# Patient Record
Sex: FEMALE | Race: WHITE | NOT HISPANIC OR LATINO | Employment: PART TIME | URBAN - METROPOLITAN AREA
[De-identification: names, ages, dates, MRNs, and addresses within clinical notes are randomized per-mention and may not be internally consistent; named-entity substitution may affect disease eponyms.]

---

## 2018-09-27 ENCOUNTER — HOSPITAL ENCOUNTER (EMERGENCY)
Facility: HOSPITAL | Age: 28
Discharge: HOME | End: 2018-09-27
Attending: EMERGENCY MEDICINE
Payer: MEDICAID

## 2018-09-27 VITALS
RESPIRATION RATE: 16 BRPM | SYSTOLIC BLOOD PRESSURE: 120 MMHG | OXYGEN SATURATION: 99 % | BODY MASS INDEX: 28.32 KG/M2 | HEART RATE: 88 BPM | WEIGHT: 150 LBS | TEMPERATURE: 98 F | HEIGHT: 61 IN | DIASTOLIC BLOOD PRESSURE: 60 MMHG

## 2018-09-27 DIAGNOSIS — J06.9 UPPER RESPIRATORY TRACT INFECTION, UNSPECIFIED TYPE: Primary | ICD-10-CM

## 2018-09-27 DIAGNOSIS — R05.9 COUGH: ICD-10-CM

## 2018-09-27 LAB
B-HCG UR QL: NEGATIVE
CTP QC/QA: YES

## 2018-09-27 PROCEDURE — 81025 URINE PREGNANCY TEST: CPT | Performed by: PHYSICIAN ASSISTANT

## 2018-09-27 PROCEDURE — 94640 AIRWAY INHALATION TREATMENT: CPT

## 2018-09-27 PROCEDURE — 99284 EMERGENCY DEPT VISIT MOD MDM: CPT | Mod: 25

## 2018-09-27 PROCEDURE — 99284 EMERGENCY DEPT VISIT MOD MDM: CPT | Mod: ,,, | Performed by: EMERGENCY MEDICINE

## 2018-09-27 PROCEDURE — 25000242 PHARM REV CODE 250 ALT 637 W/ HCPCS: Performed by: PHYSICIAN ASSISTANT

## 2018-09-27 PROCEDURE — 25000003 PHARM REV CODE 250: Performed by: PHYSICIAN ASSISTANT

## 2018-09-27 PROCEDURE — 27100098 HC SPACER

## 2018-09-27 RX ORDER — ALBUTEROL SULFATE 90 UG/1
2 AEROSOL, METERED RESPIRATORY (INHALATION)
Status: COMPLETED | OUTPATIENT
Start: 2018-09-27 | End: 2018-09-27

## 2018-09-27 RX ORDER — NAPROXEN 500 MG/1
500 TABLET ORAL 2 TIMES DAILY
COMMUNITY
End: 2019-01-23

## 2018-09-27 RX ORDER — BENZONATATE 100 MG/1
200 CAPSULE ORAL 3 TIMES DAILY PRN
Qty: 20 CAPSULE | Refills: 0 | Status: SHIPPED | OUTPATIENT
Start: 2018-09-27 | End: 2019-01-23

## 2018-09-27 RX ORDER — BENZONATATE 100 MG/1
200 CAPSULE ORAL
Status: COMPLETED | OUTPATIENT
Start: 2018-09-27 | End: 2018-09-27

## 2018-09-27 RX ORDER — ESCITALOPRAM OXALATE 20 MG/1
20 TABLET ORAL DAILY
COMMUNITY

## 2018-09-27 RX ORDER — IPRATROPIUM BROMIDE AND ALBUTEROL SULFATE 2.5; .5 MG/3ML; MG/3ML
3 SOLUTION RESPIRATORY (INHALATION)
Status: COMPLETED | OUTPATIENT
Start: 2018-09-27 | End: 2018-09-27

## 2018-09-27 RX ORDER — ALBUTEROL SULFATE 90 UG/1
2 AEROSOL, METERED RESPIRATORY (INHALATION) EVERY 6 HOURS PRN
Status: DISCONTINUED | OUTPATIENT
Start: 2018-09-27 | End: 2018-09-27

## 2018-09-27 RX ADMIN — IPRATROPIUM BROMIDE AND ALBUTEROL SULFATE 3 ML: .5; 3 SOLUTION RESPIRATORY (INHALATION) at 09:09

## 2018-09-27 RX ADMIN — BENZONATATE 200 MG: 100 CAPSULE ORAL at 10:09

## 2018-09-27 RX ADMIN — ALBUTEROL SULFATE 2 PUFF: 90 AEROSOL, METERED RESPIRATORY (INHALATION) at 10:09

## 2018-09-27 NOTE — ED PROVIDER NOTES
Encounter Date: 9/27/2018       History     Chief Complaint   Patient presents with    Cough     fever, congestion sunday monday. cough and wheezing started yesterday    Wheezing     The patient is a 28-year-old female presenting to the ER for evaluation of a cough.  Patient states the symptoms initially started with a subjective fever on Sunday.  She states that the cough started about 2 days ago.  Slightly productive.  Patient states that he is also wheezing and feels as though she can't catch her breath. Denies any sore throat, runny nose, ear pain or pressure.  No history of asthma or COPD.  Patient states that she has been taking Mucinex was only slight alleviation of her symptoms.  Denies any abdominal pain, vomiting. Patient's friend had a URI last week.  No recent antibiotic use.      The history is provided by the patient.     Review of patient's allergies indicates:  No Known Allergies  Past Medical History:   Diagnosis Date    Anxiety     Anxiety      Past Surgical History:   Procedure Laterality Date    LEG SURGERY       History reviewed. No pertinent family history.  Social History     Tobacco Use    Smoking status: Current Every Day Smoker     Types: Vaping with nicotine    Smokeless tobacco: Never Used   Substance Use Topics    Alcohol use: No     Frequency: Never    Drug use: No     Review of Systems   Constitutional: Positive for chills and fever.   HENT: Positive for congestion and rhinorrhea. Negative for ear pain and sore throat.    Respiratory: Positive for cough and shortness of breath.    Cardiovascular: Negative for chest pain and palpitations.   Gastrointestinal: Negative for abdominal pain, nausea and vomiting.   Musculoskeletal: Negative for myalgias.   Skin: Negative for rash.   Allergic/Immunologic: Negative for immunocompromised state.   Neurological: Negative for dizziness and weakness.   Hematological: Does not bruise/bleed easily.   Psychiatric/Behavioral: Negative for  confusion.       Physical Exam     Initial Vitals [09/27/18 0855]   BP Pulse Resp Temp SpO2   134/77 103 (!) 24 98.5 °F (36.9 °C) 97 %      MAP       --         Physical Exam    Constitutional: She appears well-developed and well-nourished. She is not diaphoretic. No distress.   HENT:   Head: Normocephalic and atraumatic.   Right Ear: Tympanic membrane normal.   Left Ear: Tympanic membrane normal.   Mouth/Throat: Uvula is midline, oropharynx is clear and moist and mucous membranes are normal.   Eyes: Conjunctivae and EOM are normal. Pupils are equal, round, and reactive to light.   Cardiovascular: Normal rate, regular rhythm, normal heart sounds and intact distal pulses.   Pulmonary/Chest: No respiratory distress. She has wheezes. She has rhonchi.   Neurological: She is alert and oriented to person, place, and time.   Skin: Skin is warm and dry.         ED Course   Procedures  Labs Reviewed   POCT URINE PREGNANCY          Imaging Results          X-Ray Chest PA And Lateral (Final result)  Result time 09/27/18 09:44:07    Final result by Evgeny Colorado Jr., MD (09/27/18 09:44:07)                 Impression:      No significant abnormality seen.      Electronically signed by: Evgeny Colorado MD  Date:    09/27/2018  Time:    09:44             Narrative:    EXAMINATION:  XR CHEST PA AND LATERAL    CLINICAL HISTORY:  Cough    TECHNIQUE:  PA and lateral views of the chest were performed.    COMPARISON:  None    FINDINGS:  Heart size and pulmonary vessels are normal.  The lungs are well aerated and clear.  No pleural fluid.  Bones are intact.                                       APC / Resident Notes:   Patient was seen in the ER promptly upon arrival.  She is afebrile, no acute distress. Physical examination reveals diffuse rhonchi and wheezing bilaterally.  Chest x-ray unremarkable for pneumonia.  Patient was given DuoNeb treatment in ED.    Upon reassessment lung sounds are heard better.  Wheezing has diminished.  She  was given albuterol inhaler and spacer in ED and was given instructions by respiratory.  Patient also prescribed home on Tessalon Perles for cough.  Symptoms likely secondary to viral etiology.  Advised increase handwashing.  She is stable for discharge at this time.    The care of this patient was overseen by attending physician who agrees with treatment, plan, and disposition.           Attending Attestation:     Physician Attestation Statement for NP/PA:   I have conducted a face to face encounter with this patient in addition to the NP/PA, due to Medical Complexity    Other NP/PA Attestation Additions:    History of Present Illness: 29 y/o female w/ URI symptoms and SOB x few days. Has sick contact. Afebrile. Nonproductive cough. No known intrinsic lung disease.   Physical Exam: Well appearing. Tachycardic. Bilateral wheezes that do not clear with cough.   Medical Decision Making: Mildly tachycardic. Afebrile. Normotensive. Duoneb x 1 here w/ improvement of symptoms. CXR viewed. No acute infiltrate, effusion or PTX on my read. Encourage fluids while sick. Supportive care for now. PA to provide rx for albuterol MDI PRN. Good for discharge. Return precautions discussed.                    Clinical Impression:   The primary encounter diagnosis was Upper respiratory tract infection, unspecified type. A diagnosis of Cough was also pertinent to this visit.      Disposition:   Disposition: Discharged  Condition: Stable (Good)                        Tammy Zazueta PA-C  09/27/18 [id number]       Jaswinder Boyle MD  09/28/18 [id number]

## 2018-09-27 NOTE — ED TRIAGE NOTES
Onset fever 4 day ago and began coughing and wheezing 2 days ago.  States she has bronchitis. Using mucinex. Denies sore throat or runny nose or ear pain . Had fever earlier.

## 2018-09-27 NOTE — DISCHARGE INSTRUCTIONS
Take medication as prescribed.  Continue taking Mucinex as previously.  Tylenol or Motrin for pain if needed.  Follow up with family doctor this week.  Return to the ER with concerning symptoms.    Use albuterol inhaler 3 times a day

## 2018-09-27 NOTE — ED NOTES
LOC: The patient is awake and alert; oriented x 3 and speaking appropriately.  APPEARANCE: Patient resting comfortably, patient is clean and well groomed  SKIN: warm and dry, normal skin turgor & moist mucus membranes, skin intact, no breakdown noted.  MUSCULOSKELETAL: Patient moving all extremities well, no obvious swelling or deformities noted  RESPIRATORY: Airway is open and patent, audible wheezing and moist cough, respirations are spontaneous, increased  effort and rate  CARDIAC: Patient has a normal rate, no peripheral edema noted, capillary refill < 3 seconds; No complaints of chest pain   ABDOMEN: Soft and denies dysuria or abd pain .

## 2019-01-22 ENCOUNTER — HOSPITAL ENCOUNTER (EMERGENCY)
Facility: OTHER | Age: 29
Discharge: HOME | End: 2019-01-22
Attending: EMERGENCY MEDICINE
Payer: MEDICAID

## 2019-01-22 VITALS
HEART RATE: 98 BPM | OXYGEN SATURATION: 96 % | TEMPERATURE: 98 F | HEIGHT: 61 IN | BODY MASS INDEX: 28.32 KG/M2 | SYSTOLIC BLOOD PRESSURE: 114 MMHG | DIASTOLIC BLOOD PRESSURE: 60 MMHG | RESPIRATION RATE: 18 BRPM | WEIGHT: 150 LBS

## 2019-01-22 DIAGNOSIS — M54.9 BACK PAIN: ICD-10-CM
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
## 2019-01-22 LAB
B-HCG UR QL: NEGATIVE
CTP QC/QA: YES

## 2019-01-22 PROCEDURE — 99283 EMERGENCY DEPT VISIT LOW MDM: CPT

## 2019-01-22 PROCEDURE — 81025 URINE PREGNANCY TEST: CPT | Performed by: EMERGENCY MEDICINE

## 2019-01-22 PROCEDURE — 25000003 PHARM REV CODE 250: Performed by: PHYSICIAN ASSISTANT

## 2019-01-22 RX ORDER — METHOCARBAMOL 500 MG/1
500 TABLET, FILM COATED ORAL 3 TIMES DAILY
Qty: 12 TABLET | Refills: 0 | Status: SHIPPED | OUTPATIENT
Start: 2019-01-22 | End: 2020-01-31 | Stop reason: CLARIF

## 2019-01-22 RX ORDER — METHOCARBAMOL 500 MG/1
1000 TABLET, FILM COATED ORAL
Status: COMPLETED | OUTPATIENT
Start: 2019-01-22 | End: 2019-01-22

## 2019-01-22 RX ORDER — ALPRAZOLAM 0.5 MG/1
0.5 TABLET ORAL 3 TIMES DAILY
COMMUNITY
End: 2023-04-12

## 2019-01-22 RX ADMIN — METHOCARBAMOL TABLETS 1000 MG: 500 TABLET, COATED ORAL at 06:01

## 2019-01-23 ENCOUNTER — HOSPITAL ENCOUNTER (EMERGENCY)
Facility: OTHER | Age: 29
Discharge: HOME | End: 2019-01-23
Attending: EMERGENCY MEDICINE
Payer: MEDICAID

## 2019-01-23 VITALS
SYSTOLIC BLOOD PRESSURE: 114 MMHG | RESPIRATION RATE: 18 BRPM | HEIGHT: 61 IN | WEIGHT: 150 LBS | BODY MASS INDEX: 28.32 KG/M2 | TEMPERATURE: 98 F | HEART RATE: 82 BPM | OXYGEN SATURATION: 97 % | DIASTOLIC BLOOD PRESSURE: 59 MMHG

## 2019-01-23 DIAGNOSIS — M54.2 NECK PAIN: ICD-10-CM

## 2019-01-23 DIAGNOSIS — S16.1XXA STRAIN OF NECK MUSCLE, INITIAL ENCOUNTER: Primary | ICD-10-CM

## 2019-01-23 LAB
B-HCG UR QL: NEGATIVE
CTP QC/QA: YES

## 2019-01-23 PROCEDURE — 81025 URINE PREGNANCY TEST: CPT | Performed by: PHYSICIAN ASSISTANT

## 2019-01-23 PROCEDURE — 96372 THER/PROPH/DIAG INJ SC/IM: CPT

## 2019-01-23 PROCEDURE — 99283 EMERGENCY DEPT VISIT LOW MDM: CPT | Mod: 25

## 2019-01-23 PROCEDURE — 63600175 PHARM REV CODE 636 W HCPCS: Performed by: PHYSICIAN ASSISTANT

## 2019-01-23 RX ORDER — KETOROLAC TROMETHAMINE 30 MG/ML
15 INJECTION, SOLUTION INTRAMUSCULAR; INTRAVENOUS
Status: COMPLETED | OUTPATIENT
Start: 2019-01-23 | End: 2019-01-23

## 2019-01-23 RX ADMIN — KETOROLAC TROMETHAMINE 15 MG: 30 INJECTION, SOLUTION INTRAMUSCULAR at 06:01
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
## 2019-01-24 ENCOUNTER — PES CALL (OUTPATIENT)
Dept: ADMINISTRATIVE | Facility: CLINIC | Age: 29
End: 2019-01-24
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
## 2019-01-30 ENCOUNTER — HOSPITAL ENCOUNTER (EMERGENCY)
Facility: OTHER | Age: 29
Discharge: HOME | End: 2019-01-30
Attending: EMERGENCY MEDICINE
Payer: MEDICAID

## 2019-01-30 VITALS
TEMPERATURE: 98 F | BODY MASS INDEX: 29.27 KG/M2 | OXYGEN SATURATION: 100 % | DIASTOLIC BLOOD PRESSURE: 62 MMHG | WEIGHT: 155 LBS | HEIGHT: 61 IN | SYSTOLIC BLOOD PRESSURE: 122 MMHG | HEART RATE: 84 BPM | RESPIRATION RATE: 16 BRPM

## 2019-01-30 DIAGNOSIS — S09.90XD INJURY OF HEAD, SUBSEQUENT ENCOUNTER: ICD-10-CM

## 2019-01-30 DIAGNOSIS — J32.9 SINUSITIS, UNSPECIFIED CHRONICITY, UNSPECIFIED LOCATION: Primary | ICD-10-CM

## 2019-01-30 DIAGNOSIS — S06.0X0D CONCUSSION WITHOUT LOSS OF CONSCIOUSNESS, SUBSEQUENT ENCOUNTER: ICD-10-CM

## 2019-01-30 LAB
B-HCG UR QL: NEGATIVE
CTP QC/QA: YES

## 2019-01-30 PROCEDURE — 99284 EMERGENCY DEPT VISIT MOD MDM: CPT | Mod: 25

## 2019-01-30 PROCEDURE — 81025 URINE PREGNANCY TEST: CPT | Performed by: EMERGENCY MEDICINE
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
## 2019-03-21 ENCOUNTER — HOSPITAL ENCOUNTER (EMERGENCY)
Facility: HOSPITAL | Age: 29
Discharge: HOME | End: 2019-03-22
Attending: EMERGENCY MEDICINE
Payer: MEDICAID

## 2019-03-21 VITALS
HEIGHT: 61 IN | TEMPERATURE: 98 F | RESPIRATION RATE: 18 BRPM | SYSTOLIC BLOOD PRESSURE: 119 MMHG | OXYGEN SATURATION: 98 % | BODY MASS INDEX: 30.21 KG/M2 | DIASTOLIC BLOOD PRESSURE: 73 MMHG | WEIGHT: 160 LBS | HEART RATE: 108 BPM
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
## 2019-03-21 DIAGNOSIS — M54.9 UPPER BACK PAIN ON LEFT SIDE: Primary | ICD-10-CM

## 2019-03-21 LAB
B-HCG UR QL: NEGATIVE
CTP QC/QA: YES

## 2019-03-21 PROCEDURE — 99282 PR EMERGENCY DEPT VISIT,LEVEL II: ICD-10-PCS | Mod: ,,, | Performed by: PHYSICIAN ASSISTANT

## 2019-03-21 PROCEDURE — 99283 EMERGENCY DEPT VISIT LOW MDM: CPT | Mod: 25

## 2019-03-21 PROCEDURE — 99282 EMERGENCY DEPT VISIT SF MDM: CPT | Mod: ,,, | Performed by: PHYSICIAN ASSISTANT

## 2019-03-21 PROCEDURE — 81025 URINE PREGNANCY TEST: CPT | Performed by: PHYSICIAN ASSISTANT

## 2019-03-21 NOTE — ED NOTES
LOC: The patient is awake, alert, and aware of environment. The patient is oriented x 3 and speaking appropriately.   APPEARANCE: No acute distress noted.   PSYCHOSOCIAL: Patient is calm and cooperative.   SKIN: The skin is warm, dry.   RESPIRATORY: Airway is open and patent. Bilateral chest rise and fall. Respirations are spontaneous, even and unlabored. Normal effort and rate noted. No accessory muscle use noted.   CARDIAC:  Denies chest pain or SOB.   ABDOMEN: Soft and non tender to palpation. No distention noted.   URINARY:  Voids independently.   NEUROLOGIC: Eyes open spontaneously. Speech clear. Tolerating saliva secretions well. Able to follow commands, demonstrating ability to actively and appropriately communicate within context of current conversation. Symmetrical facial muscles. Moving all extremities well. Movement is purposeful.   MUSCULOSKELETAL: No obvious deformities noted.
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
## 2019-08-15 ENCOUNTER — TELEPHONE (OUTPATIENT)
Dept: SPORTS MEDICINE | Facility: CLINIC | Age: 29
End: 2019-08-15

## 2019-08-29 ENCOUNTER — TELEPHONE (OUTPATIENT)
Dept: SPORTS MEDICINE | Facility: CLINIC | Age: 29
End: 2019-08-29

## 2019-08-29 NOTE — TELEPHONE ENCOUNTER
Attempted to telephone patient to reschedule 9/04/19, 8:00 am appointment to later in the day due to Dr. Henderson being called away from clinic. Unable to leave message (mailbox full).

## 2019-08-29 NOTE — TELEPHONE ENCOUNTER
Attempted to telephone in an effort to notify patient of their 9/04/19 appointment cancellation. No answer when telephoning.

## 2019-09-11 ENCOUNTER — HOSPITAL ENCOUNTER (OUTPATIENT)
Dept: RADIOLOGY | Facility: HOSPITAL | Age: 29
Discharge: HOME | End: 2019-09-11
Attending: ORTHOPAEDIC SURGERY
Payer: MEDICAID

## 2019-09-11 ENCOUNTER — OFFICE VISIT (OUTPATIENT)
Dept: SPORTS MEDICINE | Facility: CLINIC | Age: 29
End: 2019-09-11
Payer: MEDICAID

## 2019-09-11 VITALS
HEIGHT: 61 IN | BODY MASS INDEX: 30.21 KG/M2 | SYSTOLIC BLOOD PRESSURE: 114 MMHG | HEART RATE: 98 BPM | DIASTOLIC BLOOD PRESSURE: 70 MMHG | WEIGHT: 160 LBS

## 2019-09-11 DIAGNOSIS — M25.561 CHRONIC PAIN OF RIGHT KNEE: ICD-10-CM

## 2019-09-11 DIAGNOSIS — G89.29 CHRONIC MIDLINE LOW BACK PAIN WITHOUT SCIATICA: ICD-10-CM

## 2019-09-11 DIAGNOSIS — G89.29 CHRONIC PAIN OF RIGHT KNEE: ICD-10-CM

## 2019-09-11 DIAGNOSIS — M25.551 BILATERAL HIP PAIN: Primary | ICD-10-CM

## 2019-09-11 DIAGNOSIS — M25.551 BILATERAL HIP PAIN: ICD-10-CM

## 2019-09-11 DIAGNOSIS — M54.50 CHRONIC MIDLINE LOW BACK PAIN WITHOUT SCIATICA: ICD-10-CM

## 2019-09-11 DIAGNOSIS — M25.552 BILATERAL HIP PAIN: ICD-10-CM

## 2019-09-11 DIAGNOSIS — M46.1 SACROILIITIS: ICD-10-CM

## 2019-09-11 DIAGNOSIS — M21.70 LEG LENGTH DISCREPANCY: ICD-10-CM

## 2019-09-11 DIAGNOSIS — Q65.89 DDH (DEVELOPMENTAL DYSPLASIA OF THE HIP): ICD-10-CM

## 2019-09-11 DIAGNOSIS — M25.552 BILATERAL HIP PAIN: Primary | ICD-10-CM

## 2019-09-11 PROCEDURE — 99212 OFFICE O/P EST SF 10 MIN: CPT | Mod: PBBFAC,25,27,PO | Performed by: FAMILY MEDICINE

## 2019-09-11 PROCEDURE — 99999 PR PBB SHADOW E&M-EST. PATIENT-LVL V: ICD-10-PCS | Mod: PBBFAC,,, | Performed by: ORTHOPAEDIC SURGERY

## 2019-09-11 PROCEDURE — 73521 X-RAY EXAM HIPS BI 2 VIEWS: CPT | Mod: TC,FY,PO

## 2019-09-11 PROCEDURE — 77073 BONE LENGTH STUDIES: CPT | Mod: TC,FY,PO

## 2019-09-11 PROCEDURE — 72110 X-RAY EXAM L-2 SPINE 4/>VWS: CPT | Mod: TC,FY,PO

## 2019-09-11 PROCEDURE — 20611 DRAIN/INJ JOINT/BURSA W/US: CPT | Mod: PBBFAC,PO | Performed by: FAMILY MEDICINE

## 2019-09-11 PROCEDURE — 99499 UNLISTED E&M SERVICE: CPT | Mod: S$PBB,,, | Performed by: FAMILY MEDICINE

## 2019-09-11 PROCEDURE — 77073 BONE LENGTH STUDIES: CPT | Mod: 26,,, | Performed by: RADIOLOGY

## 2019-09-11 PROCEDURE — 72110 XR LUMBAR SPINE 5 VIEW WITH FLEX AND EXT: ICD-10-PCS | Mod: 26,,, | Performed by: RADIOLOGY

## 2019-09-11 PROCEDURE — 73560 X-RAY EXAM OF KNEE 1 OR 2: CPT | Mod: TC,FY,PO,RT

## 2019-09-11 PROCEDURE — 73521 XR HIPS BILATERAL 2 VIEW INCL AP PELVIS: ICD-10-PCS | Mod: 26,,, | Performed by: RADIOLOGY

## 2019-09-11 PROCEDURE — 99215 OFFICE O/P EST HI 40 MIN: CPT | Mod: PBBFAC,25,PO | Performed by: ORTHOPAEDIC SURGERY

## 2019-09-11 PROCEDURE — 99999 PR PBB SHADOW E&M-EST. PATIENT-LVL II: ICD-10-PCS | Mod: PBBFAC,,, | Performed by: FAMILY MEDICINE

## 2019-09-11 PROCEDURE — 72110 X-RAY EXAM L-2 SPINE 4/>VWS: CPT | Mod: 26,,, | Performed by: RADIOLOGY

## 2019-09-11 PROCEDURE — 73560 X-RAY EXAM OF KNEE 1 OR 2: CPT | Mod: 26,RT,, | Performed by: RADIOLOGY

## 2019-09-11 PROCEDURE — 73560 XR KNEE 1 OR 2 VIEW RIGHT: ICD-10-PCS | Mod: 26,RT,, | Performed by: RADIOLOGY

## 2019-09-11 PROCEDURE — 99204 OFFICE O/P NEW MOD 45 MIN: CPT | Mod: S$PBB,,, | Performed by: ORTHOPAEDIC SURGERY

## 2019-09-11 PROCEDURE — 99999 PR PBB SHADOW E&M-EST. PATIENT-LVL V: CPT | Mod: PBBFAC,,, | Performed by: ORTHOPAEDIC SURGERY

## 2019-09-11 PROCEDURE — 99499 NO LOS: ICD-10-PCS | Mod: S$PBB,,, | Performed by: FAMILY MEDICINE

## 2019-09-11 PROCEDURE — 20611 LARGE JOINT ASPIRATION/INJECTION: L HIP JOINT: ICD-10-PCS | Mod: S$PBB,LT,, | Performed by: FAMILY MEDICINE

## 2019-09-11 PROCEDURE — 99204 PR OFFICE/OUTPT VISIT, NEW, LEVL IV, 45-59 MIN: ICD-10-PCS | Mod: S$PBB,,, | Performed by: ORTHOPAEDIC SURGERY

## 2019-09-11 PROCEDURE — 77073 XR HIP TO ANKLE: ICD-10-PCS | Mod: 26,,, | Performed by: RADIOLOGY

## 2019-09-11 PROCEDURE — 73521 X-RAY EXAM HIPS BI 2 VIEWS: CPT | Mod: 26,,, | Performed by: RADIOLOGY

## 2019-09-11 PROCEDURE — 99999 PR PBB SHADOW E&M-EST. PATIENT-LVL II: CPT | Mod: PBBFAC,,, | Performed by: FAMILY MEDICINE

## 2019-09-11 RX ORDER — TRAMADOL HYDROCHLORIDE 50 MG/1
50 TABLET ORAL EVERY 12 HOURS PRN
Status: ON HOLD | COMMUNITY
End: 2020-02-27 | Stop reason: HOSPADM

## 2019-09-11 RX ORDER — TRIAMCINOLONE ACETONIDE 40 MG/ML
40 INJECTION, SUSPENSION INTRA-ARTICULAR; INTRAMUSCULAR
Status: DISCONTINUED | OUTPATIENT
Start: 2019-09-11 | End: 2019-09-11 | Stop reason: HOSPADM

## 2019-09-11 RX ORDER — DICLOFENAC SODIUM 50 MG/1
50 TABLET, DELAYED RELEASE ORAL 2 TIMES DAILY
Status: ON HOLD | COMMUNITY
End: 2020-02-27 | Stop reason: HOSPADM

## 2019-09-11 RX ORDER — GABAPENTIN 300 MG/1
300 CAPSULE ORAL 3 TIMES DAILY
Status: ON HOLD | COMMUNITY
End: 2020-02-27 | Stop reason: HOSPADM

## 2019-09-11 RX ADMIN — TRIAMCINOLONE ACETONIDE 40 MG: 40 INJECTION, SUSPENSION INTRA-ARTICULAR; INTRAMUSCULAR at 01:09

## 2019-09-11 NOTE — PROGRESS NOTES
Subjective:          Chief Complaint: Scarlett Knox is a 29 y.o. female who had concerns including Pain of the Left Hip and Pain of the Right Hip.    Patient is kind of females refer to Dr. Rios by Dr. Parrish.  Patient presents with primary complaint of low back pain and radiculopathy into the right lower extremity with associated limp.  Patient reports a history of bilateral developmental dysplasia of the hip, with multiple surgeries of the hip and the knees during childhood.  She reports that her low back pain and limp has significantly progressed over the past few months, and is limiting her ability to walk.  She can only walk a couple feet before she has to stop due to low back pain and deconditioning.  She is on gabapentin.  She denies any lumbar injections. Denies any groin pain. Reports that she had a right shoe lift due to leg length discrepancy, which she has not worn over the past few years because she lost it. She reports that her limp is significantly impairing her ability to go up and down stairs.  She reports pain and numbness going into the posterior thigh and calf and the plantar foot on the right.          Review of Systems   Constitution: Negative.   HENT: Negative.    Eyes: Negative.    Cardiovascular: Negative.    Respiratory: Negative.    Endocrine: Negative.    Hematologic/Lymphatic: Negative.    Skin: Negative.    Musculoskeletal: Positive for joint pain.   Gastrointestinal: Negative.    Genitourinary: Negative.    Neurological: Negative.    Psychiatric/Behavioral: Negative.    Allergic/Immunologic: Negative.                    Objective:        General: Scarlett is well-developed, well-nourished, appears stated age, in no acute distress, alert and oriented to time, place and person.     General    Vitals reviewed.  Constitutional: She is oriented to person, place, and time. She appears well-developed and well-nourished. No distress.   HENT:   Mouth/Throat: No oropharyngeal exudate.    Eyes: Right eye exhibits no discharge. Left eye exhibits no discharge.   Neck: Normal range of motion.   Cardiovascular: Normal rate.    Pulmonary/Chest: Effort normal and breath sounds normal. No respiratory distress.   Neurological: She is alert and oriented to person, place, and time. She has normal reflexes. No cranial nerve deficit. Coordination normal.   Psychiatric: She has a normal mood and affect. Her behavior is normal. Judgment and thought content normal.     General Musculoskeletal Exam   Gait: abnormal and antalgic   Pelvic Obliquity: none      Right Knee Exam     Inspection   Alignment:  normal  Effusion: absent    Left Knee Exam     Inspection   Alignment:  normal  Effusion: absent    Right Hip Exam     Inspection   Scars: absent  Swelling: absent  Bruising: absent  No deformity of hip.  Quadriceps Atrophy:  Negative  Erythema: absent    Range of Motion   Abduction:  40 normal   Adduction:  20 normal   Extension:  0 normal   Flexion:  90 normal   External rotation:  10 normal   Internal rotation:  15 normal     Tests   Pain w/ forced internal rotation (SAFIA): absent  Pain w/ forced external rotation (FADIR): absent  Frank: negative  Trendelenburg Test: negative  Circumduction test: negative  Stinchfield test: negative  Log Roll: negative  Snapping Hip (internal): negative  Step-down test: negative  Resisted sit-up pain: negative  Unresisted sit-up pain: negative  Resisted sit-up pain with adductor contraction pain: negative    Other   Sensation: normal    Comments:  Right leg is about 2 cm shorter than the left.  Left Hip Exam     Inspection   Scars: absent  Swelling: absent  No deformity of hip.  Quadriceps Atrophy:  negative  Erythema: absent  Bruising: absent    Tenderness   The patient tender to palpation of the SI joint.    Range of Motion   Abduction:  40 normal   Adduction:  20 normal   Extension:  0 normal   Flexion:  100 normal   External rotation:  20 normal   Internal rotation: 15 normal      Tests   Pain w/ forced internal rotation (SAFIA): absent  Pain w/ forced external rotation (FADIR): absent  Frank: negative  Trendelenburg Test: negative  Circumduction test: negative  Stinchfield test: negative  Log Roll: negative  Snapping Hip (internal): negative  Step-down test: negative  Resisted sit-up pain: negative  Resisted sit-up pain with adductor contraction pain: negative  Unresisted sit-up pain: negative    Other   Sensation: normal      Back (L-Spine & T-Spine) / Neck (C-Spine) Exam     Back (L-Spine & T-Spine) Range of Motion   The patient has abnormal back ROM.    Comments:  5/5 strength in the L2 to S1 nerve distributions.  Sensation 2/2 L2-S1 distributions. 3+ deep tendon reflexes for the Achilles and patellar tendons.  Positive Kamari's test on the Left, negative on the Right      Muscle Strength   Right Lower Extremity   Hip Abduction: 5/5   Hip Adduction: 5/5   Hip Flexion: 5/5   Left Lower Extremity   Hip Abduction: 5/5   Hip Adduction: 5/5   Hip Flexion: 5/5     Reflexes     Left Side  Quadriceps:  2+  Achilles:  2+    Right Side   Quadriceps:  2+  Achilles:  2+    Vascular Exam     Right Pulses  Dorsalis Pedis:      2+  Posterior Tibial:      2+        Left Pulses  Dorsalis Pedis:      2+  Posterior Tibial:      2+        Capillary Refill  Right Hand: normal capillary refill  Left Hand: normal capillary refill    Edema  Right Upper Leg: absent  Left Upper Leg: absent    IMAGING  AP of the pelvis with bilateral frog-leg laterals from today was personally reviewed and reveals developmental dysplasia of the hip with minimal femoral neck offset. There is bilateral subchondral sclerosis, however joint space is well preserved.  No evidence of fractures or dislocations.    AP and lateral flexion-extension x-rays of the lumbar spine from today was personally reviewed, and reveals no evidence of spondylolisthesis or spondylolysis.  All 3 columns are well aligned.  No evidence of degenerative disc  disease.        Assessment:       Encounter Diagnoses   Name Primary?    Bilateral hip pain Yes    Chronic midline low back pain without sciatica     DDH (developmental dysplasia of the hip)     Chronic pain of right knee     Sacroiliitis     Leg length discrepancy           Plan:         1. Juarez Hip Score was filled out today in clinic.     RTC in 3 months with Maria Ragland PA-C. Patient will fill out Juarez Hip Score on return.    2. Physical Therapy referral: Lumbar and pelvic mobilizations, and Left iliotibial band stretching on the Left.    [facility].    4. Referral to Orthotist for Shoe lift on the Right (3.5 cm).              [facility] patient questionnaires have been collected today.

## 2019-09-11 NOTE — PROGRESS NOTES
Scarlett Knox, a 29 y.o. female, is here for evaluation of SI Joint/Lumbar spine.     History of Present Illness (HPI): low back pain and radiculopathy into the right lower extremity with associated limp.  Patient reports a history of bilateral developmental dysplasia of the hip, with multiple surgeries of the hip and the knees during childhood which she had multiple surgeries in childhood to attempt correction.     Location: SI Joint   [Onset] duration: Chronic, significant progression of the past few months  [Palliative] modifying factors:    oral analgesics - Gabapentin   [Provocative] modifying factors:    ambulating more than a few feet   going up and down stairs  Prior:  has a past surgical history that includes Leg Surgery.  Progression: worsening discomfot  Quality:    sharp  [Radiation] associated signs and symptoms: anterior thigh numbness  Severity: per nursing documentation  [Trauma] context: none specific     Review of systems (ROS):  A 10+ review of systems was performed with pertinent positives and negatives noted above in the history of present illness. Other systems were negative unless otherwise specified.      PHYSICAL EXAMINATION  General:  The patient is alert and oriented x 3.  Mood is pleasant.  Observation of ears, eyes and nose reveal no gross abnormalities.  HEENT: NCAT, sclera nonicteric  Lungs: Respirations are equal and unlabored.   Gait is coordinated. Patient can toe walk and heel walk without difficulty.    HIP/PELVIS EXAMINATION    Observation/Inspection  Gait:   Nonantalgic   Alignment:  Neutral   Scars:   None   Muscle atrophy: None   Effusion:  None   Warmth:  None   Discoloration:   None   Leg lengths:   Equal   Pelvis:   Level     Tenderness/Crepitus (T/C):      T / C  Trochanteric bursa   - / -  Piriformis    - / -  SI joint    - / -  Psoas tendon   - / -  Rectus insertion  - / -  Adductor insertion  - / -  Pubic symphysis  - / -    ROM: (* = pain)    Flexion:      120  degrees  External rotation:   40 degrees  Internal rotation with axial load:  30 degrees  Internal rotation without axial load:  40 degrees  Abduction:    45 degrees  Adduction:     20 degrees    Special Tests:  Pain w/ forced internal rotation (FADIR):  -   Pain w/ forced external rotation (SAFIA):  -   Circumduction test:     -  Stinchfield test:     -   Log roll:       -   Snapping hip (internal):    -   Sit-up pain:      -   Resisted sit-up pain:     -   Resisted sit-up with adductor contraction pain:  -   Step-down test:     +  Trendelenburg test:     -  Bridge test      +     Extremity Neuro-vascular Examination:   Sensation:  Grossly intact to light touch all dermatomal regions.   Motor Function:  Fully intact motor function at hip, knee, foot and ankle    DTRs;  quadriceps and  achilles 2+.  No clonus and downgoing Babinski.    Vascular status:  DP and PT pulses 2+, brisk capillary refill, symmetric.    Skin:  intact, compartments soft.    Other Findings:

## 2019-09-11 NOTE — PROCEDURES
"Large Joint Aspiration/Injection: L hip joint  Date/Time: 9/11/2019 1:10 PM  Performed by: Papo Van MD  Authorized by: Papo Van MD     Consent Done?:  Yes (Verbal)  Indications:  Pain  Procedure site marked: Yes    Timeout: Prior to procedure the correct patient, procedure, and site was verified      Location:  Hip  Site:  L hip joint  Prep: Patient was prepped and draped in usual sterile fashion    Needle size:  20 G  Ultrasonic Guidance for needle placement: Yes  Images are saved and documented.  Approach:  Posterior  Medications:  40 mg triamcinolone acetonide 40 mg/mL  Patient tolerance:  Patient tolerated the procedure well with no immediate complications    Additional Comments: Sacroiliac joint  Description of ultrasound utilization for needle guidance:   Ultrasound guidance used for needle localization. Images saved and stored for documentation. The sacroiliac joint was visualized. Dynamic visualization of the 20g x 3.5" needle was continuous throughout the procedure.      "

## 2019-09-11 NOTE — LETTER
September 11, 2019      Pat Henderson MD  [address]  [place] [address]           [facility]  [address]  [place] [address]  Phone: [phone]          Patient: Scarlett Knox   MR Number: [id number]   YOB: 1990   Date of Visit: 9/11/2019       Dear Dr. Pat Henderson:    Thank you for referring Scarlett Knox to me for evaluation. Attached you will find relevant portions of my assessment and plan of care.    If you have questions, please do not hesitate to call me. I look forward to following Scarlett Knox along with you.    Sincerely,    Papo Van MD    Enclosure  CC:  No Recipients    If you would like to receive this communication electronically, please contact [email or web address] or [phone] to request more information on [email or web address] Link access.    For providers and/or their staff who would like to refer a patient to [facility], please contact us through our one-stop-shop provider referral line, [facility], at [phone].    If you feel you have received this communication in error or would no longer like to receive these types of communications, please e-mail [email or web address]

## 2019-09-26 ENCOUNTER — CLINICAL SUPPORT (OUTPATIENT)
Dept: REHABILITATION | Facility: HOSPITAL | Age: 29
End: 2019-09-26
Attending: ORTHOPAEDIC SURGERY
Payer: MEDICAID

## 2019-09-26 DIAGNOSIS — M25.551 BILATERAL HIP PAIN: Primary | ICD-10-CM

## 2019-09-26 DIAGNOSIS — M25.552 BILATERAL HIP PAIN: Primary | ICD-10-CM

## 2019-09-26 PROCEDURE — 97161 PT EVAL LOW COMPLEX 20 MIN: CPT

## 2019-09-26 PROCEDURE — 97110 THERAPEUTIC EXERCISES: CPT
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
## 2019-09-30 ENCOUNTER — CLINICAL SUPPORT (OUTPATIENT)
Dept: REHABILITATION | Facility: HOSPITAL | Age: 29
End: 2019-09-30
Attending: ORTHOPAEDIC SURGERY
Payer: MEDICAID

## 2019-09-30 DIAGNOSIS — M25.551 BILATERAL HIP PAIN: ICD-10-CM

## 2019-09-30 DIAGNOSIS — M25.552 BILATERAL HIP PAIN: ICD-10-CM

## 2019-09-30 PROCEDURE — 97110 THERAPEUTIC EXERCISES: CPT

## 2019-09-30 NOTE — PROGRESS NOTES
"Physical Therapy Daily Treatment Note     Name: Scarlett Knox  Clinic Number: [id number]    Therapy Diagnosis:   Encounter Diagnosis   Name Primary?    Bilateral hip pain      Physician: Pat Henderson MD    Visit Date: 9/30/2019  Physician Orders: PT Eval and Treat   Medical Diagnosis from Referral:   M25.551,M25.552 (ICD-10-CM) - Bilateral hip pain   M54.5,G89.29 (ICD-10-CM) - Chronic midline low back pain without sciatica   Q65.89 (ICD-10-CM) - DDH (developmental dysplasia of the hip)   M25.561,G89.29 (ICD-10-CM) - Chronic pain of right knee   M46.1 (ICD-10-CM) - Sacroiliitis   M21.70 (ICD-10-CM) - Leg length discrepancy         Evaluation Date: 9/26/2019  Authorization Period Expiration: 9/30/19  Plan of Care Expiration: 12/19/19  Visit # / Visits authorized: 2/ 1      Time In: 2:00  Time Out: 3:00  Total Billable Time: 23 minutes    Precautions: Standard    Subjective     Pt reports: rolled my ankle over the weakend  She was not compliant with home exercise program.  Response to previous treatment: none  Functional change: none    Pain: 0/10 (number not verb)  Location: right hip and back      Objective     Scarlett received therapeutic exercises to develop strength, endurance, ROM and core stabilization for 60 minutes including:  Bike x 10 min  Seated HS S  Prone quad/hip flexor stretch with strap x30 sec B  Slump glides 2x10 R  Prone press ups x10 (decreased radicular symptoms)  Clamshells with neutral hip 15x: 5" hold  SLR 3x10  SL hip iso hold 5x5"    Add next session:  Edgar Pantoja participated in dynamic functional therapeutic activities to improve functional performance for 00  minutes, including:      Scarlett participated in gait training to improve functional mobility and safety for 00  minutes, including:      received the following manual therapy techniques: Joint mobilizations and Soft tissue Mobilization were applied to the: R LE for 00 minutes, including:          Home Exercises " Provided and Patient Education Provided     Education provided:   - cont HEP, Importance    Written Home Exercises Provided: Patient instructed to cont prior HEP.  Exercises were reviewed and Scarlett was able to demonstrate them prior to the end of the session.  Scarlett demonstrated good  understanding of the education provided.     See EMR under Patient Instructions for exercises provided prior visit.    Assessment     Pt has not been compliant w/ HEP since eval 2* having company in town. Ed on importance of performing HEP daily. Strengthening ex challenging for pt 2* decreased strength and endurance. Back pain noted w/ laying on stomach.   Scarlett is progressing well towards her goals.   Pt prognosis is Good.     Pt will continue to benefit from skilled outpatient physical therapy to address the deficits listed in the problem list box on initial evaluation, provide pt/family education and to maximize pt's level of independence in the home and community environment.     Pt's spiritual, cultural and educational needs considered and pt agreeable to plan of care and goals.    Anticipated barriers to physical therapy: Good    Goals: Short Term Goals: 4 weeks   - Pt will increase ROM to 110 deg R hip flex, 30 deg L hip IR, 30 deg R hip ER for more functional mobility  - Pt will increase strength to 4/5 B hip abd to improve gait and WB tolerance  - Decrease Pain to 4/10 as worst with standing/walking for 5 minutes  - Pt to self correct posture with minimal cues  - Pt independent with HEP with progressions.      Long Term Goals (12 Weeks):  - Pt will tolerate walking 200ft without an AD  - Pt will increase strength to 4+/5 BLE to tolerate ascending and descending stairs without major compensations  - Decrease Pain to 2/10 as worst with beginner yoga classes to improve muscle flexibility  - Pt to return to 60% PLOF    Plan     Cont w/ poc to to focus on core/LE strength as well as hip ROM.    Regina Montana, PTA

## 2019-10-03 ENCOUNTER — CLINICAL SUPPORT (OUTPATIENT)
Dept: REHABILITATION | Facility: HOSPITAL | Age: 29
End: 2019-10-03
Attending: ORTHOPAEDIC SURGERY
Payer: MEDICAID

## 2019-10-03 DIAGNOSIS — M25.551 BILATERAL HIP PAIN: ICD-10-CM

## 2019-10-03 DIAGNOSIS — M25.552 BILATERAL HIP PAIN: ICD-10-CM

## 2019-10-03 PROCEDURE — 97110 THERAPEUTIC EXERCISES: CPT

## 2019-10-03 NOTE — PROGRESS NOTES
"Physical Therapy Daily Treatment Note     Name: Scarlett Knox  Clinic Number: [id number]    Therapy Diagnosis:   Encounter Diagnosis   Name Primary?    Bilateral hip pain      Physician: Pat Henderson MD    Visit Date: 10/3/2019  Physician Orders: PT Eval and Treat   Medical Diagnosis from Referral:   M25.551,M25.552 (ICD-10-CM) - Bilateral hip pain   M54.5,G89.29 (ICD-10-CM) - Chronic midline low back pain without sciatica   Q65.89 (ICD-10-CM) - DDH (developmental dysplasia of the hip)   M25.561,G89.29 (ICD-10-CM) - Chronic pain of right knee   M46.1 (ICD-10-CM) - Sacroiliitis   M21.70 (ICD-10-CM) - Leg length discrepancy         Evaluation Date: 9/26/2019  Authorization Period Expiration: 9/30/19  Plan of Care Expiration: 12/19/19  Visit # / Visits authorized: 3/ 1      Time In: 1:15 pm  Time Out: 200 pm  Total Billable Time: 45 minutes    Precautions: Standard    Subjective     Pt reports: she is fatigued and sore today.  She was not compliant with home exercise program.  Response to previous treatment: none  Functional change: none    Pain: 0/10 (number not verb)  Location: right hip and back      Objective     Scarlett received therapeutic exercises to develop strength, endurance, ROM and core stabilization for 60 minutes including:  Bike x 10 min-np  LTR x3 min  DKC x3 min  Seated HS S x1 min B  Prone quad/hip flexor stretch with strap x30 sec B-np  Slump glides 2x10 R-np  Prone press ups x10 (decreased radicular symptoms)-np  Clamshells with neutral hip 15x: 5" hold-np  SLR 2x10 B  Hip abd and add pilates ring isometerics 10 sec x10 ea way  Bridges 2x10  SL IR with ball btwn knees 10 sec x10        Home Exercises Provided and Patient Education Provided     Education provided:   - cont HEP, Importance    Written Home Exercises Provided: Patient instructed to cont prior HEP.  Exercises were reviewed and Scarlett was able to demonstrate them prior to the end of the session.  Scarlett demonstrated good  " understanding of the education provided.     See EMR under Patient Instructions for exercises provided prior visit.    Assessment     Pt treatment slightly limited secondary to patient starting treatment with a lot of fatigue and soreness. She was able to tolerate all exercises otherwise. Plan to progress pending patient energy level next visit.  Scarlett is progressing well towards her goals.   Pt prognosis is Good.     Pt will continue to benefit from skilled outpatient physical therapy to address the deficits listed in the problem list box on initial evaluation, provide pt/family education and to maximize pt's level of independence in the home and community environment.     Pt's spiritual, cultural and educational needs considered and pt agreeable to plan of care and goals.    Anticipated barriers to physical therapy: Good    Goals: Short Term Goals: 4 weeks   - Pt will increase ROM to 110 deg R hip flex, 30 deg L hip IR, 30 deg R hip ER for more functional mobility (Progressing, not met)  - Pt will increase strength to 4/5 B hip abd to improve gait and WB tolerance (Progressing, not met)  - Decrease Pain to 4/10 as worst with standing/walking for 5 minutes (Progressing, not met)  - Pt to self correct posture with minimal cues (Progressing, not met)  - Pt independent with HEP with progressions.  (Progressing, not met)     Long Term Goals (12 Weeks):  - Pt will tolerate walking 200ft without an AD (Progressing, not met)  - Pt will increase strength to 4+/5 BLE to tolerate ascending and descending stairs without major compensations (Progressing, not met)  - Decrease Pain to 2/10 as worst with beginner yoga classes to improve muscle flexibility (Progressing, not met)  - Pt to return to 60% PLOF (Progressing, not met)    Plan     Cont w/ poc to to focus on core/LE strength as well as hip ROM.    Angelica Álvarez, PT

## 2019-10-10 ENCOUNTER — CLINICAL SUPPORT (OUTPATIENT)
Dept: REHABILITATION | Facility: HOSPITAL | Age: 29
End: 2019-10-10
Attending: ORTHOPAEDIC SURGERY
Payer: MEDICAID

## 2019-10-10 DIAGNOSIS — M25.551 BILATERAL HIP PAIN: ICD-10-CM

## 2019-10-10 DIAGNOSIS — M25.552 BILATERAL HIP PAIN: ICD-10-CM

## 2019-10-10 PROCEDURE — 97110 THERAPEUTIC EXERCISES: CPT

## 2019-10-10 NOTE — PROGRESS NOTES
"Physical Therapy Daily Treatment Note     Name: Scarlett Knox  Clinic Number: [id number]    Therapy Diagnosis:   Encounter Diagnosis   Name Primary?    Bilateral hip pain      Physician: Pat Henderson MD    Visit Date: 10/10/2019  Physician Orders: PT Eval and Treat   Medical Diagnosis from Referral:   M25.551,M25.552 (ICD-10-CM) - Bilateral hip pain   M54.5,G89.29 (ICD-10-CM) - Chronic midline low back pain without sciatica   Q65.89 (ICD-10-CM) - DDH (developmental dysplasia of the hip)   M25.561,G89.29 (ICD-10-CM) - Chronic pain of right knee   M46.1 (ICD-10-CM) - Sacroiliitis   M21.70 (ICD-10-CM) - Leg length discrepancy         Evaluation Date: 9/26/2019  Authorization Period Expiration: 9/30/19  Plan of Care Expiration: 12/19/19  Visit # / Visits authorized: 4/ 1      Time In: 11:06 pm  Time Out: 11:50 pm  Total Billable Time: 44 minutes    Precautions: Standard    Subjective     Pt reports: Got new shoe w/ lift. Gluts are very sore and sharp pains in hip  She was not compliant with home exercise program.  Response to previous treatment: none  Functional change: none    Pain: 0/10 (number not verb)  Location: right hip and back      Objective     Scarlett received therapeutic exercises to develop strength, endurance, ROM and core stabilization for 44 minutes including:  Bike x 10 min-np  LTR x3 min  DKC x3 min  Seated HS S x1 min B  Prone quad/hip flexor stretch with strap x30 sec B-np  Slump glides 2x10 R-np  Prone press ups x10 (decreased radicular symptoms)-np  Clamshells with neutral hip 15x: 5" hold-np  SLR 2x10 B  Hip abd and add pilates ring isometerics 10 sec x10 ea way  Bridges 2x10  SL IR with ball btwn knees 10 sec x10        Home Exercises Provided and Patient Education Provided     Education provided:   - cont HEP, Importance    Written Home Exercises Provided: Patient instructed to cont prior HEP.  Exercises were reviewed and Scarlett was able to demonstrate them prior to the end of the " session.  Scarlett demonstrated good  understanding of the education provided.     See EMR under Patient Instructions for exercises provided prior visit.    Assessment     Session was not advanced this session 2* pt getting shoe lift and wearing full day resulting in hip pain/soreness. ED pt on sched shoe wearing to prevent discomfort. Nerve sensation is better on R LE but now is feeling it on L LE but not as bad as the R was. VC needed to engage core throughout session to prevent LBP.  Scarlett is progressing well towards her goals.   Pt prognosis is Good.     Pt will continue to benefit from skilled outpatient physical therapy to address the deficits listed in the problem list box on initial evaluation, provide pt/family education and to maximize pt's level of independence in the home and community environment.     Pt's spiritual, cultural and educational needs considered and pt agreeable to plan of care and goals.    Anticipated barriers to physical therapy: Good    Goals: Short Term Goals: 4 weeks   - Pt will increase ROM to 110 deg R hip flex, 30 deg L hip IR, 30 deg R hip ER for more functional mobility (Progressing, not met)  - Pt will increase strength to 4/5 B hip abd to improve gait and WB tolerance (Progressing, not met)  - Decrease Pain to 4/10 as worst with standing/walking for 5 minutes (Progressing, not met)  - Pt to self correct posture with minimal cues (Progressing, not met)  - Pt independent with HEP with progressions.  (Progressing, not met)     Long Term Goals (12 Weeks):  - Pt will tolerate walking 200ft without an AD (Progressing, not met)  - Pt will increase strength to 4+/5 BLE to tolerate ascending and descending stairs without major compensations (Progressing, not met)  - Decrease Pain to 2/10 as worst with beginner yoga classes to improve muscle flexibility (Progressing, not met)  - Pt to return to 60% PLOF (Progressing, not met)    Plan     Cont w/ poc to to focus on core/LE strength as  well as hip ROM.    Regina Montana, PTA

## 2019-10-15 ENCOUNTER — CLINICAL SUPPORT (OUTPATIENT)
Dept: REHABILITATION | Facility: HOSPITAL | Age: 29
End: 2019-10-15
Attending: ORTHOPAEDIC SURGERY
Payer: MEDICAID

## 2019-10-15 DIAGNOSIS — M25.552 BILATERAL HIP PAIN: ICD-10-CM

## 2019-10-15 DIAGNOSIS — M25.551 BILATERAL HIP PAIN: ICD-10-CM

## 2019-10-15 PROCEDURE — 97110 THERAPEUTIC EXERCISES: CPT

## 2019-10-15 NOTE — PROGRESS NOTES
"Physical Therapy Daily Treatment Note     Name: Scarlett Knox  Clinic Number: [id number]    Therapy Diagnosis:   Encounter Diagnosis   Name Primary?    Bilateral hip pain      Physician: Pat Henderson MD    Visit Date: 10/15/2019  Physician Orders: PT Eval and Treat   Medical Diagnosis from Referral:   M25.551,M25.552 (ICD-10-CM) - Bilateral hip pain   M54.5,G89.29 (ICD-10-CM) - Chronic midline low back pain without sciatica   Q65.89 (ICD-10-CM) - DDH (developmental dysplasia of the hip)   M25.561,G89.29 (ICD-10-CM) - Chronic pain of right knee   M46.1 (ICD-10-CM) - Sacroiliitis   M21.70 (ICD-10-CM) - Leg length discrepancy         Evaluation Date: 9/26/2019  Authorization Period Expiration: 9/30/19  Plan of Care Expiration: 12/19/19  Visit # / Visits authorized: 5/ 1      Time In: 11:14 pm  Time Out: 12:00 pm  Total Billable Time: 23 minutes    Precautions: Standard    Subjective     Pt reports: sciatic pain is better since wearing the shoes. Gluts still sore  She was not compliant with home exercise program.  Response to previous treatment: none  Functional change: none    Pain: 0/10 (number not verb)  Location: right hip and back      Objective     Scarlett received therapeutic exercises to develop strength, endurance, ROM and core stabilization for 46 minutes including:  Bike x 10 min lvl4  LTR x3 min  DKC x3 min  Seated HS S x1 min B  Prone quad/hip flexor stretch with strap x30 sec B-np  Slump glides 2x10 R-np  Prone press ups x10 (decreased radicular symptoms)-np  Clamshells with neutral hip 15x: 5" hold-np  SLR 2x10 B  Hip abd and add pilates ring isometerics 10 sec x10 ea way  Bridges 3x10: 3" hold  SL IR with ball btwn knees 10 sec x10-NP        Home Exercises Provided and Patient Education Provided     Education provided:   - cont HEP, Importance    Written Home Exercises Provided: Patient instructed to cont prior HEP.  Exercises were reviewed and Scarlett was able to demonstrate them prior to the end " of the session.  Scarlett demonstrated good  understanding of the education provided.     See EMR under Patient Instructions for exercises provided prior visit.    Assessment     Unable to perform all maurice 2* time. Reintroduced bike today w/ good carlita. Pt cont to have soreness B gluts but states that the sciatic pain has reduced since wearing shoe lift. Difficulty completing bridges today 2* hurting neck a couple of days ago.   Scarlett is progressing well towards her goals.   Pt prognosis is Good.     Pt will continue to benefit from skilled outpatient physical therapy to address the deficits listed in the problem list box on initial evaluation, provide pt/family education and to maximize pt's level of independence in the home and community environment.     Pt's spiritual, cultural and educational needs considered and pt agreeable to plan of care and goals.    Anticipated barriers to physical therapy: Good    Goals: Short Term Goals: 4 weeks   - Pt will increase ROM to 110 deg R hip flex, 30 deg L hip IR, 30 deg R hip ER for more functional mobility (Progressing, not met)  - Pt will increase strength to 4/5 B hip abd to improve gait and WB tolerance (Progressing, not met)  - Decrease Pain to 4/10 as worst with standing/walking for 5 minutes (Progressing, not met)  - Pt to self correct posture with minimal cues (Progressing, not met)  - Pt independent with HEP with progressions.  (Progressing, not met)     Long Term Goals (12 Weeks):  - Pt will tolerate walking 200ft without an AD (Progressing, not met)  - Pt will increase strength to 4+/5 BLE to tolerate ascending and descending stairs without major compensations (Progressing, not met)  - Decrease Pain to 2/10 as worst with beginner yoga classes to improve muscle flexibility (Progressing, not met)  - Pt to return to 60% PLOF (Progressing, not met)    Plan     Cont w/ poc to to focus on core/LE strength as well as hip ROM.    Regina Montana, PTA

## 2019-10-22 ENCOUNTER — CLINICAL SUPPORT (OUTPATIENT)
Dept: REHABILITATION | Facility: HOSPITAL | Age: 29
End: 2019-10-22
Attending: ORTHOPAEDIC SURGERY
Payer: MEDICAID

## 2019-10-22 DIAGNOSIS — M25.552 BILATERAL HIP PAIN: ICD-10-CM

## 2019-10-22 DIAGNOSIS — M25.551 BILATERAL HIP PAIN: ICD-10-CM

## 2019-10-22 PROCEDURE — 97110 THERAPEUTIC EXERCISES: CPT

## 2019-10-22 NOTE — PROGRESS NOTES
"Physical Therapy Daily Treatment Note     Name: Scarlett Knox  Clinic Number: [id number]    Therapy Diagnosis:   Encounter Diagnosis   Name Primary?    Bilateral hip pain      Physician: Pat Henderson MD    Visit Date: 10/22/2019  Physician Orders: PT Eval and Treat   Medical Diagnosis from Referral:   M25.551,M25.552 (ICD-10-CM) - Bilateral hip pain   M54.5,G89.29 (ICD-10-CM) - Chronic midline low back pain without sciatica   Q65.89 (ICD-10-CM) - DDH (developmental dysplasia of the hip)   M25.561,G89.29 (ICD-10-CM) - Chronic pain of right knee   M46.1 (ICD-10-CM) - Sacroiliitis   M21.70 (ICD-10-CM) - Leg length discrepancy         Evaluation Date: 9/26/2019  Authorization Period Expiration: 9/30/19  Plan of Care Expiration: 12/19/19  Visit # / Visits authorized: 5/ 20      Time In: 1100 pm  Time Out: 1200 pm  Total Billable Time: 44 minutes    Precautions: Standard    Subjective     Pt reports: still having glut soreness. Shoes are helping w/ nerve symptoms  She was not compliant with home exercise program.  Response to previous treatment: none  Functional change: none    Pain: 0/10 (number not verb)  Location: right hip and back      Objective     Scarlett received therapeutic exercises to develop strength, endurance, ROM and core stabilization for 60 minutes including:  Bike x 10 min lvl4  LTR x3 min-NP  DKC x3 min-NP  Seated HS S x1 min B-NP  Prone quad/hip flexor stretch with strap x30 sec B-np  Slump glides 2x10 R-np  Prone press ups x10 (decreased radicular symptoms)-np  Clamshells with neutral hip 15x: 5" hold-np  SLR 2x10 B  Hip abd and add pilates ring isometerics 10 sec x10 ea way  Bridges 3x10: 3" hold  SL IR with ball btwn knees 10 sec x10-NP  Prone lumbar ext x 4m   PF S 2x1'        Home Exercises Provided and Patient Education Provided     Education provided:   - cont HEP, Importance    Written Home Exercises Provided: Patient instructed to cont prior HEP.  Exercises were reviewed and Scarlett was " able to demonstrate them prior to the end of the session.  Scarlett demonstrated good  understanding of the education provided.     See EMR under Patient Instructions for exercises provided prior visit.    Assessment     Pt still experiencing soreness in gluts. Nerve pain is still there but better since wearing new shoes. Nerve pain is worse when not wearing shoes. Pt presents w/ posterior derrangment. Sciatic nerve pain centralized w/ standing lumbar ext as well as prone press up.  Scarlett is progressing well towards her goals.   Pt prognosis is Good.     Pt will continue to benefit from skilled outpatient physical therapy to address the deficits listed in the problem list box on initial evaluation, provide pt/family education and to maximize pt's level of independence in the home and community environment.     Pt's spiritual, cultural and educational needs considered and pt agreeable to plan of care and goals.    Anticipated barriers to physical therapy: Good    Goals: Short Term Goals: 4 weeks   - Pt will increase ROM to 110 deg R hip flex, 30 deg L hip IR, 30 deg R hip ER for more functional mobility (Progressing, not met)  - Pt will increase strength to 4/5 B hip abd to improve gait and WB tolerance (Progressing, not met)  - Decrease Pain to 4/10 as worst with standing/walking for 5 minutes (Progressing, not met)  - Pt to self correct posture with minimal cues (Progressing, not met)  - Pt independent with HEP with progressions.  (Progressing, not met)     Long Term Goals (12 Weeks):  - Pt will tolerate walking 200ft without an AD (Progressing, not met)  - Pt will increase strength to 4+/5 BLE to tolerate ascending and descending stairs without major compensations (Progressing, not met)  - Decrease Pain to 2/10 as worst with beginner yoga classes to improve muscle flexibility (Progressing, not met)  - Pt to return to 60% PLOF (Progressing, not met)    Plan     Cont w/ poc to to focus on core/LE strength as well  as hip ROM.    Regina Montana, PTA

## 2019-10-24 ENCOUNTER — CLINICAL SUPPORT (OUTPATIENT)
Dept: REHABILITATION | Facility: HOSPITAL | Age: 29
End: 2019-10-24
Attending: ORTHOPAEDIC SURGERY
Payer: MEDICAID

## 2019-10-24 DIAGNOSIS — M25.551 BILATERAL HIP PAIN: ICD-10-CM

## 2019-10-24 DIAGNOSIS — M25.552 BILATERAL HIP PAIN: ICD-10-CM

## 2019-10-24 PROCEDURE — 97110 THERAPEUTIC EXERCISES: CPT

## 2019-10-24 NOTE — PROGRESS NOTES
"Physical Therapy Daily Treatment Note     Name: Scarlett Knox  Clinic Number: [id number]    Therapy Diagnosis:   Encounter Diagnosis   Name Primary?    Bilateral hip pain      Physician: Pat Henderson MD    Visit Date: 10/24/2019  Physician Orders: PT Eval and Treat   Medical Diagnosis from Referral:   M25.551,M25.552 (ICD-10-CM) - Bilateral hip pain   M54.5,G89.29 (ICD-10-CM) - Chronic midline low back pain without sciatica   Q65.89 (ICD-10-CM) - DDH (developmental dysplasia of the hip)   M25.561,G89.29 (ICD-10-CM) - Chronic pain of right knee   M46.1 (ICD-10-CM) - Sacroiliitis   M21.70 (ICD-10-CM) - Leg length discrepancy         Evaluation Date: 9/26/2019  Authorization Period Expiration: 9/30/19  Plan of Care Expiration: 12/19/19  Visit # / Visits authorized: 6/ 20      Time In: 1100 am  Time Out: 1145m  Total Billable Time: 45 minutes    Precautions: Standard    Subjective     Pt reports: still having glut soreness. Shoes are helping w/ nerve symptoms. Patient states she is feeling it more with walking today.  She was not compliant with home exercise program.  Response to previous treatment: none  Functional change: none    Pain: 0/10 (number not verb)  Location: right hip and back      Objective     Scarlett received therapeutic exercises to develop strength, endurance, ROM and core stabilization for 45 minutes including:  Bike x 10 min lvl4-np  LTR x3 min-NP  DKC x3 min-NP  Seated HS S x1 min B  Hand heel rocking x20  Prone quad/hip flexor stretch with strap x30 sec B-np  Slump glides 2x10 R-np  Prone press ups 2x10 (decreased radicular symptoms)  Clamshells with neutral hip 15x: 5" hold-np  SLR 2x10 B  Hip abd and add pilates ring isometerics 10 sec x10 ea way  Seated ball squeezes 2x10 with 5 sec  Bridges 3x10: 3" hold-np  SL IR with ball btwn knees 10 sec x10-NP  Prone lumbar ext x 4m -np  GSS on wedge x1 min        Home Exercises Provided and Patient Education Provided     Education provided:   - " cont HEP, Importance    Written Home Exercises Provided: Patient instructed to cont prior HEP.  Exercises were reviewed and Scarlett was able to demonstrate them prior to the end of the session.  Scarlett demonstrated good  understanding of the education provided.     See EMR under Patient Instructions for exercises provided prior visit.    Assessment     Pt still improved with prone press ups, however noted improvement in standing with flexion for radicular symptoms. Patient educated to go into the direction that does not increase symptoms.   Scarlett is progressing well towards her goals.   Pt prognosis is Good.     Pt will continue to benefit from skilled outpatient physical therapy to address the deficits listed in the problem list box on initial evaluation, provide pt/family education and to maximize pt's level of independence in the home and community environment.     Pt's spiritual, cultural and educational needs considered and pt agreeable to plan of care and goals.    Anticipated barriers to physical therapy: Good    Goals: Short Term Goals: 4 weeks   - Pt will increase ROM to 110 deg R hip flex, 30 deg L hip IR, 30 deg R hip ER for more functional mobility (Progressing, not met)  - Pt will increase strength to 4/5 B hip abd to improve gait and WB tolerance (Progressing, not met)  - Decrease Pain to 4/10 as worst with standing/walking for 5 minutes (Progressing, not met)  - Pt to self correct posture with minimal cues (Progressing, not met)  - Pt independent with HEP with progressions.  (Progressing, not met)     Long Term Goals (12 Weeks):  - Pt will tolerate walking 200ft without an AD (Progressing, not met)  - Pt will increase strength to 4+/5 BLE to tolerate ascending and descending stairs without major compensations (Progressing, not met)  - Decrease Pain to 2/10 as worst with beginner yoga classes to improve muscle flexibility (Progressing, not met)  - Pt to return to 60% PLOF (Progressing, not  met)    Plan     Cont w/ poc to to focus on core/LE strength as well as hip ROM.    Angelica Álvarez, PT

## 2019-10-29 ENCOUNTER — CLINICAL SUPPORT (OUTPATIENT)
Dept: REHABILITATION | Facility: HOSPITAL | Age: 29
End: 2019-10-29
Attending: ORTHOPAEDIC SURGERY
Payer: MEDICAID

## 2019-10-29 DIAGNOSIS — M25.552 BILATERAL HIP PAIN: ICD-10-CM

## 2019-10-29 DIAGNOSIS — M25.551 BILATERAL HIP PAIN: ICD-10-CM

## 2019-10-29 PROCEDURE — 97110 THERAPEUTIC EXERCISES: CPT

## 2019-10-29 NOTE — PROGRESS NOTES
"Physical Therapy Daily Treatment Note     Name: Scarlett Knox  Clinic Number: [id number]    Therapy Diagnosis:   Encounter Diagnosis   Name Primary?    Bilateral hip pain      Physician: Pat Henderson MD    Visit Date: 10/29/2019  Physician Orders: PT Eval and Treat   Medical Diagnosis from Referral:   M25.551,M25.552 (ICD-10-CM) - Bilateral hip pain   M54.5,G89.29 (ICD-10-CM) - Chronic midline low back pain without sciatica   Q65.89 (ICD-10-CM) - DDH (developmental dysplasia of the hip)   M25.561,G89.29 (ICD-10-CM) - Chronic pain of right knee   M46.1 (ICD-10-CM) - Sacroiliitis   M21.70 (ICD-10-CM) - Leg length discrepancy         Evaluation Date: 9/26/2019  Authorization Period Expiration: 9/30/19  Plan of Care Expiration: 12/19/19  Visit # / Visits authorized: 7/ 20      Time In: 1100 am  Time Out: 11:50 am  Total Billable Time: 50 minutes    Precautions: Standard    Subjective     Pt reports: clams caused nerve pain to get worse, better after lumbar ext  She was not compliant with home exercise program.  Response to previous treatment: none  Functional change: none    Pain: 0/10 (number not verb)  Location: right hip and back      Objective     Scarlett received therapeutic exercises to develop strength, endurance, ROM and core stabilization for 30 minutes including:  Bike x 10 min lvl4  Prone lumbar ext x 4m   Prone press ups 2x10 (decreased radicular symptoms)  STS blue box 2x20  Lateral walks no resistance  pallof press 20x ea  Gait training x 20 min  .    NP:  LTR x3 min-NP  DKC x3 min-NP  Seated HS S x1 min B  Hand heel rocking x20  Prone quad/hip flexor stretch with strap x30 sec B-np  Slump glides 2x10 R-np  Clamshells with neutral hip 15x: 5" hold-np  SLR 2x10 B  Hip abd and add pilates ring isometerics 10 sec x10 ea way  Seated ball squeezes 2x10 with 5 sec  Bridges 3x10: 3" hold-np  SL IR with ball btwn knees 10 sec x10-NP  GSS on wedge x1 min        Home Exercises Provided and Patient Education " Provided     Education provided:   - cont HEP, Importance    Written Home Exercises Provided: Patient instructed to cont prior HEP.  Exercises were reviewed and Scarlett was able to demonstrate them prior to the end of the session.  Scarlett demonstrated good  understanding of the education provided.     See EMR under Patient Instructions for exercises provided prior visit.    Assessment     Pt presents w/ low endurance for standing ex. Heavy VC needed to engage core w/ ex to prevent LBP. Performing standing lumbar ext when back started hurting or felt radicular symptoms decreased symptoms. Part of session focused on gait training and proper muscle activation w/ walking. Pt able to walk w/ significant decreased limp and trunk compensation after VC.cane adjust to fit pt more appropriate. Pt ED on getting up every hour to perform lumbar ext as well as walk around to get out of flexed position. Pt overall states that she feels stronger.   Scarlett is progressing well towards her goals.   Pt prognosis is Good.     Pt will continue to benefit from skilled outpatient physical therapy to address the deficits listed in the problem list box on initial evaluation, provide pt/family education and to maximize pt's level of independence in the home and community environment.     Pt's spiritual, cultural and educational needs considered and pt agreeable to plan of care and goals.    Anticipated barriers to physical therapy: Good    Goals: Short Term Goals: 4 weeks   - Pt will increase ROM to 110 deg R hip flex, 30 deg L hip IR, 30 deg R hip ER for more functional mobility (Progressing, not met)  - Pt will increase strength to 4/5 B hip abd to improve gait and WB tolerance (Progressing, not met)  - Decrease Pain to 4/10 as worst with standing/walking for 5 minutes (Progressing, not met)  - Pt to self correct posture with minimal cues (Progressing, not met)  - Pt independent with HEP with progressions.  (Progressing, not met)     Long  Term Goals (12 Weeks):  - Pt will tolerate walking 200ft without an AD (Progressing, not met)  - Pt will increase strength to 4+/5 BLE to tolerate ascending and descending stairs without major compensations (Progressing, not met)  - Decrease Pain to 2/10 as worst with beginner yoga classes to improve muscle flexibility (Progressing, not met)  - Pt to return to 60% PLOF (Progressing, not met)    Plan     Cont w/ poc to to focus on core/LE strength as well as hip ROM.    Regina Montana, PTA

## 2019-10-31 ENCOUNTER — CLINICAL SUPPORT (OUTPATIENT)
Dept: REHABILITATION | Facility: HOSPITAL | Age: 29
End: 2019-10-31
Attending: ORTHOPAEDIC SURGERY
Payer: MEDICAID

## 2019-10-31 DIAGNOSIS — M25.552 BILATERAL HIP PAIN: ICD-10-CM

## 2019-10-31 DIAGNOSIS — M25.551 BILATERAL HIP PAIN: ICD-10-CM

## 2019-10-31 PROCEDURE — 97110 THERAPEUTIC EXERCISES: CPT

## 2019-10-31 NOTE — PROGRESS NOTES
"Physical Therapy Daily Treatment Note     Name: Scarlett Knox  Clinic Number: [id number]    Therapy Diagnosis:   Encounter Diagnosis   Name Primary?    Bilateral hip pain      Physician: Pat Henderson MD    Visit Date: 10/31/2019  Physician Orders: PT Eval and Treat   Medical Diagnosis from Referral:   M25.551,M25.552 (ICD-10-CM) - Bilateral hip pain   M54.5,G89.29 (ICD-10-CM) - Chronic midline low back pain without sciatica   Q65.89 (ICD-10-CM) - DDH (developmental dysplasia of the hip)   M25.561,G89.29 (ICD-10-CM) - Chronic pain of right knee   M46.1 (ICD-10-CM) - Sacroiliitis   M21.70 (ICD-10-CM) - Leg length discrepancy         Evaluation Date: 9/26/2019  Authorization Period Expiration: 9/30/19  Plan of Care Expiration: 12/19/19  Visit # / Visits authorized: 8/ 20      Time In: 1100 am  Time Out: 11:55 am  Total Billable Time: 40 minutes    Precautions: Standard    Subjective     Pt reports: she is having a bad day is at the verge of tears.  She was not compliant with home exercise program.  Response to previous treatment: none  Functional change: none    Pain: 0/10 (number not verb)  Location: right hip and back      Objective     Scarlett received therapeutic exercises to develop strength, endurance, ROM and core stabilization for 30 minutes including:  Bike x 10 min lvl4  Prone lumbar ext x 4m -np  Prone press ups 2x10 (decreased radicular symptoms)  STS blue box 2x20  Lateral walks no resistance  pallof press 20x ea-np  Hand heel rocking x20.    NP:  LTR x3 min-NP  DKC x3 min-NP  Seated HS S x1 min B  Prone quad/hip flexor stretch with strap x30 sec B-np  Slump glides 2x10 R-np  Clamshells with neutral hip 15x: 5" hold-np  SLR 2x10 B  Hip abd and add pilates ring isometerics 10 sec x10 ea way  Seated ball squeezes 2x10 with 5 sec  Bridges 3x10: 3" hold-np  SL IR with ball btwn knees 10 sec x10-NP  GSS on wedge x1 min        Home Exercises Provided and Patient Education Provided     Education provided: "   - cont HEP, Importance    Written Home Exercises Provided: Patient instructed to cont prior HEP.  Exercises were reviewed and Scarlett was able to demonstrate them prior to the end of the session.  Scarlett demonstrated good  understanding of the education provided.     See EMR under Patient Instructions for exercises provided prior visit.    Assessment     Pt presents more antalgic gait pattern this visit and required motivation for bike to progress endurance. She is making fair progress toward goals, but continues to require a lot of emotional support.  Scarlett is progressing well towards her goals.   Pt prognosis is Good.     Pt will continue to benefit from skilled outpatient physical therapy to address the deficits listed in the problem list box on initial evaluation, provide pt/family education and to maximize pt's level of independence in the home and community environment.     Pt's spiritual, cultural and educational needs considered and pt agreeable to plan of care and goals.    Anticipated barriers to physical therapy: Good    Goals: Short Term Goals: 4 weeks   - Pt will increase ROM to 110 deg R hip flex, 30 deg L hip IR, 30 deg R hip ER for more functional mobility (Progressing, not met)  - Pt will increase strength to 4/5 B hip abd to improve gait and WB tolerance (Progressing, not met)  - Decrease Pain to 4/10 as worst with standing/walking for 5 minutes (Progressing, not met)  - Pt to self correct posture with minimal cues (Progressing, not met)  - Pt independent with HEP with progressions.  (Progressing, not met)     Long Term Goals (12 Weeks):  - Pt will tolerate walking 200ft without an AD (Progressing, not met)  - Pt will increase strength to 4+/5 BLE to tolerate ascending and descending stairs without major compensations (Progressing, not met)  - Decrease Pain to 2/10 as worst with beginner yoga classes to improve muscle flexibility (Progressing, not met)  - Pt to return to 60% PLOF (Progressing,  not met)    Plan     Cont w/ poc to to focus on core/LE strength as well as hip ROM.    Angelica Álvarez, PT

## 2019-11-05 ENCOUNTER — CLINICAL SUPPORT (OUTPATIENT)
Dept: REHABILITATION | Facility: HOSPITAL | Age: 29
End: 2019-11-05
Attending: ORTHOPAEDIC SURGERY
Payer: MEDICAID

## 2019-11-05 DIAGNOSIS — M25.552 BILATERAL HIP PAIN: ICD-10-CM

## 2019-11-05 DIAGNOSIS — M25.551 BILATERAL HIP PAIN: ICD-10-CM

## 2019-11-05 PROCEDURE — 97110 THERAPEUTIC EXERCISES: CPT

## 2019-11-05 NOTE — PROGRESS NOTES
"Physical Therapy Daily Treatment Note     Name: Scarlett Knox  Clinic Number: [id number]    Therapy Diagnosis:   Encounter Diagnosis   Name Primary?    Bilateral hip pain      Physician: Pat Henderson MD    Visit Date: 11/5/2019  Physician Orders: PT Eval and Treat   Medical Diagnosis from Referral:   M25.551,M25.552 (ICD-10-CM) - Bilateral hip pain   M54.5,G89.29 (ICD-10-CM) - Chronic midline low back pain without sciatica   Q65.89 (ICD-10-CM) - DDH (developmental dysplasia of the hip)   M25.561,G89.29 (ICD-10-CM) - Chronic pain of right knee   M46.1 (ICD-10-CM) - Sacroiliitis   M21.70 (ICD-10-CM) - Leg length discrepancy         Evaluation Date: 9/26/2019  Authorization Period Expiration: 9/30/19  Plan of Care Expiration: 12/19/19  Visit # / Visits authorized: 9/ 20      Time In: 1105 am  Time Out: 11:55 am  Total Billable Time: 25 minutes    Precautions: Standard    Subjective     Pt reports: no complaints today  She was not compliant with home exercise program.  Response to previous treatment: none  Functional change: none    Pain: 0/10 (number not verb)  Location: right hip and back      Objective     Scarlett received therapeutic exercises to develop strength, endurance, ROM and core stabilization for 50 minutes including:  Bike x 10 min lvl4  Prone lumbar ext x 4m -np  Prone press ups 2x10 (decreased radicular symptoms)  STS to wt [id number] (us mirror for cueing)  Lateral walks no resistance  pallof press 20x ea OTB  Hand heel rocking x20.  Shuttle 1 blk cord GTB around LEs 3x10    NP:  LTR x3 min-NP  DKC x3 min-NP  Seated HS S x1 min B  Prone quad/hip flexor stretch with strap x30 sec B-np  Slump glides 2x10 R-np  Clamshells with neutral hip 15x: 5" hold-np  SLR 2x10 B  Hip abd and add pilates ring isometerics 10 sec x10 ea way  Seated ball squeezes 2x10 with 5 sec  Bridges 3x10: 3" hold-np  SL IR with ball btwn knees 10 sec x10-NP  GSS on wedge x1 min        Home Exercises Provided and Patient Education " Provided     Education provided:   - cont HEP, Importance    Written Home Exercises Provided: Patient instructed to cont prior HEP.  Exercises were reviewed and Scarlett was able to demonstrate them prior to the end of the session.  Scarlett demonstrated good  understanding of the education provided.     See EMR under Patient Instructions for exercises provided prior visit.    Assessment     Cont to amb w/ deviated gait but is doing better endurance wise w/ ex. Cont to have glut pain w/ prolong standing/walking  Scarlett is progressing well towards her goals.   Pt prognosis is Good.     Pt will continue to benefit from skilled outpatient physical therapy to address the deficits listed in the problem list box on initial evaluation, provide pt/family education and to maximize pt's level of independence in the home and community environment.     Pt's spiritual, cultural and educational needs considered and pt agreeable to plan of care and goals.    Anticipated barriers to physical therapy: Good    Goals: Short Term Goals: 4 weeks   - Pt will increase ROM to 110 deg R hip flex, 30 deg L hip IR, 30 deg R hip ER for more functional mobility (Progressing, not met)  - Pt will increase strength to 4/5 B hip abd to improve gait and WB tolerance (Progressing, not met)  - Decrease Pain to 4/10 as worst with standing/walking for 5 minutes (Progressing, not met)  - Pt to self correct posture with minimal cues (Progressing, not met)  - Pt independent with HEP with progressions.  (Progressing, not met)     Long Term Goals (12 Weeks):  - Pt will tolerate walking 200ft without an AD (Progressing, not met)  - Pt will increase strength to 4+/5 BLE to tolerate ascending and descending stairs without major compensations (Progressing, not met)  - Decrease Pain to 2/10 as worst with beginner yoga classes to improve muscle flexibility (Progressing, not met)  - Pt to return to 60% PLOF (Progressing, not met)    Plan     Cont w/ poc to to focus  on core/LE strength as well as hip ROM.    Regina Montana, PTA

## 2019-11-07 ENCOUNTER — CLINICAL SUPPORT (OUTPATIENT)
Dept: REHABILITATION | Facility: HOSPITAL | Age: 29
End: 2019-11-07
Attending: ORTHOPAEDIC SURGERY
Payer: MEDICAID

## 2019-11-07 ENCOUNTER — TELEPHONE (OUTPATIENT)
Dept: SPORTS MEDICINE | Facility: CLINIC | Age: 29
End: 2019-11-07

## 2019-11-07 DIAGNOSIS — M25.552 BILATERAL HIP PAIN: ICD-10-CM

## 2019-11-07 DIAGNOSIS — M25.551 BILATERAL HIP PAIN: ICD-10-CM

## 2019-11-07 PROCEDURE — 97110 THERAPEUTIC EXERCISES: CPT

## 2019-11-07 NOTE — TELEPHONE ENCOUNTER
Scheduled patient visit with KIANNA Ragland for 12/12/19.  ----- Message from Johanny Gómez sent at 11/7/2019  9:47 AM CST -----  Contact: pt  Reason; Pt calling to schedule f/u appt with  or anyone available    Communication; [phone]

## 2019-11-07 NOTE — PROGRESS NOTES
"Physical Therapy Daily Treatment Note     Name: Scarlett Knox  Clinic Number: [id number]    Therapy Diagnosis:   Encounter Diagnosis   Name Primary?    Bilateral hip pain      Physician: Pat Henderson MD    Visit Date: 11/7/2019  Physician Orders: PT Eval and Treat   Medical Diagnosis from Referral:   M25.551,M25.552 (ICD-10-CM) - Bilateral hip pain   M54.5,G89.29 (ICD-10-CM) - Chronic midline low back pain without sciatica   Q65.89 (ICD-10-CM) - DDH (developmental dysplasia of the hip)   M25.561,G89.29 (ICD-10-CM) - Chronic pain of right knee   M46.1 (ICD-10-CM) - Sacroiliitis   M21.70 (ICD-10-CM) - Leg length discrepancy         Evaluation Date: 9/26/2019  Authorization Period Expiration: 9/30/19  Plan of Care Expiration: 12/19/19  Visit # / Visits authorized: 10/ 20      Time In: 1100 am  Time Out: 1200  pm  Total Billable Time: 40 minutes    Precautions: Standard    Subjective     Pt reports: she is doing a little better.  She was not compliant with home exercise program.  Response to previous treatment: none  Functional change: none    Pain: 0/10 (number not verb)  Location: right hip and back      Objective     Scarlett received therapeutic exercises to develop strength, endurance, ROM and core stabilization for 60 minutes including:  Bike x 10 min lvl4  Prone lumbar ext x 4m -np  Prone press ups 2x10 (decreased radicular symptoms)  STS red box x30  Lateral walks no resistance x1 lap  pallof press 20x ea OTB-np  Hand heel rocking x20.  SLR 3x10 B  Shuttle 1 blk cord GTB around LEs 3x10    NP:  LTR x3 min-NP  DKC x3 min-NP  Seated HS S x1 min B  Prone quad/hip flexor stretch with strap x30 sec B-np  Slump glides 2x10 R-np  Clamshells with neutral hip 15x: 5" hold-np          Home Exercises Provided and Patient Education Provided     Education provided:   - cont HEP, Importance    Written Home Exercises Provided: Patient instructed to cont prior HEP.  Exercises were reviewed and Scarlett was able to " demonstrate them prior to the end of the session.  Scarlett demonstrated good  understanding of the education provided.     See EMR under Patient Instructions for exercises provided prior visit.    Assessment     Patient re-educated to preform movement patterns that decrease pain and symptoms. She is tolerating more exercises this session, showing better endurance than previous visits.   Scarlett is progressing well towards her goals.   Pt prognosis is Good.     Pt will continue to benefit from skilled outpatient physical therapy to address the deficits listed in the problem list box on initial evaluation, provide pt/family education and to maximize pt's level of independence in the home and community environment.     Pt's spiritual, cultural and educational needs considered and pt agreeable to plan of care and goals.    Anticipated barriers to physical therapy: Good    Goals: Short Term Goals: 4 weeks   - Pt will increase ROM to 110 deg R hip flex, 30 deg L hip IR, 30 deg R hip ER for more functional mobility (Progressing, not met)  - Pt will increase strength to 4/5 B hip abd to improve gait and WB tolerance (Progressing, not met)  - Decrease Pain to 4/10 as worst with standing/walking for 5 minutes (Progressing, not met)  - Pt to self correct posture with minimal cues (Progressing, not met)  - Pt independent with HEP with progressions.  (Progressing, not met)     Long Term Goals (12 Weeks):  - Pt will tolerate walking 200ft without an AD (Progressing, not met)  - Pt will increase strength to 4+/5 BLE to tolerate ascending and descending stairs without major compensations (Progressing, not met)  - Decrease Pain to 2/10 as worst with beginner yoga classes to improve muscle flexibility (Progressing, not met)  - Pt to return to 60% PLOF (Progressing, not met)    Plan     Cont w/ poc to to focus on core/LE strength as well as hip ROM.    Angelica Álvarez, PT

## 2019-11-12 ENCOUNTER — CLINICAL SUPPORT (OUTPATIENT)
Dept: REHABILITATION | Facility: HOSPITAL | Age: 29
End: 2019-11-12
Attending: ORTHOPAEDIC SURGERY
Payer: MEDICAID

## 2019-11-12 DIAGNOSIS — M25.552 BILATERAL HIP PAIN: ICD-10-CM

## 2019-11-12 DIAGNOSIS — M25.551 BILATERAL HIP PAIN: ICD-10-CM

## 2019-11-12 PROCEDURE — 97110 THERAPEUTIC EXERCISES: CPT

## 2019-11-12 NOTE — PROGRESS NOTES
"Physical Therapy Daily Treatment Note     Name: Scarlett Knox  Clinic Number: [id number]    Therapy Diagnosis:   Encounter Diagnosis   Name Primary?    Bilateral hip pain      Physician: Pat Henderson MD    Visit Date: 11/12/2019  Physician Orders: PT Eval and Treat   Medical Diagnosis from Referral:   M25.551,M25.552 (ICD-10-CM) - Bilateral hip pain   M54.5,G89.29 (ICD-10-CM) - Chronic midline low back pain without sciatica   Q65.89 (ICD-10-CM) - DDH (developmental dysplasia of the hip)   M25.561,G89.29 (ICD-10-CM) - Chronic pain of right knee   M46.1 (ICD-10-CM) - Sacroiliitis   M21.70 (ICD-10-CM) - Leg length discrepancy         Evaluation Date: 9/26/2019  Authorization Period Expiration: 9/30/19  Plan of Care Expiration: 12/19/19  Visit # / Visits authorized: 12/ 20      Time In: 1000 am  Time Out: 1100  pm  Total Billable Time: 23 minutes    Precautions: Standard    Subjective     Pt reports: LB hurts w/ prolong walking/sitting.  She was not compliant with home exercise program.  Response to previous treatment: none  Functional change: none    Pain: 0/10 (number not verb)  Location: right hip and back      Objective     Scarlett received therapeutic exercises to develop strength, endurance, ROM and core stabilization for 60 minutes including:  Bike x 10 min lvl4  Dead bug w/ SB 3x10  DKTC/LTR w/ SB x3m ea.  Modified plank 3x30"  Modified side plank 3x30"  Prone lumbar ext x 4m -np  Prone press ups 2x10 (decreased radicular symptoms)-NP  STS red box x30 -NT  Lateral walks no resistance x1 lap  pallof press 20x ea OTB-np  Hand heel rocking x5m  SLR 3x10 B-NT  Shuttle 1 blk cord GTB around LEs 3x10-NT    NP:  LTR x3 min-NP  DKC x3 min-NP  Seated HS S x1 min B  Prone quad/hip flexor stretch with strap x30 sec B-np  Slump glides 2x10 R-np  Clamshells with neutral hip 15x: 5" hold-np          Home Exercises Provided and Patient Education Provided     Education provided:   - cont HEP, Importance    Written Home " Exercises Provided: Patient instructed to cont prior HEP.  Exercises were reviewed and Scarlett was able to demonstrate them prior to the end of the session.  Scarlett demonstrated good  understanding of the education provided.     See EMR under Patient Instructions for exercises provided prior visit.    Assessment     Pt cam into session w/ increased LBP. Unable to perform standing ex. Lumbar ext helps w/ sciatic nerve pain but doesn't help w/ LBP. Focused on core strengthening today to help support LB. Pt reported relief of symptoms upon leaving clinic  Scarlett is progressing well towards her goals.   Pt prognosis is Good.     Pt will continue to benefit from skilled outpatient physical therapy to address the deficits listed in the problem list box on initial evaluation, provide pt/family education and to maximize pt's level of independence in the home and community environment.     Pt's spiritual, cultural and educational needs considered and pt agreeable to plan of care and goals.    Anticipated barriers to physical therapy: Good    Goals: Short Term Goals: 4 weeks   - Pt will increase ROM to 110 deg R hip flex, 30 deg L hip IR, 30 deg R hip ER for more functional mobility (Progressing, not met)  - Pt will increase strength to 4/5 B hip abd to improve gait and WB tolerance (Progressing, not met)  - Decrease Pain to 4/10 as worst with standing/walking for 5 minutes (Progressing, not met)  - Pt to self correct posture with minimal cues (Progressing, not met)  - Pt independent with HEP with progressions.  (Progressing, not met)     Long Term Goals (12 Weeks):  - Pt will tolerate walking 200ft without an AD (Progressing, not met)  - Pt will increase strength to 4+/5 BLE to tolerate ascending and descending stairs without major compensations (Progressing, not met)  - Decrease Pain to 2/10 as worst with beginner yoga classes to improve muscle flexibility (Progressing, not met)  - Pt to return to 60% PLOF (Progressing,  not met)    Plan     Cont w/ poc to to focus on core/LE strength as well as hip ROM.    Regina Montana, PTA

## 2019-11-14 ENCOUNTER — CLINICAL SUPPORT (OUTPATIENT)
Dept: REHABILITATION | Facility: HOSPITAL | Age: 29
End: 2019-11-14
Attending: ORTHOPAEDIC SURGERY
Payer: MEDICAID

## 2019-11-14 DIAGNOSIS — M25.552 BILATERAL HIP PAIN: ICD-10-CM

## 2019-11-14 DIAGNOSIS — M25.551 BILATERAL HIP PAIN: ICD-10-CM

## 2019-11-14 PROCEDURE — 97110 THERAPEUTIC EXERCISES: CPT

## 2019-11-14 NOTE — PROGRESS NOTES
"Physical Therapy Daily Treatment Note     Name: Scarlett Knox  Clinic Number: [id number]    Therapy Diagnosis:   Encounter Diagnosis   Name Primary?    Bilateral hip pain      Physician: Pat Henderson MD    Visit Date: 11/14/2019  Physician Orders: PT Eval and Treat   Medical Diagnosis from Referral:   M25.551,M25.552 (ICD-10-CM) - Bilateral hip pain   M54.5,G89.29 (ICD-10-CM) - Chronic midline low back pain without sciatica   Q65.89 (ICD-10-CM) - DDH (developmental dysplasia of the hip)   M25.561,G89.29 (ICD-10-CM) - Chronic pain of right knee   M46.1 (ICD-10-CM) - Sacroiliitis   M21.70 (ICD-10-CM) - Leg length discrepancy         Evaluation Date: 9/26/2019  Authorization Period Expiration: 9/30/19  Plan of Care Expiration: 12/19/19  Visit # / Visits authorized: 13/ 20      Time In: 1100 am  Time Out: 1200  pm  Total Billable Time: 40 minutes    Precautions: Standard    Subjective     Pt reports: she is noticing pain in back with planks and push ups.  She was not compliant with home exercise program.  Response to previous treatment: none  Functional change: none    Pain: 0/10 (number not verb)  Location: right hip and back      Objective     Scarlett received therapeutic exercises to develop strength, endurance, ROM and core stabilization for 60 minutes including:  Bike x 10 min lvl4  Dead bug w/ SB 3x10  DKTC/LTR w/ SB x3m ea.  Modified plank 3x30"  Modified side plank 3x30"  Prone lumbar ext x 4m   Prone press ups 2x10 (decreased radicular symptoms)-NP  STS red box x30   Lateral walks no resistance x1 lap  pallof press 20x ea OTB-np  Hand heel rocking x5m-np  SLR 3x10 B-NT  Shuttle 1 blk cord GTB around LEs 3x10-    NP:  Seated HS S x1 min B  Prone quad/hip flexor stretch with strap x30 sec B-np  Slump glides 2x10 R-np  Clamshells with neutral hip 15x: 5" hold-np          Home Exercises Provided and Patient Education Provided     Education provided:   - cont HEP, Importance    Written Home Exercises " Provided: Patient instructed to cont prior HEP.  Exercises were reviewed and Scarlett was able to demonstrate them prior to the end of the session.  Scarlett demonstrated good  understanding of the education provided.     See EMR under Patient Instructions for exercises provided prior visit.    Assessment     Pt was able to tolerate upright bike well today. She did require re-education on modifying ROM with exercises if she starts to notice pain.   Scarlett is progressing well towards her goals.   Pt prognosis is Good.     Pt will continue to benefit from skilled outpatient physical therapy to address the deficits listed in the problem list box on initial evaluation, provide pt/family education and to maximize pt's level of independence in the home and community environment.     Pt's spiritual, cultural and educational needs considered and pt agreeable to plan of care and goals.    Anticipated barriers to physical therapy: Good    Goals: Short Term Goals: 4 weeks   - Pt will increase ROM to 110 deg R hip flex, 30 deg L hip IR, 30 deg R hip ER for more functional mobility (Progressing, not met)  - Pt will increase strength to 4/5 B hip abd to improve gait and WB tolerance (Progressing, not met)  - Decrease Pain to 4/10 as worst with standing/walking for 5 minutes (Progressing, not met)  - Pt to self correct posture with minimal cues (Progressing, not met)  - Pt independent with HEP with progressions.  (Progressing, not met)     Long Term Goals (12 Weeks):  - Pt will tolerate walking 200ft without an AD (Progressing, not met)  - Pt will increase strength to 4+/5 BLE to tolerate ascending and descending stairs without major compensations (Progressing, not met)  - Decrease Pain to 2/10 as worst with beginner yoga classes to improve muscle flexibility (Progressing, not met)  - Pt to return to 60% PLOF (Progressing, not met)    Plan     Cont w/ poc to to focus on core/LE strength as well as hip ROM.    Angelica Álvarez, PT

## 2019-11-21 ENCOUNTER — CLINICAL SUPPORT (OUTPATIENT)
Dept: REHABILITATION | Facility: HOSPITAL | Age: 29
End: 2019-11-21
Attending: ORTHOPAEDIC SURGERY
Payer: MEDICAID

## 2019-11-21 DIAGNOSIS — M25.552 BILATERAL HIP PAIN: ICD-10-CM

## 2019-11-21 DIAGNOSIS — M25.551 BILATERAL HIP PAIN: ICD-10-CM

## 2019-11-21 PROCEDURE — 97110 THERAPEUTIC EXERCISES: CPT

## 2019-11-21 NOTE — PROGRESS NOTES
"Physical Therapy Daily Treatment Note     Name: Scarlett Knox  Clinic Number: [id number]    Therapy Diagnosis:   Encounter Diagnosis   Name Primary?    Bilateral hip pain      Physician: Pat Henderson MD    Visit Date: 11/21/2019  Physician Orders: PT Eval and Treat   Medical Diagnosis from Referral:   M25.551,M25.552 (ICD-10-CM) - Bilateral hip pain   M54.5,G89.29 (ICD-10-CM) - Chronic midline low back pain without sciatica   Q65.89 (ICD-10-CM) - DDH (developmental dysplasia of the hip)   M25.561,G89.29 (ICD-10-CM) - Chronic pain of right knee   M46.1 (ICD-10-CM) - Sacroiliitis   M21.70 (ICD-10-CM) - Leg length discrepancy         Evaluation Date: 9/26/2019  Authorization Period Expiration: 9/30/19  Plan of Care Expiration: 12/19/19  Visit # / Visits authorized: 14/ 20      Time In: 1100 am  Time Out: 12:00 pm  Total Billable Time: 30 minutes    Precautions: Standard    Subjective     Pt reports: gluts are not sore anymore. Still have same LB symptoms  She was not compliant with home exercise program.  Response to previous treatment: none  Functional change: none    Pain: 0/10 (number not verb)  Location: right hip and back      Objective     Scarlett received therapeutic exercises to develop strength, endurance, ROM and core stabilization for 60 minutes including:  Bike x 10 min lvl4  Dead bug w/ SB 3x10  DKTC/LTR w/ SB x3m ea.  Modified plank 3x30"  Modified side plank 3x30"  Prone lumbar ext x 4m   Prone press ups 2x10 (decreased radicular symptoms)-NP  STS red box x30   Lateral walks no resistance x1 lap-NT  pallof press 20x ea OTB-np  Hand heel rocking x5m-np  SLR 3x10 B-NT  Shuttle 1 blk cord GTB around LEs 3x10-    NP:  Seated HS S x1 min B  Prone quad/hip flexor stretch with strap x30 sec B-np  Slump glides 2x10 R-np  Clamshells with neutral hip 15x: 5" hold-np          Home Exercises Provided and Patient Education Provided     Education provided:   - cont HEP, Importance    Written Home Exercises " Provided: Patient instructed to cont prior HEP.  Exercises were reviewed and Scarlett was able to demonstrate them prior to the end of the session.  Scarlett demonstrated good  understanding of the education provided.     See EMR under Patient Instructions for exercises provided prior visit.    Assessment     Pt cont to complain of pain w/ standing exs but is able to decrease symptoms when she is told to activate appropriate muscles. Cont to work on core and functional strengthening. Suggested to pt about trying aquatic therapy. Pt would like to try it, will talk to PT about possible transfer   Scarlett is progressing well towards her goals.   Pt prognosis is Good.     Pt will continue to benefit from skilled outpatient physical therapy to address the deficits listed in the problem list box on initial evaluation, provide pt/family education and to maximize pt's level of independence in the home and community environment.     Pt's spiritual, cultural and educational needs considered and pt agreeable to plan of care and goals.    Anticipated barriers to physical therapy: Good    Goals: Short Term Goals: 4 weeks   - Pt will increase ROM to 110 deg R hip flex, 30 deg L hip IR, 30 deg R hip ER for more functional mobility (Progressing, not met)  - Pt will increase strength to 4/5 B hip abd to improve gait and WB tolerance (Progressing, not met)  - Decrease Pain to 4/10 as worst with standing/walking for 5 minutes (Progressing, not met)  - Pt to self correct posture with minimal cues (Progressing, not met)  - Pt independent with HEP with progressions.  (Progressing, not met)     Long Term Goals (12 Weeks):  - Pt will tolerate walking 200ft without an AD (Progressing, not met)  - Pt will increase strength to 4+/5 BLE to tolerate ascending and descending stairs without major compensations (Progressing, not met)  - Decrease Pain to 2/10 as worst with beginner yoga classes to improve muscle flexibility (Progressing, not met)  -  Pt to return to 60% PLOF (Progressing, not met)    Plan     Cont w/ poc to to focus on core/LE strength as well as hip ROM.    Regina Montana, PTA

## 2019-11-26 ENCOUNTER — CLINICAL SUPPORT (OUTPATIENT)
Dept: REHABILITATION | Facility: HOSPITAL | Age: 29
End: 2019-11-26
Attending: ORTHOPAEDIC SURGERY
Payer: MEDICAID

## 2019-11-26 DIAGNOSIS — M25.551 BILATERAL HIP PAIN: ICD-10-CM

## 2019-11-26 DIAGNOSIS — M25.552 BILATERAL HIP PAIN: ICD-10-CM

## 2019-11-26 PROCEDURE — 97110 THERAPEUTIC EXERCISES: CPT

## 2019-11-26 NOTE — PROGRESS NOTES
"Physical Therapy Daily Treatment Note     Name: Scarlett Knox  Clinic Number: [id number]    Therapy Diagnosis:   Encounter Diagnosis   Name Primary?    Bilateral hip pain      Physician: Pat Henderson MD    Visit Date: 11/26/2019  Physician Orders: PT Eval and Treat   Medical Diagnosis from Referral:   M25.551,M25.552 (ICD-10-CM) - Bilateral hip pain   M54.5,G89.29 (ICD-10-CM) - Chronic midline low back pain without sciatica   Q65.89 (ICD-10-CM) - DDH (developmental dysplasia of the hip)   M25.561,G89.29 (ICD-10-CM) - Chronic pain of right knee   M46.1 (ICD-10-CM) - Sacroiliitis   M21.70 (ICD-10-CM) - Leg length discrepancy         Evaluation Date: 9/26/2019  Authorization Period Expiration: 9/30/19  Plan of Care Expiration: 12/19/19  Visit # / Visits authorized: 15/ 20      Time In: 1100 am  Time Out: 12:00 pm  Total Billable Time: 23 minutes    Precautions: Standard    Subjective     Pt reports:im ok as long as I use my wheelchair  She was not compliant with home exercise program.  Response to previous treatment: none  Functional change: none    Pain: 0/10 (number not verb)  Location: right hip and back      Objective     Scarlett received therapeutic exercises to develop strength, endurance, ROM and core stabilization for 60 minutes including:  Bike x 10 min lvl4  Dead bug w/ SB 3x10  DKTC/LTR w/ SB x3m ea.  Modified plank 3x30"  Modified side plank 3x30"  Shuttle 1 blk cord GTB around LEs 3x10-  Prone hip ext w/ knee bent 3x8:3"    NT:  STS red box x30   Lateral walks no resistance x1 lap-NT  pallof press 20x ea OTB-np  Hand heel rocking x5m-np  SLR 3x10 B-NT  Prone lumbar ext x 4m   Prone press ups 2x10 (decreased radicular symptoms)-NP        Home Exercises Provided and Patient Education Provided     Education provided:   - cont HEP, Importance    Written Home Exercises Provided: Patient instructed to cont prior HEP.  Exercises were reviewed and Scarlett was able to demonstrate them prior to the end of the " session.  Scarlett demonstrated good  understanding of the education provided.     See EMR under Patient Instructions for exercises provided prior visit.    Assessment     Pt cont to have SI/LB pain and states that she only feels good when using her wheelchair. Pt's sciatic pain is better and is not as sharp. Pt was ED to make an appt. W/ MD to get further investigation of pain 2* not making any progress in PT. May benefit from aquatic therapy.  Scarlett is progressing well towards her goals.   Pt prognosis is Good.     Pt will continue to benefit from skilled outpatient physical therapy to address the deficits listed in the problem list box on initial evaluation, provide pt/family education and to maximize pt's level of independence in the home and community environment.     Pt's spiritual, cultural and educational needs considered and pt agreeable to plan of care and goals.    Anticipated barriers to physical therapy: Good    Goals: Short Term Goals: 4 weeks   - Pt will increase ROM to 110 deg R hip flex, 30 deg L hip IR, 30 deg R hip ER for more functional mobility (Progressing, not met)  - Pt will increase strength to 4/5 B hip abd to improve gait and WB tolerance (Progressing, not met)  - Decrease Pain to 4/10 as worst with standing/walking for 5 minutes (Progressing, not met)  - Pt to self correct posture with minimal cues (Progressing, not met)  - Pt independent with HEP with progressions.  (Progressing, not met)     Long Term Goals (12 Weeks):  - Pt will tolerate walking 200ft without an AD (Progressing, not met)  - Pt will increase strength to 4+/5 BLE to tolerate ascending and descending stairs without major compensations (Progressing, not met)  - Decrease Pain to 2/10 as worst with beginner yoga classes to improve muscle flexibility (Progressing, not met)  - Pt to return to 60% PLOF (Progressing, not met)    Plan     Cont w/ poc to to focus on core/LE strength as well as hip ROM.    Regina Montana, PTA

## 2019-12-12 ENCOUNTER — OFFICE VISIT (OUTPATIENT)
Dept: SPORTS MEDICINE | Facility: CLINIC | Age: 29
End: 2019-12-12
Payer: MEDICAID

## 2019-12-12 ENCOUNTER — TELEPHONE (OUTPATIENT)
Dept: ORTHOPEDICS | Facility: CLINIC | Age: 29
End: 2019-12-12

## 2019-12-12 VITALS
DIASTOLIC BLOOD PRESSURE: 65 MMHG | HEART RATE: 121 BPM | WEIGHT: 160 LBS | BODY MASS INDEX: 30.21 KG/M2 | SYSTOLIC BLOOD PRESSURE: 113 MMHG | HEIGHT: 61 IN

## 2019-12-12 DIAGNOSIS — Q65.89 DDH (DEVELOPMENTAL DYSPLASIA OF THE HIP): ICD-10-CM

## 2019-12-12 DIAGNOSIS — M25.551 BILATERAL HIP PAIN: Primary | ICD-10-CM

## 2019-12-12 DIAGNOSIS — G89.29 CHRONIC MIDLINE LOW BACK PAIN WITHOUT SCIATICA: ICD-10-CM

## 2019-12-12 DIAGNOSIS — M46.1 SACROILIITIS: ICD-10-CM

## 2019-12-12 DIAGNOSIS — M54.50 CHRONIC MIDLINE LOW BACK PAIN WITHOUT SCIATICA: ICD-10-CM

## 2019-12-12 DIAGNOSIS — M25.552 BILATERAL HIP PAIN: Primary | ICD-10-CM

## 2019-12-12 PROCEDURE — 99999 PR PBB SHADOW E&M-EST. PATIENT-LVL III: CPT | Mod: PBBFAC,,, | Performed by: PHYSICIAN ASSISTANT

## 2019-12-12 PROCEDURE — 99213 OFFICE O/P EST LOW 20 MIN: CPT | Mod: PBBFAC | Performed by: PHYSICIAN ASSISTANT

## 2019-12-12 PROCEDURE — 99214 OFFICE O/P EST MOD 30 MIN: CPT | Mod: S$PBB,,, | Performed by: PHYSICIAN ASSISTANT

## 2019-12-12 PROCEDURE — 99214 PR OFFICE/OUTPT VISIT, EST, LEVL IV, 30-39 MIN: ICD-10-PCS | Mod: S$PBB,,, | Performed by: PHYSICIAN ASSISTANT

## 2019-12-12 PROCEDURE — 99999 PR PBB SHADOW E&M-EST. PATIENT-LVL III: ICD-10-PCS | Mod: PBBFAC,,, | Performed by: PHYSICIAN ASSISTANT

## 2019-12-12 RX ORDER — DEXTROAMPHETAMINE SACCHARATE, AMPHETAMINE ASPARTATE MONOHYDRATE, DEXTROAMPHETAMINE SULFATE AND AMPHETAMINE SULFATE 2.5; 2.5; 2.5; 2.5 MG/1; MG/1; MG/1; MG/1
10 CAPSULE, EXTENDED RELEASE ORAL EVERY MORNING
COMMUNITY
End: 2023-07-21 | Stop reason: CLARIF

## 2019-12-12 NOTE — TELEPHONE ENCOUNTER
----- Message from Robina Knott MA sent at 12/12/2019  1:23 PM CST -----  She is new medicaid, I know Lou raya see her.  Told him I would forward to you as well.  ----- Message -----  From: Luca Xiong MA  Sent: 12/12/2019   1:09 PM CST  To: Maria Ragland PA-C, [facility] Staff    Hello,    Can you please contact Ms. Knox to schedule a f/u MRI results? She is having a MRI on 12/18 and will be going out of town 12/19 - 12/26. Can Lou Alvarado or Dr. Mike see the patient for the results?      Luca Xiong  Medical Assistant to Asha Ragland PA-C
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
## 2019-12-12 NOTE — TELEPHONE ENCOUNTER
Patient has been scheduled from request of Maria Toro. Tried to notify patient but phone just rang with no voice mail. Appt letter mailed out to pt.

## 2019-12-18 ENCOUNTER — HOSPITAL ENCOUNTER (OUTPATIENT)
Dept: RADIOLOGY | Facility: HOSPITAL | Age: 29
Discharge: HOME | End: 2019-12-18
Attending: PHYSICIAN ASSISTANT
Payer: MEDICAID

## 2019-12-18 DIAGNOSIS — M54.50 CHRONIC MIDLINE LOW BACK PAIN WITHOUT SCIATICA: ICD-10-CM

## 2019-12-18 DIAGNOSIS — M46.1 SACROILIITIS: ICD-10-CM

## 2019-12-18 DIAGNOSIS — G89.29 CHRONIC MIDLINE LOW BACK PAIN WITHOUT SCIATICA: ICD-10-CM

## 2019-12-18 DIAGNOSIS — M25.552 BILATERAL HIP PAIN: ICD-10-CM

## 2019-12-18 DIAGNOSIS — M25.551 BILATERAL HIP PAIN: ICD-10-CM

## 2019-12-18 DIAGNOSIS — Q65.89 DDH (DEVELOPMENTAL DYSPLASIA OF THE HIP): ICD-10-CM

## 2019-12-18 PROCEDURE — 72148 MRI LUMBAR SPINE W/O DYE: CPT | Mod: 26,,, | Performed by: RADIOLOGY

## 2019-12-18 PROCEDURE — 72148 MRI LUMBAR SPINE W/O DYE: CPT | Mod: TC

## 2019-12-18 PROCEDURE — 72148 MRI LUMBAR SPINE WITHOUT CONTRAST: ICD-10-PCS | Mod: 26,,, | Performed by: RADIOLOGY

## 2020-01-02 ENCOUNTER — CLINICAL SUPPORT (OUTPATIENT)
Dept: REHABILITATION | Facility: HOSPITAL | Age: 30
End: 2020-01-02
Payer: MEDICAID

## 2020-01-02 DIAGNOSIS — R52 PAIN AGGRAVATED BY WALKING: ICD-10-CM

## 2020-01-02 PROBLEM — M25.552 BILATERAL HIP PAIN: Status: RESOLVED | Noted: 2019-09-11 | Resolved: 2020-01-02

## 2020-01-02 PROBLEM — M25.551 BILATERAL HIP PAIN: Status: RESOLVED | Noted: 2019-09-11 | Resolved: 2020-01-02

## 2020-01-02 PROCEDURE — 97110 THERAPEUTIC EXERCISES: CPT

## 2020-01-02 PROCEDURE — 97161 PT EVAL LOW COMPLEX 20 MIN: CPT
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
## 2020-01-03 NOTE — PROGRESS NOTES
Physical Therapy Daily Treatment Note     Name: Scarlett Knox  Clinic Number: [id number]    Therapy Diagnosis:   Encounter Diagnosis   Name Primary?    Pain aggravated by walking      Physician: Maria Ragland PA-C    Visit Date: 1/6/2020  Physician Orders: PT Eval and Treat Aquatic therapy  Medical Diagnosis from Referral:   M25.551,M25.552 (ICD-10-CM) - Bilateral hip pain   Q65.89 (ICD-10-CM) - DDH (developmental dysplasia of the hip)   M46.1 (ICD-10-CM) - Sacroiliitis   M54.5,G89.29 (ICD-10-CM) - Chronic midline low back pain without sciatica         Evaluation Date: 1/2/2020  Authorization Period Expiration: 4/1/2020  Plan of Care Expiration: 3/26/2020  Visit # / Visits authorized: 2/ 20  Total Visits: 2      Time In: 1500  Time Out: 1600  Total Billable Time: 60 minutes    Precautions: Standard, R shoe lift    PROCEDURES/IMAGING: MRI lumbar spine 12/18/2019: L4-L5 disc extrusion results in moderate spinal canal stenosis.  Subjective     Pt reports: I just came from school & I forgot my swimwear; patient provided with hospital own & paper shorts  She was compliant with home exercise program.  Response to previous treatment: 1st pool treatment  Functional change: none, walks into clinic using quad cane    Pain: 10/10  Location: bilateral back  L>R & L ankle    Objective     Scarlett received aquatic therapeutic exercises to develop strength, endurance, ROM, flexibility, posture and core stabilization for 60 minutes including:    WARMUP x 2 laps each  Walk fwd/back/side    STRETCHES 2 x 20sec  Hip flexor  Piriformis    LE EX x 10( 5 reps x 2 sets)  Mini squat  Hip abd/add  Hip flex/ext ( L knee flexed 2/2 pain)      UE EX/CORE  x   Lower truncal rotation // bars x 20  DKTC to hip extension x 10    ENDURANCE  Bicycle // bars x 4 min    COOL DOWN x 1 lap each  Walk fwd/back/side      Home Exercises Provided and Patient Education Provided     Education provided:   Hydration post therapy      Written Home  Exercises Provided: Patient instructed to cont prior HEP.  Exercises were reviewed and Scarlett was able to demonstrate them prior to the end of the session.  Scarlett demonstrated fair  understanding of the education provided.     See EMR under Patient Instructions for exercises provided prior visit 1/2/2020    Assessment   Wrapped R foot with 2 shoe inserts & coban to simulate lift 2/2 leg length discrepancy. Slow movements in/out of the water 2/2 high pain levels.   Patient required frequent verbal cues  for proper technique, normal respiration & core stabilization. Patient did well post instruction. Patient easily distracted & required constant redirection. Patient had taken multiple medications prior to therapy(pain medication, gabapentin, & muscle relaxant). Patient did get relief of some back pain when performing lower truncal rotation. Patient will benefit from aquatic environment to unload  Spine & bilateral LEs for strengthening, core stabilization & postural awareness.      Scarlett is progressing well towards her goals.   Pt prognosis is Fair.     Pt will continue to benefit from skilled aquatic outpatient physical therapy to address the deficits listed in the problem list box on initial evaluation, provide pt/family education and to maximize pt's level of independence in the home and community environment.     Pt's spiritual, cultural and educational needs considered and pt agreeable to plan of care and goals.    Anticipated barriers to physical therapy: chronic pain    Goals:   Short Term Goals: 6 weeks   1. Patient will be independent in HEP & progressions (progressing)  2. Patient will achieve TUG score of 20 sec with/without use of assistive device to demonstrate improved  mobility (progressing)     Long Term Goals: 12 weeks   1. Patient will have FOTO limitation score of 40-60% to demonstrate improved function & decreased pain (progressing)  2. Patient will achieve FTSTS score of 25 sec to demonsrarte  improved transfers & endurance (progressing)  3. Patient will achieve B hip MMT of 4/5 to demonstrate improved strength (progressing)  4. Patient will be able to descend/ascend 3 steps using LRAD & pain level 5/10 or less (progressing)  5. Patient will achieve lumbar forward flexion to 30 cm fingertip to floor to demonstrate improved lumbar AROM (progressing)        Plan     Increase exercises slowly to prevent pain    Plan of care Certification: 1/2/2020 to 3/26/2020.     Outpatient aquatic Physical Therapy 1-2 times weekly for  12 weeks to include the following interventions: manual therapy, aquatic therapy, patient education, therapeutic exercise, therapeutic activities  Patient may be seen by PTA as part of rehabilitation team    Kimberlee Jason, PT

## 2020-01-06 ENCOUNTER — CLINICAL SUPPORT (OUTPATIENT)
Dept: REHABILITATION | Facility: HOSPITAL | Age: 30
End: 2020-01-06
Payer: MEDICAID

## 2020-01-06 DIAGNOSIS — R52 PAIN AGGRAVATED BY WALKING: ICD-10-CM

## 2020-01-06 PROCEDURE — 97110 THERAPEUTIC EXERCISES: CPT

## 2020-01-17 ENCOUNTER — CLINICAL SUPPORT (OUTPATIENT)
Dept: REHABILITATION | Facility: HOSPITAL | Age: 30
End: 2020-01-17
Payer: MEDICAID

## 2020-01-17 DIAGNOSIS — R52 PAIN AGGRAVATED BY WALKING: ICD-10-CM

## 2020-01-17 PROCEDURE — 97110 THERAPEUTIC EXERCISES: CPT

## 2020-01-17 NOTE — PROGRESS NOTES
Physical Therapy Daily Treatment Note     Name: Scarlett Knox  Clinic Number: [id number]    Therapy Diagnosis:   Encounter Diagnosis   Name Primary?    Pain aggravated by walking      Physician: Maria Ragland PA-C    Visit Date: 1/17/2020  Physician Orders: PT Eval and Treat Aquatic therapy  Medical Diagnosis from Referral:   M25.551,M25.552 (ICD-10-CM) - Bilateral hip pain   Q65.89 (ICD-10-CM) - DDH (developmental dysplasia of the hip)   M46.1 (ICD-10-CM) - Sacroiliitis   M54.5,G89.29 (ICD-10-CM) - Chronic midline low back pain without sciatica         Evaluation Date: 1/2/2020  Authorization Period Expiration: 4/1/2020  Plan of Care Expiration: 3/26/2020  Visit # / Visits authorized: 3/ 20  Total Visits: 3      Time In: 1300  Time Out: 1400  Total Billable Time: 30 minutes    Precautions: Standard, R shoe lift    PROCEDURES/IMAGING: MRI lumbar spine 12/18/2019: L4-L5 disc extrusion results in moderate spinal canal stenosis.  Subjective     Pt reports: I brought my pool shoes & some shoe inserts to help with the leg difference  She was compliant with home exercise program.  Response to previous treatment: muscle soreness x 1 day  Functional change: none, walks into clinic using quad cane    Pain: 6/10 after tramadol  Location: bilateral back  L>R & L ankle    Objective     Scarlett received aquatic therapeutic exercises to develop strength, endurance, ROM, flexibility, posture and core stabilization for 60 minutes including:    WARMUP x 2 laps each  Walk fwd/back/side    STRETCHES 2 x 20sec  Hip flexor  Piriformis    LE EX x 20  Mini squat  Hip abd/add- Small diameter noodle straddle // bars  Hip flex/ext - noodle straddle // bars      UE EX/CORE  x 20  Lower truncal rotation // bars x 20  DKTC to hip extension x 20  Push up outer rail // bars  Tricep dips // bars    ENDURANCE  Bicycle // bars x 4 min    COOL DOWN x 1 lap each  Walk fwd/back/side      Home Exercises Provided and Patient Education Provided      Education provided:   Hydration post therapy      Written Home Exercises Provided: Patient instructed to cont prior HEP.  Exercises were reviewed and Scarlett was able to demonstrate them prior to the end of the session.  Scarlett demonstrated fair  understanding of the education provided.     See EMR under Patient Instructions for exercises provided prior visit 1/2/2020    Assessment   Patient placed 2 rubber heel lifts in R ppol shoe 2/2 leg length discrepancy. Slow movements in/out of the water 2/2 high pain levels  Patient required frequent verbal cues  for proper technique, normal respiration & core stabilization. Patient did well post instruction. Patient did get some relief of pain when suspended on pool noodle for LE exercises.. Patient did get relief of some back pain when performing lower truncal rotation. Patient will benefit from aquatic environment to unload  Spine & bilateral LEs for strengthening, core stabilization & postural awareness.      Scarlett is progressing well towards her goals.   Pt prognosis is Fair.     Pt will continue to benefit from skilled aquatic outpatient physical therapy to address the deficits listed in the problem list box on initial evaluation, provide pt/family education and to maximize pt's level of independence in the home and community environment.     Pt's spiritual, cultural and educational needs considered and pt agreeable to plan of care and goals.    Anticipated barriers to physical therapy: chronic pain    Goals:   Short Term Goals: 6 weeks   1. Patient will be independent in HEP & progressions (progressing)  2. Patient will achieve TUG score of 20 sec with/without use of assistive device to demonstrate improved  mobility (progressing)     Long Term Goals: 12 weeks   1. Patient will have FOTO limitation score of 40-60% to demonstrate improved function & decreased pain (progressing)  2. Patient will achieve FTSTS score of 25 sec to demonsrarte improved transfers &  endurance (progressing)  3. Patient will achieve B hip MMT of 4/5 to demonstrate improved strength (progressing)  4. Patient will be able to descend/ascend 3 steps using LRAD & pain level 5/10 or less (progressing)  5. Patient will achieve lumbar forward flexion to 30 cm fingertip to floor to demonstrate improved lumbar AROM (progressing)        Plan     Increase exercises slowly to prevent pain    Plan of care Certification: 1/2/2020 to 3/26/2020.     Outpatient aquatic Physical Therapy 1-2 times weekly for  12 weeks to include the following interventions: manual therapy, aquatic therapy, patient education, therapeutic exercise, therapeutic activities  Patient may be seen by PTA as part of rehabilitation team    Kimberlee Jason, PT

## 2020-01-21 ENCOUNTER — CLINICAL SUPPORT (OUTPATIENT)
Dept: REHABILITATION | Facility: HOSPITAL | Age: 30
End: 2020-01-21
Payer: MEDICAID

## 2020-01-21 DIAGNOSIS — R52 PAIN AGGRAVATED BY WALKING: ICD-10-CM

## 2020-01-21 PROCEDURE — 97110 THERAPEUTIC EXERCISES: CPT

## 2020-01-21 NOTE — PROGRESS NOTES
Physical Therapy Daily Treatment Note     Name: Scarlett Knox  Clinic Number: [id number]    Therapy Diagnosis:   Encounter Diagnosis   Name Primary?    Pain aggravated by walking      Physician: Maria Ragland PA-C    Visit Date: 1/21/2020  Physician Orders: PT Eval and Treat Aquatic therapy  Medical Diagnosis from Referral:   M25.551,M25.552 (ICD-10-CM) - Bilateral hip pain   Q65.89 (ICD-10-CM) - DDH (developmental dysplasia of the hip)   M46.1 (ICD-10-CM) - Sacroiliitis   M54.5,G89.29 (ICD-10-CM) - Chronic midline low back pain without sciatica         Evaluation Date: 1/2/2020  Authorization Period Expiration: 4/1/2020  Plan of Care Expiration: 3/26/2020  Visit # / Visits authorized: 4/ 20  Total Visits: 4      Time In: 1400  Time Out: 1500  Total Billable Time: 30 minutes    Precautions: Standard, R shoe lift    PROCEDURES/IMAGING: MRI lumbar spine 12/18/2019: L4-L5 disc extrusion results in moderate spinal canal stenosis.  Subjective     Pt reports: I was at school this morning & used my wheelchair  She was compliant with home exercise program.  Response to previous treatment: muscle soreness x 1 day  Functional change: more upright trunk with gait    Pain: 5/10   Location: bilateral back  L>R & L ankle    Objective     Scarlett received aquatic therapeutic exercises to develop strength, endurance, ROM, flexibility, posture and core stabilization for 60 minutes including:    WARMUP x 2 laps each  Walk fwd/back/side    STRETCHES 2 x 20sec  Hip flexor  Piriformis    LE EX x 20  Mini squat- unable to perform 2/2 pain  Hip abd/add- Small diameter noodle straddle // bars  Hip flex/ext - noodle straddle // bars      UE EX/CORE  x 20  Lower truncal rotation // bars x 20  DKTC to hip extension x 20  Push up outer rail // bars  Tricep dips // bars  Supine float using yellow neck collar & noodle under arms & ankles for seaweed x 3 minutes(PT performed)    ENDURANCE  Bicycle // bars x 4 min    COOL DOWN x 1 lap  each  Walk fwd/back/side      Home Exercises Provided and Patient Education Provided     Education provided:   Hydration post therapy      Written Home Exercises Provided: Patient instructed to cont prior HEP.  Exercises were reviewed and Scarlett was able to demonstrate them prior to the end of the session.  Scarlett demonstrated fair  understanding of the education provided.     See EMR under Patient Instructions for exercises provided prior visit 1/2/2020    Assessment   Patient placed 2 rubber heel lifts in R pool shoe 2/2 leg length discrepancy. Slow movements in/out of the water 2/2 high pain levels  Patient required frequent verbal cues  for proper technique, normal respiration & core stabilization. Patient did well post instruction. Patient did get some relief of pain when floated supine.. Patient unable to tolerate closed chain exercises of squats today. Patient will benefit from aquatic environment to unload  Spine & bilateral LEs for strengthening, core stabilization & postural awareness.      Scarlett is progressing well towards her goals.   Pt prognosis is Fair.     Pt will continue to benefit from skilled aquatic outpatient physical therapy to address the deficits listed in the problem list box on initial evaluation, provide pt/family education and to maximize pt's level of independence in the home and community environment.     Pt's spiritual, cultural and educational needs considered and pt agreeable to plan of care and goals.    Anticipated barriers to physical therapy: chronic pain    Goals:   Short Term Goals: 6 weeks   1. Patient will be independent in HEP & progressions (progressing)  2. Patient will achieve TUG score of 20 sec with/without use of assistive device to demonstrate improved  mobility (progressing)     Long Term Goals: 12 weeks   1. Patient will have FOTO limitation score of 40-60% to demonstrate improved function & decreased pain (progressing)  2. Patient will achieve FTSTS score of  25 sec to demonsrarte improved transfers & endurance (progressing)  3. Patient will achieve B hip MMT of 4/5 to demonstrate improved strength (progressing)  4. Patient will be able to descend/ascend 3 steps using LRAD & pain level 5/10 or less (progressing)  5. Patient will achieve lumbar forward flexion to 30 cm fingertip to floor to demonstrate improved lumbar AROM (progressing)    Plan     Increase exercises slowly to prevent pain    Plan of care Certification: 1/2/2020 to 3/26/2020.     Outpatient aquatic Physical Therapy 1-2 times weekly for  12 weeks to include the following interventions: manual therapy, aquatic therapy, patient education, therapeutic exercise, therapeutic activities  Patient may be seen by PTA as part of rehabilitation team    Kimberlee Jason, PT

## 2020-01-24 ENCOUNTER — INITIAL CONSULT (OUTPATIENT)
Dept: ORTHOPEDICS | Facility: CLINIC | Age: 30
End: 2020-01-24
Payer: MEDICAID

## 2020-01-24 VITALS — HEIGHT: 61 IN | BODY MASS INDEX: 30.21 KG/M2 | WEIGHT: 160 LBS

## 2020-01-24 DIAGNOSIS — M54.16 LUMBAR RADICULOPATHY: Primary | ICD-10-CM

## 2020-01-24 PROCEDURE — 99999 PR PBB SHADOW E&M-EST. PATIENT-LVL III: CPT | Mod: PBBFAC,,, | Performed by: ORTHOPAEDIC SURGERY

## 2020-01-24 PROCEDURE — 99999 PR PBB SHADOW E&M-EST. PATIENT-LVL III: ICD-10-PCS | Mod: PBBFAC,,, | Performed by: ORTHOPAEDIC SURGERY

## 2020-01-24 PROCEDURE — 99214 OFFICE O/P EST MOD 30 MIN: CPT | Mod: S$PBB,,, | Performed by: ORTHOPAEDIC SURGERY

## 2020-01-24 PROCEDURE — 99213 OFFICE O/P EST LOW 20 MIN: CPT | Mod: PBBFAC | Performed by: ORTHOPAEDIC SURGERY

## 2020-01-24 PROCEDURE — 99214 PR OFFICE/OUTPT VISIT, EST, LEVL IV, 30-39 MIN: ICD-10-PCS | Mod: S$PBB,,, | Performed by: ORTHOPAEDIC SURGERY

## 2020-01-24 NOTE — LETTER
January 30, 2020      Maria Ragland PA-C  [address]  [facility] [address]           [facility]  [address]  [place] [address]  Phone: [phone]          Patient: Scarlett Knox   MR Number: [id number]   YOB: 1990   Date of Visit: 1/24/2020       Dear Maria Ragland:    Thank you for referring Scarlett Knox to me for evaluation. Attached you will find relevant portions of my assessment and plan of care.    If you have questions, please do not hesitate to call me. I look forward to following Scarlett Knox along with you.    Sincerely,    Jillian Segundo  CC:  No Recipients    If you would like to receive this communication electronically, please contact [email or web address] or [phone] to request more information on [employer] Link access.    For providers and/or their staff who would like to refer a patient to [facility], please contact us through our one-stop-shop provider referral line, Daysi Harley, at [phone].    If you feel you have received this communication in error or would no longer like to receive these types of communications, please e-mail [email or web address]
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
## 2020-01-27 ENCOUNTER — PATIENT MESSAGE (OUTPATIENT)
Dept: ORTHOPEDICS | Facility: CLINIC | Age: 30
End: 2020-01-27

## 2020-01-27 ENCOUNTER — DOCUMENTATION ONLY (OUTPATIENT)
Dept: REHABILITATION | Facility: HOSPITAL | Age: 30
End: 2020-01-27

## 2020-01-29 DIAGNOSIS — Z98.890 S/P DISKECTOMY: ICD-10-CM

## 2020-01-29 DIAGNOSIS — M54.16 LUMBAR RADICULOPATHY: Primary | ICD-10-CM

## 2020-01-29 RX ORDER — SODIUM CHLORIDE 9 MG/ML
INJECTION, SOLUTION INTRAVENOUS CONTINUOUS
Status: CANCELLED | OUTPATIENT
Start: 2020-01-29

## 2020-01-29 RX ORDER — MUPIROCIN 20 MG/G
OINTMENT TOPICAL
Status: CANCELLED | OUTPATIENT
Start: 2020-01-29

## 2020-01-31 ENCOUNTER — DOCUMENTATION ONLY (OUTPATIENT)
Dept: REHABILITATION | Facility: HOSPITAL | Age: 30
End: 2020-01-31

## 2020-01-31 ENCOUNTER — TELEPHONE (OUTPATIENT)
Dept: PREADMISSION TESTING | Facility: HOSPITAL | Age: 30
End: 2020-01-31

## 2020-01-31 DIAGNOSIS — Z01.818 PREOPERATIVE TESTING: Primary | ICD-10-CM

## 2020-01-31 RX ORDER — TIZANIDINE HYDROCHLORIDE 4 MG/1
CAPSULE, GELATIN COATED ORAL 2 TIMES DAILY
COMMUNITY
End: 2023-07-21 | Stop reason: CLARIF

## 2020-01-31 NOTE — PROGRESS NOTES
Outpatient Therapy Discharge Summary     Name: Scarlett Knox  Clinic Number: [id number]    Therapy Diagnosis: pain aggravated by walking  Physician: Maria Ragland PA-C    Physician Orders: PT eval & treat aquatic therapy  Medical Diagnosis: Chronic LBP  Evaluation Date: 1/2/2020      Date of Last visit: 1/21/2020  Total Visits Received: 4  Cancelled Visits: 1  No Show Visits: 3    Assessment    Goals: not met 2/2 limited therapy visits    Discharge reason: Patient scheduled for spinal surgery  2/27/2010    Plan   This patient is discharged from Physical Therapy

## 2020-01-31 NOTE — PRE-PROCEDURE INSTRUCTIONS
Patient stated has not had any problem with anesthesia in the past. Will need labs and ua. Our  will call to set up these appts.Preop instructions given. Hold asa, asa containing products, nsaids, vitamins and supplements one week prior to surgery. Medication instructions given.   Shower the night before surgery and the morning of surgery with an antibacterial soap( hibiclens or dial antibacterial soap).  Nothing on the skin once shower. Do not apply any deodorant, lotion, powder, perfume,or aftershave. No makeup or moisturizer. No fingernail polish or jewelry going to surgery. May have solid foods, gum, and hard candy until 8 hours before surgery/procedure time.  May have clear liquids( water, gatorade, powerade or apple juice) until 2 hours prior to surgery/procedure time.  No red drinks. If in doubt , drink water. Nothing to drink 2 hours before arrival time for surgery/procedure. If you are told to take medication in the morning of surgery, it may be taken with a sip of water. If your doctor tells you something different pertaining to when to stop eating or drinking, follow your doctor's instructions. Instructions given to the surgery center.  Call for changes in status or questions. Verbalizes understanding. Mailed written med and preop instructions.

## 2020-01-31 NOTE — ANESTHESIA PAT ROS NOTE
01/31/2020  Scarlett Knox is a 29 y.o., female.      Pre-op Assessment         Review of Systems  Anesthesia Hx:  No problems with previous Anesthesia  Denies Family Hx of Anesthesia complications.   Denies Personal Hx of Anesthesia complications.   Social:  Smoker, Social Alcohol Use  Tobacco Use:    Hematology/Oncology:     Oncology Normal    -- Anemia (H/O ANEMIA AS TEENAGER):   EENT/Dental:   Nasal Symptoms: (SNORES WHEN SLEEPS)  Jaw Problems: GRINDS TEETH WHEN SLEEPS,  HAS DIFFICULTY KEEPING MOUTH OPEN WIDE FOR EXTENDED PERIODS OF TIME  Cardiovascular:    Denies Angina.  Functional Capacity unable to determine, flat ground only at this time    Pulmonary:  Pulmonary Normal  Denies Shortness of breath.  Denies Recent URI.    Renal/:  Renal/ Normal     Hepatic/GI:  Hepatic/GI Normal    Musculoskeletal:  Musculoskeletal General/Symptoms: Functional capacity is ambulatory with cane. SLIPPED ON CLOTHES AND FELL IN PAST MONTH Joint Disease:  ArthritisInfected Joint: OF RIGHT HAND.  Lumbar Spine Disorders, Lumbar Disc Disease, Radiculopathy   Neurological:  Neuro Symptoms of pain OF LOWER BACK AND LEFT LEG  Endocrine:  Metabolic Disorders, Obesity / BMI > 30  Psych:  Anxiety Disorder.  Attention Deficit Disorder (ADHD). Depression.             Anesthesia Assessment: Preoperative EQUATION    Planned Procedure: Procedure(s) (LRB):  LAMINECTOMY, SPINE, LUMBAR, WITH DISCECTOMY L4/5 New lester + Solo Sara retractor SNS:SSEP/EMG (N/A)  Requested Anesthesia Type:General  Surgeon: Dominic Mike MD  Service: Neurosurgery  Known or anticipated Date of Surgery:2/27/2020    Surgeon notes: reviewed    Electronic QUestionnaire Assessment completed via nurse interview with patient.        Triage considerations:     The patient has no apparent active cardiac condition (No unstable coronary Syndrome such as  severe unstable angina or recent [<1 month] myocardial infarction, decompensated CHF, severe valvular   disease or significant arrhythmia)    Previous anesthesia records:No problems and Not available    Last PCP note: within 3 months , outside [facility]   Subspecialty notes: Ortho, SPORTS MEDICINE    Other important co-morbidities: Smoker      Tests already available:  Available tests,  3-6 months ago , within [facility] .12/18/2019 MRI LUMBAR SPINE W/O CONTRAST, 9/11/2019 XRAY LUMBAR COMPLETE W FLEX & EXT             Instructions given. (See in Nurse's note)    Optimization:  Anesthesia Preop Clinic Assessment  Indicated- not indicated for this surgery       Plan:    Testing:  BMP, Hematology Profile, PT/INR, PTT, T&S and UA     Patient  has previously scheduled Medical Appointment:2/10 DR. LLAMAS    Navigation: Tests Scheduled. TBD                          Results will be tracked by Preop Clinic.   2/20 Labs resulted and noted.  2/26 UA and Micro ua resulted and noted. Dr. Melchor , anesthesia notified of results. Dr Morris recommended to notify Dr. Llamas to see if wants to treat in am before surgery or postpone surgery.Dr. Llamas and his staff notified by [employer] of ua and micro ua.   ----- Message from Dominic Llamas MD sent at 2/26/2020 12:12 PM CST -----  We will give her an additional dose of antibiotics.  ----- Message -----  From: Anila Adair RN  Sent: 2/26/2020   8:20 AM CST  To: Anila Adair RN, Dominic Llamas MD, #     ua: cloudy, 3 + leukocytes  Micro ua: 10 rbc, 36 wbc, many bacteria  Anesthesia , Dr. Melchor ,notified. Recommended to notify Dr. Llamas.  Do you want to treat in am before surgery or postpone surgery?  Thanks!  Anila Adair RN BSN

## 2020-01-31 NOTE — TELEPHONE ENCOUNTER
----- Message from Anila Adair RN sent at 1/31/2020 12:31 PM CST -----  Surgery 2/27  Please schedule labs and ua.   Thanks!

## 2020-02-10 ENCOUNTER — OFFICE VISIT (OUTPATIENT)
Dept: ORTHOPEDICS | Facility: CLINIC | Age: 30
End: 2020-02-10
Payer: MEDICAID

## 2020-02-10 VITALS — BODY MASS INDEX: 28.78 KG/M2 | WEIGHT: 152.44 LBS | HEIGHT: 61 IN

## 2020-02-10 DIAGNOSIS — M54.16 LUMBAR RADICULOPATHY: Primary | ICD-10-CM

## 2020-02-10 PROCEDURE — 99999 PR PBB SHADOW E&M-EST. PATIENT-LVL II: CPT | Mod: PBBFAC,,, | Performed by: ORTHOPAEDIC SURGERY

## 2020-02-10 PROCEDURE — 99212 OFFICE O/P EST SF 10 MIN: CPT | Mod: PBBFAC | Performed by: ORTHOPAEDIC SURGERY

## 2020-02-10 PROCEDURE — 99213 OFFICE O/P EST LOW 20 MIN: CPT | Mod: S$PBB,,, | Performed by: ORTHOPAEDIC SURGERY

## 2020-02-10 PROCEDURE — 99999 PR PBB SHADOW E&M-EST. PATIENT-LVL II: ICD-10-PCS | Mod: PBBFAC,,, | Performed by: ORTHOPAEDIC SURGERY

## 2020-02-10 PROCEDURE — 99213 PR OFFICE/OUTPT VISIT, EST, LEVL III, 20-29 MIN: ICD-10-PCS | Mod: S$PBB,,, | Performed by: ORTHOPAEDIC SURGERY

## 2020-02-10 RX ORDER — NAPROXEN 500 MG/1
500 TABLET ORAL 2 TIMES DAILY
Status: ON HOLD | COMMUNITY
End: 2020-02-27 | Stop reason: HOSPADM
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
## 2020-02-17 ENCOUNTER — PATIENT MESSAGE (OUTPATIENT)
Dept: SURGERY | Facility: HOSPITAL | Age: 30
End: 2020-02-17

## 2020-02-17 ENCOUNTER — PATIENT MESSAGE (OUTPATIENT)
Dept: ORTHOPEDICS | Facility: CLINIC | Age: 30
End: 2020-02-17

## 2020-02-20 ENCOUNTER — LAB VISIT (OUTPATIENT)
Dept: LAB | Facility: HOSPITAL | Age: 30
End: 2020-02-20
Attending: ANESTHESIOLOGY
Payer: MEDICAID

## 2020-02-20 DIAGNOSIS — Z01.818 PREOPERATIVE TESTING: ICD-10-CM

## 2020-02-20 LAB
ABO + RH BLD: NORMAL
ANION GAP SERPL CALC-SCNC: 8 MMOL/L (ref 8–16)
APTT BLDCRRT: 24.9 SEC (ref 21–32)
BLD GP AB SCN CELLS X3 SERPL QL: NORMAL
BUN SERPL-MCNC: 12 MG/DL (ref 6–20)
CALCIUM SERPL-MCNC: 9.5 MG/DL (ref 8.7–10.5)
CHLORIDE SERPL-SCNC: 106 MMOL/L (ref 95–110)
CO2 SERPL-SCNC: 24 MMOL/L (ref 23–29)
CREAT SERPL-MCNC: 0.8 MG/DL (ref 0.5–1.4)
ERYTHROCYTE [DISTWIDTH] IN BLOOD BY AUTOMATED COUNT: 13.5 % (ref 11.5–14.5)
EST. GFR  (AFRICAN AMERICAN): >60 ML/MIN/1.73 M^2
EST. GFR  (NON AFRICAN AMERICAN): >60 ML/MIN/1.73 M^2
GLUCOSE SERPL-MCNC: 76 MG/DL (ref 70–110)
HCT VFR BLD AUTO: 42 % (ref 37–48.5)
HGB BLD-MCNC: 13.6 G/DL (ref 12–16)
INR PPP: 1 (ref 0.8–1.2)
MCH RBC QN AUTO: 29.4 PG (ref 27–31)
MCHC RBC AUTO-ENTMCNC: 32.4 G/DL (ref 32–36)
MCV RBC AUTO: 91 FL (ref 82–98)
PLATELET # BLD AUTO: 434 K/UL (ref 150–350)
PMV BLD AUTO: 10.1 FL (ref 9.2–12.9)
POTASSIUM SERPL-SCNC: 4.4 MMOL/L (ref 3.5–5.1)
PROTHROMBIN TIME: 10.3 SEC (ref 9–12.5)
RBC # BLD AUTO: 4.62 M/UL (ref 4–5.4)
SODIUM SERPL-SCNC: 138 MMOL/L (ref 136–145)
WBC # BLD AUTO: 7.71 K/UL (ref 3.9–12.7)

## 2020-02-20 PROCEDURE — 85610 PROTHROMBIN TIME: CPT

## 2020-02-20 PROCEDURE — 80048 BASIC METABOLIC PNL TOTAL CA: CPT

## 2020-02-20 PROCEDURE — 85730 THROMBOPLASTIN TIME PARTIAL: CPT

## 2020-02-20 PROCEDURE — 85027 COMPLETE CBC AUTOMATED: CPT

## 2020-02-20 PROCEDURE — 86901 BLOOD TYPING SEROLOGIC RH(D): CPT

## 2020-02-20 PROCEDURE — 36415 COLL VENOUS BLD VENIPUNCTURE: CPT

## 2020-02-24 ENCOUNTER — PATIENT MESSAGE (OUTPATIENT)
Dept: SPORTS MEDICINE | Facility: CLINIC | Age: 30
End: 2020-02-24

## 2020-02-26 ENCOUNTER — PATIENT MESSAGE (OUTPATIENT)
Dept: SURGERY | Facility: HOSPITAL | Age: 30
End: 2020-02-26

## 2020-02-26 ENCOUNTER — ANESTHESIA EVENT (OUTPATIENT)
Dept: SURGERY | Facility: HOSPITAL | Age: 30
End: 2020-02-26
Payer: MEDICAID

## 2020-02-26 ENCOUNTER — TELEPHONE (OUTPATIENT)
Dept: PREADMISSION TESTING | Facility: HOSPITAL | Age: 30
End: 2020-02-26

## 2020-02-26 NOTE — TELEPHONE ENCOUNTER
----- Message from Dominic Mike MD sent at 2/26/2020 12:12 PM CST -----  We will give her an additional dose of antibiotics.  ----- Message -----  From: Anila Adair RN  Sent: 2/26/2020   8:20 AM CST  To: Anila Adair RN, Dominic Mike MD, #    ua: cloudy, 3 + leukocytes  Micro ua: 10 rbc, 36 wbc, many bacteria  Anesthesia , Dr. Melchor ,notified. Recommended to notify Dr. Mike.  Do you want to treat in am before surgery or postpone surgery?  Thanks!
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
## 2020-02-27 ENCOUNTER — HOSPITAL ENCOUNTER (OUTPATIENT)
Facility: HOSPITAL | Age: 30
Discharge: HOME | End: 2020-03-01
Attending: ORTHOPAEDIC SURGERY | Admitting: ORTHOPAEDIC SURGERY
Payer: MEDICAID

## 2020-02-27 ENCOUNTER — ANESTHESIA (OUTPATIENT)
Dept: SURGERY | Facility: HOSPITAL | Age: 30
End: 2020-02-27
Payer: MEDICAID

## 2020-02-27 DIAGNOSIS — Z98.890 S/P DISKECTOMY: Primary | ICD-10-CM

## 2020-02-27 DIAGNOSIS — M54.16 LUMBAR RADICULOPATHY: ICD-10-CM

## 2020-02-27 LAB
B-HCG UR QL: NEGATIVE
CTP QC/QA: YES

## 2020-02-27 PROCEDURE — 63600175 PHARM REV CODE 636 W HCPCS: Performed by: PHYSICIAN ASSISTANT

## 2020-02-27 PROCEDURE — 71000016 HC POSTOP RECOV ADDL HR: Performed by: ORTHOPAEDIC SURGERY

## 2020-02-27 PROCEDURE — 81025 URINE PREGNANCY TEST: CPT | Performed by: ANESTHESIOLOGY

## 2020-02-27 PROCEDURE — 63600175 PHARM REV CODE 636 W HCPCS: Performed by: ANESTHESIOLOGY

## 2020-02-27 PROCEDURE — 27201423 OPTIME MED/SURG SUP & DEVICES STERILE SUPPLY: Performed by: ORTHOPAEDIC SURGERY

## 2020-02-27 PROCEDURE — 25000003 PHARM REV CODE 250: Performed by: NURSE ANESTHETIST, CERTIFIED REGISTERED

## 2020-02-27 PROCEDURE — D9220A PRA ANESTHESIA: ICD-10-PCS | Mod: ANES,,, | Performed by: ANESTHESIOLOGY

## 2020-02-27 PROCEDURE — D9220A PRA ANESTHESIA: Mod: ANES,,, | Performed by: ANESTHESIOLOGY

## 2020-02-27 PROCEDURE — 25000003 PHARM REV CODE 250: Performed by: STUDENT IN AN ORGANIZED HEALTH CARE EDUCATION/TRAINING PROGRAM

## 2020-02-27 PROCEDURE — 00630 ANES PX LUMBAR REGION NOS: CPT | Performed by: ORTHOPAEDIC SURGERY

## 2020-02-27 PROCEDURE — 25000003 PHARM REV CODE 250: Performed by: PHYSICIAN ASSISTANT

## 2020-02-27 PROCEDURE — 37000008 HC ANESTHESIA 1ST 15 MINUTES: Performed by: ORTHOPAEDIC SURGERY

## 2020-02-27 PROCEDURE — 63600175 PHARM REV CODE 636 W HCPCS: Performed by: STUDENT IN AN ORGANIZED HEALTH CARE EDUCATION/TRAINING PROGRAM

## 2020-02-27 PROCEDURE — 36000711: Performed by: ORTHOPAEDIC SURGERY

## 2020-02-27 PROCEDURE — 63600175 PHARM REV CODE 636 W HCPCS: Performed by: NURSE ANESTHETIST, CERTIFIED REGISTERED

## 2020-02-27 PROCEDURE — 71000015 HC POSTOP RECOV 1ST HR: Performed by: ORTHOPAEDIC SURGERY

## 2020-02-27 PROCEDURE — 71000033 HC RECOVERY, INTIAL HOUR: Performed by: ORTHOPAEDIC SURGERY

## 2020-02-27 PROCEDURE — 37000009 HC ANESTHESIA EA ADD 15 MINS: Performed by: ORTHOPAEDIC SURGERY

## 2020-02-27 PROCEDURE — D9220A PRA ANESTHESIA: Mod: CRNA,,, | Performed by: NURSE ANESTHETIST, CERTIFIED REGISTERED

## 2020-02-27 PROCEDURE — D9220A PRA ANESTHESIA: ICD-10-PCS | Mod: CRNA,,, | Performed by: NURSE ANESTHETIST, CERTIFIED REGISTERED

## 2020-02-27 PROCEDURE — 36000710: Performed by: ORTHOPAEDIC SURGERY

## 2020-02-27 PROCEDURE — 25000003 PHARM REV CODE 250: Performed by: ORTHOPAEDIC SURGERY

## 2020-02-27 PROCEDURE — 94761 N-INVAS EAR/PLS OXIMETRY MLT: CPT

## 2020-02-27 PROCEDURE — 63030 PR LAMINOTOMY,LUMBAR DISK,1 INTRSP: ICD-10-PCS | Mod: LT,,, | Performed by: ORTHOPAEDIC SURGERY

## 2020-02-27 PROCEDURE — 63030 LAMOT DCMPRN NRV RT 1 LMBR: CPT | Mod: LT,,, | Performed by: ORTHOPAEDIC SURGERY

## 2020-02-27 RX ORDER — OXYCODONE HYDROCHLORIDE 10 MG/1
10 TABLET ORAL EVERY 4 HOURS PRN
Status: DISCONTINUED | OUTPATIENT
Start: 2020-02-27 | End: 2020-02-28

## 2020-02-27 RX ORDER — SODIUM CHLORIDE 0.9 % (FLUSH) 0.9 %
10 SYRINGE (ML) INJECTION
Status: DISCONTINUED | OUTPATIENT
Start: 2020-02-27 | End: 2020-03-01 | Stop reason: HOSPADM

## 2020-02-27 RX ORDER — ONDANSETRON 2 MG/ML
INJECTION INTRAMUSCULAR; INTRAVENOUS
Status: DISCONTINUED | OUTPATIENT
Start: 2020-02-27 | End: 2020-02-27

## 2020-02-27 RX ORDER — CEFAZOLIN SODIUM 1 G/3ML
INJECTION, POWDER, FOR SOLUTION INTRAMUSCULAR; INTRAVENOUS
Status: DISCONTINUED | OUTPATIENT
Start: 2020-02-27 | End: 2020-02-27

## 2020-02-27 RX ORDER — TALC
6 POWDER (GRAM) TOPICAL NIGHTLY PRN
Status: DISCONTINUED | OUTPATIENT
Start: 2020-02-27 | End: 2020-03-01 | Stop reason: HOSPADM

## 2020-02-27 RX ORDER — CEFAZOLIN SODIUM 1 G/3ML
2 INJECTION, POWDER, FOR SOLUTION INTRAMUSCULAR; INTRAVENOUS
Status: COMPLETED | OUTPATIENT
Start: 2020-02-28 | End: 2020-02-28

## 2020-02-27 RX ORDER — LIDOCAINE HYDROCHLORIDE AND EPINEPHRINE 10; 10 MG/ML; UG/ML
INJECTION, SOLUTION INFILTRATION; PERINEURAL
Status: DISCONTINUED | OUTPATIENT
Start: 2020-02-27 | End: 2020-02-27 | Stop reason: HOSPADM

## 2020-02-27 RX ORDER — MORPHINE SULFATE 2 MG/ML
2 INJECTION, SOLUTION INTRAMUSCULAR; INTRAVENOUS ONCE
Status: COMPLETED | OUTPATIENT
Start: 2020-02-27 | End: 2020-02-27

## 2020-02-27 RX ORDER — CIPROFLOXACIN 2 MG/ML
INJECTION, SOLUTION INTRAVENOUS
Status: DISCONTINUED | OUTPATIENT
Start: 2020-02-27 | End: 2020-02-27

## 2020-02-27 RX ORDER — BUPIVACAINE HYDROCHLORIDE AND EPINEPHRINE 2.5; 5 MG/ML; UG/ML
INJECTION, SOLUTION EPIDURAL; INFILTRATION; INTRACAUDAL; PERINEURAL
Status: DISCONTINUED | OUTPATIENT
Start: 2020-02-27 | End: 2020-02-27 | Stop reason: HOSPADM

## 2020-02-27 RX ORDER — METHOCARBAMOL 750 MG/1
750 TABLET, FILM COATED ORAL 3 TIMES DAILY
Status: DISCONTINUED | OUTPATIENT
Start: 2020-02-27 | End: 2020-02-27

## 2020-02-27 RX ORDER — HYDROMORPHONE HYDROCHLORIDE 1 MG/ML
0.2 INJECTION, SOLUTION INTRAMUSCULAR; INTRAVENOUS; SUBCUTANEOUS EVERY 5 MIN PRN
Status: COMPLETED | OUTPATIENT
Start: 2020-02-27 | End: 2020-02-27

## 2020-02-27 RX ORDER — ROCURONIUM BROMIDE 10 MG/ML
INJECTION, SOLUTION INTRAVENOUS
Status: DISCONTINUED | OUTPATIENT
Start: 2020-02-27 | End: 2020-02-27

## 2020-02-27 RX ORDER — MORPHINE SULFATE 2 MG/ML
INJECTION, SOLUTION INTRAMUSCULAR; INTRAVENOUS
Status: DISPENSED
Start: 2020-02-27 | End: 2020-02-28

## 2020-02-27 RX ORDER — MIDAZOLAM HYDROCHLORIDE 1 MG/ML
INJECTION, SOLUTION INTRAMUSCULAR; INTRAVENOUS
Status: DISCONTINUED | OUTPATIENT
Start: 2020-02-27 | End: 2020-02-27

## 2020-02-27 RX ORDER — ESCITALOPRAM OXALATE 20 MG/1
20 TABLET ORAL DAILY
Status: DISCONTINUED | OUTPATIENT
Start: 2020-02-28 | End: 2020-03-01 | Stop reason: HOSPADM

## 2020-02-27 RX ORDER — GABAPENTIN 100 MG/1
100 CAPSULE ORAL 3 TIMES DAILY
Status: DISCONTINUED | OUTPATIENT
Start: 2020-02-27 | End: 2020-02-27

## 2020-02-27 RX ORDER — CELECOXIB 200 MG/1
200 CAPSULE ORAL DAILY
Qty: 12 CAPSULE | Refills: 0 | Status: SHIPPED | OUTPATIENT
Start: 2020-02-27 | End: 2020-03-16 | Stop reason: SDUPTHER

## 2020-02-27 RX ORDER — GLYCOPYRROLATE 0.2 MG/ML
INJECTION INTRAMUSCULAR; INTRAVENOUS
Status: DISCONTINUED | OUTPATIENT
Start: 2020-02-27 | End: 2020-02-27

## 2020-02-27 RX ORDER — GABAPENTIN 300 MG/1
300 CAPSULE ORAL 3 TIMES DAILY
Status: DISCONTINUED | OUTPATIENT
Start: 2020-02-27 | End: 2020-03-01 | Stop reason: HOSPADM

## 2020-02-27 RX ORDER — ACETAMINOPHEN 325 MG/1
650 TABLET ORAL EVERY 8 HOURS PRN
Status: DISCONTINUED | OUTPATIENT
Start: 2020-02-27 | End: 2020-03-01 | Stop reason: HOSPADM

## 2020-02-27 RX ORDER — ONDANSETRON 8 MG/1
8 TABLET, ORALLY DISINTEGRATING ORAL EVERY 8 HOURS PRN
Status: DISCONTINUED | OUTPATIENT
Start: 2020-02-27 | End: 2020-03-01 | Stop reason: HOSPADM

## 2020-02-27 RX ORDER — FENTANYL CITRATE 50 UG/ML
INJECTION, SOLUTION INTRAMUSCULAR; INTRAVENOUS
Status: DISCONTINUED | OUTPATIENT
Start: 2020-02-27 | End: 2020-02-27

## 2020-02-27 RX ORDER — METHOCARBAMOL 500 MG/1
1000 TABLET, FILM COATED ORAL 3 TIMES DAILY
Qty: 84 TABLET | Refills: 0 | Status: SHIPPED | OUTPATIENT
Start: 2020-02-27 | End: 2020-03-16 | Stop reason: SDUPTHER

## 2020-02-27 RX ORDER — ALPRAZOLAM 0.5 MG/1
0.5 TABLET ORAL 3 TIMES DAILY
Status: DISCONTINUED | OUTPATIENT
Start: 2020-02-27 | End: 2020-03-01 | Stop reason: HOSPADM

## 2020-02-27 RX ORDER — METHOCARBAMOL 750 MG/1
750 TABLET, FILM COATED ORAL 3 TIMES DAILY
Status: DISCONTINUED | OUTPATIENT
Start: 2020-02-27 | End: 2020-03-01 | Stop reason: HOSPADM

## 2020-02-27 RX ORDER — OXYCODONE HYDROCHLORIDE 5 MG/1
5 TABLET ORAL EVERY 4 HOURS PRN
Status: DISCONTINUED | OUTPATIENT
Start: 2020-02-27 | End: 2020-02-28

## 2020-02-27 RX ORDER — CELECOXIB 100 MG/1
100 CAPSULE ORAL DAILY
Status: DISCONTINUED | OUTPATIENT
Start: 2020-02-28 | End: 2020-03-01 | Stop reason: HOSPADM

## 2020-02-27 RX ORDER — SODIUM CHLORIDE 0.9 % (FLUSH) 0.9 %
3 SYRINGE (ML) INJECTION
Status: DISCONTINUED | OUTPATIENT
Start: 2020-02-27 | End: 2020-03-01 | Stop reason: HOSPADM

## 2020-02-27 RX ORDER — MUPIROCIN 20 MG/G
OINTMENT TOPICAL
Status: DISCONTINUED | OUTPATIENT
Start: 2020-02-27 | End: 2020-02-27 | Stop reason: HOSPADM

## 2020-02-27 RX ORDER — LIDOCAINE HYDROCHLORIDE 10 MG/ML
1 INJECTION, SOLUTION EPIDURAL; INFILTRATION; INTRACAUDAL; PERINEURAL ONCE
Status: DISCONTINUED | OUTPATIENT
Start: 2020-02-27 | End: 2020-03-01 | Stop reason: HOSPADM

## 2020-02-27 RX ORDER — PROPOFOL 10 MG/ML
INJECTION, EMULSION INTRAVENOUS
Status: DISCONTINUED | OUTPATIENT
Start: 2020-02-27 | End: 2020-02-27

## 2020-02-27 RX ORDER — DEXMEDETOMIDINE HYDROCHLORIDE 100 UG/ML
INJECTION, SOLUTION INTRAVENOUS
Status: DISCONTINUED | OUTPATIENT
Start: 2020-02-27 | End: 2020-02-27

## 2020-02-27 RX ORDER — HALOPERIDOL 5 MG/ML
0.5 INJECTION INTRAMUSCULAR EVERY 30 MIN PRN
Status: DISCONTINUED | OUTPATIENT
Start: 2020-02-27 | End: 2020-03-01 | Stop reason: HOSPADM

## 2020-02-27 RX ORDER — DEXAMETHASONE SODIUM PHOSPHATE 4 MG/ML
INJECTION, SOLUTION INTRA-ARTICULAR; INTRALESIONAL; INTRAMUSCULAR; INTRAVENOUS; SOFT TISSUE
Status: DISCONTINUED | OUTPATIENT
Start: 2020-02-27 | End: 2020-02-27

## 2020-02-27 RX ORDER — BACITRACIN 50000 [IU]/1
INJECTION, POWDER, FOR SOLUTION INTRAMUSCULAR
Status: DISCONTINUED | OUTPATIENT
Start: 2020-02-27 | End: 2020-02-27 | Stop reason: HOSPADM

## 2020-02-27 RX ORDER — KETAMINE HCL IN 0.9 % NACL 50 MG/5 ML
SYRINGE (ML) INTRAVENOUS
Status: DISCONTINUED | OUTPATIENT
Start: 2020-02-27 | End: 2020-02-27

## 2020-02-27 RX ORDER — SODIUM CHLORIDE 9 MG/ML
INJECTION, SOLUTION INTRAVENOUS CONTINUOUS
Status: DISCONTINUED | OUTPATIENT
Start: 2020-02-27 | End: 2020-03-01 | Stop reason: HOSPADM

## 2020-02-27 RX ORDER — ACETAMINOPHEN 500 MG
1000 TABLET ORAL
Status: COMPLETED | OUTPATIENT
Start: 2020-02-27 | End: 2020-02-27

## 2020-02-27 RX ORDER — HYDROMORPHONE HYDROCHLORIDE 1 MG/ML
0.5 INJECTION, SOLUTION INTRAMUSCULAR; INTRAVENOUS; SUBCUTANEOUS ONCE
Status: COMPLETED | OUTPATIENT
Start: 2020-02-27 | End: 2020-02-27

## 2020-02-27 RX ORDER — DOCUSATE SODIUM 100 MG/1
100 CAPSULE, LIQUID FILLED ORAL 3 TIMES DAILY
Qty: 90 CAPSULE | Refills: 0 | Status: SHIPPED | OUTPATIENT
Start: 2020-02-27 | End: 2022-12-13 | Stop reason: CLARIF

## 2020-02-27 RX ORDER — GABAPENTIN 300 MG/1
300 CAPSULE ORAL 3 TIMES DAILY
Status: DISCONTINUED | OUTPATIENT
Start: 2020-02-27 | End: 2020-02-27

## 2020-02-27 RX ORDER — PROMETHAZINE HYDROCHLORIDE 12.5 MG/1
12.5 TABLET ORAL EVERY 6 HOURS PRN
Qty: 60 TABLET | Refills: 0 | Status: SHIPPED | OUTPATIENT
Start: 2020-02-27 | End: 2022-12-13 | Stop reason: CLARIF

## 2020-02-27 RX ORDER — METHOCARBAMOL 500 MG/1
500 TABLET, FILM COATED ORAL 4 TIMES DAILY
Status: DISCONTINUED | OUTPATIENT
Start: 2020-02-27 | End: 2020-02-27

## 2020-02-27 RX ORDER — ACETAMINOPHEN 325 MG/1
650 TABLET ORAL EVERY 4 HOURS PRN
Status: DISCONTINUED | OUTPATIENT
Start: 2020-02-27 | End: 2020-02-28

## 2020-02-27 RX ORDER — DEXTROMETHORPHAN HYDROBROMIDE, GUAIFENESIN 5; 100 MG/5ML; MG/5ML
650 LIQUID ORAL EVERY 6 HOURS
Qty: 56 TABLET | Refills: 0 | Status: SHIPPED | OUTPATIENT
Start: 2020-02-27 | End: 2023-04-12

## 2020-02-27 RX ORDER — PHENYLEPHRINE HYDROCHLORIDE 10 MG/ML
INJECTION INTRAVENOUS
Status: DISCONTINUED | OUTPATIENT
Start: 2020-02-27 | End: 2020-02-27

## 2020-02-27 RX ORDER — MORPHINE SULFATE 2 MG/ML
3 INJECTION, SOLUTION INTRAMUSCULAR; INTRAVENOUS
Status: DISCONTINUED | OUTPATIENT
Start: 2020-02-27 | End: 2020-02-28

## 2020-02-27 RX ORDER — LIDOCAINE HCL/PF 100 MG/5ML
SYRINGE (ML) INTRAVENOUS
Status: DISCONTINUED | OUTPATIENT
Start: 2020-02-27 | End: 2020-02-27

## 2020-02-27 RX ORDER — OXYCODONE HYDROCHLORIDE 5 MG/1
5 TABLET ORAL EVERY 4 HOURS PRN
Qty: 20 TABLET | Refills: 0 | Status: SHIPPED | OUTPATIENT
Start: 2020-02-27 | End: 2023-04-12

## 2020-02-27 RX ADMIN — HYDROMORPHONE HYDROCHLORIDE 0.2 MG: 1 INJECTION, SOLUTION INTRAMUSCULAR; INTRAVENOUS; SUBCUTANEOUS at 06:02

## 2020-02-27 RX ADMIN — Medication 25 MG: at 04:02

## 2020-02-27 RX ADMIN — ACETAMINOPHEN 1000 MG: 500 TABLET ORAL at 03:02

## 2020-02-27 RX ADMIN — DEXMEDETOMIDINE HYDROCHLORIDE 20 MCG: 100 INJECTION, SOLUTION, CONCENTRATE INTRAVENOUS at 05:02

## 2020-02-27 RX ADMIN — ONDANSETRON 4 MG: 2 INJECTION INTRAMUSCULAR; INTRAVENOUS at 04:02

## 2020-02-27 RX ADMIN — CEFAZOLIN 2 G: 1 INJECTION, POWDER, FOR SOLUTION INTRAMUSCULAR; INTRAVENOUS at 11:02

## 2020-02-27 RX ADMIN — ALPRAZOLAM 0.5 MG: 0.5 TABLET ORAL at 10:02

## 2020-02-27 RX ADMIN — CEFAZOLIN 2 G: 330 INJECTION, POWDER, FOR SOLUTION INTRAMUSCULAR; INTRAVENOUS at 04:02

## 2020-02-27 RX ADMIN — SODIUM CHLORIDE 500 ML: 0.9 INJECTION, SOLUTION INTRAVENOUS at 09:02

## 2020-02-27 RX ADMIN — CIPROFLOXACIN 400 MG: 2 INJECTION, SOLUTION INTRAVENOUS at 04:02

## 2020-02-27 RX ADMIN — Medication 15 MG: at 05:02

## 2020-02-27 RX ADMIN — PHENYLEPHRINE HYDROCHLORIDE 100 MCG: 10 INJECTION INTRAVENOUS at 05:02

## 2020-02-27 RX ADMIN — HYDROMORPHONE HYDROCHLORIDE 0.2 MG: 1 INJECTION, SOLUTION INTRAMUSCULAR; INTRAVENOUS; SUBCUTANEOUS at 05:02

## 2020-02-27 RX ADMIN — OXYCODONE HYDROCHLORIDE 10 MG: 10 TABLET ORAL at 05:02

## 2020-02-27 RX ADMIN — LIDOCAINE HYDROCHLORIDE 100 MG: 20 INJECTION, SOLUTION INTRAVENOUS at 03:02

## 2020-02-27 RX ADMIN — HYDROMORPHONE HYDROCHLORIDE 0.5 MG: 1 INJECTION, SOLUTION INTRAMUSCULAR; INTRAVENOUS; SUBCUTANEOUS at 06:02

## 2020-02-27 RX ADMIN — MORPHINE SULFATE 2 MG: 2 INJECTION, SOLUTION INTRAMUSCULAR; INTRAVENOUS at 06:02

## 2020-02-27 RX ADMIN — PHENYLEPHRINE HYDROCHLORIDE 100 MCG: 10 INJECTION INTRAVENOUS at 04:02

## 2020-02-27 RX ADMIN — MIDAZOLAM HYDROCHLORIDE 4 MG: 1 INJECTION, SOLUTION INTRAMUSCULAR; INTRAVENOUS at 03:02

## 2020-02-27 RX ADMIN — DEXAMETHASONE SODIUM PHOSPHATE 4 MG: 4 INJECTION, SOLUTION INTRAMUSCULAR; INTRAVENOUS at 04:02

## 2020-02-27 RX ADMIN — ROCURONIUM BROMIDE 10 MG: 10 INJECTION, SOLUTION INTRAVENOUS at 04:02

## 2020-02-27 RX ADMIN — PROPOFOL 150 MG: 10 INJECTION, EMULSION INTRAVENOUS at 03:02

## 2020-02-27 RX ADMIN — METHOCARBAMOL TABLETS 750 MG: 750 TABLET, COATED ORAL at 06:02

## 2020-02-27 RX ADMIN — GLYCOPYRROLATE 0.2 MG: 0.2 INJECTION, SOLUTION INTRAMUSCULAR; INTRAVENOUS at 04:02

## 2020-02-27 RX ADMIN — SODIUM CHLORIDE, SODIUM GLUCONATE, SODIUM ACETATE, POTASSIUM CHLORIDE, MAGNESIUM CHLORIDE, SODIUM PHOSPHATE, DIBASIC, AND POTASSIUM PHOSPHATE: .53; .5; .37; .037; .03; .012; .00082 INJECTION, SOLUTION INTRAVENOUS at 03:02

## 2020-02-27 RX ADMIN — REMIFENTANIL HYDROCHLORIDE 0.04 MCG/KG/MIN: 1 INJECTION, POWDER, LYOPHILIZED, FOR SOLUTION INTRAVENOUS at 03:02

## 2020-02-27 RX ADMIN — ROCURONIUM BROMIDE 60 MG: 10 INJECTION, SOLUTION INTRAVENOUS at 03:02

## 2020-02-27 RX ADMIN — SUGAMMADEX 200 MG: 100 INJECTION, SOLUTION INTRAVENOUS at 05:02

## 2020-02-27 RX ADMIN — SODIUM CHLORIDE 1000 ML: 0.9 INJECTION, SOLUTION INTRAVENOUS at 11:02

## 2020-02-27 RX ADMIN — MUPIROCIN: 20 OINTMENT TOPICAL at 11:02

## 2020-02-27 RX ADMIN — OXYCODONE HYDROCHLORIDE 10 MG: 10 TABLET ORAL at 10:02

## 2020-02-27 RX ADMIN — FENTANYL CITRATE 100 MCG: 50 INJECTION, SOLUTION INTRAMUSCULAR; INTRAVENOUS at 03:02

## 2020-02-27 RX ADMIN — MORPHINE SULFATE 3 MG: 2 INJECTION, SOLUTION INTRAMUSCULAR; INTRAVENOUS at 11:02

## 2020-02-27 RX ADMIN — GABAPENTIN 300 MG: 300 CAPSULE ORAL at 06:02

## 2020-02-27 NOTE — DISCHARGE INSTRUCTIONS
DR. KASSI LORA POSTOPERATIVE INSTRUCTIONS -   LUMBAR DISKECTOMY       Antibiotics: You do not need additional antibiotics at home.    Wound Care: You may remove your dressing and shower 5 days after surgery. Until then please keep your wound clean and dry. Sponge baths are acceptable. Do not go in a pool or hot tub until seen in clinic. Please leave the small steri-strips covering your wound in place until they fall off naturally (2 weeks). You may notice clear suture ends hanging from the sides of your incision after the steri-strips are removed, it is ok to clip these with scissors.    Brace: You do not need a brace.    Pain: You will be given a prescription for pain medicine before discharge. If your pain is not adequately controlled with these medications, please call [phone]. Your pain medicine will be gradually decreased over the following 8 weeks.  Per our department policy, we will only provide pain medication for 2 months after your date of surgery.  If you require additional pain medication after this date, you will need to schedule an appointment with pain management or your PCP to provide future prescriptions.  A copy of the medication policy is attached.      Infection: Signs of infection include increasing wound drainage and redness around the wound, as well as a temperature over 101.5 degrees. It is unnecessary to take your temperature on a routine basis. Please call the above number if you are concerned about an infection.    Driving and Work: It is ok to return to driving and work as long as you are not taking narcotic pain medications. Please do not lift over 10 pounds or participate in exercise or sports until cleared by Dr. Mike.    Deep Venous Thrombosis (Blood Clots): Symptoms include swelling in the legs and shortness of breath. Please call the office or proceed to the nearest emergency room if you have any of these symptoms.    Physical Therapy: The best physical therapy after  surgery is walking. Please try to walk as much as possible.    Physical Therapy: The best physical therapy immediately after surgery is the range of motion exercises detailed above and walking. Please try to walk as much as possible.    Follow-up: You will be scheduled for a follow-up appointment in 2-3 weeks with either Dr. Mike or his physician assistant, Lou May PA-C.    Questions: During business hours please call [phone] or [phone] for routine questions. For after hours questions please call [phone] and ask to speak with the Orthopaedic resident on call.    Disability: If you submitted short term disability paperwork for us to complete and would like to check the status, please call the Disability Department at [phone].  You may fax any necessary paperwork to [phone].

## 2020-02-27 NOTE — INTERVAL H&P NOTE
The patient has been examined and the H&P has been reviewed:    I concur with the findings and no changes have occurred since H&P was written.    Anesthesia/Surgery risks, benefits and alternative options discussed and understood by patient/family.          Active Hospital Problems    Diagnosis  POA    S/P diskectomy [Z98.890]  Not Applicable      Resolved Hospital Problems   No resolved problems to display.

## 2020-02-27 NOTE — TRANSFER OF CARE
Anesthesia Transfer of Care Note    Patient: Scarlett Knox    Procedure(s) Performed: Procedure(s) (LRB):  LAMINECTOMY, SPINE, LUMBAR, WITH DISCECTOMY L4/5  (N/A)    Patient location: PACU    Anesthesia Type: general    Transport from OR: Transported from OR on 6-10 L/min O2 by face mask with adequate spontaneous ventilation    Post pain: adequate analgesia    Post assessment: no apparent anesthetic complications and tolerated procedure well    Post vital signs: stable    Level of consciousness: awake and alert    Nausea/Vomiting: no nausea/vomiting    Complications: none    Transfer of care protocol was followed      Last vitals:   Visit Vitals  /60   Pulse 96   Temp 36.4 °C (97.5 °F) (Temporal)   Resp (!) 22   Wt 67.1 kg (148 lb)   LMP 01/27/2020 (Exact Date)   SpO2 100%   Breastfeeding? No   BMI 27.96 kg/m²

## 2020-02-27 NOTE — BRIEF OP NOTE
[facility]  Brief Operative Note    SUMMARY     Surgery Date: 2/27/2020     Surgeon(s) and Role:     * Dominic Mike MD - Primary    Assisting Surgeon: NICOLE Huffman MD  Pre-op Diagnosis:  Lumbar radiculopathy [M54.16]    Post-op Diagnosis:  Post-Op Diagnosis Codes:     * Lumbar radiculopathy [M54.16]    Procedure(s) (LRB):  LAMINECTOMY, SPINE, LUMBAR, WITH DISCECTOMY L4/5  (N/A)    Anesthesia: General    Description of Procedure: see op note    Description of the findings of the procedure: see op note    Estimated Blood Loss: * No values recorded between 2/27/2020  4:12 PM and 2/27/2020  5:48 PM *         Specimens:   Specimen (12h ago, onward)    None

## 2020-02-28 PROCEDURE — 97530 THERAPEUTIC ACTIVITIES: CPT

## 2020-02-28 PROCEDURE — 97166 OT EVAL MOD COMPLEX 45 MIN: CPT

## 2020-02-28 PROCEDURE — 63600175 PHARM REV CODE 636 W HCPCS: Performed by: STUDENT IN AN ORGANIZED HEALTH CARE EDUCATION/TRAINING PROGRAM

## 2020-02-28 PROCEDURE — 25000003 PHARM REV CODE 250: Performed by: STUDENT IN AN ORGANIZED HEALTH CARE EDUCATION/TRAINING PROGRAM

## 2020-02-28 PROCEDURE — 97535 SELF CARE MNGMENT TRAINING: CPT

## 2020-02-28 PROCEDURE — 97161 PT EVAL LOW COMPLEX 20 MIN: CPT

## 2020-02-28 PROCEDURE — 99900035 HC TECH TIME PER 15 MIN (STAT)

## 2020-02-28 PROCEDURE — 94761 N-INVAS EAR/PLS OXIMETRY MLT: CPT

## 2020-02-28 PROCEDURE — 94770 HC EXHALED C02 TEST: CPT

## 2020-02-28 RX ORDER — TRAMADOL HYDROCHLORIDE 50 MG/1
50 TABLET ORAL EVERY 12 HOURS PRN
Status: DISCONTINUED | OUTPATIENT
Start: 2020-02-28 | End: 2020-03-01 | Stop reason: HOSPADM

## 2020-02-28 RX ORDER — HYDROMORPHONE HCL IN 0.9% NACL 6 MG/30 ML
PATIENT CONTROLLED ANALGESIA SYRINGE INTRAVENOUS CONTINUOUS
Status: DISCONTINUED | OUTPATIENT
Start: 2020-02-28 | End: 2020-02-29

## 2020-02-28 RX ORDER — HYDROMORPHONE HYDROCHLORIDE 1 MG/ML
1 INJECTION, SOLUTION INTRAMUSCULAR; INTRAVENOUS; SUBCUTANEOUS
Status: DISCONTINUED | OUTPATIENT
Start: 2020-02-28 | End: 2020-02-29

## 2020-02-28 RX ORDER — LORAZEPAM 2 MG/ML
2 INJECTION INTRAMUSCULAR ONCE
Status: COMPLETED | OUTPATIENT
Start: 2020-02-28 | End: 2020-02-28

## 2020-02-28 RX ORDER — OXYCODONE HYDROCHLORIDE 5 MG/1
5 TABLET ORAL EVERY 4 HOURS PRN
Status: DISCONTINUED | OUTPATIENT
Start: 2020-02-28 | End: 2020-03-01 | Stop reason: HOSPADM

## 2020-02-28 RX ORDER — NALOXONE HCL 0.4 MG/ML
0.02 VIAL (ML) INJECTION
Status: DISCONTINUED | OUTPATIENT
Start: 2020-02-28 | End: 2020-02-29

## 2020-02-28 RX ORDER — OXYCODONE HYDROCHLORIDE 10 MG/1
10 TABLET ORAL EVERY 4 HOURS PRN
Status: DISCONTINUED | OUTPATIENT
Start: 2020-02-28 | End: 2020-03-01 | Stop reason: HOSPADM

## 2020-02-28 RX ADMIN — ALPRAZOLAM 0.5 MG: 0.5 TABLET ORAL at 04:02

## 2020-02-28 RX ADMIN — GABAPENTIN 300 MG: 300 CAPSULE ORAL at 09:02

## 2020-02-28 RX ADMIN — HYDROMORPHONE HYDROCHLORIDE 1 MG: 1 INJECTION, SOLUTION INTRAMUSCULAR; INTRAVENOUS; SUBCUTANEOUS at 10:02

## 2020-02-28 RX ADMIN — ONDANSETRON 8 MG: 8 TABLET, ORALLY DISINTEGRATING ORAL at 04:02

## 2020-02-28 RX ADMIN — ALPRAZOLAM 0.5 MG: 0.5 TABLET ORAL at 10:02

## 2020-02-28 RX ADMIN — LORAZEPAM 2 MG: 2 INJECTION INTRAMUSCULAR; INTRAVENOUS at 04:02

## 2020-02-28 RX ADMIN — CEFAZOLIN 2 G: 1 INJECTION, POWDER, FOR SOLUTION INTRAMUSCULAR; INTRAVENOUS at 04:02

## 2020-02-28 RX ADMIN — ESCITALOPRAM OXALATE 20 MG: 20 TABLET ORAL at 10:02

## 2020-02-28 RX ADMIN — OXYCODONE HYDROCHLORIDE 10 MG: 10 TABLET ORAL at 04:02

## 2020-02-28 RX ADMIN — CEFAZOLIN 2 G: 1 INJECTION, POWDER, FOR SOLUTION INTRAMUSCULAR; INTRAVENOUS at 10:02

## 2020-02-28 RX ADMIN — HYDROMORPHONE HYDROCHLORIDE 1 MG: 1 INJECTION, SOLUTION INTRAMUSCULAR; INTRAVENOUS; SUBCUTANEOUS at 04:02

## 2020-02-28 RX ADMIN — Medication: at 11:02

## 2020-02-28 RX ADMIN — GABAPENTIN 300 MG: 300 CAPSULE ORAL at 04:02

## 2020-02-28 RX ADMIN — GABAPENTIN 300 MG: 300 CAPSULE ORAL at 10:02

## 2020-02-28 RX ADMIN — Medication: at 06:02

## 2020-02-28 RX ADMIN — METHOCARBAMOL TABLETS 750 MG: 750 TABLET, COATED ORAL at 10:02

## 2020-02-28 RX ADMIN — METHOCARBAMOL TABLETS 750 MG: 750 TABLET, COATED ORAL at 04:02

## 2020-02-28 RX ADMIN — Medication: at 05:02

## 2020-02-28 RX ADMIN — MORPHINE SULFATE 3 MG: 2 INJECTION, SOLUTION INTRAMUSCULAR; INTRAVENOUS at 04:02

## 2020-02-28 RX ADMIN — CELECOXIB 100 MG: 100 CAPSULE ORAL at 10:02

## 2020-02-28 RX ADMIN — METHOCARBAMOL TABLETS 750 MG: 750 TABLET, COATED ORAL at 09:02

## 2020-02-28 RX ADMIN — ALPRAZOLAM 0.5 MG: 0.5 TABLET ORAL at 09:02

## 2020-02-28 NOTE — NURSING
2nd page placed to md on call. Pt rle flexed. C/o pain 10/10 unrelieved by prn pain meds. At bedside to monitor.

## 2020-02-28 NOTE — PLAN OF CARE
[facility]    HOME HEALTH ORDERS  FACE TO FACE ENCOUNTER    Patient Name: Scarlett Knox  YOB: 1990    PCP: Liberty Ngo MD   PCP Address: [address] [place] [address]  PCP Phone Number: [phone]  PCP Fax: [phone]    Encounter Date: 02/28/2020    Admit to Home Health    Diagnoses:  Active Hospital Problems    Diagnosis  POA    *S/P diskectomy [Z98.890]  Not Applicable      Resolved Hospital Problems   No resolved problems to display.       Future Appointments   Date Time Provider Department Center   3/16/2020  9:15 AM Dominic Mike MD [facility] SPINE [facility]     Follow-up Information     Lou May PA-C In 3 weeks.    Specialty:  Orthopedic Surgery  Contact information:  [address]  [facility] [id number] [phone]                     I have seen and examined this patient face to face today. My clinical findings that support the need for the home health skilled services and home bound status are the following:  Weakness/numbness causing balance and gait disturbance due to Surgery making it taxing to leave home.    Allergies:Review of patient's allergies indicates:  No Known Allergies    Diet: regular diet    Activities: activity as tolerated, spine precautions    Home Health Admitting Clinician:   SN/PT to complete comprehensive assessment including routine vital signs. Instruct on disease process and s/s of complications to report to MD. Follow specific home health protocol.  Review/verify medication list sent home with the patient at time of discharge  and instruct patient/caregiver as needed. If coumadin ordered, coumadin clinic to manage INR with INR draws 2x per week with a goal to maintain INR between 1.8 and 2.2. Frequency may be adjusted depending on start of care date.    Notify MD if SBP > 160 or < 90; DBP > 90 or < 50; HR > 120 or < 50; Temp > 101    Home Medical Equipment:  Walker, 3-1 bedside commode, transfer tub  bench    CONSULTS:    Physical Therapy may admit if patient not on coumadin, PT to perform comprehensive assessment if performing admit visit and generate therapy plan of care. Evaluate for home safety and equipment needs; Establish/upgrade home exercise program. Perform/instruct on therapeutic exercises, gait training, transfer training, and Range of Motion.      OTHER: (only select if patient needs other therapy disciplines)  Occupational Therapy to evaluate and treat. Evaluate home environment for safety and equipment needs. Perform/Instruct on transfers, ADL training, ROM, and therapeutic exercises.      WOUND CARE ORDERS:  DR. KASSI LORA POSTOPERATIVE INSTRUCTIONS -   LUMBAR DISKECTOMY       Antibiotics: You do not need additional antibiotics at home.    Wound Care: You may remove your dressing and shower 5 days after surgery. Until then please keep your wound clean and dry. Sponge baths are acceptable. Do not go in a pool or hot tub until seen in clinic. Please leave the small steri-strips covering your wound in place until they fall off naturally (2 weeks). You may notice clear suture ends hanging from the sides of your incision after the steri-strips are removed, it is ok to clip these with scissors.    Brace: You do not need a brace.    Pain: You will be given a prescription for pain medicine before discharge. If your pain is not adequately controlled with these medications, please call [phone]. Your pain medicine will be gradually decreased over the following 8 weeks.  Per our department policy, we will only provide pain medication for 2 months after your date of surgery.  If you require additional pain medication after this date, you will need to schedule an appointment with pain management or your PCP to provide future prescriptions.  A copy of the medication policy is attached.      Infection: Signs of infection include increasing wound drainage and redness around the wound, as well as a  temperature over 101.5 degrees. It is unnecessary to take your temperature on a routine basis. Please call the above number if you are concerned about an infection.    Driving and Work: It is ok to return to driving and work as long as you are not taking narcotic pain medications. Please do not lift over 10 pounds or participate in exercise or sports until cleared by Dr. Mike.    Deep Venous Thrombosis (Blood Clots): Symptoms include swelling in the legs and shortness of breath. Please call the office or proceed to the nearest emergency room if you have any of these symptoms.    Physical Therapy: The best physical therapy after surgery is walking. Please try to walk as much as possible.    Physical Therapy: The best physical therapy immediately after surgery is the range of motion exercises detailed above and walking. Please try to walk as much as possible.    Follow-up: You will be scheduled for a follow-up appointment in 2-3 weeks with either Dr. Mike or his physician assistant, Lou May PA-C.    Questions: During business hours please call [phone] or [phone] for routine questions. For after hours questions please call [phone] and ask to speak with the Orthopaedic resident on call.    Disability: If you submitted short term disability paperwork for us to complete and would like to check the status, please call the Disability Department at [phone].  You may fax any necessary paperwork to [phone].         Medications: Review discharge medications with patient and family and provide education.      Current Discharge Medication List      START taking these medications    Details   acetaminophen (TYLENOL) 650 MG TbSR Take 1 tablet (650 mg total) by mouth every 6 (six) hours.  Qty: 56 tablet, Refills: 0      celecoxib (CELEBREX) 200 MG capsule Take 1 capsule (200 mg total) by mouth once daily.  Qty: 12 capsule, Refills: 0      docusate sodium (COLACE) 100 MG capsule Take  1 capsule (100 mg total) by mouth 3 (three) times daily.  Qty: 90 capsule, Refills: 0      oxyCODONE (ROXICODONE) 5 MG immediate release tablet Take 1 tablet (5 mg total) by mouth every 4 (four) hours as needed for Pain.  Qty: 20 tablet, Refills: 0      promethazine (PHENERGAN) 12.5 MG Tab Take 1 tablet (12.5 mg total) by mouth every 6 (six) hours as needed.  Qty: 60 tablet, Refills: 0         CONTINUE these medications which have CHANGED    Details   methocarbamol (ROBAXIN) 500 MG Tab Take 2 tablets (1,000 mg total) by mouth 3 (three) times daily. for 14 days  Qty: 84 tablet, Refills: 0         CONTINUE these medications which have NOT CHANGED    Details   dextroamphetamine-amphetamine (ADDERALL XR) 10 MG 24 hr capsule Take 10 mg by mouth every morning.      escitalopram oxalate (LEXAPRO) 20 MG tablet Take 20 mg by mouth once daily.      tiZANidine 4 mg Cap Take by mouth 2 (two) times daily.      ALPRAZolam (XANAX) 0.5 MG tablet Take 0.5 mg by mouth 3 (three) times daily.         STOP taking these medications       diclofenac (VOLTAREN) 50 MG EC tablet Comments:   Reason for Stopping:         gabapentin (NEURONTIN) 300 MG capsule Comments:   Reason for Stopping:         naproxen (NAPROSYN) 500 MG tablet Comments:   Reason for Stopping:         traMADol (ULTRAM) 50 mg tablet Comments:   Reason for Stopping:               I certify that this patient is confined to her home and needs intermittent skilled nursing care, physical therapy and occupational therapy.

## 2020-02-28 NOTE — OP NOTE
DATE OF PROCEDURE: 2/27/20     SURGEON: Dominic Mike M.D.     FIRST ASSISTANT SURGEON: Sean Huffman MD, PGY-2     PREOPERATIVE DIAGNOSIS: Left Lumbar Radiculopathy secondary to left L4/5 disc herniation.     POSTOPERATIVE DIAGNOSIS: Left Lumbar Radiculopathy secondary to left L4/5 disc herniation.     PROCEDURES PERFORMED:  1. Lumbar laminotomy, foraminotomy and disectomy Left 4/5     ANESTHESIA: General endotracheal anesthesia.     ESTIMATED BLOOD LOSS: 25 mL.     IMPLANTS: None.     SPECIMENS: None.     FINDINGS: Large disc herniation.     DRAINS: None.     COMPLICATIONS: None.     SPONGE AND NEEDLE COUNT: Correct x2.     NEUROLOGICAL MONITORING: Somatosensory evoked  potential, free-running EMG.  There were no changes to SSEP baselines.  There no significant EMG activity.     REASON FOR OPERATION AND BRIEF HISTORY AND PHYSICAL: Scarlett Reilly a    29 y.o. female with refractory lumbar radiculopathy secondary to a large left/paracentral disc herniation at L4/5     DESCRIPTION OF INFORMED CONSENT: Please see my last clinic note for a full   description of the informed consent I had with the patient as well as her partner.     DESCRIPTION OF PROCEDURE: The patient was met in the preoperative area where   her low back was marked in the midline by me personally. Subsequently, she was   brought to the Operating Room where sequential compression devices were placed   on the patient's bilateral calves and run throughout the case. The patient was   then intubated via general endotracheal anesthesia. They were then flipped prone   onto a Case table with Solo frame. The head was secured on a facial pillow. The eyes were personally checked by pennied found to be acceptable. The arms were held in a 90-90 position. All bony prominences were padded paying special attention to the axilla, elbows, hands, breasts, knees, and feet. Being satisfied with positioning and confirming this to be adequate with Anesthesia, the  patient's lower back was prepped and draped in normal sterile fashion.     A full timeout was then called identifying the patient, the procedural site and   level, the availability of all instruments and no specific nursing, surgical,   anesthetic or neurological monitoring concerns. Finding that it was safe to   proceed with surgery, the patient was given a weight-appropriate dose of   antibiotics by the Anesthesia Service.     I then infiltrated my planned incision with 10 mL of 1% lidocaine. I then made   a midline lumbar incision and carried dissection down through the skin and  subcutaneous tissues using combination of sharp dissection and electrocautery   where necessary. The dorsal fascia of the lumbar spine was identified and   incised in the midline and I performed a preliminary subperiosteal exposure of   what I took to be the Left L4 and L5 lamina as well as what I took to be   the Left L4/5 facet joint. At the conclusion of my preliminary dissection, I   placed a [place]field 4 elevator under what I took to be the trailing edge of the L4  lamina, took lateral radiograph and thus confirmed my level.     At this point, I finalized my exposure. I then used a high-speed drill to thin   the trailing one-third of the L4 lamina to reveal the origin of the ligamentum   flavum. I then released the ligamentum flavum from the trailing edge of the L4  lamina using a forward-angle curette. I gently worked the ligamentum flavum   distally. This allowed me to visualize the underlying epidural fat and subsequently blue-whitedura. I used the Penfield 4 elevator to move the thecal sac towards the   midline and brought in a nerve root retractor. An obvious disk bulge was then   seen in this area and I entered it with a disc knife via a vertical annulotomy. Multiple soft   fragments were removed with a pituitary. At the conclusion of my diskectomy, I   could easily pass a [place] elevator along the Left L5 nerve root ventrally  as well   as across the thecal sac. The wound was then thoroughly irrigated, including   irrigation of the disk space, which revealed no residual free fragments. I then  finalized hemostasis with FloSeal and patties. Valsalva maneuver to 40 mmHg   demonstrated no cerebrospinal fluid leak. Next, 500 mg of vancomycin powder was  placed in the deep space and deep fascia was closed with #1 Vicryl in a   figure-of-eight fashion. The superficial layer was then irrigated and closed   over a drain with 2-0 Vicryl, 3-0 Monocryl, Dermabond and Steri-Strips. A soft   dressing was placed. The patient was then flipped supine, extubated and transferred to the Recovery Room in stable condition.

## 2020-02-28 NOTE — NURSING TRANSFER
Nursing Transfer Note      2/27/2020     Transfer To: 529 B  From PACU    Transfer via stretcher    Transfer with Personal Home Wheelchair    Transported by PCT    Medicines sent: NA    Chart send with patient: Yes    Notified: spouse via patient text    Patient reassessed at: Upon arrival to floor

## 2020-02-28 NOTE — RESPIRATORY THERAPY
Rapid Response Respiratory Therapy ETCO2 Check     Time of visit: 1640    Code Status: Full Code   : 1990  Bed: 529/529 B:   MRN: [id number]    SITUATION     Evaluated patient for: ETCO2 Compliance     BACKGROUND     Patient has a past medical history of Anxiety, Anxiety, and Back pain.    ASSESSMENT     Is the ETCO2 monitor on? (Yes/No)  yes   Is the patient wearing a cannula? (Yes/No) yes  Are ETCO2 orders placed? (Yes/No) yes  Is the patient on a PCA pump? (Yes/No) yes  ETCO2 monitored: ETCO2 (mmHg): 40 mmHg  O2 Device/Concentration: room air  Pulse: 90 Respiratory rate: 18 Temperature: Temp: 96.4 °F (35.8 °C) BP: BP: (!) 98/54 SpO2: 94%  Level of Consciousness: Level of Consciousness (AVPU): alert  All Lung Field Breath Sounds:    Ambu at bedside: Ambu bag with the patient?: Yes, Adult Ambu    INTERVENTIONS/RECOMMENDATIONS     Continue wearing ETCO2 monitor while on PCA pump.    PHYSICIAN ESCALATION     Physician Escalation (Yes/No): no       Discussed plan of care primary RTMARIA ISABEL     FOLLOW-UP     Please call back the Rapid Response RT, Roula Acosta, CRT at x [phone] for any questions or concerns.

## 2020-02-28 NOTE — ASSESSMENT & PLAN NOTE
29 y.o. female POD1 s/p L4-5 Diskectomy    Pain control: PCA/multimodal  PT/OT: WBAT spine precautions  DVT PPx: ASA 81 BID, FCDs at all times when not ambulating  Abx: postop Ancef  Labs: none  Drain: none  Ashraf: none    Dispo: Aggressive pain control. Attempt to wean off PCA tomorrow. Home likely tomorrow vs Sunday

## 2020-02-28 NOTE — PLAN OF CARE
Problem: Physical Therapy Goal  Goal: Physical Therapy Goal  Description  Goals to be met by: 3/13/2020     Patient will increase functional independence with mobility by performin. Supine to sit with Set-up Sweet  2. Sit to supine with Set-up Sweet  3. Sit to stand transfer with Supervision  4. Bed to chair transfer with Supervision using quad cane   5. Gait  x 75 feet with Supervision using Quad Cane  6. Ascend/descend 3 stair with no Handrails with HHA/Contact Guard Assistance      Outcome: Ongoing, Progressing    PT evaluation completed, goals appropriate. Will continue to follow while in house.     Moni Mehta, PT, DPT  2020

## 2020-02-28 NOTE — ANESTHESIA POSTPROCEDURE EVALUATION
Anesthesia Post Evaluation    Patient: Scarlett Knox    Procedure(s) Performed: Procedure(s) (LRB):  LAMINECTOMY, SPINE, LUMBAR, WITH DISCECTOMY L4/5  (N/A)    Final Anesthesia Type: general    Patient location during evaluation: PACU  Patient participation: Yes- Able to Participate  Level of consciousness: awake and alert  Post-procedure vital signs: reviewed and stable  Pain management: adequate  Airway patency: patent    PONV status at discharge: No PONV  Anesthetic complications: no      Cardiovascular status: blood pressure returned to baseline  Respiratory status: unassisted  Follow-up not needed.          Vitals Value Taken Time   /61 2/27/2020  6:17 PM   Temp 36.4 °C (97.5 °F) 2/27/2020  5:50 PM   Pulse 98 2/27/2020  6:26 PM   Resp 28 2/27/2020  6:26 PM   SpO2 97 % 2/27/2020  6:26 PM   Vitals shown include unvalidated device data.      No case tracking events are documented in the log.      Pain/Carlos A Score: Pain Rating Prior to Med Admin: 9 (2/27/2020  6:20 PM)  Carlos A Score: 10 (2/27/2020  6:15 PM)

## 2020-02-28 NOTE — PLAN OF CARE
Eval complete. Pt tolerated session well. Initiate OT POC.  Discharge Recommendation: OPOT  DME rec: Sock aid, reacher, dressing stick, long handled sponge.     Problem: Occupational Therapy Goal  Goal: Occupational Therapy Goal  Description  Goals to be met by: 3/26/2020     Patient will increase functional independence with ADLs by performing:    UE Dressing with Supervision.  LE Dressing with Supervision and Assistive Devices as needed.  Grooming while standing at sink with Supervision and Assistive Devices as needed..  Toileting from toilet with Supervision for hygiene and clothing management.   Bathing from  standing at sink with Supervision and Assistive Devices as needed.  Toilet transfer to toilet with Supervision and Assistive Devices as needed.     Outcome: Ongoing, Progressing

## 2020-02-28 NOTE — SUBJECTIVE & OBJECTIVE
Principal Problem:S/P diskectomy    Principal Orthopedic Problem: same    Interval History: Patient seen and examined at bedside.  No acute events overnight.  Pain poorly controlled. Started PCA last night, unable to wean this am. To Chair with assistance, deferred ambulation today secondary to pain. PT recs  PT.      Review of patient's allergies indicates:  No Known Allergies    Current Facility-Administered Medications   Medication    0.9%  NaCl infusion    acetaminophen tablet 650 mg    ALPRAZolam tablet 0.5 mg    celecoxib capsule 100 mg    [START ON 2/29/2020] docusate sodium capsule 50 mg    escitalopram oxalate tablet 20 mg    gabapentin capsule 300 mg    haloperidol lactate injection 0.5 mg    HYDROmorphone injection 1 mg    HYDROmorphone PCA syringe 6 mg/30 mL (0.2 mg/mL) NS    lidocaine (PF) 10 mg/ml (1%) injection 10 mg    melatonin tablet 6 mg    methocarbamol tablet 750 mg    naloxone 0.4 mg/mL injection 0.02 mg    ondansetron disintegrating tablet 8 mg    oxyCODONE immediate release tablet 15 mg    oxyCODONE immediate release tablet 5 mg    oxyCODONE immediate release tablet Tab 10 mg    sodium chloride 0.9% flush 10 mL    sodium chloride 0.9% flush 3 mL    traMADol tablet 50 mg     Objective:     Vital Signs (Most Recent):  Temp: 96.4 °F (35.8 °C) (02/28/20 1557)  Pulse: 97 (02/28/20 1557)  Resp: 16 (02/28/20 1557)  BP: (!) 98/54 (02/28/20 1557)  SpO2: (!) 91 % (02/28/20 1557) Vital Signs (24h Range):  Temp:  [96 °F (35.6 °C)-97.8 °F (36.6 °C)] 96.4 °F (35.8 °C)  Pulse:  [] 97  Resp:  [14-26] 16  SpO2:  [91 %-100 %] 91 %  BP: ()/(50-63) 98/54     Weight: 67.1 kg (148 lb)     Body mass index is 27.96 kg/m².      Intake/Output Summary (Last 24 hours) at 2/28/2020 1644  Last data filed at 2/28/2020 0408  Gross per 24 hour   Intake 1500 ml   Output 300 ml   Net 1200 ml       Ortho/SPM Exam  AAOx4  NAD  Reg rate  No increased WOB  Dressing c/d/i  SILT  T/SP/DP/[place]/Sa  Motor intact T/SP/DP  WWP extremities  FCDs in place and functioning    Significant Labs: None    Significant Imaging: None

## 2020-02-28 NOTE — PT/OT/SLP EVAL
"Physical Therapy Evaluation    Patient Name:  Scarlett Knox   MRN:  [id number]    Recommendations:     Discharge Recommendations:  home with home health   Discharge Equipment Recommendations: walker, rolling(Pending progress (may be OK with quad cane))   Barriers to discharge: None    Assessment:     Scarlett Knox is a 29 y.o. female admitted with a medical diagnosis of S/P diskectomy.  She presents with the following impairments/functional limitations:  weakness, impaired functional mobilty, pain, gait instability, impaired endurance, impaired balance, impaired self care skills, orthopedic precautions. Pt pleasant and cooperative and motivated to work with therapy despite pain this date. Boyfriend present and supportive. Pt reports that she has primarily been w/c bound lately due to pain and if able to ambulate, she required use of quad cane. Boyfriend assisted with all ADLs and IADLs. Based on assessment, pt required CG-Xochitl x 1 for bed mobility, transfers and taking steps but was limited by pain at this time and unable to formally ambulate. Anticipate that once pt's pain has improved, pt will be appropriate to return home with boyfriend and home health.     Rehab Prognosis: Good; patient would benefit from acute skilled PT services to address these deficits and reach maximum level of function.    Recent Surgery: Procedure(s) (LRB):  LAMINECTOMY, SPINE, LUMBAR, WITH DISCECTOMY L4/5  (N/A) 1 Day Post-Op    Plan:     During this hospitalization, patient to be seen daily to address the identified rehab impairments via gait training, therapeutic activities, therapeutic exercises, neuromuscular re-education and progress toward the following goals:    · Plan of Care Expires:  03/28/20    Subjective     Chief Complaint: Pain   Patient/Family Comments/goals: "I've been using a wheelchair at school"  Pain/Comfort:  · Pain Rating 1: 6/10  · Location - Orientation 1: generalized  · Location 1: back  · Pain Addressed 1: " Pre-medicate for activity, Reposition, Distraction, Cessation of Activity  · Pain Rating Post-Intervention 1: (Did not rate)    Patients cultural, spiritual, Jewish conflicts given the current situation: no    Living Environment:  Prior to admission, pt reports that she has primarily been w/c bound lately due to pain and if able to ambulate, she required use of quad cane. Pt also reports history of one fall last week. Boyfriend assisted with all ADLs and IADLs. Equipment used at home: cane, quad, wheelchair, shower chair, grab bar.  Upon discharge, patient will have assistance from boyfriend.    Objective:     Communicated with RN prior to session.  Patient found HOB elevated with PCA, Lissethck, SCD, peripheral IV  upon PT entry to room.    General Precautions: Standard, fall   Orthopedic Precautions:spinal precautions   Braces: N/A     Exams:  · Cognitive Exam:  Patient is oriented to Person, Place, Time and Situation  · BLE Strength: 3+/5 based on MMT    Functional Mobility:  · Bed Mobility:     · Supine to Sit: contact guard assistance via log roll   · Sit to Supine: NT, pt in chair at end of session   · Transfers:     · Sit to Stand:  minimum assistance with hand-held assist; unsteady with initial standing, able to self-correct with cuing   · Bed to Chair: minimum assistance with hand-held assist; able to take slow, small steps to chair with cuing   · Gait: Deferred at this time as pt with worsening pain and fatigue   · Balance: Fair+      Therapeutic Activities and Exercises:   Pt educated on: PT role/POC; safety with mobility; benefits of OOB activities; spinal precautions; discharge recommendations. Pt verbalized understanding.     AM-PAC 6 CLICK MOBILITY  Total Score:17     Patient left up in chair with all lines intact, call button in reach, RN notified and boyfriend present.    GOALS:   Multidisciplinary Problems     Physical Therapy Goals        Problem: Physical Therapy Goal    Goal Priority  Disciplines Outcome Goal Variances Interventions   Physical Therapy Goal     PT, PT/OT Ongoing, Progressing     Description:  Goals to be met by: 3/13/2020     Patient will increase functional independence with mobility by performin. Supine to sit with Set-up [place]  2. Sit to supine with Set-up [place]  3. Sit to stand transfer with Supervision  4. Bed to chair transfer with Supervision using quad cane   5. Gait  x 75 feet with Supervision using Quad Cane  6. Ascend/descend 3 stair with no Handrails with HHA/Contact Guard Assistance                       History:     Past Medical History:   Diagnosis Date    Anxiety     Anxiety     Back pain        Past Surgical History:   Procedure Laterality Date    KNEE CARTILAGE SURGERY Left     LUMBAR LAMINECTOMY WITH DISCECTOMY N/A 2020    Procedure: LAMINECTOMY, SPINE, LUMBAR, WITH DISCECTOMY L4/5 ;  Surgeon: Dominic Mike MD;  Location: [facility] OR [address] [place] [address];  Service: Neurosurgery;  Laterality: N/A;       Time Tracking:     PT Received On: 20  PT Start Time: 1100     PT Stop Time: 1130  PT Total Time (min): 30 min     Billable Minutes: Evaluation 15 min and Therapeutic Activity 15 min      Moni Mehta, PT, DPT  2020

## 2020-02-28 NOTE — NURSING
Pt screaming c/o pain 10/10 right leg. Morphine given. md on call paged to notify. At bedside to monitor.

## 2020-02-28 NOTE — PROGRESS NOTES
[facility]  Orthopedics  Progress Note    Patient Name: Scarlett Knox  MRN: [id number]  Admission Date: 2/27/2020  Hospital Length of Stay: 0 days  Attending Provider: Dominic Mike MD  Primary Care Provider: Liberty Ngo MD  Follow-up For: Procedure(s) (LRB):  LAMINECTOMY, SPINE, LUMBAR, WITH DISCECTOMY L4/5  (N/A)    Post-Operative Day: 1 Day Post-Op  Subjective:     Principal Problem:S/P diskectomy    Principal Orthopedic Problem: same    Interval History: Patient seen and examined at bedside.  No acute events overnight.  Pain poorly controlled. Started PCA last night, unable to wean this am. To Chair with assistance, deferred ambulation today secondary to pain. PT recs  PT.      Review of patient's allergies indicates:  No Known Allergies    Current Facility-Administered Medications   Medication    0.9%  NaCl infusion    acetaminophen tablet 650 mg    ALPRAZolam tablet 0.5 mg    celecoxib capsule 100 mg    [START ON 2/29/2020] docusate sodium capsule 50 mg    escitalopram oxalate tablet 20 mg    gabapentin capsule 300 mg    haloperidol lactate injection 0.5 mg    HYDROmorphone injection 1 mg    HYDROmorphone PCA syringe 6 mg/30 mL (0.2 mg/mL) NS    lidocaine (PF) 10 mg/ml (1%) injection 10 mg    melatonin tablet 6 mg    methocarbamol tablet 750 mg    naloxone 0.4 mg/mL injection 0.02 mg    ondansetron disintegrating tablet 8 mg    oxyCODONE immediate release tablet 15 mg    oxyCODONE immediate release tablet 5 mg    oxyCODONE immediate release tablet Tab 10 mg    sodium chloride 0.9% flush 10 mL    sodium chloride 0.9% flush 3 mL    traMADol tablet 50 mg     Objective:     Vital Signs (Most Recent):  Temp: 96.4 °F (35.8 °C) (02/28/20 1557)  Pulse: 97 (02/28/20 1557)  Resp: 16 (02/28/20 1557)  BP: (!) 98/54 (02/28/20 1557)  SpO2: (!) 91 % (02/28/20 1557) Vital Signs (24h Range):  Temp:  [96 °F (35.6 °C)-97.8 °F (36.6 °C)] 96.4 °F (35.8 °C)  Pulse:  []  97  Resp:  [14-26] 16  SpO2:  [91 %-100 %] 91 %  BP: ()/(50-63) 98/54     Weight: 67.1 kg (148 lb)     Body mass index is 27.96 kg/m².      Intake/Output Summary (Last 24 hours) at 2/28/2020 1644  Last data filed at 2/28/2020 0408  Gross per 24 hour   Intake 1500 ml   Output 300 ml   Net 1200 ml       Ortho/SPM Exam  AAOx4  NAD  Reg rate  No increased WOB  Dressing c/d/i  SILT T/SP/DP/[place]/Sa  Motor intact T/SP/DP  WWP extremities  FCDs in place and functioning    Significant Labs: None    Significant Imaging: None    Assessment/Plan:     * S/P diskectomy  29 y.o. female POD1 s/p L4-5 Diskectomy    Pain control: PCA/multimodal  PT/OT: WBAT spine precautions  DVT PPx: ASA 81 BID, FCDs at all times when not ambulating  Abx: postop Ancef  Labs: none  Drain: none  Ashraf: none    Dispo: Aggressive pain control. Attempt to wean off PCA tomorrow. Home likely tomorrow vs Sunday            Sean Huffman MD  Orthopedics  [facility]

## 2020-02-28 NOTE — PT/OT/SLP EVAL
"Occupational Therapy   Evaluation    Name: Scarlett Knox  MRN: [id number]  Admitting Diagnosis:  S/P diskectomy 1 Day Post-Op    Recommendations:     Discharge Recommendations: outpatient OT  Discharge Equipment Recommendations:  walker, rolling  Barriers to discharge:  Inaccessible home environment    Assessment:     Scarlett Knox is a 29 y.o. female with a medical diagnosis of S/P diskectomy.  She presents with pain and fatigue limiting performance.  Patient's RN reported a malfunction of pain pump this am that complicated her pain management. Performance deficits affecting function: weakness, impaired endurance, impaired self care skills, impaired functional mobilty, pain, decreased upper extremity function, decreased ROM, orthopedic precautions.      Rehab Prognosis: Fair; patient would benefit from acute skilled OT services to address these deficits and reach maximum level of function.       Plan:     Patient to be seen daily to address the above listed problems via self-care/home management, therapeutic activities, therapeutic exercises  · Plan of Care Expires: 03/26/20  · Plan of Care Reviewed with: patient, significant other    Subjective     Chief Complaint: Pain, fatigue  Patient/Family Comments/goals: "I really want to be able to stand at the sink and brush my teeth."    Occupational Profile:  Prior to admission, pt reports that she has primarily been w/c bound lately due to pain and if able to ambulate, she required use of quad cane. Pt also reports history of one fall last week. Boyfriend assisted with all ADLs and IADLs. Equipment used at home: cane, quad, wheelchair, shower chair, grab bar.  Upon discharge, patient will have assistance from boyfriend.    Pain/Comfort:  · Pain Rating 1: 6/10  · Location - Orientation 1: generalized  · Location 1: back  · Pain Addressed 1: Pre-medicate for activity, Reposition, Distraction, Cessation of Activity  · Pain Rating Post-Intervention 1: other (see " comments)(Did not rate)    Patients cultural, spiritual, Adventist conflicts given the current situation: no    Objective:     Communicated with: RN prior to session.  Patient found HOB elevated with PCA, PureWick, peripheral IV, SCD upon OT entry to room.    General Precautions: Standard, fall   Orthopedic Precautions:spinal precautions   Braces: N/A     Occupational Performance:    Bed Mobility:    · Patient completed Rolling/Turning to Left with  contact guard assistance and using log roll technique  · Patient completed Scooting/Bridging with minimum assistance  · Patient completed Supine to Sit with minimum assistance    Functional Mobility/Transfers:  · Patient completed Sit <> Stand Transfer with minimum assistance and of 2  persons  with  hand-held assist and  one self corrected LOB standing at EOB   · Patient completed Bed <> Chair Transfer using Step Transfer technique with minimum assistance with hand-held assist  · Functional Mobility: Able to take steps to chair at side of bed.      Activities of Daily Living:  · Upper Body Dressing: minimum assistance seated at EOB w/ assistance for IV  · Lower Body Dressing: total assistance supine in bed    Cognitive/Visual Perceptual:  Completely cognitively intact adult w/  glasses worn or present during eval.       Physical Exam:  Balance:    -       seated: Fair +, Standing: Fair - w/ 1 self corrected LOB.  Dominant hand:    -       RH  Upper Extremity Range of Motion:     -       Right Upper Extremity: WFL  -       Left Upper Extremity: WFL  Upper Extremity Strength:    -       Right Upper Extremity: WFL  -       Left Upper Extremity: WFL   Strength:    -       Right Upper Extremity: WFL  -       Left Upper Extremity: WFL  Fine Motor Coordination:    -       Intact  Gross motor coordination:   WFL    AMPAC 6 Click ADL:  AMPAC Total Score: 15    Treatment & Education:  -Pt edu on OT role/POC  -Importance of OOB activity with staff assistance  -Safety during  functional t/f and mobility  - White board updated  - Multiple self care tasks/functional mobility completed-- assistance level noted above  - All questions/concerns answered within OT scope of practice    Education:    Patient left up in chair with all lines intact, call button in reach, RN notified and Significant Other present    GOALS:   Multidisciplinary Problems     Occupational Therapy Goals        Problem: Occupational Therapy Goal    Goal Priority Disciplines Outcome Interventions   Occupational Therapy Goal     OT, PT/OT Ongoing, Progressing    Description:  Goals to be met by: 3/26/2020     Patient will increase functional independence with ADLs by performing:    UE Dressing with Supervision.  LE Dressing with Supervision and Assistive Devices as needed.  Grooming while standing at sink with Supervision and Assistive Devices as needed..  Toileting from toilet with Supervision for hygiene and clothing management.   Bathing from  standing at sink with Supervision and Assistive Devices as needed.  Toilet transfer to toilet with Supervision and Assistive Devices as needed.                      History:     Past Medical History:   Diagnosis Date    Anxiety     Anxiety     Back pain        Past Surgical History:   Procedure Laterality Date    KNEE CARTILAGE SURGERY Left     LUMBAR LAMINECTOMY WITH DISCECTOMY N/A 2/27/2020    Procedure: LAMINECTOMY, SPINE, LUMBAR, WITH DISCECTOMY L4/5 ;  Surgeon: Dominic Mike MD;  Location: [address];  Service: Neurosurgery;  Laterality: N/A;       Time Tracking:     OT Date of Treatment: 02/28/20  OT Start Time: 1101  OT Stop Time: 1132  OT Total Time (min): 31 min    Billable Minutes:Evaluation 8 mins  Self Care/Home Management 23 mins    Lukasz Weaver, OT  2/28/2020

## 2020-02-29 PROCEDURE — 25000003 PHARM REV CODE 250: Performed by: STUDENT IN AN ORGANIZED HEALTH CARE EDUCATION/TRAINING PROGRAM

## 2020-02-29 PROCEDURE — 97110 THERAPEUTIC EXERCISES: CPT | Mod: CQ

## 2020-02-29 PROCEDURE — 94761 N-INVAS EAR/PLS OXIMETRY MLT: CPT

## 2020-02-29 PROCEDURE — 63600175 PHARM REV CODE 636 W HCPCS: Performed by: STUDENT IN AN ORGANIZED HEALTH CARE EDUCATION/TRAINING PROGRAM

## 2020-02-29 PROCEDURE — 97535 SELF CARE MNGMENT TRAINING: CPT

## 2020-02-29 PROCEDURE — 97116 GAIT TRAINING THERAPY: CPT | Mod: CQ

## 2020-02-29 RX ORDER — HYDROMORPHONE HYDROCHLORIDE 1 MG/ML
2 INJECTION, SOLUTION INTRAMUSCULAR; INTRAVENOUS; SUBCUTANEOUS
Status: DISCONTINUED | OUTPATIENT
Start: 2020-02-29 | End: 2020-03-01

## 2020-02-29 RX ADMIN — METHOCARBAMOL TABLETS 750 MG: 750 TABLET, COATED ORAL at 08:02

## 2020-02-29 RX ADMIN — TRAMADOL HYDROCHLORIDE 50 MG: 50 TABLET, FILM COATED ORAL at 10:02

## 2020-02-29 RX ADMIN — OXYCODONE HYDROCHLORIDE 15 MG: 10 TABLET ORAL at 10:02

## 2020-02-29 RX ADMIN — HYDROMORPHONE HYDROCHLORIDE 2 MG: 1 INJECTION, SOLUTION INTRAMUSCULAR; INTRAVENOUS; SUBCUTANEOUS at 11:02

## 2020-02-29 RX ADMIN — OXYCODONE HYDROCHLORIDE 15 MG: 10 TABLET ORAL at 02:02

## 2020-02-29 RX ADMIN — HYDROMORPHONE HYDROCHLORIDE 2 MG: 1 INJECTION, SOLUTION INTRAMUSCULAR; INTRAVENOUS; SUBCUTANEOUS at 02:02

## 2020-02-29 RX ADMIN — METHOCARBAMOL TABLETS 750 MG: 750 TABLET, COATED ORAL at 03:02

## 2020-02-29 RX ADMIN — GABAPENTIN 300 MG: 300 CAPSULE ORAL at 08:02

## 2020-02-29 RX ADMIN — ALPRAZOLAM 0.5 MG: 0.5 TABLET ORAL at 09:02

## 2020-02-29 RX ADMIN — CELECOXIB 100 MG: 100 CAPSULE ORAL at 09:02

## 2020-02-29 RX ADMIN — OXYCODONE HYDROCHLORIDE 15 MG: 10 TABLET ORAL at 06:02

## 2020-02-29 RX ADMIN — GABAPENTIN 300 MG: 300 CAPSULE ORAL at 09:02

## 2020-02-29 RX ADMIN — GABAPENTIN 300 MG: 300 CAPSULE ORAL at 03:02

## 2020-02-29 RX ADMIN — ALPRAZOLAM 0.5 MG: 0.5 TABLET ORAL at 08:02

## 2020-02-29 RX ADMIN — ALPRAZOLAM 0.5 MG: 0.5 TABLET ORAL at 03:02

## 2020-02-29 RX ADMIN — ESCITALOPRAM OXALATE 20 MG: 20 TABLET ORAL at 09:02

## 2020-02-29 RX ADMIN — HYDROMORPHONE HYDROCHLORIDE 2 MG: 1 INJECTION, SOLUTION INTRAMUSCULAR; INTRAVENOUS; SUBCUTANEOUS at 07:02

## 2020-02-29 RX ADMIN — METHOCARBAMOL TABLETS 750 MG: 750 TABLET, COATED ORAL at 09:02

## 2020-02-29 RX ADMIN — OXYCODONE HYDROCHLORIDE 15 MG: 10 TABLET ORAL at 07:02

## 2020-02-29 RX ADMIN — HYDROMORPHONE HYDROCHLORIDE 1 MG: 1 INJECTION, SOLUTION INTRAMUSCULAR; INTRAVENOUS; SUBCUTANEOUS at 09:02

## 2020-02-29 RX ADMIN — DOCUSATE SODIUM 50 MG: 50 CAPSULE, LIQUID FILLED ORAL at 09:02

## 2020-02-29 NOTE — PLAN OF CARE
Pt A & O x 4, sig other at bedside, independently positions and up with assistance, voiding clear urine per restroom. Pt PCA in place effectively controlling pain, heat packs applied, nausea controlled with PO zofran. Pt now tolerating small amounts of diet and smoothie.

## 2020-02-29 NOTE — PLAN OF CARE
Problem: Physical Therapy Goal  Goal: Physical Therapy Goal  Description  Goals to be met by: 3/13/2020     Patient will increase functional independence with mobility by performin. Supine to sit with Set-up [place]  2. Sit to supine with Set-up [place]  3. Sit to stand transfer with Supervision  4. Bed to chair transfer with Supervision using quad cane   5. Gait  x 75 feet with Supervision using Quad Cane  6. Ascend/descend 3 stair with no Handrails with HHA/Contact Guard Assistance      Outcome: Ongoing, Progressing.  Patient progressing well towards her goals as evidence of walking range and improved tolerance to therapeutic activities.

## 2020-02-29 NOTE — PT/OT/SLP PROGRESS
Occupational Therapy   Treatment    Name: Scarlett Knox  MRN: [id number]  Admitting Diagnosis:  S/P diskectomy  2 Days Post-Op    Recommendations:     Discharge Recommendations: outpatient OT  Discharge Equipment Recommendations:  walker, rolling  Barriers to discharge:  Inaccessible home environment    Assessment:     Scarlett Knox is a 29 y.o. female with a medical diagnosis of S/P diskectomy. Performance deficits affecting function are weakness, impaired functional mobilty, impaired self care skills, gait instability, impaired balance, impaired endurance, decreased lower extremity function, pain, decreased safety awareness, orthopedic precautions, impaired skin. Patient would benefit from continued skilled acute OT daily to improve functional mobility, increase independence with ADLs, and address established goals. Recommending outpatient OT once medically appropriate for discharge to increase maximal independence, reduce burden of care, and ensure safety.     Rehab Prognosis:  Good; patient would benefit from acute skilled OT services to address these deficits and reach maximum level of function.       Plan:     Patient to be seen daily to address the above listed problems via self-care/home management, therapeutic activities, therapeutic exercises  · Plan of Care Expires: 03/26/20  · Plan of Care Reviewed with: patient, significant other    Subjective     Pain/Comfort:  · Pain Rating 1: 5/10  · Location - Side 1: Bilateral  · Location - Orientation 1: lower  · Location 1: back  · Pain Addressed 1: Reposition, Distraction, Cessation of Activity, Pre-medicate for activity  · Pain Rating Post-Intervention 1: 6/10    Objective:     Communicated with: patient prior to session.  Patient found supine upon OT entry to room.    General Precautions: Standard, fall   Orthopedic Precautions:spinal precautions   Braces: N/A     Occupational Performance:     Bed Mobility:    · Patient completed Rolling/Turning to Left  with  minimum assistance  · Patient completed Scooting/Bridging with moderate assistance  · Patient completed Supine to Sit with minimum assistance  · Patient completed Sit to Supine with moderate assistance     Functional Mobility/Transfers:  · Patient completed Sit <> Stand Transfer with contact guard assistance<>minimal assistance with rolling walker   · Patient completed Bed > Chair Transfer using functional ambulation technique with CGA and using RW; chair>bed stand pivot transfer with min A using RW  · Patient completed Toilet Transfer chair<>toilet with functional ambulation technique with minimum assistance with rolling walker    Activities of Daily Living:  · Grooming: supervision washing and drying hands only while seated on toilet  · Upper Body Dressing: minimum assistance Donning back gown  · Lower Body Dressing: moderate assistance [place] and donning socks only  · Toileting: minimum assistance      AMPAC 6 Click ADL: 14    Treatment & Education:  Role of OT and POC  ADL retraining  Functional mobility training  Safety  Importance of OOB activity    Patient left supine with call button in reach, boyfriend present and all needs met (nursing notifed)Education:      Recommending patient wear the zflex pressure relief boots in bed for positional purposes due to patient having a tendency to lie with B plantar flexion (plantar flexion also seen when patient sat EOB) despite patient is able to perform dorsal flexion with B LE on command. Nursing notified.    GOALS:   Multidisciplinary Problems     Occupational Therapy Goals        Problem: Occupational Therapy Goal    Goal Priority Disciplines Outcome Interventions   Occupational Therapy Goal     OT, PT/OT Ongoing, Progressing    Description:  Goals to be met by: 3/26/2020     Patient will increase functional independence with ADLs by performing:    UE Dressing with Supervision.  LE Dressing with Supervision and Assistive Devices as needed.  Grooming while  standing at sink with Supervision and Assistive Devices as needed..  Toileting from toilet with Supervision for hygiene and clothing management.   Bathing from  standing at sink with Supervision and Assistive Devices as needed.  Toilet transfer to toilet with Supervision and Assistive Devices as needed.                      Time Tracking:     OT Date of Treatment: 02/29/20  OT Start Time: 1334  OT Stop Time: 1400  OT Total Time (min): 26 min    Billable Minutes:Self Care/Home Management 26    ERWIN Small  2/29/2020

## 2020-02-29 NOTE — PLAN OF CARE
Problem: Occupational Therapy Goal  Goal: Occupational Therapy Goal  Description  Goals to be met by: 3/26/2020     Patient will increase functional independence with ADLs by performing:    UE Dressing with Supervision.  LE Dressing with Supervision and Assistive Devices as needed.  Grooming while standing at sink with Supervision and Assistive Devices as needed..  Toileting from toilet with Supervision for hygiene and clothing management.   Bathing from  standing at sink with Supervision and Assistive Devices as needed.  Toilet transfer to toilet with Supervision and Assistive Devices as needed.     Outcome: Ongoing, Progressing   Patient's goals are appropriate.   ERWIN Small  2/29/2020

## 2020-02-29 NOTE — SUBJECTIVE & OBJECTIVE
Principal Problem:S/P diskectomy    Principal Orthopedic Problem: same    Interval History: Patient seen and examined at bedside.  No acute events overnight.  Pain controlled. PT recs HH PT.      Review of patient's allergies indicates:  No Known Allergies    Current Facility-Administered Medications   Medication    0.9%  NaCl infusion    acetaminophen tablet 650 mg    ALPRAZolam tablet 0.5 mg    celecoxib capsule 100 mg    docusate sodium capsule 50 mg    escitalopram oxalate tablet 20 mg    gabapentin capsule 300 mg    haloperidol lactate injection 0.5 mg    HYDROmorphone injection 1 mg    lidocaine (PF) 10 mg/ml (1%) injection 10 mg    melatonin tablet 6 mg    methocarbamol tablet 750 mg    ondansetron disintegrating tablet 8 mg    oxyCODONE immediate release tablet 15 mg    oxyCODONE immediate release tablet 5 mg    oxyCODONE immediate release tablet Tab 10 mg    sodium chloride 0.9% flush 10 mL    sodium chloride 0.9% flush 3 mL    traMADol tablet 50 mg     Objective:     Vital Signs (Most Recent):  Temp: 97.1 °F (36.2 °C) (02/29/20 0533)  Pulse: 104 (02/29/20 0533)  Resp: 19 (02/29/20 0533)  BP: (!) 90/57 (02/29/20 0533)  SpO2: (!) 92 % (02/29/20 0533) Vital Signs (24h Range):  Temp:  [96.4 °F (35.8 °C)-97.9 °F (36.6 °C)] 97.1 °F (36.2 °C)  Pulse:  [] 104  Resp:  [16-19] 19  SpO2:  [91 %-94 %] 92 %  BP: ()/(54-61) 90/57     Weight: 67.1 kg (148 lb)     Body mass index is 27.96 kg/m².      Intake/Output Summary (Last 24 hours) at 2/29/2020 0652  Last data filed at 2/28/2020 1800  Gross per 24 hour   Intake 960 ml   Output --   Net 960 ml       Ortho/SPM Exam    AAOx4  NAD  Reg rate  No increased WOB  Dressing c/d/i  SILT T/SP/DP/[place]/Sa  Motor intact T/SP/DP  WWP extremities  FCDs in place and functioning    Significant Labs: None    Significant Imaging: None

## 2020-02-29 NOTE — ASSESSMENT & PLAN NOTE
29 y.o. female POD2 s/p L4-5 Diskectomy    Pain control: multimodal  PT/OT: WBAT spine precautions  DVT PPx: ASA 81 BID, FCDs at all times when not ambulating  Abx: postop Ancef  Labs: none  Drain: none  Ashraf: none    Dispo: Aggressive pain control. DC PCA today. Home likely today vs Sunday

## 2020-02-29 NOTE — PT/OT/SLP PROGRESS
Physical Therapy Treatment    Patient Name:  Scarlett Knox   MRN:  [id number]    Recommendations:     Discharge Recommendations:  home with home health   Discharge Equipment Recommendations: walker, rolling   Barriers to discharge: Inaccessible home    Assessment:     Scarlett Knox is a 29 y.o. female admitted with a medical diagnosis of S/P diskectomy.  She presents with the following impairments/functional limitations:  weakness, impaired endurance, impaired self care skills, impaired functional mobilty, gait instability, impaired balance, decreased lower extremity function, pain, decreased safety awareness, orthopedic precautions, impaired skin . Patient was very emotional this morning, but in PM treatment Patient was more calm and with improved initiative. Patient requires constant supervision due to impulsiveness, as she tries to transfer by herself. Patient presents with R LE weakness and had a tendency to maintain both ankles plantar flexed while resting..    Rehab Prognosis: Good; patient would benefit from acute skilled PT services to address these deficits and reach maximum level of function.    Recent Surgery: Procedure(s) (LRB):  LAMINECTOMY, SPINE, LUMBAR, WITH DISCECTOMY L4/5  (N/A) 2 Days Post-Op    Plan:     During this hospitalization, patient to be seen daily to address the identified rehab impairments via gait training, therapeutic activities, therapeutic exercises, neuromuscular re-education and progress toward the following goals:    · Plan of Care Expires:  03/28/20    Subjective     Chief Complaint: pain with mobility and anxiety.  Patient/Family Comments/goals: to get stronger and to heal well.  Pain/Comfort:  · Pain Rating 1: 5/10  · Location - Side 1: Bilateral  · Location - Orientation 1: lower  · Location 1: back  · Pain Addressed 1: Pre-medicate for activity, Reposition, Distraction, Cessation of Activity  · Pain Rating Post-Intervention 1: 6/10      Objective:     Communicated  with nsg prior to session.  Patient found supine with MATT Salmeron upon PT entry to room.     General Precautions: Standard, fall   Orthopedic Precautions:spinal precautions   Braces: N/A     Functional Mobility:  · Bed Mobility:     · Rolling Left:  minimum assistance  · Scooting: moderate assistance  · Supine to Sit: minimum assistance  · Sit to Supine: moderate assistance  · Transfers:     · Sit to Stand:  contact guard assistance and minimum assistance with rolling walker  · Bed to Chair: contact guard assistance and minimum assistance with  rolling walker  using  Stand Pivot  · Gait: 32 ft, 33 ft and 70 ft with RW and min to CGA, with chair follow and cues to correct posture.      AM-PAC 6 CLICK MOBILITY  Turning over in bed (including adjusting bedclothes, sheets and blankets)?: 3  Sitting down on and standing up from a chair with arms (e.g., wheelchair, bedside commode, etc.): 3  Moving from lying on back to sitting on the side of the bed?: 3  Moving to and from a bed to a chair (including a wheelchair)?: 3  Need to walk in hospital room?: 3  Climbing 3-5 steps with a railing?: 2  Basic Mobility Total Score: 17       Therapeutic Activities and Exercises:   Co-treatment performed with O.T, who performed self care activities. Patient assisted to the toilette. Educated on log roll technique with transfers from supine<>sit and safety and proper mechanics with transfers from sit<>stand.    Patient left HOB elevated with all lines intact, call button in reach and boyfriend present..    GOALS:   Multidisciplinary Problems     Physical Therapy Goals        Problem: Physical Therapy Goal    Goal Priority Disciplines Outcome Goal Variances Interventions   Physical Therapy Goal     PT, PT/OT Ongoing, Progressing     Description:  Goals to be met by: 3/13/2020     Patient will increase functional independence with mobility by performin. Supine to sit with Set-up [place]  2. Sit to supine with Set-up  [place]  3. Sit to stand transfer with Supervision  4. Bed to chair transfer with Supervision using quad cane   5. Gait  x 75 feet with Supervision using Quad Cane  6. Ascend/descend 3 stair with no Handrails with HHA/Contact Guard Assistance                       Time Tracking:     PT Received On: 02/29/20  PT Start Time: 1332     PT Stop Time: 1359  PT Total Time (min): 27 min     Billable Minutes: Gait Training 17 and Neuromuscular Re-education 10    Treatment Type: Treatment  PT/PTA: PTA     PTA Visit Number: Berny Madrigal, PTA  02/29/2020

## 2020-02-29 NOTE — PROGRESS NOTES
[facility]  Orthopedics  Progress Note    Patient Name: Scarlett Knox  MRN: [id number]  Admission Date: 2/27/2020  Hospital Length of Stay: 0 days  Attending Provider: Dominic Mike MD  Primary Care Provider: Liberty Ngo MD  Follow-up For: Procedure(s) (LRB):  LAMINECTOMY, SPINE, LUMBAR, WITH DISCECTOMY L4/5  (N/A)    Post-Operative Day: 2 Days Post-Op  Subjective:     Principal Problem:S/P diskectomy    Principal Orthopedic Problem: same    Interval History: Patient seen and examined at bedside.  No acute events overnight.  Pain controlled. PT recs HH PT.      Review of patient's allergies indicates:  No Known Allergies    Current Facility-Administered Medications   Medication    0.9%  NaCl infusion    acetaminophen tablet 650 mg    ALPRAZolam tablet 0.5 mg    celecoxib capsule 100 mg    docusate sodium capsule 50 mg    escitalopram oxalate tablet 20 mg    gabapentin capsule 300 mg    haloperidol lactate injection 0.5 mg    HYDROmorphone injection 1 mg    lidocaine (PF) 10 mg/ml (1%) injection 10 mg    melatonin tablet 6 mg    methocarbamol tablet 750 mg    ondansetron disintegrating tablet 8 mg    oxyCODONE immediate release tablet 15 mg    oxyCODONE immediate release tablet 5 mg    oxyCODONE immediate release tablet Tab 10 mg    sodium chloride 0.9% flush 10 mL    sodium chloride 0.9% flush 3 mL    traMADol tablet 50 mg     Objective:     Vital Signs (Most Recent):  Temp: 97.1 °F (36.2 °C) (02/29/20 0533)  Pulse: 104 (02/29/20 0533)  Resp: 19 (02/29/20 0533)  BP: (!) 90/57 (02/29/20 0533)  SpO2: (!) 92 % (02/29/20 0533) Vital Signs (24h Range):  Temp:  [96.4 °F (35.8 °C)-97.9 °F (36.6 °C)] 97.1 °F (36.2 °C)  Pulse:  [] 104  Resp:  [16-19] 19  SpO2:  [91 %-94 %] 92 %  BP: ()/(54-61) 90/57     Weight: 67.1 kg (148 lb)     Body mass index is 27.96 kg/m².      Intake/Output Summary (Last 24 hours) at 2/29/2020 0652  Last data filed at 2/28/2020  1800  Gross per 24 hour   Intake 960 ml   Output --   Net 960 ml       Ortho/SPM Exam    AAOx4  NAD  Reg rate  No increased WOB  Dressing c/d/i  SILT T/SP/DP/Cordova/Sa  Motor intact T/SP/DP  WWP extremities  FCDs in place and functioning    Significant Labs: None    Significant Imaging: None    Assessment/Plan:     * S/P diskectomy  29 y.o. female POD2 s/p L4-5 Diskectomy    Pain control: multimodal  PT/OT: WBAT spine precautions  DVT PPx: ASA 81 BID, FCDs at all times when not ambulating  Abx: postop Ancef  Labs: none  Drain: none  Ashraf: none    Dispo: Aggressive pain control. DC PCA today. Home likely today vs Sunday            Damian Avalos MD  Orthopedics  [facility]

## 2020-03-01 VITALS
RESPIRATION RATE: 18 BRPM | SYSTOLIC BLOOD PRESSURE: 99 MMHG | BODY MASS INDEX: 27.96 KG/M2 | DIASTOLIC BLOOD PRESSURE: 56 MMHG | WEIGHT: 148 LBS | HEART RATE: 87 BPM | OXYGEN SATURATION: 94 % | TEMPERATURE: 99 F

## 2020-03-01 PROCEDURE — 25000003 PHARM REV CODE 250: Performed by: STUDENT IN AN ORGANIZED HEALTH CARE EDUCATION/TRAINING PROGRAM

## 2020-03-01 PROCEDURE — 63600175 PHARM REV CODE 636 W HCPCS: Performed by: STUDENT IN AN ORGANIZED HEALTH CARE EDUCATION/TRAINING PROGRAM

## 2020-03-01 PROCEDURE — 94761 N-INVAS EAR/PLS OXIMETRY MLT: CPT

## 2020-03-01 RX ORDER — HYDROMORPHONE HYDROCHLORIDE 1 MG/ML
1 INJECTION, SOLUTION INTRAMUSCULAR; INTRAVENOUS; SUBCUTANEOUS
Status: DISCONTINUED | OUTPATIENT
Start: 2020-03-01 | End: 2020-03-01 | Stop reason: HOSPADM

## 2020-03-01 RX ADMIN — HYDROMORPHONE HYDROCHLORIDE 2 MG: 1 INJECTION, SOLUTION INTRAMUSCULAR; INTRAVENOUS; SUBCUTANEOUS at 03:03

## 2020-03-01 RX ADMIN — ALPRAZOLAM 0.5 MG: 0.5 TABLET ORAL at 10:03

## 2020-03-01 RX ADMIN — ALPRAZOLAM 0.5 MG: 0.5 TABLET ORAL at 02:03

## 2020-03-01 RX ADMIN — DOCUSATE SODIUM 50 MG: 50 CAPSULE, LIQUID FILLED ORAL at 10:03

## 2020-03-01 RX ADMIN — OXYCODONE HYDROCHLORIDE 15 MG: 10 TABLET ORAL at 06:03

## 2020-03-01 RX ADMIN — OXYCODONE HYDROCHLORIDE 15 MG: 10 TABLET ORAL at 02:03

## 2020-03-01 RX ADMIN — METHOCARBAMOL TABLETS 750 MG: 750 TABLET, COATED ORAL at 10:03

## 2020-03-01 RX ADMIN — ESCITALOPRAM OXALATE 20 MG: 20 TABLET ORAL at 10:03

## 2020-03-01 RX ADMIN — METHOCARBAMOL TABLETS 750 MG: 750 TABLET, COATED ORAL at 02:03

## 2020-03-01 RX ADMIN — CELECOXIB 100 MG: 100 CAPSULE ORAL at 10:03

## 2020-03-01 RX ADMIN — GABAPENTIN 300 MG: 300 CAPSULE ORAL at 02:03

## 2020-03-01 RX ADMIN — GABAPENTIN 300 MG: 300 CAPSULE ORAL at 10:03

## 2020-03-01 NOTE — SUBJECTIVE & OBJECTIVE
Principal Problem:S/P diskectomy    Principal Orthopedic Problem: same    Interval History: Patient seen and examined at bedside.  No acute events overnight. PCA Dc'd yesterday. Pt using frequent IV pain medications.      Review of patient's allergies indicates:  No Known Allergies    Current Facility-Administered Medications   Medication    0.9%  NaCl infusion    acetaminophen tablet 650 mg    ALPRAZolam tablet 0.5 mg    celecoxib capsule 100 mg    docusate sodium capsule 50 mg    escitalopram oxalate tablet 20 mg    gabapentin capsule 300 mg    haloperidol lactate injection 0.5 mg    HYDROmorphone injection 2 mg    lidocaine (PF) 10 mg/ml (1%) injection 10 mg    melatonin tablet 6 mg    methocarbamol tablet 750 mg    ondansetron disintegrating tablet 8 mg    oxyCODONE immediate release tablet 15 mg    oxyCODONE immediate release tablet 5 mg    oxyCODONE immediate release tablet Tab 10 mg    sodium chloride 0.9% flush 10 mL    sodium chloride 0.9% flush 3 mL    traMADol tablet 50 mg     Objective:     Vital Signs (Most Recent):  Temp: 98.2 °F (36.8 °C) (02/29/20 2054)  Pulse: 105 (03/01/20 0316)  Resp: 20 (03/01/20 0316)  BP: (!) 94/53 (03/01/20 0629)  SpO2: 95 % (03/01/20 0316) Vital Signs (24h Range):  Temp:  [97.8 °F (36.6 °C)-98.5 °F (36.9 °C)] 98.2 °F (36.8 °C)  Pulse:  [] 105  Resp:  [15-20] 20  SpO2:  [89 %-95 %] 95 %  BP: ()/(50-58) 94/53     Weight: 67.1 kg (148 lb)     Body mass index is 27.96 kg/m².      Intake/Output Summary (Last 24 hours) at 3/1/2020 0731  Last data filed at 2/29/2020 1700  Gross per 24 hour   Intake 750 ml   Output 300 ml   Net 450 ml       Ortho/SPM Exam    AAOx4  NAD  Reg rate  No increased WOB  Dressing c/d/i  SILT T/SP/DP/Cordova/Sa  Motor intact T/SP/DP  WWP extremities  FCDs in place and functioning    Significant Labs: None    Significant Imaging: None

## 2020-03-01 NOTE — NURSING
Pt received discharge instructions. Verbalized understanding of all instructions. Prescriptions delivered to bedside via pharmacy. PIV removed, no redness/swelling, catheter tip intact. Leaving via wheelchair with spouse.

## 2020-03-01 NOTE — ASSESSMENT & PLAN NOTE
29 y.o. female POD3 s/p L4-5 Diskectomy    Pain control: multimodal  PT/OT: WBAT spine precautions  DVT PPx: ASA 81 BID, FCDs at all times when not ambulating  Abx: postop Ancef  Labs: none  Drain: none  Ashraf: none    Dispo: pending pain control

## 2020-03-01 NOTE — PROGRESS NOTES
[facility]  Orthopedics  Progress Note    Patient Name: Scarlett Knox  MRN: [id number]  Admission Date: 2/27/2020  Hospital Length of Stay: 0 days  Attending Provider: Dominic Mike MD  Primary Care Provider: Liberty Ngo MD  Follow-up For: Procedure(s) (LRB):  LAMINECTOMY, SPINE, LUMBAR, WITH DISCECTOMY L4/5  (N/A)    Post-Operative Day: 3 Days Post-Op  Subjective:     Principal Problem:S/P diskectomy    Principal Orthopedic Problem: same    Interval History: Patient seen and examined at bedside.  No acute events overnight. PCA Dc'd yesterday. Pt using frequent IV pain medications.      Review of patient's allergies indicates:  No Known Allergies    Current Facility-Administered Medications   Medication    0.9%  NaCl infusion    acetaminophen tablet 650 mg    ALPRAZolam tablet 0.5 mg    celecoxib capsule 100 mg    docusate sodium capsule 50 mg    escitalopram oxalate tablet 20 mg    gabapentin capsule 300 mg    haloperidol lactate injection 0.5 mg    HYDROmorphone injection 2 mg    lidocaine (PF) 10 mg/ml (1%) injection 10 mg    melatonin tablet 6 mg    methocarbamol tablet 750 mg    ondansetron disintegrating tablet 8 mg    oxyCODONE immediate release tablet 15 mg    oxyCODONE immediate release tablet 5 mg    oxyCODONE immediate release tablet Tab 10 mg    sodium chloride 0.9% flush 10 mL    sodium chloride 0.9% flush 3 mL    traMADol tablet 50 mg     Objective:     Vital Signs (Most Recent):  Temp: 98.2 °F (36.8 °C) (02/29/20 2054)  Pulse: 105 (03/01/20 0316)  Resp: 20 (03/01/20 0316)  BP: (!) 94/53 (03/01/20 0629)  SpO2: 95 % (03/01/20 0316) Vital Signs (24h Range):  Temp:  [97.8 °F (36.6 °C)-98.5 °F (36.9 °C)] 98.2 °F (36.8 °C)  Pulse:  [] 105  Resp:  [15-20] 20  SpO2:  [89 %-95 %] 95 %  BP: ()/(50-58) 94/53     Weight: 67.1 kg (148 lb)     Body mass index is 27.96 kg/m².      Intake/Output Summary (Last 24 hours) at 3/1/2020 0731  Last data filed  at 2/29/2020 1700  Gross per 24 hour   Intake 750 ml   Output 300 ml   Net 450 ml       Ortho/SPM Exam    AAOx4  NAD  Reg rate  No increased WOB  Dressing c/d/i  SILT T/SP/DP/Cordova/Sa  Motor intact T/SP/DP  WWP extremities  FCDs in place and functioning    Significant Labs: None    Significant Imaging: None    Assessment/Plan:     * S/P diskectomy  29 y.o. female POD3 s/p L4-5 Diskectomy    Pain control: multimodal  PT/OT: WBAT spine precautions  DVT PPx: ASA 81 BID, FCDs at all times when not ambulating  Abx: postop Ancef  Labs: none  Drain: none  Ashraf: none    Dispo: pending pain control            Damian Avalos MD  Orthopedics  [facility]
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
## 2020-03-03 NOTE — PLAN OF CARE
03/01/20 1528   Final Note   Assessment Type Final Discharge Note   Anticipated Discharge Disposition Home   What phone number can be called within the next 1-3 days to see how you are doing after discharge? [phone]   Hospital Follow Up  Appt(s) scheduled? Yes   Discharge plans and expectations educations in teach back method with documentation complete? Yes     Future Appointments   Date Time Provider Department Center   3/16/2020  9:15 AM Dominic Mike MD [facility]

## 2020-03-04 ENCOUNTER — PATIENT MESSAGE (OUTPATIENT)
Dept: ORTHOPEDICS | Facility: CLINIC | Age: 30
End: 2020-03-04

## 2020-03-05 ENCOUNTER — NURSE TRIAGE (OUTPATIENT)
Dept: ADMINISTRATIVE | Facility: CLINIC | Age: 30
End: 2020-03-05

## 2020-03-05 ENCOUNTER — PATIENT MESSAGE (OUTPATIENT)
Dept: ORTHOPEDICS | Facility: CLINIC | Age: 30
End: 2020-03-05

## 2020-03-16 DIAGNOSIS — Z98.890 S/P SPINAL SURGERY: Primary | ICD-10-CM

## 2020-03-16 RX ORDER — CELECOXIB 200 MG/1
200 CAPSULE ORAL DAILY
Qty: 12 CAPSULE | Refills: 0 | Status: SHIPPED | OUTPATIENT
Start: 2020-03-16 | End: 2022-12-13 | Stop reason: CLARIF

## 2020-03-16 RX ORDER — METHOCARBAMOL 500 MG/1
1000 TABLET, FILM COATED ORAL 3 TIMES DAILY
Qty: 84 TABLET | Refills: 0 | Status: SHIPPED | OUTPATIENT
Start: 2020-03-16 | End: 2020-03-30

## 2020-03-20 ENCOUNTER — PATIENT MESSAGE (OUTPATIENT)
Dept: ORTHOPEDICS | Facility: CLINIC | Age: 30
End: 2020-03-20

## 2020-03-23 DIAGNOSIS — Z98.890 S/P SPINAL SURGERY: ICD-10-CM

## 2020-03-23 RX ORDER — GABAPENTIN 300 MG/1
600 CAPSULE ORAL 3 TIMES DAILY
Qty: 180 CAPSULE | Refills: 11 | Status: SHIPPED | OUTPATIENT
Start: 2020-03-23 | End: 2021-03-23

## 2020-04-22 ENCOUNTER — TELEPHONE (OUTPATIENT)
Dept: ORTHOPEDICS | Facility: CLINIC | Age: 30
End: 2020-04-22

## 2020-04-22 ENCOUNTER — PATIENT MESSAGE (OUTPATIENT)
Dept: ORTHOPEDICS | Facility: CLINIC | Age: 30
End: 2020-04-22

## 2020-04-22 DIAGNOSIS — M51.36 DDD (DEGENERATIVE DISC DISEASE), LUMBAR: Primary | ICD-10-CM

## 2020-05-13 ENCOUNTER — HOSPITAL ENCOUNTER (OUTPATIENT)
Dept: RADIOLOGY | Facility: HOSPITAL | Age: 30
Discharge: HOME | End: 2020-05-13
Attending: PHYSICIAN ASSISTANT
Payer: MEDICAID

## 2020-05-13 DIAGNOSIS — M51.36 DDD (DEGENERATIVE DISC DISEASE), LUMBAR: ICD-10-CM

## 2020-05-13 PROCEDURE — 72100 XR LUMBAR SPINE AP AND LATERAL: ICD-10-PCS | Mod: 26,,, | Performed by: RADIOLOGY

## 2020-05-13 PROCEDURE — 72100 X-RAY EXAM L-S SPINE 2/3 VWS: CPT | Mod: 26,,, | Performed by: RADIOLOGY

## 2020-05-13 PROCEDURE — 72100 X-RAY EXAM L-S SPINE 2/3 VWS: CPT | Mod: TC

## 2020-05-18 ENCOUNTER — PATIENT MESSAGE (OUTPATIENT)
Dept: ORTHOPEDICS | Facility: CLINIC | Age: 30
End: 2020-05-18

## 2020-05-26 ENCOUNTER — PATIENT MESSAGE (OUTPATIENT)
Dept: ORTHOPEDICS | Facility: CLINIC | Age: 30
End: 2020-05-26

## 2020-06-01 DIAGNOSIS — Z98.890 S/P DISKECTOMY: Primary | ICD-10-CM

## 2020-06-03 ENCOUNTER — CLINICAL SUPPORT (OUTPATIENT)
Dept: REHABILITATION | Facility: HOSPITAL | Age: 30
End: 2020-06-03
Payer: MEDICAID

## 2020-06-03 DIAGNOSIS — Z98.890 S/P DISKECTOMY: ICD-10-CM

## 2020-06-03 DIAGNOSIS — R26.2 DIFFICULTY WALKING: ICD-10-CM

## 2020-06-03 DIAGNOSIS — M53.86 DECREASED RANGE OF MOTION OF LUMBAR SPINE: ICD-10-CM

## 2020-06-03 PROBLEM — R52 PAIN AGGRAVATED BY WALKING: Status: RESOLVED | Noted: 2020-01-02 | Resolved: 2020-06-03

## 2020-06-03 PROCEDURE — 97162 PT EVAL MOD COMPLEX 30 MIN: CPT

## 2020-06-03 PROCEDURE — 97110 THERAPEUTIC EXERCISES: CPT
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
## 2020-06-10 ENCOUNTER — OFFICE VISIT (OUTPATIENT)
Dept: ORTHOPEDICS | Facility: CLINIC | Age: 30
End: 2020-06-10
Payer: MEDICAID

## 2020-06-10 VITALS — BODY MASS INDEX: 27.94 KG/M2 | HEIGHT: 61 IN | WEIGHT: 148 LBS

## 2020-06-10 DIAGNOSIS — Z98.890 S/P DISKECTOMY: Primary | ICD-10-CM

## 2020-06-10 PROCEDURE — 99024 POSTOP FOLLOW-UP VISIT: CPT | Mod: ,,, | Performed by: PHYSICIAN ASSISTANT

## 2020-06-10 PROCEDURE — 99999 PR PBB SHADOW E&M-EST. PATIENT-LVL III: CPT | Mod: PBBFAC,,, | Performed by: PHYSICIAN ASSISTANT

## 2020-06-10 PROCEDURE — 99999 PR PBB SHADOW E&M-EST. PATIENT-LVL III: ICD-10-PCS | Mod: PBBFAC,,, | Performed by: PHYSICIAN ASSISTANT

## 2020-06-10 PROCEDURE — 99024 PR POST-OP FOLLOW-UP VISIT: ICD-10-PCS | Mod: ,,, | Performed by: PHYSICIAN ASSISTANT

## 2020-06-10 PROCEDURE — 99213 OFFICE O/P EST LOW 20 MIN: CPT | Mod: PBBFAC | Performed by: PHYSICIAN ASSISTANT

## 2020-06-10 NOTE — PROGRESS NOTES
Date: 06/10/2020    Supervising Physician: Dominic Mike M.D.    Date of Surgery: 2/27/2020     Procedure: L4/5 diskectomy    History: Scarlett Knox is seen today for follow-up following the above listed procedure. Overall the patient is doing well but today notes her pain is significantly improved compared to before surgery.  She is very pleased.  She does have some low back pain and occasional pain in the right lateral leg but this is tolerable.  She takes aleve occasionally for pain.  She is walking without assistance (she was walking with a cane prior to surgery). she denies fever, chills, and sweats since the time of the surgery.  She says she had a slow recovery and it was difficult to get out of bed until about April.  Because of the pandemic, she was unable to start therapy but recently started a couple weeks ago.  She is doing well with therapy.  There are some yoga poses she is unable to do due to back pain but she is working with therapy on this.       Exam: Incision is healed.   There is no sign of infection. Neuro exam is stable. No signs of DVT.    Radiographs: imaging from May shows no evidence of instability. No acute abnormalities.     Assessment/Plan: 15 weeks post op.    Doing well postoperatively. Continue with PT.  Progress activities as tolerated.     I will plan to see the patient back for the next postop visit at a year from surgery.     Thank you for the opportunity to participate in this patient's care. Please give me a call if there are any concerns or questions.

## 2020-06-19 ENCOUNTER — CLINICAL SUPPORT (OUTPATIENT)
Dept: REHABILITATION | Facility: HOSPITAL | Age: 30
End: 2020-06-19
Payer: MEDICAID

## 2020-06-19 DIAGNOSIS — R26.2 DIFFICULTY WALKING: ICD-10-CM

## 2020-06-19 DIAGNOSIS — M53.86 DECREASED RANGE OF MOTION OF LUMBAR SPINE: ICD-10-CM

## 2020-06-19 PROCEDURE — 97112 NEUROMUSCULAR REEDUCATION: CPT

## 2020-06-19 PROCEDURE — 97110 THERAPEUTIC EXERCISES: CPT

## 2020-06-19 NOTE — PROGRESS NOTES
"    Physical Therapy Treatment Note     Name: Scarlett Knox  Clinic Number: [id number]    Therapy Diagnosis:   Encounter Diagnoses   Name Primary?    Decreased range of motion of lumbar spine     Difficulty walking      Physician: Latrell Lynn MD    Visit Date: 6/19/2020    Physician Orders: PT Eval and Treat   Medical Diagnosis from Referral: Z98.890 (ICD-10-CM) - S/P diskectomy  Evaluation Date: 6/3/2020  Authorization Period Expiration: 9/18/20  Plan of Care Expiration: 7/31/20  Visit # / Visits authorized: 2/20  FOTO: 2/10        Time In: 9:20 am  Time Out: 10:00 am  Total Billable Time: 40 minutes      Precautions: Standard, lumbar sx 2/27    Subjective     Pt reports: she has been doing well; has been doing exercises at home.  She was compliant with home exercise program.  Response to previous treatment: last visit initial eval  Functional change:     Pain: 1/10  Location: bilateral back      Objective     Scarlett received therapeutic exercises to develop strength, endurance, flexibility and core stabilization for 32 minutes including:     Prone quad stretch 30" 3x  Bridging 3x10  Clamshells 2x10  Ball roll outs 10x        Scarlett participated in neuromuscular re-education activities to improve: Balance, Coordination, Proprioception and Posture for 8 minutes. The following activities were included:    Sit<>stand airex 10x          Home Exercises Provided and Patient Education Provided      Education provided:   - HEP     Written Home Exercises Provided: verbal/demo.  Exercises were reviewed and Scarlett was able to demonstrate them prior to the end of the session.  Scarlett demonstrated good  understanding of the education provided.           Assessment     Patient tolerated first follow up session well today with no adverse effects. PT added in new exercises to address LE strength, and patient was able to perform with min difficulty.   Scarlett is progressing well towards her goals.   Pt prognosis is " Good.     Pt will continue to benefit from skilled outpatient physical therapy to address the deficits listed in the problem list box on initial evaluation, provide pt/family education and to maximize pt's level of independence in the home and community environment.     Pt's spiritual, cultural and educational needs considered and pt agreeable to plan of care and goals.     Anticipated barriers to physical therapy: chronicity of symptoms    Goals:     Long Term Goals: 8 weeks   1. Patient will be independent with HEP.  2. Patient will improve lumbar ROM by 50% in all directions without pain.  3. Pt will improve all impaired RLE MMT to 4+/5 for functional tasks.  4. Pt will be able to perform 10 repetitions on 30 second STS test without use of hands.  5. Pt demo functional improvements via FOTO score of 47% limited or less.     Plan     Continue with current POC.     Marga Goodson, PT

## 2020-07-08 ENCOUNTER — CLINICAL SUPPORT (OUTPATIENT)
Dept: REHABILITATION | Facility: HOSPITAL | Age: 30
End: 2020-07-08
Payer: MEDICAID

## 2020-07-08 DIAGNOSIS — M53.86 DECREASED RANGE OF MOTION OF LUMBAR SPINE: ICD-10-CM

## 2020-07-08 DIAGNOSIS — R26.2 DIFFICULTY WALKING: ICD-10-CM

## 2020-07-08 PROCEDURE — 97112 NEUROMUSCULAR REEDUCATION: CPT

## 2020-07-08 PROCEDURE — 97110 THERAPEUTIC EXERCISES: CPT

## 2020-07-08 NOTE — PROGRESS NOTES
"    Physical Therapy Treatment Note     Name: Scarlett Knox  Clinic Number: [id number]    Therapy Diagnosis:   Encounter Diagnoses   Name Primary?    Decreased range of motion of lumbar spine     Difficulty walking      Physician: Latrell Lynn MD    Visit Date: 7/8/2020    Physician Orders: PT Eval and Treat   Medical Diagnosis from Referral: Z98.890 (ICD-10-CM) - S/P diskectomy  Evaluation Date: 6/3/2020  Authorization Period Expiration: 9/18/20  Plan of Care Expiration: 7/31/20  Visit # / Visits authorized: 3/20  FOTO: 3/10        Time In: 2:30 pm  Time Out: 3:15 pm  Total Billable Time: 45 minutes      Precautions: Standard, lumbar sx 2/27    Subjective     Pt reports: she is doing well today; still exercising at home.  She was compliant with home exercise program.  Response to previous treatment: no adverse effects   Functional change:     Pain: 1/10  Location: bilateral back      Objective     Scarlett received therapeutic exercises to develop strength, endurance, flexibility and core stabilization for 25 minutes including:     Prone quad stretch 30" 3x  Bridging 3x10  Clamshells 2x10  Ball roll outs 10x        Scarlett participated in neuromuscular re-education activities to improve: Balance, Coordination, Proprioception and Posture for 20 minutes. The following activities were included:    Sit<>stand airex 2x10  SAPD airex GTB 3x10  pallof press airex GTB 2x10           Home Exercises Provided and Patient Education Provided      Education provided:   - HEP     Written Home Exercises Provided: verbal/demo.  Exercises were reviewed and Scarlett was able to demonstrate them prior to the end of the session.  Scarlett demonstrated good  understanding of the education provided.           Assessment     Patient tolerated treatment session well today with no adverse effects. Pt was able to progress balance and dynamic cor stability exercises with minimal difficulty today. Pt required min tactile cues for " correct performance of exercises.  Scarlett is progressing well towards her goals.   Pt prognosis is Good.     Pt will continue to benefit from skilled outpatient physical therapy to address the deficits listed in the problem list box on initial evaluation, provide pt/family education and to maximize pt's level of independence in the home and community environment.     Pt's spiritual, cultural and educational needs considered and pt agreeable to plan of care and goals.     Anticipated barriers to physical therapy: chronicity of symptoms    Goals:     Long Term Goals: 8 weeks   1. Patient will be independent with HEP.  2. Patient will improve lumbar ROM by 50% in all directions without pain.  3. Pt will improve all impaired RLE MMT to 4+/5 for functional tasks.  4. Pt will be able to perform 10 repetitions on 30 second STS test without use of hands.  5. Pt demo functional improvements via FOTO score of 47% limited or less.     Plan     Continue with current POC.     Marga Goodson, PT

## 2020-07-10 ENCOUNTER — CLINICAL SUPPORT (OUTPATIENT)
Dept: REHABILITATION | Facility: HOSPITAL | Age: 30
End: 2020-07-10
Payer: MEDICAID

## 2020-07-10 DIAGNOSIS — M53.86 DECREASED RANGE OF MOTION OF LUMBAR SPINE: ICD-10-CM

## 2020-07-10 DIAGNOSIS — R26.2 DIFFICULTY WALKING: ICD-10-CM

## 2020-07-10 PROCEDURE — 97110 THERAPEUTIC EXERCISES: CPT

## 2020-07-10 PROCEDURE — 97112 NEUROMUSCULAR REEDUCATION: CPT

## 2020-07-10 NOTE — PROGRESS NOTES
"    Physical Therapy Treatment Note     Name: Scarlett Knox  Clinic Number: [id number]    Therapy Diagnosis:   Encounter Diagnoses   Name Primary?    Decreased range of motion of lumbar spine     Difficulty walking      Physician: Latrell Lynn MD    Visit Date: 7/10/2020    Physician Orders: PT Eval and Treat   Medical Diagnosis from Referral: Z98.890 (ICD-10-CM) - S/P diskectomy  Evaluation Date: 6/3/2020  Authorization Period Expiration: 9/18/20  Plan of Care Expiration: 7/31/20  Visit # / Visits authorized: 4/20  FOTO: 4/10        Time In: 9:20 am  Time Out: 10:00 am  Total Billable Time: 40 minutes      Precautions: Standard, lumbar sx 2/27    Subjective     Pt reports: she is doing well today; still exercising at home.  She was compliant with home exercise program.  Response to previous treatment: no adverse effects   Functional change:     Pain: 1/10  Location: bilateral back      Objective     Scarlett received therapeutic exercises to develop strength, endurance, flexibility and core stabilization for 25 minutes including:     Prone quad stretch 30" 3x  Bridging 3x10  Clamshells 2x10  Ball roll outs 10x        Scarlett participated in neuromuscular re-education activities to improve: Balance, Coordination, Proprioception and Posture for 15 minutes. The following activities were included:    Sit<>stand airex 2x10  SAPD airex GTB 3x10  pallof press airex GTB 2x10           Home Exercises Provided and Patient Education Provided      Education provided:   - HEP     Written Home Exercises Provided: verbal/demo.  Exercises were reviewed and Scarlett was able to demonstrate them prior to the end of the session.  Scarlett demonstrated good  understanding of the education provided.           Assessment     Patient tolerated treatment session well today with no adverse effects. Pt reported more fatigue and discomfort today so she required more frequent rest breaks throughout session.   Scarlett is progressing " well towards her goals.   Pt prognosis is Good.     Pt will continue to benefit from skilled outpatient physical therapy to address the deficits listed in the problem list box on initial evaluation, provide pt/family education and to maximize pt's level of independence in the home and community environment.     Pt's spiritual, cultural and educational needs considered and pt agreeable to plan of care and goals.     Anticipated barriers to physical therapy: chronicity of symptoms    Goals:     Long Term Goals: 8 weeks   1. Patient will be independent with HEP.  2. Patient will improve lumbar ROM by 50% in all directions without pain.  3. Pt will improve all impaired RLE MMT to 4+/5 for functional tasks.  4. Pt will be able to perform 10 repetitions on 30 second STS test without use of hands.  5. Pt demo functional improvements via FOTO score of 47% limited or less.     Plan     Continue with current POC.     Marga Goodson, PT

## 2020-07-15 ENCOUNTER — CLINICAL SUPPORT (OUTPATIENT)
Dept: REHABILITATION | Facility: HOSPITAL | Age: 30
End: 2020-07-15
Payer: MEDICAID

## 2020-07-15 DIAGNOSIS — R26.2 DIFFICULTY WALKING: ICD-10-CM

## 2020-07-15 DIAGNOSIS — M53.86 DECREASED RANGE OF MOTION OF LUMBAR SPINE: ICD-10-CM

## 2020-07-15 PROCEDURE — 97112 NEUROMUSCULAR REEDUCATION: CPT

## 2020-07-15 PROCEDURE — 97110 THERAPEUTIC EXERCISES: CPT

## 2020-07-15 NOTE — PROGRESS NOTES
"    Physical Therapy Treatment Note     Name: Scarlett Knox  Clinic Number: [id number]    Therapy Diagnosis:   Encounter Diagnoses   Name Primary?    Decreased range of motion of lumbar spine     Difficulty walking      Physician: Latrell Lynn MD    Visit Date: 7/15/2020    Physician Orders: PT Eval and Treat   Medical Diagnosis from Referral: Z98.890 (ICD-10-CM) - S/P diskectomy  Evaluation Date: 6/3/2020  Authorization Period Expiration: 9/18/20  Plan of Care Expiration: 7/31/20  Visit # / Visits authorized: 5/20  FOTO: 5/10        Time In: 9:10 am  Time Out: 9:50 am  Total Billable Time: 40 minutes      Precautions: Standard, lumbar sx 2/27    Subjective     Pt reports: she is doing well today  She was compliant with home exercise program.  Response to previous treatment: no adverse effects   Functional change:     Pain: 1/10  Location: bilateral back      Objective     Scarlett received therapeutic exercises to develop strength, endurance, flexibility and core stabilization for 25 minutes including:     Bike 5'  Prone quad stretch 30" 3x  Bridging 3x10 w/ airex  Clamshells 2x10  Ball roll outs 10x        Scarlett participated in neuromuscular re-education activities to improve: Balance, Coordination, Proprioception and Posture for 15 minutes. The following activities were included:    Sit<>stand airex 2x10  SAPD airex GTB 3x10  pallof press airex GTB 2x10           Home Exercises Provided and Patient Education Provided      Education provided:   - HEP     Written Home Exercises Provided: verbal/demo.  Exercises were reviewed and Scarlett was able to demonstrate them prior to the end of the session.  Scarlett demonstrated good  understanding of the education provided.           Assessment     Patient tolerated treatment session well today with no adverse effects. Pt reported exercises did not feel as difficult today. Bridges progressed by adding airex under feet.   Scarlett is progressing well towards her " goals.   Pt prognosis is Good.     Pt will continue to benefit from skilled outpatient physical therapy to address the deficits listed in the problem list box on initial evaluation, provide pt/family education and to maximize pt's level of independence in the home and community environment.     Pt's spiritual, cultural and educational needs considered and pt agreeable to plan of care and goals.     Anticipated barriers to physical therapy: chronicity of symptoms    Goals:     Long Term Goals: 8 weeks   1. Patient will be independent with HEP.  2. Patient will improve lumbar ROM by 50% in all directions without pain.  3. Pt will improve all impaired RLE MMT to 4+/5 for functional tasks.  4. Pt will be able to perform 10 repetitions on 30 second STS test without use of hands.  5. Pt demo functional improvements via FOTO score of 47% limited or less.     Plan     Continue with current POC.     Marga Goodson, PT

## 2020-07-20 ENCOUNTER — CLINICAL SUPPORT (OUTPATIENT)
Dept: REHABILITATION | Facility: HOSPITAL | Age: 30
End: 2020-07-20
Payer: MEDICAID

## 2020-07-20 DIAGNOSIS — R26.2 DIFFICULTY WALKING: ICD-10-CM

## 2020-07-20 DIAGNOSIS — M53.86 DECREASED RANGE OF MOTION OF LUMBAR SPINE: ICD-10-CM

## 2020-07-20 PROCEDURE — 97140 MANUAL THERAPY 1/> REGIONS: CPT

## 2020-07-20 PROCEDURE — 97110 THERAPEUTIC EXERCISES: CPT

## 2020-07-20 NOTE — PROGRESS NOTES
"    Physical Therapy Treatment Note     Name: Scarlett Knox  Clinic Number: [id number]    Therapy Diagnosis:   Encounter Diagnoses   Name Primary?    Decreased range of motion of lumbar spine     Difficulty walking      Physician: Latrell Lynn MD    Visit Date: 7/20/2020    Physician Orders: PT Eval and Treat   Medical Diagnosis from Referral: Z98.890 (ICD-10-CM) - S/P diskectomy  Evaluation Date: 6/3/2020  Authorization Period Expiration: 9/18/20  Plan of Care Expiration: 7/31/20  Visit # / Visits authorized: 6/20  FOTO: 6/10        Time In: 9:15 am  Time Out: 10:00 am  Total Billable Time: 45 minutes      Precautions: Standard, lumbar sx 2/27    Subjective     Pt reports: she cleaned a lot Saturday, so she is hurting today in her right hip and low back.   She was compliant with home exercise program.  Response to previous treatment: no adverse effects   Functional change:     Pain: 1/10  Location: bilateral back      Objective     Scarlett received therapeutic exercises to develop strength, endurance, flexibility and core stabilization for 30 minutes including:     Bike 5'- NP  Prone quad stretch 30" 3x  Bridging 3x10  Clamshells R 1:38, L 1:30  Ball roll outs 10x- NP        Scarlett participated in neuromuscular re-education activities to improve: Balance, Coordination, Proprioception and Posture for 15 minutes. The following activities were included:    Sit<>stand airex 2x10  SAPD airex GTB 3x10  pallof press airex GTB 2x10           Home Exercises Provided and Patient Education Provided      Education provided:   - HEP     Written Home Exercises Provided: verbal/demo.  Exercises were reviewed and Scarlett was able to demonstrate them prior to the end of the session.  Scarlett demonstrated good  understanding of the education provided.           Assessment     Patient tolerated treatment session well today with no adverse effects. Pt was more fatigued and sore coming into therapy today, so not all " exercises performed today.  Scarlett is progressing well towards her goals.   Pt prognosis is Good.     Pt will continue to benefit from skilled outpatient physical therapy to address the deficits listed in the problem list box on initial evaluation, provide pt/family education and to maximize pt's level of independence in the home and community environment.     Pt's spiritual, cultural and educational needs considered and pt agreeable to plan of care and goals.     Anticipated barriers to physical therapy: chronicity of symptoms    Goals:     Long Term Goals: 8 weeks   1. Patient will be independent with HEP.  2. Patient will improve lumbar ROM by 50% in all directions without pain.  3. Pt will improve all impaired RLE MMT to 4+/5 for functional tasks.  4. Pt will be able to perform 10 repetitions on 30 second STS test without use of hands.  5. Pt demo functional improvements via FOTO score of 47% limited or less.     Plan     Continue with current POC.     Marga Goodson, PT

## 2020-07-22 ENCOUNTER — CLINICAL SUPPORT (OUTPATIENT)
Dept: REHABILITATION | Facility: HOSPITAL | Age: 30
End: 2020-07-22
Payer: MEDICAID

## 2020-07-22 DIAGNOSIS — M53.86 DECREASED RANGE OF MOTION OF LUMBAR SPINE: ICD-10-CM

## 2020-07-22 DIAGNOSIS — R26.2 DIFFICULTY WALKING: ICD-10-CM

## 2020-07-22 PROCEDURE — 97112 NEUROMUSCULAR REEDUCATION: CPT

## 2020-07-22 PROCEDURE — 97110 THERAPEUTIC EXERCISES: CPT

## 2020-07-22 NOTE — PROGRESS NOTES
"    Physical Therapy Treatment Note     Name: Scarlett Knox  Clinic Number: [id number]    Therapy Diagnosis:   Encounter Diagnoses   Name Primary?    Decreased range of motion of lumbar spine     Difficulty walking      Physician: Latrell Lynn MD    Visit Date: 7/22/2020    Physician Orders: PT Eval and Treat   Medical Diagnosis from Referral: Z98.890 (ICD-10-CM) - S/P diskectomy  Evaluation Date: 6/3/2020  Authorization Period Expiration: 9/18/20  Plan of Care Expiration: 7/31/20  Visit # / Visits authorized: 7/20  FOTO: 7/10        Time In: 9:10 am  Time Out: 9:55 am  Total Billable Time: 45 minutes      Precautions: Standard, lumbar sx 2/27    Subjective     Pt reports: she is feeling a lot better today.   She was compliant with home exercise program.  Response to previous treatment: no adverse effects   Functional change:     Pain: 1/10  Location: bilateral back      Objective     Scarlett received therapeutic exercises to develop strength, endurance, flexibility and core stabilization for 30 minutes including:     Bike 5'  Prone quad stretch 30" 3x- NP  Bridging 3x10- NP  Clamshells R 1:38, L 1:30- NP  Ball roll outs 10x  Dynamic side lunge 20x  Dynamic crescent lunge 20x  Plank to down dog 10x        Scarlett participated in neuromuscular re-education activities to improve: Balance, Coordination, Proprioception and Posture for 15 minutes. The following activities were included:    Sit<>stand airex 2x10- NP  SAPD airex GTB 3x10 single leg  Pallof press airex GTB 2x10   SLS 30" 2x          Home Exercises Provided and Patient Education Provided      Education provided:   - HEP     Written Home Exercises Provided: verbal/demo.  Exercises were reviewed and Scarlett was able to demonstrate them prior to the end of the session.  Scarlett demonstrated good  understanding of the education provided.           Assessment     Patient tolerated treatment session well today with no adverse effects. She was able to " perform more standing balance exercises and dynamic standing therex without and increase in pain and minimal difficulty.   Scarlett is progressing well towards her goals.   Pt prognosis is Good.     Pt will continue to benefit from skilled outpatient physical therapy to address the deficits listed in the problem list box on initial evaluation, provide pt/family education and to maximize pt's level of independence in the home and community environment.     Pt's spiritual, cultural and educational needs considered and pt agreeable to plan of care and goals.     Anticipated barriers to physical therapy: chronicity of symptoms    Goals:     Long Term Goals: 8 weeks   1. Patient will be independent with HEP.  2. Patient will improve lumbar ROM by 50% in all directions without pain.  3. Pt will improve all impaired RLE MMT to 4+/5 for functional tasks.  4. Pt will be able to perform 10 repetitions on 30 second STS test without use of hands.  5. Pt demo functional improvements via FOTO score of 47% limited or less.     Plan     Continue with current POC.     Marga Goodson, PT

## 2020-07-27 ENCOUNTER — CLINICAL SUPPORT (OUTPATIENT)
Dept: REHABILITATION | Facility: HOSPITAL | Age: 30
End: 2020-07-27
Payer: MEDICAID

## 2020-07-27 DIAGNOSIS — R26.2 DIFFICULTY WALKING: ICD-10-CM

## 2020-07-27 DIAGNOSIS — M53.86 DECREASED RANGE OF MOTION OF LUMBAR SPINE: ICD-10-CM

## 2020-07-27 PROCEDURE — 97112 NEUROMUSCULAR REEDUCATION: CPT

## 2020-07-27 PROCEDURE — 97110 THERAPEUTIC EXERCISES: CPT

## 2020-07-27 NOTE — PROGRESS NOTES
"    Physical Therapy Treatment Note     Name: Scarlett Knox  Clinic Number: [id number]    Therapy Diagnosis:   Encounter Diagnoses   Name Primary?    Decreased range of motion of lumbar spine     Difficulty walking      Physician: Latrell Lynn MD    Visit Date: 7/27/2020    Physician Orders: PT Eval and Treat   Medical Diagnosis from Referral: Z98.890 (ICD-10-CM) - S/P diskectomy  Evaluation Date: 6/3/2020  Authorization Period Expiration: 9/18/20  Plan of Care Expiration: 7/31/20  Visit # / Visits authorized: 8/20  FOTO: 8/10        Time In: 9:15 am  Time Out: 10:00 am  Total Billable Time: 45 minutes      Precautions: Standard, lumbar sx 2/27    Subjective     Pt reports: she is feeling a lot better today.   She was compliant with home exercise program.  Response to previous treatment: no adverse effects   Functional change:     Pain: 1/10  Location: bilateral back      Objective     Scarlett received therapeutic exercises to develop strength, endurance, flexibility and core stabilization for 30 minutes including:     Bike 5'  Supine peroneal nerve glide (R) 2x10  Prone quad stretch 30" 3x- NP  Bridging 3x10- NP  Clamshells R 1:38, L 1:30- NP  Ball roll outs 10x- NP  Dynamic side lunge 20x  Dynamic crescent lunge 20x  Plank to down dog 10x        Scarlett participated in neuromuscular re-education activities to improve: Balance, Coordination, Proprioception and Posture for 15 minutes. The following activities were included:    Sit<>stand airex 2x10- NP  SAPD airex BTB 3x10 single leg  Pallof press airex GTB 2x10   SLS 30" 2x          Home Exercises Provided and Patient Education Provided      Education provided:   - HEP     Written Home Exercises Provided: verbal/demo.  Exercises were reviewed and Scarlett was able to demonstrate them prior to the end of the session.  Scarlett demonstrated good  understanding of the education provided.           Assessment     Patient tolerated treatment session well today " with no adverse effects. She presented with new right lateral lower leg pain, so peroneal nerve glides added today to address this pain. Pt reported pain did not go over 3/10 throughout the treatment session today.   Scarlett is progressing well towards her goals.   Pt prognosis is Good.     Pt will continue to benefit from skilled outpatient physical therapy to address the deficits listed in the problem list box on initial evaluation, provide pt/family education and to maximize pt's level of independence in the home and community environment.     Pt's spiritual, cultural and educational needs considered and pt agreeable to plan of care and goals.     Anticipated barriers to physical therapy: chronicity of symptoms    Goals:     Long Term Goals: 8 weeks   1. Patient will be independent with HEP.  2. Patient will improve lumbar ROM by 50% in all directions without pain.  3. Pt will improve all impaired RLE MMT to 4+/5 for functional tasks.  4. Pt will be able to perform 10 repetitions on 30 second STS test without use of hands.  5. Pt demo functional improvements via FOTO score of 47% limited or less.     Plan     Continue with current POC. Discharge next visit.     Marga Goodson, PT

## 2020-07-29 ENCOUNTER — CLINICAL SUPPORT (OUTPATIENT)
Dept: REHABILITATION | Facility: HOSPITAL | Age: 30
End: 2020-07-29
Payer: MEDICAID

## 2020-07-29 DIAGNOSIS — M53.86 DECREASED RANGE OF MOTION OF LUMBAR SPINE: ICD-10-CM

## 2020-07-29 DIAGNOSIS — R26.2 DIFFICULTY WALKING: ICD-10-CM

## 2020-07-29 PROCEDURE — 97110 THERAPEUTIC EXERCISES: CPT

## 2020-07-29 PROCEDURE — 97140 MANUAL THERAPY 1/> REGIONS: CPT

## 2020-07-29 NOTE — PROGRESS NOTES
Physical Therapy Treatment Note/ Discharge Summary     Name: Scarlett Knox  Clinic Number: [id number]    Therapy Diagnosis:   Encounter Diagnoses   Name Primary?    Decreased range of motion of lumbar spine     Difficulty walking      Physician: Latrell Lynn MD    Visit Date: 7/29/2020    Physician Orders: PT Eval and Treat   Medical Diagnosis from Referral: Z98.890 (ICD-10-CM) - S/P diskectomy  Evaluation Date: 6/3/2020  Authorization Period Expiration: 9/18/20  Plan of Care Expiration: 7/31/20  Visit # / Visits authorized: 8/20  FOTO: 8/10        Time In: 9:15 am  Time Out: 10:00 am  Total Billable Time: 45 minutes      Precautions: Standard, lumbar sx 2/27    Subjective     Pt reports: she is feeling great today and is ready for discharge.   She was compliant with home exercise program.  Response to previous treatment: no adverse effects   Functional change:     Pain: 1/10  Location: bilateral back      Objective     Scarlett received assessment and therapeutic exercises to develop strength, endurance, flexibility and core stabilization for 35 minutes including:    Thoracolumbar AROM Pain/Dysfunction with Movement   Flexion 100%    Extension 100%    Right side bending 100%     Left side bending 100%            L/E MMT Right Left Pain/Dysfunction with Movement   Hip Flexion 5/5 5/5     Knee Flexion 5/5 5/5     Knee Extension 5/5 5/5     Ankle DF 4+/5 5/5             30 second sit-to-stand test (without U/E support): 10 repetitions        Dynamic side lunge 20x  Dynamic crescent lunge 20x  Plank to down dog 10x        Scarlett participated in neuromuscular re-education activities to improve: Balance, Coordination, Proprioception and Posture for 10 minutes. The following activities were included:      SAPD airex BTB 3x10 single leg  Pallof press airex GTB 2x10             Home Exercises Provided and Patient Education Provided      Education provided:   - HEP     Written Home Exercises Provided:  verbal/demo.  Exercises were reviewed and Scarlett was able to demonstrate them prior to the end of the session.  Scarlett demonstrated good  understanding of the education provided.           Assessment     Patient tolerated treatment session well today with no adverse effects. Pt re-assessed for discharge today and shown significant improvements with lumbar ROM, strength, and endurance of BLEs.     Long Term Goals: 8 weeks   1. Patient will be independent with HEP. (MET)  2. Patient will improve lumbar ROM by 50% in all directions without pain. (MET)  3. Pt will improve all impaired RLE MMT to 4+/5 for functional tasks. (MET)  4. Pt will be able to perform 10 repetitions on 30 second STS test without use of hands. (MET)  5. Pt demo functional improvements via FOTO score of 47% limited or less.  (MET)    Plan     Discharge from physical therapy.     Marga Goodson, PT

## 2021-04-13 ENCOUNTER — PATIENT MESSAGE (OUTPATIENT)
Dept: RESEARCH | Facility: HOSPITAL | Age: 31
End: 2021-04-13

## 2022-08-29 DIAGNOSIS — Z98.890 S/P DISKECTOMY: Primary | ICD-10-CM

## 2022-09-21 ENCOUNTER — CLINICAL SUPPORT (OUTPATIENT)
Dept: REHABILITATION | Facility: HOSPITAL | Age: 32
End: 2022-09-21
Payer: COMMERCIAL

## 2022-09-21 DIAGNOSIS — M54.50 CHRONIC MIDLINE LOW BACK PAIN WITHOUT SCIATICA: Primary | ICD-10-CM

## 2022-09-21 DIAGNOSIS — G89.29 CHRONIC MIDLINE LOW BACK PAIN WITHOUT SCIATICA: Primary | ICD-10-CM

## 2022-09-21 DIAGNOSIS — Z98.890 S/P DISKECTOMY: ICD-10-CM

## 2022-09-21 PROCEDURE — 97161 PT EVAL LOW COMPLEX 20 MIN: CPT

## 2022-10-04 ENCOUNTER — PATIENT MESSAGE (OUTPATIENT)
Dept: REHABILITATION | Facility: HOSPITAL | Age: 32
End: 2022-10-04
Payer: COMMERCIAL

## 2022-10-07 ENCOUNTER — CLINICAL SUPPORT (OUTPATIENT)
Dept: REHABILITATION | Facility: HOSPITAL | Age: 32
End: 2022-10-07
Payer: COMMERCIAL

## 2022-10-07 DIAGNOSIS — G89.29 CHRONIC MIDLINE LOW BACK PAIN WITHOUT SCIATICA: Primary | ICD-10-CM

## 2022-10-07 DIAGNOSIS — M54.50 CHRONIC MIDLINE LOW BACK PAIN WITHOUT SCIATICA: Primary | ICD-10-CM

## 2022-10-07 DIAGNOSIS — Z98.890 S/P DISKECTOMY: ICD-10-CM

## 2022-10-07 PROCEDURE — 97110 THERAPEUTIC EXERCISES: CPT | Mod: CQ

## 2022-10-07 NOTE — PROGRESS NOTES
"[facility]   Physical Therapy Treatment Note     Name: Scarlett Knox  Clinic Number: [id number]    Therapy Diagnosis:   Encounter Diagnoses   Name Primary?    Chronic midline low back pain without sciatica Yes    S/P diskectomy      Physician: Lou May PA-C    Visit Date: 10/7/2022       Physician Orders: PT Eval and Treat   Medical Diagnosis from Referral: Z98.890 (ICD-10-CM) - S/P diskectomy  Evaluation Date: 9/21/2022  Authorization Period Expiration: TBD  Plan of Care Expiration: 12/21/2022  Progress Note Due: 11/21/2022  Visit # / Visits authorized: 1/ 1, 1/20  FOTO: 0/5  PTA Visit #: 1/5     Precautions: Standard    Time In: 12:35  Time Out: 1:25  Total Billable Time: 50 minutes    Subjective     Pt reports: that she took a gabapentin today so her pain is minimal today.  She  was not given yet  compliant with home exercise program.  Response to previous treatment: last session was initial eval  Functional change: none at this time     Pain: 3/10  Location: bilateral T-L spine     Objective     Scarlett received therapeutic exercises to develop strength, endurance, ROM, flexibility, and posture for 50 minutes including:  UBE 2'/2' fwd/bkwd  Upper thoracic stretch 10" 5x   Seated thoracic ext Held at 6 (d/t pain)   Open books 15x radha   SL clam iso 2x30" BLE   SL hip abd iso 2x30" BLE   Side stepping with OTB 3 laps   Prone press up (pillow at hips) 10x   Pilates ring 5" 10x     PA glides of T-spine          Home Exercises Provided and Patient Education Provided     Education provided:   - HEP    Written Home Exercises Provided: yes.  Exercises were reviewed and Scarlett was able to demonstrate them prior to the end of the session.  Scarlett demonstrated good  understanding of the education provided.     See EMR under Patient Instructions for exercises provided 10/7/2022.    Assessment     Initiated therex program following pt's initial evaluation with no adverse effects. Pt " noted discomfort during thoracic extension, therefore held at 6 with immediate resolution of symptoms. Pt required pillow at hips during prone press ups in order to decrease pain in low back. Will monitor pt's response to patient's program and modify as needed during upcoming sessions.       Scarlett Is progressing well towards her goals.   Pt prognosis is Good.     Pt will continue to benefit from skilled outpatient physical therapy to address the deficits listed in the problem list box on initial evaluation, provide pt/family education and to maximize pt's level of independence in the home and community environment.     Pt's spiritual, cultural and educational needs considered and pt agreeable to plan of care and goals.     Anticipated barriers to physical therapy: co-morbidities, pain     Goals:   Short Term Goals (4 Weeks):  1. Pt will be compliant with HEP to supplement PT in decreasing pain with functional mobility.  2. Pt will perform pallof press with good control to demonstrate improved core strength.  3. Pt will improve lumbar rotational ROM by 10%  to improve functional mobility.  4. Pt will improve impaired LE MMTs by 1/2 score to improve strength for functional tasks.  Long Term Goals (8 Weeks):   1. Pt will improve FOTO score to </= 43% limited to decrease perceived limitation with maintaining/changing body position.   2. Pt will perform floor to waist lift with good control to demonstrate improved core strength.  3. Pt will improve impaired LE MMTs by 1 score to improve strength for functional tasks.  4. Pt will report no pain during lumbar ROM to promote functional mobility.   5. Pt will return to yoga without increasing symptoms to meet pt's specific goal.     Plan   Plan of care Certification: 9/21/2022 to 12/21/2022.     Continue with current POC.     Kirsten Blanc PTA

## 2022-10-12 ENCOUNTER — CLINICAL SUPPORT (OUTPATIENT)
Dept: REHABILITATION | Facility: HOSPITAL | Age: 32
End: 2022-10-12
Payer: COMMERCIAL

## 2022-10-12 DIAGNOSIS — Z98.890 S/P DISKECTOMY: ICD-10-CM

## 2022-10-12 DIAGNOSIS — M54.50 CHRONIC MIDLINE LOW BACK PAIN WITHOUT SCIATICA: Primary | ICD-10-CM

## 2022-10-12 DIAGNOSIS — G89.29 CHRONIC MIDLINE LOW BACK PAIN WITHOUT SCIATICA: Primary | ICD-10-CM

## 2022-10-12 PROCEDURE — 97110 THERAPEUTIC EXERCISES: CPT

## 2022-10-12 NOTE — PROGRESS NOTES
"[facility]   Physical Therapy Treatment Note     Name: Scarlett Knox  Clinic Number: [id number]    Therapy Diagnosis:   Encounter Diagnoses   Name Primary?    Chronic midline low back pain without sciatica Yes    S/P diskectomy        Physician: Lou May PA-C    Visit Date: 10/12/2022       Physician Orders: PT Eval and Treat   Medical Diagnosis from Referral: Z98.890 (ICD-10-CM) - S/P diskectomy  Evaluation Date: 9/21/2022  Authorization Period Expiration: 12/31/2022  Plan of Care Expiration: 12/21/2022  Progress Note Due: 11/21/2022  Visit # / Visits authorized: 1/ 1, 2/20  FOTO: 2/5  PTA Visit #: 0/5     Precautions: Standard    Time In: 4:00p  Time Out: 4:52p  Total Billable Time: 52 minutes    Subjective     Pt reports: she felt sore after last session, but it was a good sore. She is looking forward to her session today. She has occasional (1x/month) weakness in her right leg that resolves when she sits down   She was compliant with home exercise program.  Response to previous treatment: last session was initial eval  Functional change: none at this time     Pain: 3/10  Location: bilateral T-L spine     Objective     SAFIA: positive B, improved with core contraction.     Scarlett received therapeutic exercises to develop strength, endurance, ROM, flexibility, and posture for 52 minutes including:  UBE 2'/2' fwd/bkwd  Upper back stretch 15" 5x   Seated thoracic ext x15   DANII 2x1'  Open books x10 B  Rows blue theraband x10, 3s  SL clam iso 1x1' BLE   SL hip abd iso 2x30" BLE   Side stepping with RTB 3 laps   Prone press up (s/ pillow at hips) 10x   Pilates ring 5" 10x     PA glides of T-spine    NEXT SESSION:   - TrA activation        Home Exercises Provided and Patient Education Provided     Education provided:   - HEP    Written Home Exercises Provided: yes.  Exercises were reviewed and Scarlett was able to demonstrate them prior to the end of the session.  Scarlett " demonstrated good  understanding of the education provided.     See EMR under Patient Instructions for exercises provided  10/12/2022 .    Assessment     Progressed thoracic mobility and postural strengthening at this date with good tolerance. Improved tolerance to thoracic extension following mobilizations, so plan to include at beginning of session for future appointments. SAFIA test indicated impaired hip mobility and impaired core activation, so plan to include core strengthening at next session.       Scarlett Is progressing well towards her goals.   Pt prognosis is Good.     Pt will continue to benefit from skilled outpatient physical therapy to address the deficits listed in the problem list box on initial evaluation, provide pt/family education and to maximize pt's level of independence in the home and community environment.     Pt's spiritual, cultural and educational needs considered and pt agreeable to plan of care and goals.     Anticipated barriers to physical therapy: co-morbidities, pain     Goals:   Short Term Goals (4 Weeks):  1. Pt will be compliant with HEP to supplement PT in decreasing pain with functional mobility.  2. Pt will perform pallof press with good control to demonstrate improved core strength.  3. Pt will improve lumbar rotational ROM by 10%  to improve functional mobility.  4. Pt will improve impaired LE MMTs by 1/2 score to improve strength for functional tasks.  Long Term Goals (8 Weeks):   1. Pt will improve FOTO score to </= 43% limited to decrease perceived limitation with maintaining/changing body position.   2. Pt will perform floor to waist lift with good control to demonstrate improved core strength.  3. Pt will improve impaired LE MMTs by 1 score to improve strength for functional tasks.  4. Pt will report no pain during lumbar ROM to promote functional mobility.   5. Pt will return to yoga without increasing symptoms to meet pt's specific goal.     Plan   Plan of care  Certification: 9/21/2022 to 12/21/2022.     Continue with current POC.     Francisco Javier Alcala, PT

## 2022-10-13 ENCOUNTER — PATIENT MESSAGE (OUTPATIENT)
Dept: REHABILITATION | Facility: HOSPITAL | Age: 32
End: 2022-10-13
Payer: COMMERCIAL

## 2022-10-14 ENCOUNTER — CLINICAL SUPPORT (OUTPATIENT)
Dept: REHABILITATION | Facility: HOSPITAL | Age: 32
End: 2022-10-14
Payer: COMMERCIAL

## 2022-10-14 DIAGNOSIS — M54.50 CHRONIC MIDLINE LOW BACK PAIN WITHOUT SCIATICA: Primary | ICD-10-CM

## 2022-10-14 DIAGNOSIS — G89.29 CHRONIC MIDLINE LOW BACK PAIN WITHOUT SCIATICA: Primary | ICD-10-CM

## 2022-10-14 DIAGNOSIS — Z98.890 S/P DISKECTOMY: ICD-10-CM

## 2022-10-14 PROCEDURE — 97110 THERAPEUTIC EXERCISES: CPT | Mod: CQ

## 2022-10-14 NOTE — PROGRESS NOTES
"[facility]   Physical Therapy Treatment Note     Name: Scarlett Knox  Clinic Number: [id number]    Therapy Diagnosis:   Encounter Diagnoses   Name Primary?    Chronic midline low back pain without sciatica Yes    S/P diskectomy          Physician: Lou May PA-C    Visit Date: 10/14/2022       Physician Orders: PT Eval and Treat   Medical Diagnosis from Referral: Z98.890 (ICD-10-CM) - S/P diskectomy  Evaluation Date: 9/21/2022  Authorization Period Expiration: 12/31/2022  Plan of Care Expiration: 12/21/2022  Progress Note Due: 11/21/2022  Visit # / Visits authorized: 1/ 1, 3/20  FOTO: 3/5  PTA Visit #: 1/5     Precautions: Standard    Time In: 2:05 p  Time Out: 3:00 p  Total Billable Time: 55 minutes    Subjective     Pt reports: that her shoulder began bothering her yesterday (about a 4/10 on pain scale) after feeling like she over stretched left shoulder. Pt states that the pain has since resolved.   She was compliant with home exercise program.  Response to previous treatment: muscle soreness  Functional change: none at this time     Pain: 3/10  Location: bilateral T-L spine     Objective     SAFIA: positive B, improved with core contraction.     Scarlett received therapeutic exercises to develop strength, endurance, ROM, flexibility, and posture for 55 minutes including:  UBE 2'/2' fwd/bkwd  Upper back stretch 15" 5x   Seated thoracic ext x15   DANII 2x1'  Open books x10 B (NP today)  Rows blue theraband 2x10, 3s  SL clam iso 1x1' BLE   SL hip abd iso 2x30" BLE   Side stepping with RTB 3 laps   Prone press up (s/ pillow at hips) 10x   Pilates ring 5" 10x   TrA activation 15x (hooklying 90/90 breathing)     PA glides of T-spine          Home Exercises Provided and Patient Education Provided     Education provided:   - HEP    Written Home Exercises Provided: yes.  Exercises were reviewed and Scarlett was able to demonstrate them prior to the end of the session.  Scarlett " demonstrated good  understanding of the education provided.     See EMR under Patient Instructions for exercises provided  10/12/2022 .    Assessment     Moderate cuing and demonstrations required during transverse abdominus activation for proper form and to avoid glute compensation. Held open books during session today due to irritation yesterday while performing HEP. Pt demonstrated fatigue during sidelying hip abduction and clam isometrics and required verbal and tactile cues to correct compensations.       Scarlett Is progressing well towards her goals.   Pt prognosis is Good.     Pt will continue to benefit from skilled outpatient physical therapy to address the deficits listed in the problem list box on initial evaluation, provide pt/family education and to maximize pt's level of independence in the home and community environment.     Pt's spiritual, cultural and educational needs considered and pt agreeable to plan of care and goals.     Anticipated barriers to physical therapy: co-morbidities, pain     Goals:   Short Term Goals (4 Weeks):  1. Pt will be compliant with HEP to supplement PT in decreasing pain with functional mobility.  2. Pt will perform pallof press with good control to demonstrate improved core strength.  3. Pt will improve lumbar rotational ROM by 10%  to improve functional mobility.  4. Pt will improve impaired LE MMTs by 1/2 score to improve strength for functional tasks.  Long Term Goals (8 Weeks):   1. Pt will improve FOTO score to </= 43% limited to decrease perceived limitation with maintaining/changing body position.   2. Pt will perform floor to waist lift with good control to demonstrate improved core strength.  3. Pt will improve impaired LE MMTs by 1 score to improve strength for functional tasks.  4. Pt will report no pain during lumbar ROM to promote functional mobility.   5. Pt will return to yoga without increasing symptoms to meet pt's specific goal.     Plan   Plan of care  Certification: 9/21/2022 to 12/21/2022.     Continue with current POC.     Kirsten Blanc PTA

## 2022-10-19 ENCOUNTER — CLINICAL SUPPORT (OUTPATIENT)
Dept: REHABILITATION | Facility: HOSPITAL | Age: 32
End: 2022-10-19
Payer: COMMERCIAL

## 2022-10-19 DIAGNOSIS — Z98.890 S/P DISKECTOMY: ICD-10-CM

## 2022-10-19 DIAGNOSIS — G89.29 CHRONIC MIDLINE LOW BACK PAIN WITHOUT SCIATICA: Primary | ICD-10-CM

## 2022-10-19 DIAGNOSIS — M54.50 CHRONIC MIDLINE LOW BACK PAIN WITHOUT SCIATICA: Primary | ICD-10-CM

## 2022-10-19 PROCEDURE — 97110 THERAPEUTIC EXERCISES: CPT

## 2022-10-19 NOTE — PROGRESS NOTES
"[facility]   Physical Therapy Treatment Note     Name: Scarlett Knox  Clinic Number: [id number]    Therapy Diagnosis:   Encounter Diagnoses   Name Primary?    Chronic midline low back pain without sciatica Yes    S/P diskectomy        Physician: Lou May PA-C    Visit Date: 10/19/2022       Physician Orders: PT Eval and Treat   Medical Diagnosis from Referral: Z98.890 (ICD-10-CM) - S/P diskectomy  Evaluation Date: 9/21/2022  Authorization Period Expiration: 12/31/2022  Plan of Care Expiration: 12/21/2022  Progress Note Due: 11/21/2022  Visit # / Visits authorized: 1/1, 4/20  FOTO: 4/5  PTA Visit #: 0/5     Precautions: Standard    Time In: 4:00p  Time Out: 4:50p  Total Billable Time: 50 minutes    Subjective     Pt reports: her back hurt her after last session. It was more of a pain than a soreness. She thinks it's because she over did it on Friday. She's feeling better now. She's started doing open books at home again   She was compliant with home exercise program.  Response to previous treatment: muscle soreness  Functional change: none at this time     Pain: 3/10  Location: bilateral T-L spine     Objective     SAFIA: positive B, improved with core contraction.     Scarlett received therapeutic exercises to develop strength, endurance, ROM, flexibility, and posture for 50 minutes including:  UBE 2'/2' fwd/bkwd  Upper back stretch 15" 5x   Seated thoracic ext x15   DANII 2x1'  Open books x10 B (NP today)  Rows blue theraband 2x10, 3s  SL clam iso 1x1' BLE   SL hip abd iso 2x30" BLE   Side stepping with RTB at knees 2 laps   Prone press up 10x   Pilates ring 5" 10x   TrA activation 15x (hooklying 90/90 breathing)     PA glides of T-spine          Home Exercises Provided and Patient Education Provided     Education provided:   - HEP    Written Home Exercises Provided: yes.  Exercises were reviewed and Scarlett was able to demonstrate them prior to the end of the session.  " Scarlett demonstrated good  understanding of the education provided.     See EMR under Patient Instructions for exercises provided  10/12/2022 .    Assessment     Pt tolerated therapy session well with no adverse effects. She continues to show improvements in tolerance to extension of lumbar spine. Performed decreased volume for side stepping at this date to avoid increasing pain per pt's request. Will monitor response and progress as tolerated.       Scarlett Is progressing well towards her goals.   Pt prognosis is Good.     Pt will continue to benefit from skilled outpatient physical therapy to address the deficits listed in the problem list box on initial evaluation, provide pt/family education and to maximize pt's level of independence in the home and community environment.     Pt's spiritual, cultural and educational needs considered and pt agreeable to plan of care and goals.     Anticipated barriers to physical therapy: co-morbidities, pain     Goals:   Short Term Goals (4 Weeks):  1. Pt will be compliant with HEP to supplement PT in decreasing pain with functional mobility.  2. Pt will perform pallof press with good control to demonstrate improved core strength.  3. Pt will improve lumbar rotational ROM by 10%  to improve functional mobility.  4. Pt will improve impaired LE MMTs by 1/2 score to improve strength for functional tasks.  Long Term Goals (8 Weeks):   1. Pt will improve FOTO score to </= 43% limited to decrease perceived limitation with maintaining/changing body position.   2. Pt will perform floor to waist lift with good control to demonstrate improved core strength.  3. Pt will improve impaired LE MMTs by 1 score to improve strength for functional tasks.  4. Pt will report no pain during lumbar ROM to promote functional mobility.   5. Pt will return to yoga without increasing symptoms to meet pt's specific goal.     Plan   Plan of care Certification: 9/21/2022 to 12/21/2022.     Continue with  current POC.     Francisco Javier Alcala, PT

## 2022-10-20 NOTE — PROGRESS NOTES
"[facility]   Physical Therapy Treatment Note     Name: Scarlett Knox  Clinic Number: [id number]    Therapy Diagnosis:   Encounter Diagnoses   Name Primary?    Chronic midline low back pain without sciatica Yes    S/P diskectomy          Physician: Lou May PA-C    Visit Date: 10/21/2022       Physician Orders: PT Eval and Treat   Medical Diagnosis from Referral: Z98.890 (ICD-10-CM) - S/P diskectomy  Evaluation Date: 9/21/2022  Authorization Period Expiration: 12/31/2022  Plan of Care Expiration: 12/21/2022  Progress Note Due: 11/21/2022  Visit # / Visits authorized: 1/1, 5/20  FOTO: 5/5  PTA Visit #: 1/5     Precautions: Standard    Time In: 7:55 am  Time Out: 8:43 am  Total Billable Time: 48  minutes    Subjective     Pt reports: that she isn't really having any pain in her back today, however notes that her legs have been sore and "crampy".   She was compliant with home exercise program.  Response to previous treatment: muscle soreness  Functional change: none at this time     Pain: 0/10  Location: bilateral T-L spine     Objective     SAFIA: positive B, improved with core contraction.     Scarlett received therapeutic exercises to develop strength, endurance, ROM, flexibility, and posture for 48 minutes including:  UBE 2'/2' fwd/bkwd  Prone quad stretch 2x30" BLE   Calf stretch 2x30"  Supine HSS 1x30"   Upper back stretch 15" 5x   Seated thoracic ext x15   DANII 2x1'  SL clam iso 2x1' BLE   SL hip abd iso 2x1min" BLE   TrA activation 15x (hooklying 90/90 breathing)   Open books x10 B       Not performed:  Rows blue theraband 2x10, 3s  Prone press up 10x   Pilates ring 5" 10x   Side stepping with RTB at knees 2 laps   PA glides of T-spine          Home Exercises Provided and Patient Education Provided     Education provided:   - HEP    Written Home Exercises Provided: yes.  Exercises were reviewed and Scarlett was able to demonstrate them prior to the end of the session.  " Scarlett demonstrated good  understanding of the education provided.     See EMR under Patient Instructions for exercises provided  10/12/2022 .    Assessment     Pt with subjective reports of tightness throughout LE, therefore incorporated stretching prior to performing therex. Will also issue stretches as HEP. Pt noted LE fatigue this session, therefore declined side step with band. Pt required moderate cues during TrA brace for proper form and to avoid compensatory strategies.       Scarlett Is progressing well towards her goals.   Pt prognosis is Good.     Pt will continue to benefit from skilled outpatient physical therapy to address the deficits listed in the problem list box on initial evaluation, provide pt/family education and to maximize pt's level of independence in the home and community environment.     Pt's spiritual, cultural and educational needs considered and pt agreeable to plan of care and goals.     Anticipated barriers to physical therapy: co-morbidities, pain     Goals:   Short Term Goals (4 Weeks):  1. Pt will be compliant with HEP to supplement PT in decreasing pain with functional mobility.  2. Pt will perform pallof press with good control to demonstrate improved core strength.  3. Pt will improve lumbar rotational ROM by 10%  to improve functional mobility.  4. Pt will improve impaired LE MMTs by 1/2 score to improve strength for functional tasks.  Long Term Goals (8 Weeks):   1. Pt will improve FOTO score to </= 43% limited to decrease perceived limitation with maintaining/changing body position.   2. Pt will perform floor to waist lift with good control to demonstrate improved core strength.  3. Pt will improve impaired LE MMTs by 1 score to improve strength for functional tasks.  4. Pt will report no pain during lumbar ROM to promote functional mobility.   5. Pt will return to yoga without increasing symptoms to meet pt's specific goal.     Plan   Plan of care Certification: 9/21/2022 to  12/21/2022.     Continue with current POC.     Kirsten Blanc PTA

## 2022-10-21 ENCOUNTER — CLINICAL SUPPORT (OUTPATIENT)
Dept: REHABILITATION | Facility: HOSPITAL | Age: 32
End: 2022-10-21
Payer: COMMERCIAL

## 2022-10-21 DIAGNOSIS — M54.50 CHRONIC MIDLINE LOW BACK PAIN WITHOUT SCIATICA: Primary | ICD-10-CM

## 2022-10-21 DIAGNOSIS — G89.29 CHRONIC MIDLINE LOW BACK PAIN WITHOUT SCIATICA: Primary | ICD-10-CM

## 2022-10-21 DIAGNOSIS — Z98.890 S/P DISKECTOMY: ICD-10-CM

## 2022-10-21 PROCEDURE — 97110 THERAPEUTIC EXERCISES: CPT | Mod: CQ

## 2022-11-02 ENCOUNTER — CLINICAL SUPPORT (OUTPATIENT)
Dept: REHABILITATION | Facility: HOSPITAL | Age: 32
End: 2022-11-02
Payer: COMMERCIAL

## 2022-11-02 DIAGNOSIS — G89.29 CHRONIC MIDLINE LOW BACK PAIN WITHOUT SCIATICA: Primary | ICD-10-CM

## 2022-11-02 DIAGNOSIS — Z98.890 S/P DISKECTOMY: ICD-10-CM

## 2022-11-02 DIAGNOSIS — M54.50 CHRONIC MIDLINE LOW BACK PAIN WITHOUT SCIATICA: Primary | ICD-10-CM

## 2022-11-02 PROCEDURE — 97110 THERAPEUTIC EXERCISES: CPT | Mod: CQ

## 2022-11-02 NOTE — PROGRESS NOTES
"[facility]   Physical Therapy Treatment Note     Name: Scarlett Knox  Clinic Number: [id number]    Therapy Diagnosis:   Encounter Diagnoses   Name Primary?    Chronic midline low back pain without sciatica Yes    S/P diskectomy            Physician: Lou May PA-C    Visit Date: 11/2/2022       Physician Orders: PT Eval and Treat   Medical Diagnosis from Referral: Z98.890 (ICD-10-CM) - S/P diskectomy  Evaluation Date: 9/21/2022  Authorization Period Expiration: 12/31/2022  Plan of Care Expiration: 12/21/2022  Progress Note Due: 11/21/2022  Visit # / Visits authorized: 1/1, 6/20  FOTO: 5/5  PTA Visit #: 2/5     Precautions: Standard    Time In: 4:00 pm  Time Out: 4:50 pm  Total Billable Time: 50 minutes    Subjective     Pt reports: that she is feeling good today however was unable to attend therapy last week due to her roommate having COVID.   She was compliant with home exercise program.  Response to previous treatment: muscle soreness  Functional change: none at this time     Pain: 0/10  Location: bilateral T-L spine     Objective     SAFIA: positive B, improved with core contraction.     Scarlett received therapeutic exercises to develop strength, endurance, ROM, flexibility, and posture for 50 minutes including:  UBE 3'/3' fwd/bkwd  Prone quad stretch 2x30" BLE   Calf stretch 2x30"  Supine HSS 1x30"   Upper back stretch 15" 5x   Seated thoracic ext x15   DANII 2x1'  SL clam iso 2x1' BLE   SL hip abd iso 2x1min" BLE   Serratus punches 3lb 2x10   TrA activation 15x (hooklying 90/90 breathing)   Open books x10 B       PA glides of T-spine      Not performed:  Rows blue theraband 2x10, 3s  Prone press up 10x   Pilates ring 5" 10x   Side stepping with RTB at knees 2 laps             Home Exercises Provided and Patient Education Provided     Education provided:   - HEP    Written Home Exercises Provided: yes.  Exercises were reviewed and Scarlett was able to demonstrate them prior " to the end of the session.  Scarlett demonstrated good  understanding of the education provided.     See EMR under Patient Instructions for exercises provided  10/12/2022 .    Assessment     Pt with tightness in C/T spine with thoracic stretches, therefore performed T-spine mobs. Pt noted immediate relief and increased mobility. Pt given V/Cs during prone press ups to decrease shoulder compensations. Serratus punches also incorporated due to this. Will continue to progress pt per her tolerance and POC during upcoming sessions.       Scarlett Is progressing well towards her goals.   Pt prognosis is Good.     Pt will continue to benefit from skilled outpatient physical therapy to address the deficits listed in the problem list box on initial evaluation, provide pt/family education and to maximize pt's level of independence in the home and community environment.     Pt's spiritual, cultural and educational needs considered and pt agreeable to plan of care and goals.     Anticipated barriers to physical therapy: co-morbidities, pain     Goals:   Short Term Goals (4 Weeks):  1. Pt will be compliant with HEP to supplement PT in decreasing pain with functional mobility.  2. Pt will perform pallof press with good control to demonstrate improved core strength.  3. Pt will improve lumbar rotational ROM by 10%  to improve functional mobility.  4. Pt will improve impaired LE MMTs by 1/2 score to improve strength for functional tasks.  Long Term Goals (8 Weeks):   1. Pt will improve FOTO score to </= 43% limited to decrease perceived limitation with maintaining/changing body position.   2. Pt will perform floor to waist lift with good control to demonstrate improved core strength.  3. Pt will improve impaired LE MMTs by 1 score to improve strength for functional tasks.  4. Pt will report no pain during lumbar ROM to promote functional mobility.   5. Pt will return to yoga without increasing symptoms to meet pt's specific goal.      Plan   Plan of care Certification: 9/21/2022 to 12/21/2022.     Continue with current POC.     Kirsten Blanc PTA

## 2022-11-03 ENCOUNTER — DOCUMENTATION ONLY (OUTPATIENT)
Dept: REHABILITATION | Facility: HOSPITAL | Age: 32
End: 2022-11-03
Payer: COMMERCIAL

## 2022-11-03 NOTE — PROGRESS NOTES
PT/PTA met face to face to discuss pt's treatment plan and progress towards established goals. Pt will be seen by a physical therapist minimally every 6th visit or every 30 days.    Kirsten Blanc PTA    Face to Face PTA Conference performed with Kirsten Blanc PTA regarding patient's current status, overall progress, and plan of care.    Francisco Javier Alcala, PT

## 2022-11-09 ENCOUNTER — CLINICAL SUPPORT (OUTPATIENT)
Dept: REHABILITATION | Facility: HOSPITAL | Age: 32
End: 2022-11-09
Payer: COMMERCIAL

## 2022-11-09 DIAGNOSIS — G89.29 CHRONIC MIDLINE LOW BACK PAIN WITHOUT SCIATICA: Primary | ICD-10-CM

## 2022-11-09 DIAGNOSIS — M54.50 CHRONIC MIDLINE LOW BACK PAIN WITHOUT SCIATICA: Primary | ICD-10-CM

## 2022-11-09 DIAGNOSIS — Z98.890 S/P DISKECTOMY: ICD-10-CM

## 2022-11-09 PROCEDURE — 97110 THERAPEUTIC EXERCISES: CPT | Mod: CQ

## 2022-11-09 NOTE — PROGRESS NOTES
"[facility]   Physical Therapy Treatment Note     Name: Scarlett Knox  Clinic Number: [id number]    Therapy Diagnosis:   Encounter Diagnoses   Name Primary?    Chronic midline low back pain without sciatica Yes    S/P diskectomy              Physician: Lou May PA-C    Visit Date: 11/9/2022       Physician Orders: PT Eval and Treat   Medical Diagnosis from Referral: Z98.890 (ICD-10-CM) - S/P diskectomy  Evaluation Date: 9/21/2022  Authorization Period Expiration: 12/31/2022  Plan of Care Expiration: 12/21/2022  Progress Note Due: 11/21/2022  Visit # / Visits authorized: 1/1, 7/20  FOTO: 5/5  Date of last FOTO: 11/9/2022  PTA Visit #: 3/5     Precautions: Standard    Time In: 5:32 pm  Time Out: 6:19 pm  Total Billable Time: 47 minutes    Subjective     Pt reports: that she is feeling very tired and sore today after 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   She was compliant with home exercise program.  Response to previous treatment: muscle soreness  Functional change: none at this time     Pain: 0/10  Location: bilateral T-L spine     Objective     SAFIA: positive B, improved with core contraction.     Scarlett received therapeutic exercises to develop strength, endurance, ROM, flexibility, and posture for 47 minutes including:  UBE 3'/3' fwd/bkwd  Upper back stretch 15" 5x   Static lunges 5x ea   Inclined mountain climber c/ RTB 10x ea   Shoulder ext c/ TrA brace  + march 10x BLE   PPT c/ mini march 10x ea   Serratus punches 3lb 2x10   DANII 2x1'      Not performed:   Seated thoracic ext x15   SL clam iso 2x1' BLE   SL hip abd iso 2x1min" BLE   TrA activation 15x (hooklying 90/90 breathing)   Open books x10 B   Prone quad stretch 2x30" BLE   Calf stretch 2x30"  Supine HSS 1x30"   PA glides of T-spine  Side stepping with RTB at knees 2 laps             Home Exercises Provided and Patient Education Provided "     Education provided:   - HEP    Written Home Exercises Provided: yes.  Exercises were reviewed and Scarlett was able to demonstrate them prior to the end of the session.  Scarlett demonstrated good  understanding of the education provided.     See EMR under Patient Instructions for exercises provided  10/12/2022 .    Assessment     Pt exhibited tolerance to most therex performed this session, with the exception of serratus punches. Pt noted anterior shoulder pain with serratus punches, therefore declined after about 5 reps. Began several exercises to target strength and mobility for yoga poses. Will continue to progress pt per her tolerance during upcoming sessions.       Scarlett Is progressing well towards her goals.   Pt prognosis is Good.     Pt will continue to benefit from skilled outpatient physical therapy to address the deficits listed in the problem list box on initial evaluation, provide pt/family education and to maximize pt's level of independence in the home and community environment.     Pt's spiritual, cultural and educational needs considered and pt agreeable to plan of care and goals.     Anticipated barriers to physical therapy: co-morbidities, pain     Goals:   Short Term Goals (4 Weeks):  1. Pt will be compliant with HEP to supplement PT in decreasing pain with functional mobility.  2. Pt will perform pallof press with good control to demonstrate improved core strength.  3. Pt will improve lumbar rotational ROM by 10%  to improve functional mobility.  4. Pt will improve impaired LE MMTs by 1/2 score to improve strength for functional tasks.  Long Term Goals (8 Weeks):   1. Pt will improve FOTO score to </= 43% limited to decrease perceived limitation with maintaining/changing body position.   2. Pt will perform floor to waist lift with good control to demonstrate improved core strength.  3. Pt will improve impaired LE MMTs by 1 score to improve strength for functional tasks.  4. Pt will report  no pain during lumbar ROM to promote functional mobility.   5. Pt will return to yoga without increasing symptoms to meet pt's specific goal.     Plan   Plan of care Certification: 9/21/2022 to 12/21/2022.     Continue with current POC.     Kirsten Blanc, PTA

## 2022-11-11 ENCOUNTER — CLINICAL SUPPORT (OUTPATIENT)
Dept: REHABILITATION | Facility: HOSPITAL | Age: 32
End: 2022-11-11
Payer: COMMERCIAL

## 2022-11-11 DIAGNOSIS — G89.29 CHRONIC MIDLINE LOW BACK PAIN WITHOUT SCIATICA: Primary | ICD-10-CM

## 2022-11-11 DIAGNOSIS — M54.50 CHRONIC MIDLINE LOW BACK PAIN WITHOUT SCIATICA: Primary | ICD-10-CM

## 2022-11-11 DIAGNOSIS — Z98.890 S/P DISKECTOMY: ICD-10-CM

## 2022-11-11 PROCEDURE — 97110 THERAPEUTIC EXERCISES: CPT

## 2022-11-11 NOTE — PROGRESS NOTES
"[facility]   Physical Therapy Progress and Treatment Note     Name: Scarlett Knox  Clinic Number: [id number]    Therapy Diagnosis:   Encounter Diagnoses   Name Primary?    Chronic midline low back pain without sciatica Yes    S/P diskectomy        Physician: Lou May PA-C    Visit Date: 2022       Physician Orders: PT Eval and Treat   Medical Diagnosis from Referral: Z98.890 (ICD-10-CM) - S/P diskectomy  Evaluation Date: 2022  Authorization Period Expiration: 2022  Plan of Care Expiration: 2022  Progress Note Due: 2022  Visit # / Visits authorized: ,   FOTO:   Date of last FOTO: 2022  PTA Visit #:      Precautions: Standard    Time In: 8:33 am  Time Out: 9:15 am  Total Billable Time: 42 minutes    Subjective     Pt reports: She had a wedding over the weekend where she "collapsed with pain even though she had taken all of her prescribed medications." She continued to hurt until Monday. She feels like she is 75% better since starting PT in regards to her ability to do yoga.   She was compliant with home exercise program.  Response to previous treatment: muscle soreness  Functional change: none at this time     Pain: 0/10  Location: bilateral T-L spine     Objective     FOTO: 64%    Lumbar AROM: Pain/Dysfunction with Movement: !=pain   Flexion: WNL     Extension: 10 degrees     Right side bendin degrees     Left side bendin degrees     Right rotation: 65% limited     Left rotation: 50% limited          Right Left Comment: !=pain   Hip Flexion: 4/5 4+/5     Hip ABD: 4-/5 4/5     Hip ADD: 4/5 4/5     Hip Extension: 4/5 4/5     Hip IR: 4-/5 4/5     Hip ER: 4/5 4/5           Right Left Comment ! = pain   Knee extension: 4+/5 4+/5     Knee flexion: 4+/5 4+/5     Ankle DF: 4+/5 4+/5     Ankle PF: 4+/5 4+/5        Kieran test: positive B for quad and hip flexors    Scarlett received therapeutic exercises to develop strength, " "endurance, ROM, flexibility, and posture for 43 minutes including:  UBE 3'/3' fwd/bkwd  Serratus punches 3lb 2x10   DANII 2x1'  Kieran stretch 3x30s B  Open books x10 B   Hip abd boxes x10 B      Not performed:   Seated thoracic ext x15   SL clam iso 2x1' BLE   SL hip abd iso 2x1min" BLE   TrA activation 15x (hooklying 90/90 breathing)   Prone quad stretch 2x30" BLE   Calf stretch 2x30"  Supine HSS 1x30"   PA glides of T-spine  Side stepping with RTB at knees 2 laps   Static lunges 5x ea   Inclined mountain climber c/ RTB 10x ea   Shoulder ext c/ TrA brace  + march 10x BLE   PPT c/ mini march 10x ea   Upper back stretch 15" 5x           Home Exercises Provided and Patient Education Provided     Education provided:   - HEP    Written Home Exercises Provided: yes.  Exercises were reviewed and Scarlett was able to demonstrate them prior to the end of the session.  Scarlett demonstrated good  understanding of the education provided.     See EMR under Patient Instructions for exercises provided  10/12/2022 .    Assessment     Pt presents with significant improvements in subjective reports and objective measures as compared to initial evaluation as indicated above. Session modified to have more emphasis on her hip flexor tissue extensibility and hip abduction strength. Plan to keep this emphasis going forward. Fairly good tolerance, though she is appropriately fatigued.       Scarlett Is progressing well towards her goals.   Pt prognosis is Good.     Pt will continue to benefit from skilled outpatient physical therapy to address the deficits listed in the problem list box on initial evaluation, provide pt/family education and to maximize pt's level of independence in the home and community environment.     Pt's spiritual, cultural and educational needs considered and pt agreeable to plan of care and goals.     Anticipated barriers to physical therapy: co-morbidities, pain     Goals:   Short Term Goals (4 Weeks):  1. Pt will be " compliant with HEP to supplement PT in decreasing pain with functional mobility.  2. Pt will perform pallof press with good control to demonstrate improved core strength.  3. Pt will improve lumbar rotational ROM by 10%  to improve functional mobility.  4. Pt will improve impaired LE MMTs by 1/2 score to improve strength for functional tasks.  Long Term Goals (8 Weeks):   1. Pt will improve FOTO score to </= 43% limited to decrease perceived limitation with maintaining/changing body position.   2. Pt will perform floor to waist lift with good control to demonstrate improved core strength.  3. Pt will improve impaired LE MMTs by 1 score to improve strength for functional tasks.  4. Pt will report no pain during lumbar ROM to promote functional mobility.   5. Pt will return to yoga without increasing symptoms to meet pt's specific goal.     Plan   Plan of care Certification: 9/21/2022 to 12/21/2022.     Continue with current POC.     Francisco Javier Alcala, PT

## 2022-11-16 ENCOUNTER — CLINICAL SUPPORT (OUTPATIENT)
Dept: REHABILITATION | Facility: HOSPITAL | Age: 32
End: 2022-11-16
Payer: COMMERCIAL

## 2022-11-16 DIAGNOSIS — G89.29 CHRONIC MIDLINE LOW BACK PAIN WITHOUT SCIATICA: Primary | ICD-10-CM

## 2022-11-16 DIAGNOSIS — M54.50 CHRONIC MIDLINE LOW BACK PAIN WITHOUT SCIATICA: Primary | ICD-10-CM

## 2022-11-16 DIAGNOSIS — Z98.890 S/P DISKECTOMY: ICD-10-CM

## 2022-11-16 PROCEDURE — 97110 THERAPEUTIC EXERCISES: CPT | Mod: CQ

## 2022-11-16 NOTE — PROGRESS NOTES
"[facility]   Physical Therapy Progress and Treatment Note     Name: Scarlett Knox  Clinic Number: [id number]    Therapy Diagnosis:   Encounter Diagnoses   Name Primary?    Chronic midline low back pain without sciatica Yes    S/P diskectomy          Physician: Lou May PA-C    Visit Date: 11/16/2022       Physician Orders: PT Eval and Treat   Medical Diagnosis from Referral: Z98.890 (ICD-10-CM) - S/P diskectomy  Evaluation Date: 9/21/2022  Authorization Period Expiration: 12/31/2022  Plan of Care Expiration: 12/21/2022  Progress Note Due: 11/21/2022  Visit # / Visits authorized: 1/1, 8/20  FOTO: 2/5  Date of last FOTO: 11/9/2022  PTA Visit #: 1/5     Precautions: Standard    Time In: 3:25 pm  Time Out: 4:05 pm  Total Billable Time: 40 minutes    Subjective     Pt reports: that she has considered pelvic floor therapy as she has had trouble with incontinence when running.     She was compliant with home exercise program.  Response to previous treatment: muscle soreness  Functional change: none at this time     Pain: 0/10  Location: bilateral T-L spine     Objective       Scarlett received therapeutic exercises to develop strength, endurance, ROM, flexibility, and posture for 40 minutes including:  Nu-step 5 min (for reciprocal movement) supervised   Kieran stretch 3x30s B  Prone iliospoas stretch c/ strap 2x45"   Hip abd boxes x10 B  Seated hip IR/ER GTB BLE 20x ea           UBE 3'/3' fwd/bkwd  Serratus punches 3lb 2x10   DANII 2x1'  Open books x10 B         Not performed:   Seated thoracic ext x15   SL clam iso 2x1' BLE   SL hip abd iso 2x1min" BLE   TrA activation 15x (hooklying 90/90 breathing)   Prone quad stretch 2x30" BLE   Calf stretch 2x30"  Supine HSS 1x30"   PA glides of T-spine  Side stepping with RTB at knees 2 laps   Static lunges 5x ea   Inclined mountain climber c/ RTB 10x ea   Shoulder ext c/ TrA brace  + march 10x BLE   PPT c/ mini march 10x ea   Upper back " "stretch 15" 5x           Home Exercises Provided and Patient Education Provided     Education provided:   - HEP    Written Home Exercises Provided: yes.  Exercises were reviewed and Scarlett was able to demonstrate them prior to the end of the session.  Scarlett demonstrated good  understanding of the education provided.     See EMR under Patient Instructions for exercises provided  10/12/2022 .    Assessment     Pt exhibited tolerance to addition of several therex this session. These exercises focused on improved LE strength and tissue extensibility. Pt exhibited the need for minimal cues to reduce posterior trunk rolling during sidelying hip abductions. Pt exhibited fatigue in LLE compared to RLE during seated hip IR/ER and decreased excursion with active movement. Will continue to monitor pt's response to therapy sessions and progress per her tolerance and POC.       Scarlett Is progressing well towards her goals.   Pt prognosis is Good.     Pt will continue to benefit from skilled outpatient physical therapy to address the deficits listed in the problem list box on initial evaluation, provide pt/family education and to maximize pt's level of independence in the home and community environment.     Pt's spiritual, cultural and educational needs considered and pt agreeable to plan of care and goals.     Anticipated barriers to physical therapy: co-morbidities, pain     Goals:   Short Term Goals (4 Weeks):  1. Pt will be compliant with HEP to supplement PT in decreasing pain with functional mobility.  2. Pt will perform pallof press with good control to demonstrate improved core strength.  3. Pt will improve lumbar rotational ROM by 10%  to improve functional mobility.  4. Pt will improve impaired LE MMTs by 1/2 score to improve strength for functional tasks.  Long Term Goals (8 Weeks):   1. Pt will improve FOTO score to </= 43% limited to decrease perceived limitation with maintaining/changing body position.   2. Pt " will perform floor to waist lift with good control to demonstrate improved core strength.  3. Pt will improve impaired LE MMTs by 1 score to improve strength for functional tasks.  4. Pt will report no pain during lumbar ROM to promote functional mobility.   5. Pt will return to yoga without increasing symptoms to meet pt's specific goal.     Plan   Plan of care Certification: 9/21/2022 to 12/21/2022.     Continue with current POC.     Kirsten Blanc, PTA

## 2022-11-17 NOTE — PROGRESS NOTES
"[facility]   Physical Therapy Progress and Treatment Note     Name: Scarlett Knox  Clinic Number: [id number]    Therapy Diagnosis:   Encounter Diagnoses   Name Primary?    Chronic midline low back pain without sciatica Yes    S/P diskectomy            Physician: Lou May PA-C    Visit Date: 11/18/2022       Physician Orders: PT Eval and Treat   Medical Diagnosis from Referral: Z98.890 (ICD-10-CM) - S/P diskectomy  Evaluation Date: 9/21/2022  Authorization Period Expiration: 12/31/2022  Plan of Care Expiration: 12/21/2022  Progress Note Due: 11/21/2022  Visit # / Visits authorized: 1/1, 9/20  FOTO: 3/5  Date of last FOTO: 11/9/2022  PTA Visit #: 2/5     Precautions: Standard    Time In: 4:02 pm  Time Out: 4:45 pm  Total Billable Time:43  minutes    Subjective     Pt reports: that her legs are feeling a little more fatigued, however has not completed her HEP as much due to personal reasons.      She was compliant with home exercise program.  Response to previous treatment: muscle soreness  Functional change: none at this time     Pain: 0/10  Location: bilateral T-L spine     Objective       Scarlett received therapeutic exercises to develop strength, endurance, ROM, flexibility, and posture for 43 minutes including:  Nu-step 5 min (for reciprocal movement) supervised   Kieran stretch 3x30s B  Prone iliospoas stretch c/ strap 2x45"   Hip abd boxes x10 B  Seated hip IR/ER GTB BLE 20x ea   Inclined mountain climber c/ RTB 10x ea     Next session:  Pallof press       Not performed:   UBE 3'/3' fwd/bkwd  Serratus punches 3lb 2x10   DANII 2x1'  Open books x10 B   Seated thoracic ext x15   SL clam iso 2x1' BLE   SL hip abd iso 2x1min" BLE   TrA activation 15x (hooklying 90/90 breathing)   Prone quad stretch 2x30" BLE   Calf stretch 2x30"  Supine HSS 1x30"   PA glides of T-spine  Side stepping with RTB at knees 2 laps   Static lunges 5x ea   Shoulder ext c/ TrA brace  + march 10x BLE " "  PPT c/ mini march 10x ea   Upper back stretch 15" 5x           Home Exercises Provided and Patient Education Provided     Education provided:   - HEP    Written Home Exercises Provided: yes.  Exercises were reviewed and Scarlett was able to demonstrate them prior to the end of the session.  Scarlett demonstrated good  understanding of the education provided.     See EMR under Patient Instructions for exercises provided  10/12/2022 .    Assessment     Pt exhibited tolerance to all therex performed this session, however unable to complete all reps of sidelying hip abd boxes on LLE due to fatigue. Pt given minimal verbal cues during inclined mountain climbers to ensure proper proper angle. Plan to begin additional core strengthening next session per pt's tolerance and POC.       Scarlett Is progressing well towards her goals.   Pt prognosis is Good.     Pt will continue to benefit from skilled outpatient physical therapy to address the deficits listed in the problem list box on initial evaluation, provide pt/family education and to maximize pt's level of independence in the home and community environment.     Pt's spiritual, cultural and educational needs considered and pt agreeable to plan of care and goals.     Anticipated barriers to physical therapy: co-morbidities, pain     Goals:   Short Term Goals (4 Weeks):  1. Pt will be compliant with HEP to supplement PT in decreasing pain with functional mobility.  2. Pt will perform pallof press with good control to demonstrate improved core strength.  3. Pt will improve lumbar rotational ROM by 10%  to improve functional mobility.  4. Pt will improve impaired LE MMTs by 1/2 score to improve strength for functional tasks.  Long Term Goals (8 Weeks):   1. Pt will improve FOTO score to </= 43% limited to decrease perceived limitation with maintaining/changing body position.   2. Pt will perform floor to waist lift with good control to demonstrate improved core strength.  3. " Pt will improve impaired LE MMTs by 1 score to improve strength for functional tasks.  4. Pt will report no pain during lumbar ROM to promote functional mobility.   5. Pt will return to yoga without increasing symptoms to meet pt's specific goal.     Plan   Plan of care Certification: 9/21/2022 to 12/21/2022.     Continue with current POC.     Kirsten Blanc, PTA

## 2022-11-18 ENCOUNTER — CLINICAL SUPPORT (OUTPATIENT)
Dept: REHABILITATION | Facility: HOSPITAL | Age: 32
End: 2022-11-18
Payer: COMMERCIAL

## 2022-11-18 DIAGNOSIS — G89.29 CHRONIC MIDLINE LOW BACK PAIN WITHOUT SCIATICA: Primary | ICD-10-CM

## 2022-11-18 DIAGNOSIS — M54.50 CHRONIC MIDLINE LOW BACK PAIN WITHOUT SCIATICA: Primary | ICD-10-CM

## 2022-11-18 DIAGNOSIS — Z98.890 S/P DISKECTOMY: ICD-10-CM

## 2022-11-18 PROCEDURE — 97110 THERAPEUTIC EXERCISES: CPT | Mod: CQ

## 2022-11-22 NOTE — PROGRESS NOTES
"[facility]   Physical Therapy Progress and Treatment Note     Name: Scarlett Knox  Clinic Number: [id number]    Therapy Diagnosis:   Encounter Diagnoses   Name Primary?    Chronic midline low back pain without sciatica Yes    S/P diskectomy          Physician: Lou May PA-C    Visit Date: 11/23/2022       Physician Orders: PT Eval and Treat   Medical Diagnosis from Referral: Z98.890 (ICD-10-CM) - S/P diskectomy  Evaluation Date: 9/21/2022  Authorization Period Expiration: 12/31/2022  Plan of Care Expiration: 12/21/2022  Progress Note Due: 11/21/2022  Visit # / Visits authorized: 1/1, 9/20  FOTO: 3/5  Date of last FOTO: 11/9/2022  PTA Visit #: 3/5     Precautions: Standard    Time In: 8:35 am  Time Out: 9:20 am  Total Billable Time: 45 minutes    Subjective     Pt reports: that she has been getting some muscle spasms in her arms. Pt reported that her upper back was becoming more fatigued during inclined exercises compared to lower back.      She was compliant with home exercise program.  Response to previous treatment: muscle soreness  Functional change: none at this time     Pain: 0/10  Location: bilateral T-L spine     Objective       Scarlett received therapeutic exercises to develop strength, endurance, ROM, flexibility, and posture for 45 minutes including:  Nu-step 5 min (for reciprocal movement) supervised   Pallof press DBL GTB 15x   Shld ext c/ TrA brace + alt marches GTB 2x10   Inclined mountain climber c/ RTB 10x ea   Hip abd boxes x10 B  PPT c/ mini march 10x ea   Kieran stretch 3x30s B  Prone iliospoas stretch c/ strap 2x45"     Seated hip IR/ER GTB BLE 20x ea     Next session:  STS c/ medicine ball  Crate lifts      Not performed:   DANII 2x1'  Open books x10 B   Seated thoracic ext x15   SL clam iso 2x1' BLE   SL hip abd iso 2x1min" BLE   TrA activation 15x (hooklying 90/90 breathing)   Prone quad stretch 2x30" BLE   Calf stretch 2x30"  Supine HSS 1x30"   PA " "glides of T-spine  Side stepping with RTB at knees 2 laps   Static lunges 5x ea  Upper back stretch 15" 5x           Home Exercises Provided and Patient Education Provided     Education provided:   - HEP    Written Home Exercises Provided: yes.  Exercises were reviewed and Scarlett was able to demonstrate them prior to the end of the session.  Scarlett demonstrated good  understanding of the education provided.     See EMR under Patient Instructions for exercises provided  10/12/2022 .    Assessment     Incorporated dynamic core strengthening to patient's program this session. Pt exhibited fatigue during shoulder extensions with alternating marches, however used diaphragmatic breathing to continue activity. Pr given verbal cues during inclined mountain climbers in order to maintain plank position. Will continue to progress pt per her tolerance during upcoming sessions with the addition functional strengthening.        Scarlett Is progressing well towards her goals.   Pt prognosis is Good.     Pt will continue to benefit from skilled outpatient physical therapy to address the deficits listed in the problem list box on initial evaluation, provide pt/family education and to maximize pt's level of independence in the home and community environment.     Pt's spiritual, cultural and educational needs considered and pt agreeable to plan of care and goals.     Anticipated barriers to physical therapy: co-morbidities, pain     Goals:   Short Term Goals (4 Weeks):  1. Pt will be compliant with HEP to supplement PT in decreasing pain with functional mobility.  2. Pt will perform pallof press with good control to demonstrate improved core strength.  3. Pt will improve lumbar rotational ROM by 10%  to improve functional mobility.  4. Pt will improve impaired LE MMTs by 1/2 score to improve strength for functional tasks.  Long Term Goals (8 Weeks):   1. Pt will improve FOTO score to </= 43% limited to decrease perceived limitation " with maintaining/changing body position.   2. Pt will perform floor to waist lift with good control to demonstrate improved core strength.  3. Pt will improve impaired LE MMTs by 1 score to improve strength for functional tasks.  4. Pt will report no pain during lumbar ROM to promote functional mobility.   5. Pt will return to yoga without increasing symptoms to meet pt's specific goal.     Plan   Plan of care Certification: 9/21/2022 to 12/21/2022.     Continue with current POC.     Kirsten Blanc PTA

## 2022-11-23 ENCOUNTER — CLINICAL SUPPORT (OUTPATIENT)
Dept: REHABILITATION | Facility: HOSPITAL | Age: 32
End: 2022-11-23
Payer: COMMERCIAL

## 2022-11-23 DIAGNOSIS — Z98.890 S/P DISKECTOMY: ICD-10-CM

## 2022-11-23 DIAGNOSIS — M54.50 CHRONIC MIDLINE LOW BACK PAIN WITHOUT SCIATICA: Primary | ICD-10-CM

## 2022-11-23 DIAGNOSIS — G89.29 CHRONIC MIDLINE LOW BACK PAIN WITHOUT SCIATICA: Primary | ICD-10-CM

## 2022-11-23 PROCEDURE — 97110 THERAPEUTIC EXERCISES: CPT | Mod: CQ

## 2022-11-25 ENCOUNTER — CLINICAL SUPPORT (OUTPATIENT)
Dept: REHABILITATION | Facility: HOSPITAL | Age: 32
End: 2022-11-25
Payer: COMMERCIAL

## 2022-11-25 DIAGNOSIS — G89.29 CHRONIC MIDLINE LOW BACK PAIN WITHOUT SCIATICA: Primary | ICD-10-CM

## 2022-11-25 DIAGNOSIS — Z98.890 S/P DISKECTOMY: ICD-10-CM

## 2022-11-25 DIAGNOSIS — M54.50 CHRONIC MIDLINE LOW BACK PAIN WITHOUT SCIATICA: Primary | ICD-10-CM

## 2022-11-25 PROCEDURE — 97110 THERAPEUTIC EXERCISES: CPT | Mod: CQ

## 2022-11-25 NOTE — PROGRESS NOTES
"[facility]   Physical Therapy Progress and Treatment Note     Name: Scarlett Knox  Clinic Number: [id number]    Therapy Diagnosis:   Encounter Diagnoses   Name Primary?    Chronic midline low back pain without sciatica Yes    S/P diskectomy            Physician: Lou May PA-C    Visit Date: 11/25/2022       Physician Orders: PT Eval and Treat   Medical Diagnosis from Referral: Z98.890 (ICD-10-CM) - S/P diskectomy  Evaluation Date: 9/21/2022  Authorization Period Expiration: 12/31/2022  Plan of Care Expiration: 12/21/2022  Progress Note Due: 11/21/2022  Visit # / Visits authorized: 1/1, 9/20  FOTO: 3/5  Date of last FOTO: 11/9/2022  PTA Visit #: 4/5     Precautions: Standard    Time In: 12:58 pm  Time Out: 1:46 pm   Total Billable Time: 48 minutes    Subjective     Pt reports: that she is having a little more pain in her low back today and feels that it is partly due to sitting for a while. Pt states that she is going to try to go to [employer] to get new shoes to help with her back and knee pain.    She was compliant with home exercise program.  Response to previous treatment: muscle soreness  Functional change: none at this time     Pain: 0/10  Location: bilateral T-L spine     Objective       Scarlett received therapeutic exercises to develop strength, endurance, ROM, flexibility, and posture for 48 minutes including:  Nu-step 5 min (for reciprocal movement) supervised   Pallof press DBL GTB 15x   Shld ext c/ TrA brace + alt marches GTB 2x10   Inclined mountain climber c/ RTB 10x ea   Hip abd boxes x15 B  PPT c/ mini march 10x ea Not performed   Kieran stretch 3x30s B  Prone iliospoas stretch c/ strap 2x45"   Seated hip IR/ER GTB BLE 20x ea  Not performed   STS c/ medicine ball pallof press 2x10   Crate lifts from chair 15x           Home Exercises Provided and Patient Education Provided     Education provided:   - HEP    Written Home Exercises Provided: " yes.  Exercises were reviewed and Scarlett was able to demonstrate them prior to the end of the session.  Scarlett demonstrated good  understanding of the education provided.     See EMR under Patient Instructions for exercises provided  10/12/2022 .    Assessment     Despite pt's increased low back pain this session, patient able to complete all planned therex. Pt began to exhibit fatigue during new exercise of crate lifts and shoulder extension marches,  and required cues to maintain correct body mechanics. However, patient was able to complete additional 5 reps for BLE during sidelying hip abd boxes.       Scarlett Is progressing well towards her goals.   Pt prognosis is Good.     Pt will continue to benefit from skilled outpatient physical therapy to address the deficits listed in the problem list box on initial evaluation, provide pt/family education and to maximize pt's level of independence in the home and community environment.     Pt's spiritual, cultural and educational needs considered and pt agreeable to plan of care and goals.     Anticipated barriers to physical therapy: co-morbidities, pain     Goals:   Short Term Goals (4 Weeks):  1. Pt will be compliant with HEP to supplement PT in decreasing pain with functional mobility.  2. Pt will perform pallof press with good control to demonstrate improved core strength.  3. Pt will improve lumbar rotational ROM by 10%  to improve functional mobility.  4. Pt will improve impaired LE MMTs by 1/2 score to improve strength for functional tasks.  Long Term Goals (8 Weeks):   1. Pt will improve FOTO score to </= 43% limited to decrease perceived limitation with maintaining/changing body position.   2. Pt will perform floor to waist lift with good control to demonstrate improved core strength.  3. Pt will improve impaired LE MMTs by 1 score to improve strength for functional tasks.  4. Pt will report no pain during lumbar ROM to promote functional mobility.   5. Pt  will return to yoga without increasing symptoms to meet pt's specific goal.     Plan   Plan of care Certification: 9/21/2022 to 12/21/2022.     Continue with current POC.     Kirsten Blanc PTA

## 2022-11-30 ENCOUNTER — CLINICAL SUPPORT (OUTPATIENT)
Dept: REHABILITATION | Facility: HOSPITAL | Age: 32
End: 2022-11-30
Payer: COMMERCIAL

## 2022-11-30 DIAGNOSIS — Z98.890 S/P DISKECTOMY: ICD-10-CM

## 2022-11-30 DIAGNOSIS — M54.50 CHRONIC MIDLINE LOW BACK PAIN WITHOUT SCIATICA: Primary | ICD-10-CM

## 2022-11-30 DIAGNOSIS — G89.29 CHRONIC MIDLINE LOW BACK PAIN WITHOUT SCIATICA: Primary | ICD-10-CM

## 2022-11-30 PROCEDURE — 97110 THERAPEUTIC EXERCISES: CPT | Mod: CQ

## 2022-11-30 NOTE — PROGRESS NOTES
"[facility]   Physical Therapy Progress and Treatment Note     Name: Scarlett Knox  Clinic Number: [id number]    Therapy Diagnosis:   Encounter Diagnoses   Name Primary?    Chronic midline low back pain without sciatica Yes    S/P diskectomy          Physician: Lou May PA-C    Visit Date: 11/30/2022       Physician Orders: PT Eval and Treat   Medical Diagnosis from Referral: Z98.890 (ICD-10-CM) - S/P diskectomy  Evaluation Date: 9/21/2022  Authorization Period Expiration: 12/31/2022  Plan of Care Expiration: 12/21/2022  Progress Note Due: 11/21/2022  Visit # / Visits authorized: 1/1, 13/20  FOTO: 4/5  Date of last FOTO: 11/9/2022  PTA Visit #: 5/5     Precautions: Standard    Time In: 5:04 pm  Time Out: 6:05 pm   Total Billable Time: 61 minutes    Subjective     Pt reports: Pt reported she has been having cervical musculature spasms due to being on the computer more, aid she is alternating with a standing desk. Been incorporating rhomboid stretches to help with muscle tightness. Also stated since we stopped working the area has been having discomfort  She was compliant with home exercise program.  Response to previous treatment: muscle soreness  Functional change: none at this time     Pain: 4/10  Location: bilateral T-L spine     Objective       Scarlett received therapeutic exercises to develop strength, endurance, ROM, flexibility, and posture for 56 minutes including:  Nu-step 5 min (for reciprocal movement) supervised   Kieran stretch 3x30s B  Inclined mountain climber c/ GTB 12x ea   Pallof press DBL GTB 15x   Shld ext c/ TrA brace + alt marches GTB 2x10   STS c/ medicine ball pallof press 2x10   Crate lifts from chair 15x   Seated hip IR/ER GTB BLE 20x ea        Hip abd boxes x15 B Not performed   PPT c/ mini march 10x ea Not performed   Prone iliospoas stretch c/ strap 2x45" not performed           manual therapy techniques:  were applied to the: Thoracic spine for 5 " minutes, including:  P/A grade l/ll mobs         Home Exercises Provided and Patient Education Provided     Education provided:   - HEP    Written Home Exercises Provided: yes.  Exercises were reviewed and Scarlett was able to demonstrate them prior to the end of the session.  Scarlett demonstrated good  understanding of the education provided.     See EMR under Patient Instructions for exercises provided  10/12/2022 .    Assessment      Pt had cervical musculature pain throughout this session but was able to complete all therex. Performed trial of thoracic mobilizations to improve mobility and decrease pain, during which was able to get two slight cavitations.  Pt exhibited muscle fatigue with shoulder extension with marches. Increased ROM with psoas stretches.     Scarlett Is progressing well towards her goals.   Pt prognosis is Good.     Pt will continue to benefit from skilled outpatient physical therapy to address the deficits listed in the problem list box on initial evaluation, provide pt/family education and to maximize pt's level of independence in the home and community environment.     Pt's spiritual, cultural and educational needs considered and pt agreeable to plan of care and goals.     Anticipated barriers to physical therapy: co-morbidities, pain     Goals:   Short Term Goals (4 Weeks):  1. Pt will be compliant with HEP to supplement PT in decreasing pain with functional mobility.  2. Pt will perform pallof press with good control to demonstrate improved core strength.  3. Pt will improve lumbar rotational ROM by 10%  to improve functional mobility.  4. Pt will improve impaired LE MMTs by 1/2 score to improve strength for functional tasks.  Long Term Goals (8 Weeks):   1. Pt will improve FOTO score to </= 43% limited to decrease perceived limitation with maintaining/changing body position.   2. Pt will perform floor to waist lift with good control to demonstrate improved core strength.  3. Pt will  improve impaired LE MMTs by 1 score to improve strength for functional tasks.  4. Pt will report no pain during lumbar ROM to promote functional mobility.   5. Pt will return to yoga without increasing symptoms to meet pt's specific goal.     Plan   Plan of care Certification: 9/21/2022 to 12/21/2022.     Continue with current POC.     Kirsten Blanc, PTA

## 2022-12-02 ENCOUNTER — CLINICAL SUPPORT (OUTPATIENT)
Dept: REHABILITATION | Facility: HOSPITAL | Age: 32
End: 2022-12-02
Payer: COMMERCIAL

## 2022-12-02 DIAGNOSIS — Z98.890 S/P DISKECTOMY: ICD-10-CM

## 2022-12-02 DIAGNOSIS — R26.2 DIFFICULTY WALKING: ICD-10-CM

## 2022-12-02 DIAGNOSIS — G89.29 CHRONIC MIDLINE LOW BACK PAIN WITHOUT SCIATICA: Primary | ICD-10-CM

## 2022-12-02 DIAGNOSIS — M53.86 DECREASED RANGE OF MOTION OF LUMBAR SPINE: ICD-10-CM

## 2022-12-02 DIAGNOSIS — M54.50 CHRONIC MIDLINE LOW BACK PAIN WITHOUT SCIATICA: Primary | ICD-10-CM

## 2022-12-02 PROCEDURE — 97110 THERAPEUTIC EXERCISES: CPT

## 2022-12-02 NOTE — PROGRESS NOTES
"[facility]   Physical Therapy Discharge and Treatment Note     Name: Scarlett Knox  Clinic Number: [id number]    Therapy Diagnosis:   Encounter Diagnoses   Name Primary?    Chronic midline low back pain without sciatica Yes    S/P diskectomy        Physician: Lou May PA-C    Visit Date: 2022       Physician Orders: PT Eval and Treat   Medical Diagnosis from Referral: Z98.890 (ICD-10-CM) - S/P diskectomy  Evaluation Date: 2022  Authorization Period Expiration: 2022  Plan of Care Expiration: 2022  Visit # / Visits authorized: ,   PTA Visit #: 0     Precautions: Standard    Time In: 9:16 am  Time Out: 10:03am  Total Billable Time: 47 minutes    Subjective     Pt reports: Her hips and back feel stronger and less pain. She doesn't have numbness and tingling in her arms anymore. She feels like she's gotten significantly better since starting PT. She states "this is the first time I've felt normal since my last surgery."   She was compliant with home exercise program.  Response to previous treatment: muscle soreness  Functional change: none at this time     Pain: 4/10  Location: bilateral T-L spine     Objective      Lumbar AROM: Pain/Dysfunction with Movement: !=pain   Flexion: WNL     Extension: 15 degrees     Right side bendin degrees     Left side bendin degrees     Right rotation: 50% limited     Left rotation: 35% limited          Right Left Comment: !=pain   Hip Flexion: 5/5 5/5     Hip ABD: 4+/5 4+/5     Hip ADD: 5/ 5/5     Hip Extension: 5/ 5/5     Hip IR: 4+/5 4+/5     Hip ER: 4+/5 4+/5           Right Left Comment ! = pain   Knee extension: 5/ 5/5     Knee flexion: 5/ 5/5     Ankle DF: 5/5 5/5     Ankle PF: 5/ 5/5        Kieran test:    - R: hip flexor = -25*; knee = 75*   - L: hip flexor = -20*; knee = 75*      Scarlett received therapeutic exercises and objective measures above to develop strength, endurance, ROM, " flexibility, and posture for 47 minutes including:  SL clams iso 1x1'  Kieran stretch 3x30s B  Standing hamstring curls 2x10  Pallof press DBL GTB 15x    Seated hip IR GTB BLE 20x ea    Seated hip ER iso with ball x20  Hip abd boxes x15 B         Home Exercises Provided and Patient Education Provided     Education provided:   - HEP    Written Home Exercises Provided: yes.  Exercises were reviewed and Scarlett was able to demonstrate them prior to the end of the session.  Scarlett demonstrated good  understanding of the education provided.     See EMR under Patient Instructions for exercises provided today.     Assessment     Pt presents with significant improvements in subjective reports and objective measures as compared to initial evaluation as indicated above. Despite FOTO score regression, she reports significant subjective improvements. She has met all goals aside from FOTO goal. She is ready to discharge with HEP at this time. Pt is agreeable to POC.     Pt's spiritual, cultural and educational needs considered and pt agreeable to plan of care and goals.     Anticipated barriers to physical therapy: co-morbidities, pain     Goals:   Short Term Goals (4 Weeks):  1. Pt will be compliant with HEP to supplement PT in decreasing pain with functional mobility.- MET  2. Pt will perform pallof press with good control to demonstrate improved core strength. - MET  3. Pt will improve lumbar rotational ROM by 10%  to improve functional mobility. - MET  4. Pt will improve impaired LE MMTs by 1/2 score to improve strength for functional tasks. - MET  Long Term Goals (8 Weeks):   1. Pt will improve FOTO score to </= 43% limited to decrease perceived limitation with maintaining/changing body position. - NOT MET  2. Pt will perform floor to waist lift with good control to demonstrate improved core strength. - MET  3. Pt will improve impaired LE MMTs by 1 score to improve strength for functional tasks. - MET  4. Pt will report no  pain during lumbar ROM to promote functional mobility.  - MET  5. Pt will return to yoga without increasing symptoms to meet pt's specific goal. - MET    Plan   Pt to be discharged from outpatient physical therapy services with HEP at this time.       Frnacisco Javier Alcala, PT

## 2022-12-12 ENCOUNTER — NURSE TRIAGE (OUTPATIENT)
Dept: ADMINISTRATIVE | Facility: CLINIC | Age: 32
End: 2022-12-12
Payer: COMMERCIAL

## 2022-12-12 NOTE — TELEPHONE ENCOUNTER
Patient c/o constipation. Last BM was 7 days ago. Denies vomiting. Has used zoe lax, dulcolax, and an enema. States that she has increased her fiber and is hydrated. Denies any sudden changes to her diet or medical hx. Advised per protocol to be seen in the office today. Patient states that she is unable to be seen today and will plan to be seen tomorrow. Advised the patient to call back with any further questions or if symptoms worsen.          Reason for Disposition   Rectal pain or fullness from fecal impaction (rectum full of stool) and NOT better after SITZ bath, suppository or enema    Additional Information   Negative: Vomiting bile (green color)   Negative: Patient sounds very sick or weak to the triager   Negative: Constant abdominal pain lasting > 2 hours   Negative: Vomiting and abdomen looks much more swollen than usual    Protocols used: Constipation-A-OH

## 2022-12-13 ENCOUNTER — HOSPITAL ENCOUNTER (EMERGENCY)
Facility: HOSPITAL | Age: 32
Discharge: HOME | End: 2022-12-13
Attending: EMERGENCY MEDICINE
Payer: COMMERCIAL

## 2022-12-13 VITALS
BODY MASS INDEX: 31.53 KG/M2 | DIASTOLIC BLOOD PRESSURE: 55 MMHG | RESPIRATION RATE: 18 BRPM | OXYGEN SATURATION: 99 % | HEART RATE: 94 BPM | HEIGHT: 61 IN | TEMPERATURE: 99 F | WEIGHT: 167 LBS | SYSTOLIC BLOOD PRESSURE: 109 MMHG

## 2022-12-13 DIAGNOSIS — M54.50 CHRONIC BILATERAL LOW BACK PAIN WITHOUT SCIATICA: ICD-10-CM

## 2022-12-13 DIAGNOSIS — G89.29 CHRONIC BILATERAL LOW BACK PAIN WITHOUT SCIATICA: ICD-10-CM

## 2022-12-13 DIAGNOSIS — K59.00 CONSTIPATION, UNSPECIFIED CONSTIPATION TYPE: Primary | ICD-10-CM

## 2022-12-13 PROCEDURE — 99284 EMERGENCY DEPT VISIT MOD MDM: CPT | Mod: ,,, | Performed by: EMERGENCY MEDICINE

## 2022-12-13 PROCEDURE — 99282 EMERGENCY DEPT VISIT SF MDM: CPT

## 2022-12-13 PROCEDURE — 99284 PR EMERGENCY DEPT VISIT,LEVEL IV: ICD-10-PCS | Mod: ,,, | Performed by: EMERGENCY MEDICINE

## 2022-12-13 RX ORDER — DOCUSATE SODIUM 100 MG/1
100 CAPSULE, LIQUID FILLED ORAL 3 TIMES DAILY PRN
Qty: 60 CAPSULE | Refills: 0 | Status: SHIPPED | OUTPATIENT
Start: 2022-12-13 | End: 2023-04-12

## 2022-12-13 RX ORDER — GABAPENTIN 300 MG/1
300 CAPSULE ORAL 3 TIMES DAILY
Status: ON HOLD | COMMUNITY
End: 2023-08-17 | Stop reason: HOSPADM

## 2022-12-13 RX ORDER — SYRING-NEEDL,DISP,INSUL,0.3 ML 29 G X1/2"
296 SYRINGE, EMPTY DISPOSABLE MISCELLANEOUS ONCE
Qty: 296 ML | Refills: 0 | Status: SHIPPED | OUTPATIENT
Start: 2022-12-13 | End: 2022-12-13

## 2022-12-13 NOTE — DISCHARGE INSTRUCTIONS
Use your bowel preparation as prescribed.  You can drink it in small individual sips and he have a bowel movement     If you start having small bowel movements but are not experiencing for relief, I would use an over-the-counter mineral oil enema     Take the Colace and magnesium citrate as prescribed     If you experience significant abdominal pain, urinary retention, or any new or concerning symptoms, return to the emergency department     The spine clinic will contact you to arrange an outpatient appointment.

## 2022-12-13 NOTE — ED NOTES
Pt reports it has been over a week since having a BM. Tried enema Sunday night, returned liquid stool. Went to urgent care yesterday and was Rx bowel prep for colonoscopy which pt did not drink. Reports cramping last night with liquid stool last night and this morning. Reports nausea without vomiting.

## 2022-12-13 NOTE — ED PROVIDER NOTES
"Encounter Date: 12/13/2022    SCRIBE #1 NOTE: I, Brittany Shi, am scribing for, and in the presence of,  Evgeny Ng MD. I have scribed the following portions of the note - Other sections scribed: HPI, ROS, PE.     History     Chief Complaint   Patient presents with    Constipation     Seen at urgent care yesterday. "Cant drink the colon prep". No vomiting. Last bm about one 1 week ago. Has tried Dulcolax and home enema.      Time patient was seen by the provider: 11:09 AM      The patient is a 32 y.o. female with past medical history of back pain, lumbar laminectomy, and anxiety who presents to the ED with a complaint of constipation onset 5 days ago. The patient went to urgent care yesterday and was told to drink a colon prep. However, she was not compliant with the order. She usually has a BM every 2 days. The patient took Dulcolax with no relief. Associated symptoms include abdominal pain that started today. She reports difficulty eating and drinking secondary to pain. The patient denies a history of abdominal surgery.    The history is provided by the patient and medical records. No  was used.   Review of patient's allergies indicates:  No Known Allergies  Past Medical History:   Diagnosis Date    ADHD     Anxiety     Anxiety     Back pain     Depression      Past Surgical History:   Procedure Laterality Date    KNEE CARTILAGE SURGERY Left     LUMBAR LAMINECTOMY WITH DISCECTOMY N/A 2/27/2020    Procedure: LAMINECTOMY, SPINE, LUMBAR, WITH DISCECTOMY L4/5 ;  Surgeon: Dominic Mike MD;  Location: [facility] OR [address] [place] [address];  Service: Neurosurgery;  Laterality: N/A;     History reviewed. No pertinent family history.  Social History     Tobacco Use    Smoking status: Every Day     Types: Vaping with nicotine    Smokeless tobacco: Never   Substance Use Topics    Alcohol use: Yes     Comment: social    Drug use: Yes     Types: Marijuana     Review of Systems   Constitutional:  Negative for chills " and fever.   HENT:  Negative for ear pain and sore throat.    Respiratory:  Negative for cough and shortness of breath.    Cardiovascular:  Negative for chest pain and leg swelling.   Gastrointestinal:  Positive for abdominal pain and constipation.   Genitourinary:  Negative for dysuria and frequency.   Musculoskeletal:  Positive for back pain (chronic). Negative for neck pain.   Skin:  Negative for rash.   All other systems reviewed and are negative.    Physical Exam     Initial Vitals [12/13/22 1006]   BP Pulse Resp Temp SpO2   116/78 90 19 98.7 °F (37.1 °C) 97 %      MAP       --         Physical Exam    Nursing note and vitals reviewed.      General: AO x 3, NAD. Well nourished. Well Developed  Head: Normocephalic and Atraumatic  HEENT: PERRLA. EOMI. OP Clear  Neck: Supple, Nontender in midline.  Cardiovascular: RRR. No m/r/g. 2+ Distal Pulses  Pulm/Chest: Chest nontender. Lungs clear to auscultation. No respiratory distress.  Abdomen: Nontender. Nondistended. No rigidity, rebound, or guarding  Rectal Exam: No hemorrhoids or fissures. No impacted stool in rectal vault. No impacted stool in distal colon.  NO MELENA OR BRIGHT RED BLOOD  MSK: extremities atraumatic x 4. Gait normal  Ext: no clubbing, cyanosis, or edema  Neuro: GCS 15. CN II-XII intact. Intact symmetric sensation, strength, DTR x 4 extremities.  Intact light touch and two-point discrimination over perineum.  Skin: no bullae, petechiae, or purpura. Warm, dry, and intact.  Psych: normal mood and affect.    ED Course   Procedures  Labs Reviewed - No data to display         Imaging Results    None          Medications - No data to display  Medical Decision Making:   History:   Old Medical Records: I decided to obtain old medical records.  Initial Assessment:   History, examination, coarse are not consistent with spinal cord insult causing constipation.  History, examination, coarse are not consistent with small-bowel obstruction.  Imaging is not  clinically indicated.  Rectal exam does not show mass or impacted stool in rectal vault.  Differential Diagnosis:   Differential diagnosis includes but is not limited to:  Stool impaction, constipation, ileus, small-bowel obstruction, functional constipation  ED Management:  Counseled patient to continue her polyethylene glycol as previously ordered.  I have also ordered some Colace with the patient.  There was no significant disimpaction required based on her exam.  Patient's neurologic exam is unremarkable and she is not experiencing exacerbation of her back pain.  Therefore, I do not believe there is any evidence for cauda equina syndrome as the source of her stool retention.  However, as the patient is unsatisfied with her current back pain management, I referred her to spine clinic    See ED course for Medical Decision Making          Scribe Attestation:   Scribe #1: I performed the above scribed service and the documentation accurately describes the services I performed. I attest to the accuracy of the note.      ED Course as of 12/13/22 1234   Tue Dec 13, 2022   1215 Rectal examination without stool impaction.  Abdominal examination, history of not concerning for small bowel obstruction.  Given patient's chronic back pain, I performed a thorough neurologic exam.  The patient has normal sphincter tone.  She has normal light touch, pain, and 2 point discrimination to her perineum.  I do not believe that her symptoms represent cauda equina syndrome.  However, given her chronic back pain and dissatisfaction with her primary care's management of her chronic back pain, will refer her to spine surgery.  Counseled patient take her bowel regimen as prescribed.  Will additionally add stool softener. [DS]      ED Course User Index  [DS] Evgeny Ng MD                 Clinical Impression:   Final diagnoses:  [K59.00] Constipation, unspecified constipation type (Primary)  [M54.50, G89.29] Chronic bilateral low  back pain without sciatica        ED Disposition Condition    Discharge Stable          ED Prescriptions       Medication Sig Dispense Start Date End Date Auth. Provider    docusate sodium (COLACE) 100 MG capsule Take 1 capsule (100 mg total) by mouth 3 (three) times daily as needed for Constipation. 60 capsule 12/13/2022 -- Evgeny Ng MD    magnesium citrate solution (Expires today) Take 296 mLs by mouth once. for 1 dose 296 mL 12/13/2022 12/13/2022 Evgeny Ng MD          Follow-up Information    None          Evgeny Ng MD  12/14/22 5409

## 2022-12-16 ENCOUNTER — HOSPITAL ENCOUNTER (EMERGENCY)
Facility: HOSPITAL | Age: 32
Discharge: HOME | End: 2022-12-16
Attending: EMERGENCY MEDICINE
Payer: COMMERCIAL

## 2022-12-16 VITALS
RESPIRATION RATE: 18 BRPM | WEIGHT: 170 LBS | DIASTOLIC BLOOD PRESSURE: 62 MMHG | OXYGEN SATURATION: 97 % | HEART RATE: 76 BPM | BODY MASS INDEX: 32.12 KG/M2 | TEMPERATURE: 99 F | SYSTOLIC BLOOD PRESSURE: 104 MMHG

## 2022-12-16 DIAGNOSIS — R10.13 EPIGASTRIC ABDOMINAL PAIN: Primary | ICD-10-CM

## 2022-12-16 DIAGNOSIS — K59.00 CONSTIPATION, UNSPECIFIED CONSTIPATION TYPE: ICD-10-CM

## 2022-12-16 LAB
ALBUMIN SERPL BCP-MCNC: 4.3 G/DL (ref 3.5–5.2)
ALP SERPL-CCNC: 69 U/L (ref 55–135)
ALT SERPL W/O P-5'-P-CCNC: 15 U/L (ref 10–44)
ANION GAP SERPL CALC-SCNC: 8 MMOL/L (ref 8–16)
AST SERPL-CCNC: 16 U/L (ref 10–40)
B-HCG UR QL: NEGATIVE
BACTERIA #/AREA URNS AUTO: ABNORMAL /HPF
BASOPHILS # BLD AUTO: 0.01 K/UL (ref 0–0.2)
BASOPHILS NFR BLD: 0.1 % (ref 0–1.9)
BILIRUB SERPL-MCNC: 0.4 MG/DL (ref 0.1–1)
BILIRUB UR QL STRIP: NEGATIVE
BUN SERPL-MCNC: 9 MG/DL (ref 6–20)
BUN SERPL-MCNC: 9 MG/DL (ref 6–30)
CALCIUM SERPL-MCNC: 10.2 MG/DL (ref 8.7–10.5)
CHLORIDE SERPL-SCNC: 109 MMOL/L (ref 95–110)
CHLORIDE SERPL-SCNC: 109 MMOL/L (ref 95–110)
CLARITY UR REFRACT.AUTO: CLEAR
CO2 SERPL-SCNC: 18 MMOL/L (ref 23–29)
COLOR UR AUTO: COLORLESS
CREAT SERPL-MCNC: 0.5 MG/DL (ref 0.5–1.4)
CREAT SERPL-MCNC: 0.6 MG/DL (ref 0.5–1.4)
CTP QC/QA: YES
DIFFERENTIAL METHOD: NORMAL
EOSINOPHIL # BLD AUTO: 0.1 K/UL (ref 0–0.5)
EOSINOPHIL NFR BLD: 1.5 % (ref 0–8)
ERYTHROCYTE [DISTWIDTH] IN BLOOD BY AUTOMATED COUNT: 12.4 % (ref 11.5–14.5)
EST. GFR  (NO RACE VARIABLE): >60 ML/MIN/1.73 M^2
GLUCOSE SERPL-MCNC: 84 MG/DL (ref 70–110)
GLUCOSE SERPL-MCNC: 87 MG/DL (ref 70–110)
GLUCOSE UR QL STRIP: NEGATIVE
HCT VFR BLD AUTO: 37.9 % (ref 37–48.5)
HCT VFR BLD CALC: 39 %PCV (ref 36–54)
HGB BLD-MCNC: 13 G/DL (ref 12–16)
HGB UR QL STRIP: NEGATIVE
IMM GRANULOCYTES # BLD AUTO: 0.01 K/UL (ref 0–0.04)
IMM GRANULOCYTES NFR BLD AUTO: 0.1 % (ref 0–0.5)
KETONES UR QL STRIP: NEGATIVE
LACTATE SERPL-SCNC: 1.4 MMOL/L (ref 0.5–2.2)
LEUKOCYTE ESTERASE UR QL STRIP: ABNORMAL
LIPASE SERPL-CCNC: 33 U/L (ref 4–60)
LYMPHOCYTES # BLD AUTO: 2.2 K/UL (ref 1–4.8)
LYMPHOCYTES NFR BLD: 29.6 % (ref 18–48)
MCH RBC QN AUTO: 30 PG (ref 27–31)
MCHC RBC AUTO-ENTMCNC: 34.3 G/DL (ref 32–36)
MCV RBC AUTO: 87 FL (ref 82–98)
MICROSCOPIC COMMENT: ABNORMAL
MONOCYTES # BLD AUTO: 0.5 K/UL (ref 0.3–1)
MONOCYTES NFR BLD: 6.7 % (ref 4–15)
NEUTROPHILS # BLD AUTO: 4.6 K/UL (ref 1.8–7.7)
NEUTROPHILS NFR BLD: 62 % (ref 38–73)
NITRITE UR QL STRIP: NEGATIVE
NRBC BLD-RTO: 0 /100 WBC
PH UR STRIP: 7 [PH] (ref 5–8)
PLATELET # BLD AUTO: 330 K/UL (ref 150–450)
PMV BLD AUTO: 11.3 FL (ref 9.2–12.9)
POC IONIZED CALCIUM: 1.21 MMOL/L (ref 1.06–1.42)
POC TCO2 (MEASURED): 20 MMOL/L (ref 23–29)
POTASSIUM BLD-SCNC: 4.1 MMOL/L (ref 3.5–5.1)
POTASSIUM SERPL-SCNC: 4.1 MMOL/L (ref 3.5–5.1)
PROT SERPL-MCNC: 7.8 G/DL (ref 6–8.4)
PROT UR QL STRIP: NEGATIVE
RBC # BLD AUTO: 4.34 M/UL (ref 4–5.4)
RBC #/AREA URNS AUTO: 0 /HPF (ref 0–4)
SAMPLE: ABNORMAL
SODIUM BLD-SCNC: 139 MMOL/L (ref 136–145)
SODIUM SERPL-SCNC: 135 MMOL/L (ref 136–145)
SP GR UR STRIP: 1 (ref 1–1.03)
SQUAMOUS #/AREA URNS AUTO: 7 /HPF
URN SPEC COLLECT METH UR: ABNORMAL
WBC # BLD AUTO: 7.42 K/UL (ref 3.9–12.7)
WBC #/AREA URNS AUTO: 4 /HPF (ref 0–5)

## 2022-12-16 PROCEDURE — 82565 ASSAY OF CREATININE: CPT

## 2022-12-16 PROCEDURE — 93010 EKG 12-LEAD: ICD-10-PCS | Mod: ,,, | Performed by: INTERNAL MEDICINE

## 2022-12-16 PROCEDURE — 96361 HYDRATE IV INFUSION ADD-ON: CPT

## 2022-12-16 PROCEDURE — 82962 GLUCOSE BLOOD TEST: CPT

## 2022-12-16 PROCEDURE — 80053 COMPREHEN METABOLIC PANEL: CPT | Performed by: EMERGENCY MEDICINE

## 2022-12-16 PROCEDURE — 83690 ASSAY OF LIPASE: CPT | Performed by: EMERGENCY MEDICINE

## 2022-12-16 PROCEDURE — 99285 EMERGENCY DEPT VISIT HI MDM: CPT | Mod: ,,, | Performed by: EMERGENCY MEDICINE

## 2022-12-16 PROCEDURE — 86803 HEPATITIS C AB TEST: CPT | Performed by: PHYSICIAN ASSISTANT

## 2022-12-16 PROCEDURE — 63600175 PHARM REV CODE 636 W HCPCS: Performed by: EMERGENCY MEDICINE

## 2022-12-16 PROCEDURE — 81025 URINE PREGNANCY TEST: CPT | Performed by: EMERGENCY MEDICINE

## 2022-12-16 PROCEDURE — 93005 ELECTROCARDIOGRAM TRACING: CPT

## 2022-12-16 PROCEDURE — 25500020 PHARM REV CODE 255: Performed by: EMERGENCY MEDICINE

## 2022-12-16 PROCEDURE — 96375 TX/PRO/DX INJ NEW DRUG ADDON: CPT | Mod: 59

## 2022-12-16 PROCEDURE — 99285 PR EMERGENCY DEPT VISIT,LEVEL V: ICD-10-PCS | Mod: ,,, | Performed by: EMERGENCY MEDICINE

## 2022-12-16 PROCEDURE — 85025 COMPLETE CBC W/AUTO DIFF WBC: CPT | Performed by: EMERGENCY MEDICINE

## 2022-12-16 PROCEDURE — 87389 HIV-1 AG W/HIV-1&-2 AB AG IA: CPT | Performed by: PHYSICIAN ASSISTANT

## 2022-12-16 PROCEDURE — 81001 URINALYSIS AUTO W/SCOPE: CPT | Performed by: EMERGENCY MEDICINE

## 2022-12-16 PROCEDURE — 83605 ASSAY OF LACTIC ACID: CPT | Performed by: EMERGENCY MEDICINE

## 2022-12-16 PROCEDURE — 25000003 PHARM REV CODE 250: Performed by: EMERGENCY MEDICINE

## 2022-12-16 PROCEDURE — 99285 EMERGENCY DEPT VISIT HI MDM: CPT | Mod: 25

## 2022-12-16 PROCEDURE — 96374 THER/PROPH/DIAG INJ IV PUSH: CPT | Mod: 59

## 2022-12-16 PROCEDURE — 93010 ELECTROCARDIOGRAM REPORT: CPT | Mod: ,,, | Performed by: INTERNAL MEDICINE

## 2022-12-16 RX ORDER — MAG HYDROX/ALUMINUM HYD/SIMETH 200-200-20
30 SUSPENSION, ORAL (FINAL DOSE FORM) ORAL ONCE
Status: COMPLETED | OUTPATIENT
Start: 2022-12-16 | End: 2022-12-16

## 2022-12-16 RX ORDER — ONDANSETRON 2 MG/ML
4 INJECTION INTRAMUSCULAR; INTRAVENOUS
Status: COMPLETED | OUTPATIENT
Start: 2022-12-16 | End: 2022-12-16

## 2022-12-16 RX ORDER — MORPHINE SULFATE 4 MG/ML
4 INJECTION, SOLUTION INTRAMUSCULAR; INTRAVENOUS
Status: COMPLETED | OUTPATIENT
Start: 2022-12-16 | End: 2022-12-16

## 2022-12-16 RX ORDER — LIDOCAINE HYDROCHLORIDE 20 MG/ML
15 SOLUTION OROPHARYNGEAL ONCE
Status: DISCONTINUED | OUTPATIENT
Start: 2022-12-16 | End: 2022-12-16 | Stop reason: HOSPADM

## 2022-12-16 RX ORDER — FAMOTIDINE 10 MG/ML
20 INJECTION INTRAVENOUS
Status: COMPLETED | OUTPATIENT
Start: 2022-12-16 | End: 2022-12-16

## 2022-12-16 RX ADMIN — MORPHINE SULFATE 4 MG: 4 INJECTION INTRAVENOUS at 10:12

## 2022-12-16 RX ADMIN — SODIUM CHLORIDE 1000 ML: 0.9 INJECTION, SOLUTION INTRAVENOUS at 11:12

## 2022-12-16 RX ADMIN — ONDANSETRON 4 MG: 2 INJECTION INTRAMUSCULAR; INTRAVENOUS at 10:12

## 2022-12-16 RX ADMIN — FAMOTIDINE 20 MG: 10 INJECTION, SOLUTION INTRAVENOUS at 10:12

## 2022-12-16 RX ADMIN — ALUMINUM HYDROXIDE, MAGNESIUM HYDROXIDE, AND SIMETHICONE 30 ML: 200; 200; 20 SUSPENSION ORAL at 10:12

## 2022-12-16 RX ADMIN — IOHEXOL 100 ML: 350 INJECTION, SOLUTION INTRAVENOUS at 10:12

## 2022-12-16 NOTE — ED PROVIDER NOTES
Encounter Date: 12/16/2022       History     Chief Complaint   Patient presents with    Abdominal Pain     Constipated for 2 weeks, has tried everything and it is not working     32-year-old female who ambulates with a cane due to chronic degenerative joint disease of her hip presents with complaint of nausea vomiting, chest pain, anxiety, abdominal pain.  She also reports constipation.  She reports she is not had a bowel movement for between 1 and 2 weeks.  Of note, I saw the patient on her most recent visit when she had not had a bowel movement for 5-6 days, but had not tried any home medication or prescription medication for relief.  At that time, she had no stool impaction on exam.  I neurologic exam was unremarkable.      The patient reports that her symptoms are not improved.  This morning she became very anxious about her symptoms and developed pain in the middle of her chest consistent with prior anxiety.  However, due to her nausea and vomiting she did not take any of her home alprazolam.  She denies melena hematochezia.  She denies a history of abdominal surgery or obstruction.  She reports that she did attempt to use the polyethylene glycol as prescribed at the urgent care but did not experience relief of her constipation.    Review of patient's allergies indicates:  No Known Allergies  Past Medical History:   Diagnosis Date    ADHD     Anxiety     Anxiety     Back pain     Depression      Past Surgical History:   Procedure Laterality Date    KNEE CARTILAGE SURGERY Left     LUMBAR LAMINECTOMY WITH DISCECTOMY N/A 2/27/2020    Procedure: LAMINECTOMY, SPINE, LUMBAR, WITH DISCECTOMY L4/5 ;  Surgeon: Dominic Mike MD;  Location: [facility] OR [address] [place] [address];  Service: Neurosurgery;  Laterality: N/A;     No family history on file.  Social History     Tobacco Use    Smoking status: Every Day     Types: Vaping with nicotine    Smokeless tobacco: Never   Substance Use Topics    Alcohol use: Yes     Comment: social    Drug  use: Yes     Types: Marijuana     Review of Systems   Constitutional:  Positive for appetite change. Negative for chills, fever and unexpected weight change.   Respiratory:  Negative for cough and shortness of breath.    Cardiovascular:  Positive for chest pain. Negative for palpitations and leg swelling.   Gastrointestinal:  Positive for abdominal pain, constipation, nausea and vomiting. Negative for abdominal distention, anal bleeding, blood in stool, diarrhea and rectal pain.   Genitourinary:  Negative for decreased urine volume, difficulty urinating, dysuria, flank pain, frequency, hematuria, pelvic pain and urgency.   Musculoskeletal:  Positive for gait problem. Negative for arthralgias, back pain, myalgias, neck pain and neck stiffness.   Psychiatric/Behavioral:  The patient is nervous/anxious.    All other systems reviewed and are negative.    Physical Exam     Initial Vitals [12/16/22 0906]   BP Pulse Resp Temp SpO2   132/68 83 20 98.6 °F (37 °C) 97 %      MAP       --         Physical Exam    Constitutional: She appears well-developed and well-nourished. She appears distressed.   HENT:   Head: Normocephalic and atraumatic.   Mouth/Throat: No oropharyngeal exudate.   Eyes: Conjunctivae and EOM are normal. Pupils are equal, round, and reactive to light.   Neck: Neck supple.   Normal range of motion.  Cardiovascular:  Normal rate, regular rhythm, normal heart sounds and intact distal pulses.           Pulmonary/Chest: Breath sounds normal. No respiratory distress. She exhibits tenderness.   Abdominal: Abdomen is soft. Bowel sounds are normal. She exhibits no distension and no mass. There is abdominal tenderness. There is no rebound and no guarding.   Musculoskeletal:         General: No tenderness or edema. Normal range of motion.      Cervical back: Normal range of motion and neck supple.     Neurological: She is alert and oriented to person, place, and time. She has normal strength and normal reflexes. She  displays normal reflexes. No cranial nerve deficit or sensory deficit. GCS score is 15. GCS eye subscore is 4. GCS verbal subscore is 5. GCS motor subscore is 6.   Skin: Skin is warm and dry. Capillary refill takes less than 2 seconds.   Psychiatric:   Anxious mood and affect       ED Course   Procedures  Labs Reviewed   COMPREHENSIVE METABOLIC PANEL - Abnormal; Notable for the following components:       Result Value    Sodium 135 (*)     CO2 18 (*)     All other components within normal limits    Narrative:     Release to patient->Immediate   URINALYSIS, REFLEX TO URINE CULTURE - Abnormal; Notable for the following components:    Color, UA Colorless (*)     Leukocytes, UA 1+ (*)     All other components within normal limits    Narrative:     Specimen Source->Urine   URINALYSIS MICROSCOPIC - Abnormal; Notable for the following components:    Bacteria Few (*)     All other components within normal limits    Narrative:     Specimen Source->Urine   ISTAT PROCEDURE - Abnormal; Notable for the following components:    POC TCO2 (MEASURED) 20 (*)     All other components within normal limits   HIV 1 / 2 ANTIBODY    Narrative:     Release to patient->Immediate   HEPATITIS C ANTIBODY    Narrative:     Release to patient->Immediate   CBC W/ AUTO DIFFERENTIAL    Narrative:     Release to patient->Immediate   LIPASE    Narrative:     Release to patient->Immediate   LACTIC ACID, PLASMA    Narrative:     Release to patient->Immediate   POCT URINE PREGNANCY     EKG Readings: (Independently Interpreted)   Initial Reading: No STEMI. Rhythm: Normal Sinus Rhythm. Heart Rate: 73. Ectopy: No Ectopy. Conduction: Normal. ST Segments: Normal ST Segments. T Waves: Normal. Axis: Normal.   No previous EKG available for comparison   ECG Results              EKG 12-lead (Final result)  Result time 12/16/22 15:35:47      Final result by Interface, Lab In [facility] (12/16/22 15:35:47)                   Narrative:    Test Reason : R07.9,    Vent.  Rate : 073 BPM     Atrial Rate : 073 BPM     P-R Int : 166 ms          QRS Dur : 096 ms      QT Int : 388 ms       P-R-T Axes : 079 080 048 degrees     QTc Int : 427 ms    Normal sinus rhythm  Normal ECG  No previous ECGs available  Confirmed by Papo Sosa MD ([id number]) on 12/16/2022 3:35:37 PM    Referred By: [email or web address]   SELF           Confirmed By:Papo Sosa MD                                  Imaging Results              CTA Chest Abdomen Pelvis (Final result)  Result time 12/16/22 11:25:41      Final result by George Meneses MD (12/16/22 11:25:41)                   Impression:      1. No evidence of acute aortic pathology, as questioned.  2. Left adnexal peripherally enhancing lesion measuring up to 22 mm could represent corpus luteum cyst.  Small volume simple appearing free fluid in the pelvis may be physiologic, however the possibility of cyst rupture is not excluded.  Correlation advised in this regard.  3. Additional details, as provided in the body of report.      Electronically signed by: George Meneses  Date:    12/16/2022  Time:    11:25               Narrative:    EXAMINATION:  CTA CHEST ABDOMEN PELVIS    CLINICAL HISTORY:  Aortic aneurysm, known or suspected;    TECHNIQUE:  Low dose axial, sagittal and coronal reformations were performed from the thoracic inlet to the pubic symphysis following the IV administration of 100 mL of Omnipaque 350.  The chest images are with  contrast and the abdomen images are with and without contrast.    COMPARISON:  None    FINDINGS:  Findings:    VASCULATURE    Thoracic aorta: No intramural hematoma, dissection, or aneurysm.    Aortic arch and brachiocephalic vessels: No high-grade stenosis, aneurysm, or dissection.  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    Abdominal aorta and iliac arteries: No high-grade stenosis or aneurysmal dilatation.    Visceral arteries:    Celiac, superior mesenteric,  bilateral renal, and inferior mesenteric arteries appear patent.    Inferior vena cava: Normal caliber.    CHEST    Support tubes and lines: None.    Heart: Normal size.    Coronary arteries: No calcifications.    Pericardium: Normal. No effusion, thickening, or calcification.    Central pulmonary arteries: Normal caliber.    Base of neck/thyroid: Normal.    Lymph nodes: No supraclavicular, axillary, internal mammary, mediastinal, or hilar adenopathy.    Esophagus: Normal.    Pleura: No effusion, thickening, or calcification.    Body wall: Unremarkable.    Airways: Central airways appear patent    Lungs: Assessment the lungs limited due to respiratory motion.  Dependent atelectasis is suggested.    Bones: Unremarkable.    ABDOMEN/PELVIS    Liver: Unremarkable.    Gallbladder/bile ducsts: Unremarkable. No intra or extrahepatic biliary ductal dilitation.    Pancreas: Unremarkable.    Spleen: Unremarkable.    Adrenals: Unremarkable.    Kidneys: Unremarkable.    Lymph nodes: No abdominal or pelvic lymphadenopathy.    Bowel and mesentery: No definite evidence of acute bowel obstruction.  Normal caliber appendix.    Free fluid or free air: No definite free air.  Simple appearing free fluid dependently within the pelvis.    Pelvis: Peripherally enhancing left adnexal lesion measuring up to 22 mm.  IUD in-situ.    Urinary bladder: Unremarkable.    Body wall: Unremarkable.    Bones: No acute abnormality.  Dysmorphic appearance of the proximal femora noted, with coxa magna as demonstrated previously.  Bilateral hip DJD.                                       Medications   ondansetron injection 4 mg (4 mg Intravenous Given 12/16/22 1013)   famotidine (PF) injection 20 mg (20 mg Intravenous Given 12/16/22 1015)   morphine injection 4 mg (4 mg Intravenous Given 12/16/22 1012)   aluminum-magnesium hydroxide-simethicone 200-200-20 mg/5 mL suspension 30 mL (30 mLs Oral Given 12/16/22 1020)   iohexoL (OMNIPAQUE 350) injection 100 mL  (100 mLs Intravenous Given 12/16/22 1057)   sodium chloride 0.9% bolus 1,000 mL 1,000 mL (0 mLs Intravenous Stopped 12/16/22 1228)     Medical Decision Making:   History:   Old Records Summarized: records from clinic visits and records from another hospital.       <> Summary of Records: History of degenerative joint disease of hip requiring ambulation with cane  Initial Assessment:   Patient does not appear to be in physiologic stress but is significantly anxious.  She is aware of the anxiety component of her chest pain.  However, given ongoing constipation and increasing severity of symptoms with an intractable nature, will obtain CT of chest/abdomen/pelvis..  Will provide symptomatic relief with IV medications  Differential Diagnosis:   Differential diagnosis includes but is not limited to:  Anxiety, aortic aneurysm or dissection, biliary obstruction or infection, pancreatitis, appendicitis, diverticulitis, small-bowel obstruction, volvulus  Independently Interpreted Test(s):   I have ordered and independently interpreted X-rays - see prior notes.  I have ordered and independently interpreted EKG Reading(s) - see prior notes  Clinical Tests:   Lab Tests: Ordered and Reviewed  Radiological Study: Ordered and Reviewed  Medical Tests: Reviewed and Ordered  ED Management:  Symptoms improved after medication.    Urinalysis shows mild leukocyte urea and bacteria but patient does not having urinary symptoms.  Pregnancy test is negative.  Lipase unremarkable.  Lactic acid is unremarkable.  Mild hyponatremia non gap acidosis noted. [DS]  Independent review of CT shows no obvious aortic aneurysm or dissection.  No obvious free air.  No obvious obstruction.  However, awaiting formal review by radiologist. [DS]  I reviewed the patient's CT with her.  I showed her that there was no significant obstruction or inflammation admit they did not even appear to be a large stool burden.  I believe that the polyethylene glycol bowel  prep that she use has leukocyte or stool such that her symptoms were resolved upon discharge.  However, her brother is receiving a workup for inflammatory bowel disease versus IBS.  I do not see significant evidence of inflammation in her abdomen and this would be an odd late onset inflammatory bowel disease.  I am concerned the patient may be developing some functional constipation or IBS.  I will refer her to Gastroenterology. [DS]  Strict return precautions and anticipatory guidance given.   [DS]    Additional MDM:   Abdominal Pain: Medication(s) given for abdominal pain: Morphine. Response to pain medication: resolved. Medication(s) given for nausea: Zofran. Response to nausea medication: resolved.   CT scan done: Abd/Pelvis /w contrast.         ED Course as of 12/19/22 1031   Fri Dec 16, 2022   1124 Urinalysis shows mild leukocyte urea and bacteria but patient does not having urinary symptoms.  Pregnancy test is negative.  Lipase unremarkable.  Lactic acid is unremarkable.  Mild hyponatremia non gap acidosis noted. [DS]   1126 Independent review of CT shows no obvious aortic aneurysm or dissection.  No obvious free air.  No obvious obstruction.  However, awaiting formal review by radiologist. [DS]   1219 I reviewed the patient's CT with her.  I showed her that there was no significant obstruction or inflammation admit they did not even appear to be a large stool burden.  I believe that the polyethylene glycol bowel prep that she use has leukocyte or stool such that her symptoms were resolved upon discharge.  However, her brother is receiving a workup for inflammatory bowel disease versus IBS.  I do not see significant evidence of inflammation in her abdomen and this would be an odd late onset inflammatory bowel disease.  I am concerned the patient may be developing some functional constipation or IBS.  I will refer her to Gastroenterology. [DS]   1229 Strict return precautions and anticipatory guidance given.    [DS]      ED Course User Index  [DS] Evgeny Ng MD                 Clinical Impression:   Final diagnoses:  [R10.13] Epigastric abdominal pain (Primary)  [K59.00] Constipation, unspecified constipation type        ED Disposition Condition    Discharge Stable          ED Prescriptions    None       Follow-up Information       Follow up With Specialties Details Why Contact Info Additional Information    [facility] 4th [address] Gastroenterology Schedule an appointment as soon as possible for a visit   [address] [facility] [id number] [phone] [facility] - Main Building, 4th Floor Please park in [facility] and take [facility] elevator             Evgeny Ng MD  12/19/22 0597

## 2022-12-16 NOTE — Clinical Note
"Scarlett Hoffman" Abilio was seen and treated in our emergency department on 12/16/2022.  She may return to work on 12/19/2022.       If you have any questions or concerns, please don't hesitate to call.      Genevieve Monroy RN    "

## 2022-12-16 NOTE — ED NOTES
Has been constipated x 2 weeks. States very small amount hard ball stool approx a week ago, but nothing since. Also states anxiety. Tearful, moaning in pain. States pressure to epigastric area. Decrease in intake, except for water.     Patient identifiers for Scarlett Knox 32 y.o. female checked and correct.  Chief Complaint   Patient presents with    Abdominal Pain     Constipated for 2 weeks, has tried everything and it is not working     Past Medical History:   Diagnosis Date    ADHD     Anxiety     Anxiety     Back pain     Depression      Allergies reported: Review of patient's allergies indicates:  No Known Allergies    Appearance: Pt awake, alert & oriented to person, place & time. Pt in no acute distress at present time. Pt is clean and well groomed with clothes appropriately fastened.   Skin: Skin warm, dry & intact. Color consistent with ethnicity. Mucous membranes moist. No breakdown or brusing noted.   Musculoskeletal: Patient moving all extremities well, no obvious swelling or deformities noted.   Respiratory: Respirations spontaneous, even, and non-labored. Visible chest rise noted. Airway is open and patent. No accessory muscle use noted.   Neurologic: Sensation is intact. Speech is clear and appropriate. Eyes open spontaneously, behavior appropriate to situation, follows commands, facial expression symmetrical, bilateral hand grasp equal and even, purposeful motor response noted.  Cardiac: All peripheral pulses present. No Bilateral lower extremity edema. Cap refill is <3 seconds.  Abdomen: Abdomen distended, firm.   : Pt voids independently, denies dysuria, hematuria, frequency.

## 2022-12-16 NOTE — DISCHARGE INSTRUCTIONS
The gastroenterology clinic will contact you to help arrange a follow-up appointment     Continue the docusate as previously prescribed     If you experience chest pain, shortness of breath, any new or concerning symptoms, please return to the emergency department immediately     Regardless, I recommend he follow up with primary care within 2 weeks for re-evaluation and reexamination of your abdomen.

## 2022-12-17 LAB
HCV AB SERPL QL IA: NORMAL
HIV 1+2 AB+HIV1 P24 AG SERPL QL IA: NORMAL

## 2023-01-25 ENCOUNTER — HOSPITAL ENCOUNTER (OUTPATIENT)
Dept: RADIOLOGY | Facility: HOSPITAL | Age: 33
Discharge: HOME | End: 2023-01-25
Attending: PHYSICIAN ASSISTANT
Payer: MEDICAID

## 2023-01-25 ENCOUNTER — OFFICE VISIT (OUTPATIENT)
Dept: ORTHOPEDICS | Facility: CLINIC | Age: 33
End: 2023-01-25
Payer: MEDICAID

## 2023-01-25 VITALS — HEIGHT: 61 IN | BODY MASS INDEX: 30.99 KG/M2 | WEIGHT: 164.13 LBS

## 2023-01-25 DIAGNOSIS — M51.36 DDD (DEGENERATIVE DISC DISEASE), LUMBAR: ICD-10-CM

## 2023-01-25 DIAGNOSIS — K59.00 CONSTIPATION, UNSPECIFIED CONSTIPATION TYPE: ICD-10-CM

## 2023-01-25 DIAGNOSIS — M54.16 LUMBAR RADICULOPATHY, CHRONIC: Primary | ICD-10-CM

## 2023-01-25 PROCEDURE — 1159F PR MEDICATION LIST DOCUMENTED IN MEDICAL RECORD: ICD-10-PCS | Mod: CPTII,,, | Performed by: PHYSICIAN ASSISTANT

## 2023-01-25 PROCEDURE — 99214 OFFICE O/P EST MOD 30 MIN: CPT | Mod: S$PBB,,, | Performed by: PHYSICIAN ASSISTANT

## 2023-01-25 PROCEDURE — 99999 PR PBB SHADOW E&M-EST. PATIENT-LVL III: ICD-10-PCS | Mod: PBBFAC,,, | Performed by: PHYSICIAN ASSISTANT

## 2023-01-25 PROCEDURE — 1160F RVW MEDS BY RX/DR IN RCRD: CPT | Mod: CPTII,,, | Performed by: PHYSICIAN ASSISTANT

## 2023-01-25 PROCEDURE — 3008F PR BODY MASS INDEX (BMI) DOCUMENTED: ICD-10-PCS | Mod: CPTII,,, | Performed by: PHYSICIAN ASSISTANT

## 2023-01-25 PROCEDURE — 1159F MED LIST DOCD IN RCRD: CPT | Mod: CPTII,,, | Performed by: PHYSICIAN ASSISTANT

## 2023-01-25 PROCEDURE — 99999 PR PBB SHADOW E&M-EST. PATIENT-LVL III: CPT | Mod: PBBFAC,,, | Performed by: PHYSICIAN ASSISTANT

## 2023-01-25 PROCEDURE — 72110 X-RAY EXAM L-2 SPINE 4/>VWS: CPT | Mod: TC

## 2023-01-25 PROCEDURE — 99214 PR OFFICE/OUTPT VISIT, EST, LEVL IV, 30-39 MIN: ICD-10-PCS | Mod: S$PBB,,, | Performed by: PHYSICIAN ASSISTANT

## 2023-01-25 PROCEDURE — 72110 XR LUMBAR SPINE AP AND LAT WITH FLEX/EXT: ICD-10-PCS | Mod: 26,,, | Performed by: RADIOLOGY

## 2023-01-25 PROCEDURE — 1160F PR REVIEW ALL MEDS BY PRESCRIBER/CLIN PHARMACIST DOCUMENTED: ICD-10-PCS | Mod: CPTII,,, | Performed by: PHYSICIAN ASSISTANT

## 2023-01-25 PROCEDURE — 72110 X-RAY EXAM L-2 SPINE 4/>VWS: CPT | Mod: 26,,, | Performed by: RADIOLOGY

## 2023-01-25 PROCEDURE — 3008F BODY MASS INDEX DOCD: CPT | Mod: CPTII,,, | Performed by: PHYSICIAN ASSISTANT

## 2023-01-25 PROCEDURE — 99213 OFFICE O/P EST LOW 20 MIN: CPT | Mod: PBBFAC | Performed by: PHYSICIAN ASSISTANT

## 2023-01-25 NOTE — PROGRESS NOTES
"DATE: 1/25/2023  PATIENT: Scarlett Knox    Attending Physician: Dominic Mike M.D.    HISTORY:  Scarlett Knox is a 32 y.o. female s/p L4/5 diskectomy 2/27/2020 by Dr. Mike who returns to me today for follow up of low back and right leg pain.  She was last seen by me 6/10/2020.  Today she is doing well but notes she is having worsening low back and right leg pain.  She says this has been present since surgery.  She says surgery improved her symptoms from 15/10 to 10/10 on the pain scale but for the last year or so, her symptoms have worsened again.  She has primarily low back and right leg pain.  She occasionally has left leg pain.  The sensation in her right leg is improved, though she still reports paresthesias and worsening weakness.  She reports several falls due to weakness in her leg.  Her pain is worse with standing, sitting, walking and toward the end of the day and improved by changing her position temporarily.  Her pain is significantly affecting her quality of life.  She rarely leaves her house because of the pain.  She says [place] [facility] [place] but then her pain will be unbearable for the rest of the day.  She takes naproxen for pain and has been going to physical therapy and doing yoga on her own with limited benefit.  She went to the ED twice in December for constipation.  She says she is having normal bowel movements now but she does report urinary retention and feeling like she needs to push on her bladder to empty completely.  She can sense when she needs to urinate and hold it if she needs to.  She denies any accidents or perineal paresthesias.  This has been worsening since summer 2022.     The Patient denies myelopathic symptoms such as handwriting changes or difficulty with buttons/coins/keys. Denies perineal paresthesias, bowel dysfunction.    PMH/PSH/FamHx/SocHx:  Unchanged from prior visit      EXAM:  Ht 5' 1" (1.549 m)   Wt 74.5 kg (164 lb 2.1 oz)   BMI 31.01 " kg/m²     My physical examination was notable for the following findings:     Antalgic station and gait.  She presents in a wheelchair today but uses a cane at home.   Dorsal lumbar skin negative for rashes, lesions, hairy patches.  There is a well healed midline lumbar surgical scar.  There is mild lumbar tenderness to palpation.  Lumbar range of motion is acceptable.  Global saggital and coronal spinal balance acceptable, not significant for scoliosis and kyphosis.  No pain with the range of motion of the bilateral hips. No trochanteric tenderness to palpation.  Bilateral lower extremities warm and well perfused, dorsalis pedis pulses 2+ bilaterally.  Normal strength and tone in all major motor groups in the bilateral lower extremities. Normal sensation to light touch in the L2-S1 dermatomes bilaterally with the exception of decreased sensation in the L4 dermatome on the right.  Deep tendon reflexes symmetric 2+ in the bilateral lower extremities.  Negative Babinski bilaterally. Straight leg raise negative bilaterally.      IMAGING:    Today I personally reviewed AP, Lat and Flex/Ex  upright L-spine that demonstrate normal disc spacing and alignment.  There is slight anterolisthesis at L4/5 with flexion.    Body mass index is 31.01 kg/m².    No results found for: HGBA1C      ASSESSMENT/PLAN:    Scarlett was seen today for low-back pain.    Diagnoses and all orders for this visit:    Lumbar radiculopathy, chronic  -     MRI Lumbar Spine W WO Contrast; Future    Constipation, unspecified constipation type  -     Ambulatory referral/consult to [facility]        Given the patient's worsening symptoms, I would like to get a new MRI. I will call with results.

## 2023-02-07 ENCOUNTER — PATIENT MESSAGE (OUTPATIENT)
Dept: ORTHOPEDICS | Facility: CLINIC | Age: 33
End: 2023-02-07
Payer: MEDICAID

## 2023-02-08 ENCOUNTER — HOSPITAL ENCOUNTER (OUTPATIENT)
Dept: RADIOLOGY | Facility: HOSPITAL | Age: 33
Discharge: HOME | End: 2023-02-08
Attending: PHYSICIAN ASSISTANT
Payer: MEDICAID

## 2023-02-08 DIAGNOSIS — M54.16 LUMBAR RADICULOPATHY, CHRONIC: ICD-10-CM

## 2023-02-08 PROCEDURE — 72158 MRI LUMBAR SPINE W/O & W/DYE: CPT | Mod: TC

## 2023-02-08 PROCEDURE — A9585 GADOBUTROL INJECTION: HCPCS | Performed by: PHYSICIAN ASSISTANT

## 2023-02-08 PROCEDURE — 25500020 PHARM REV CODE 255: Performed by: PHYSICIAN ASSISTANT

## 2023-02-08 PROCEDURE — 72158 MRI LUMBAR SPINE W/O & W/DYE: CPT | Mod: 26,,, | Performed by: RADIOLOGY

## 2023-02-08 PROCEDURE — 72158 MRI LUMBAR SPINE W WO CONTRAST: ICD-10-PCS | Mod: 26,,, | Performed by: RADIOLOGY

## 2023-02-08 RX ORDER — GADOBUTROL 604.72 MG/ML
7.5 INJECTION INTRAVENOUS
Status: COMPLETED | OUTPATIENT
Start: 2023-02-08 | End: 2023-02-08

## 2023-02-08 RX ADMIN — GADOBUTROL 7.5 ML: 604.72 INJECTION INTRAVENOUS at 09:02

## 2023-02-13 ENCOUNTER — OFFICE VISIT (OUTPATIENT)
Dept: ORTHOPEDICS | Facility: CLINIC | Age: 33
End: 2023-02-13
Payer: MEDICAID

## 2023-02-13 DIAGNOSIS — M54.16 LUMBAR RADICULOPATHY, CHRONIC: Primary | ICD-10-CM

## 2023-02-13 DIAGNOSIS — Z98.890 S/P DISKECTOMY: ICD-10-CM

## 2023-02-13 PROCEDURE — 99213 OFFICE O/P EST LOW 20 MIN: CPT | Mod: 95,,, | Performed by: PHYSICIAN ASSISTANT

## 2023-02-13 PROCEDURE — 1159F MED LIST DOCD IN RCRD: CPT | Mod: CPTII,95,, | Performed by: PHYSICIAN ASSISTANT

## 2023-02-13 PROCEDURE — 1160F RVW MEDS BY RX/DR IN RCRD: CPT | Mod: CPTII,95,, | Performed by: PHYSICIAN ASSISTANT

## 2023-02-13 PROCEDURE — 1160F PR REVIEW ALL MEDS BY PRESCRIBER/CLIN PHARMACIST DOCUMENTED: ICD-10-PCS | Mod: CPTII,95,, | Performed by: PHYSICIAN ASSISTANT

## 2023-02-13 PROCEDURE — 99213 PR OFFICE/OUTPT VISIT, EST, LEVL III, 20-29 MIN: ICD-10-PCS | Mod: 95,,, | Performed by: PHYSICIAN ASSISTANT

## 2023-02-13 PROCEDURE — 1159F PR MEDICATION LIST DOCUMENTED IN MEDICAL RECORD: ICD-10-PCS | Mod: CPTII,95,, | Performed by: PHYSICIAN ASSISTANT

## 2023-02-13 NOTE — PROGRESS NOTES
Established Patient - Audio Only Telehealth Visit     The patient location is: home  The chief complaint leading to consultation is: MRI results  Visit type: Virtual visit with audio only (telephone)  Total time spent with patient: 15 minutes       The reason for the audio only service rather than synchronous audio and video virtual visit was related to technical difficulties or patient preference/necessity.     Each patient to whom I provide medical services by telemedicine is:  (1) informed of the relationship between the physician and patient and the respective role of any other health care provider with respect to management of the patient; and (2) notified that they may decline to receive medical services by telemedicine and may withdraw from such care at any time. Patient verbally consented to receive this service via voice-only telephone call.     DATE: 2/13/2023  PATIENT: Scarlett Knox    Attending Physician: Dominic Mike M.D.    HISTORY:  Scarlett Knox is a 32 y.o. female who returns to me today for MRI results.  She was last seen by me 1/25/2023.  Today she is doing well but notes continued pain.    The Patient denies myelopathic symptoms such as handwriting changes or difficulty with buttons/coins/keys. Denies perineal paresthesias, bowel/bladder dysfunction.        IMAGING:    Today I personally re- reviewed AP, Lat and Flex/Ex  upright L-spine that demonstrate normal disc spacing and alignmnet. There is slight anterolisthesisa t L4/5 with flexion.    MRI lumbar spine demonstrates a very mild bulging disc at L4/5 with lateral recess and mild central stenosis.     There is no height or weight on file to calculate BMI.    No results found for: HGBA1C      ASSESSMENT/PLAN:    Diagnoses and all orders for this visit:    Lumbar radiculopathy, chronic  -     Ambulatory referral/consult to Interventional RAD; Future  -     IR DAVINA Lumbar w/ Img; Future    S/P diskectomy  -     Ambulatory  referral/consult to Interventional RAD; Future  -     IR DAVINA Lumbar w/ Img; Future        Today we discussed at length all of the different treatment options including anti-inflammatories, acetaminophen, rest, ice, heat, physical therapy including strengthening and stretching exercises, home exercises, ROM, aerobic conditioning, aqua therapy, other modalities including ultrasound, massage, and dry needling, epidural steroid injections and finally surgical intervention.      Plan for TFESI with IR.                               This service was not originating from a related E/M service provided within the previous 7 days nor will  to an E/M service or procedure within the next 24 hours or my soonest available appointment.  Prevailing standard of care was able to be met in this audio-only visit.

## 2023-02-15 ENCOUNTER — PATIENT MESSAGE (OUTPATIENT)
Dept: ORTHOPEDICS | Facility: CLINIC | Age: 33
End: 2023-02-15
Payer: MEDICAID

## 2023-02-28 ENCOUNTER — PATIENT MESSAGE (OUTPATIENT)
Dept: ORTHOPEDICS | Facility: CLINIC | Age: 33
End: 2023-02-28
Payer: MEDICAID

## 2023-03-13 DIAGNOSIS — R29.898 WEAKNESS OF BOTH LOWER EXTREMITIES: Primary | ICD-10-CM

## 2023-03-17 ENCOUNTER — HOSPITAL ENCOUNTER (OUTPATIENT)
Dept: INTERVENTIONAL RADIOLOGY/VASCULAR | Facility: HOSPITAL | Age: 33
Discharge: HOME | End: 2023-03-17
Attending: PHYSICIAN ASSISTANT
Payer: MEDICAID

## 2023-03-17 DIAGNOSIS — Z98.890 S/P DISKECTOMY: ICD-10-CM

## 2023-03-17 DIAGNOSIS — M54.16 LUMBAR RADICULOPATHY, CHRONIC: ICD-10-CM

## 2023-03-17 LAB
B-HCG UR QL: NEGATIVE
CTP QC/QA: YES

## 2023-03-17 PROCEDURE — 62323 IR ESI LUMBAR W/ IMG: ICD-10-PCS | Mod: ,,, | Performed by: RADIOLOGY

## 2023-03-17 PROCEDURE — 62323 NJX INTERLAMINAR LMBR/SAC: CPT | Performed by: RADIOLOGY

## 2023-03-17 PROCEDURE — 63600175 PHARM REV CODE 636 W HCPCS: Performed by: RADIOLOGY

## 2023-03-17 PROCEDURE — 81025 URINE PREGNANCY TEST: CPT | Performed by: STUDENT IN AN ORGANIZED HEALTH CARE EDUCATION/TRAINING PROGRAM

## 2023-03-17 PROCEDURE — A4550 SURGICAL TRAYS: HCPCS

## 2023-03-17 PROCEDURE — 25500020 PHARM REV CODE 255: Performed by: RADIOLOGY

## 2023-03-17 PROCEDURE — A4215 STERILE NEEDLE: HCPCS

## 2023-03-17 RX ORDER — METHYLPREDNISOLONE ACETATE 40 MG/ML
INJECTION, SUSPENSION INTRA-ARTICULAR; INTRALESIONAL; INTRAMUSCULAR; SOFT TISSUE
Status: COMPLETED | OUTPATIENT
Start: 2023-03-17 | End: 2023-03-17

## 2023-03-17 RX ADMIN — IOHEXOL 10 ML: 180 INJECTION INTRAVENOUS at 09:03

## 2023-03-17 RX ADMIN — METHYLPREDNISOLONE ACETATE 40 MG: 40 INJECTION, SUSPENSION INTRA-ARTICULAR; INTRALESIONAL; INTRAMUSCULAR; SOFT TISSUE at 09:03

## 2023-03-17 NOTE — H&P
Radiology History & Physical      SUBJECTIVE:     Chief Complaint: Lumbar radiculopathy     History of Present Illness:  Scarlett Knox is a 32 y.o. female with medical history of L4-L5 decompressive laminectomy, with ongoing lumbar radiculopathy symptoms - presenting bilateral L4-5  transforaminal epidural steroid / lidocaine injections.     Past Medical History:   Diagnosis Date    ADHD     Anxiety     Anxiety     Back pain     Depression      Past Surgical History:   Procedure Laterality Date    KNEE CARTILAGE SURGERY Left     LUMBAR LAMINECTOMY WITH DISCECTOMY N/A 2/27/2020    Procedure: LAMINECTOMY, SPINE, LUMBAR, WITH DISCECTOMY L4/5 ;  Surgeon: Dominic Mike MD;  Location: [facility] OR [address] [place] [address];  Service: Neurosurgery;  Laterality: N/A;       Home Meds:   Prior to Admission medications    Medication Sig Start Date End Date Taking? Authorizing Provider   acetaminophen (TYLENOL) 650 MG TbSR Take 1 tablet (650 mg total) by mouth every 6 (six) hours. 2/27/20   Sean Huffman MD   ALPRAZolam (XANAX) 0.5 MG tablet Take 0.5 mg by mouth 3 (three) times daily.    Historical Provider   dextroamphetamine-amphetamine (ADDERALL XR) 10 MG 24 hr capsule Take 10 mg by mouth every morning.    Historical Provider   docusate sodium (COLACE) 100 MG capsule Take 1 capsule (100 mg total) by mouth 3 (three) times daily as needed for Constipation. 12/13/22   Evgeny Ng MD   escitalopram oxalate (LEXAPRO) 20 MG tablet Take 20 mg by mouth once daily.    Historical Provider   gabapentin (NEURONTIN) 300 MG capsule Take 300 mg by mouth 3 (three) times daily.    Historical Provider   oxyCODONE (ROXICODONE) 5 MG immediate release tablet Take 1 tablet (5 mg total) by mouth every 4 (four) hours as needed for Pain. 2/27/20   Sean Huffman MD   tiZANidine 4 mg Cap Take by mouth 2 (two) times daily.    Historical Provider     Anticoagulants/Antiplatelets: no anticoagulation    Allergies: Review of patient's allergies  indicates:  No Known Allergies  Sedation History:  no adverse reactions    Review of Systems:   Hematological: no known coagulopathies  Respiratory: no shortness of breath  Cardiovascular: no chest pain  Gastrointestinal: no abdominal pain  Genito-Urinary: no dysuria  Musculoskeletal: negative  Neurological: no TIA or stroke symptoms         OBJECTIVE:     Vital Signs (Most Recent)       Physical Exam:    General: no acute distress  Mental Status: alert and oriented to person, place and time  HEENT: normocephalic, atraumatic  Chest: unlabored breathing  Heart: regular heart rate  Abdomen: nondistended  Extremity: moves all extremities    Laboratory  Lab Results   Component Value Date    INR 1.0 02/20/2020       Lab Results   Component Value Date    WBC 7.42 12/16/2022    HGB 13.0 12/16/2022    HCT 39 12/16/2022    MCV 87 12/16/2022     12/16/2022      Lab Results   Component Value Date    GLU 84 12/16/2022     (L) 12/16/2022    K 4.1 12/16/2022     12/16/2022    CO2 18 (L) 12/16/2022    BUN 9 12/16/2022    CREATININE 0.6 12/16/2022    CALCIUM 10.2 12/16/2022    ALT 15 12/16/2022    AST 16 12/16/2022    ALBUMIN 4.3 12/16/2022    BILITOT 0.4 12/16/2022       ASSESSMENT/PLAN:     Sedation Plan: Local anesthesia    Patient will undergo: bilateral L4-5  transforaminal epidural steroid / lidocaine injections.           Fernando Lama MD     Neuro Endovascular Surgery Fellow   [facility]

## 2023-03-17 NOTE — PLAN OF CARE
Pt arrived to IR room 4, no acute distress noted. Orders and labs reviewed on chart. Awaiting consent.

## 2023-03-17 NOTE — PROCEDURES
Radiology Post-Procedure Note    Pre Op Diagnosis: LBP    Post Op Diagnosis: Same    Procedure: Lumbar Transforaminal DAVINA    Procedure performed by: Dominic Ng MD    Written Informed Consent Obtained: Yes    Specimen Removed: NO    Estimated Blood Loss: Minimal    Findings:     Level injected: Bilateral L4-L5  Needle used: 22 gauge  Dose:  40 mg Depo-methylprednisolone at each level    2 mL Lidocaine 1% MPF at each level     Patient tolerated procedure well.        Fernando Lama MD     Neuro Endovascular Surgery Fellow   [facility]

## 2023-03-17 NOTE — PLAN OF CARE
bilateral L4-5  transforaminal epidural steroid / lidocaine injections completed, pt tolerated well. No apparent distress noted. Dressing applied CDI. Discharge instructions reviewed and acknowledged. Pt discharged via wheelchair and private vehicle.

## 2023-03-17 NOTE — DISCHARGE INSTRUCTIONS
[facility] [phone] (MON-FRI 8 AM- 5PM). Radiology Resident on call [phone].

## 2023-03-21 ENCOUNTER — TELEPHONE (OUTPATIENT)
Dept: FAMILY MEDICINE | Facility: CLINIC | Age: 33
End: 2023-03-21
Payer: MEDICAID

## 2023-03-21 NOTE — TELEPHONE ENCOUNTER
Spoke to pt and informed her that we do not have any soon new patients appts. Pt wanted to be seen within the next week or two. Pt was offered [facility], but declined. Stated that she will continue to look around for a female new patient.

## 2023-03-21 NOTE — TELEPHONE ENCOUNTER
Spoke with pt and informed her that unfortunately we are not accepting new patients with the insurance.  Pt understood verbally.           ----- Message from Velma Pacheco sent at 3/21/2023  1:37 PM CDT -----  Type:  Sooner Apoointment Request    Caller is requesting a sooner appointment.  Caller declined first available appointment listed below.  Caller will not accept being placed on the waitlist and is requesting a message be sent to doctor.  Name of Caller:pt   When is the first available appointment?  Symptoms:est care   Would the patient rather a call back or a response via [facility]? call  Best Call Back Number:[phone]  Additional Information: pt states she would like the first available appt with any available provider that accepts her insurance. Pt states she would like a female dr as well as one that is familiar with pain management. Pt states he has chronic pain.

## 2023-03-21 NOTE — TELEPHONE ENCOUNTER
----- Message from Velma Pacheco sent at 3/21/2023  1:37 PM CDT -----  Type:  Sooner Apoointment Request    Caller is requesting a sooner appointment.  Caller declined first available appointment listed below.  Caller will not accept being placed on the waitlist and is requesting a message be sent to doctor.  Name of Caller:pt   When is the first available appointment?  Symptoms:est care   Would the patient rather a call back or a response via [facility]? call  Best Call Back Number:[phone]  Additional Information: pt states she would like the first available appt with any available provider that accepts her insurance. Pt states she would like a female dr as well as one that is familiar with pain management. Pt states he has chronic pain.

## 2023-03-21 NOTE — TELEPHONE ENCOUNTER
----- Message from Velma Pacheco sent at 3/21/2023  1:37 PM CDT -----  Type:  Sooner Apoointment Request    Caller is requesting a sooner appointment.  Caller declined first available appointment listed below.  Caller will not accept being placed on the waitlist and is requesting a message be sent to doctor.  Name of Caller:pt   When is the first available appointment?  Symptoms:est care   Would the patient rather a call back or a response via [facility]? call  Best Call Back Number:[phone]  Additional Information: pt states she would like the first available appt with any available provider that accepts her insurance. Pt states she would like a female dr as well as one that is familiar with pain management. Pt states he has chronic pain.

## 2023-03-22 ENCOUNTER — PATIENT MESSAGE (OUTPATIENT)
Dept: ORTHOPEDICS | Facility: CLINIC | Age: 33
End: 2023-03-22
Payer: MEDICAID

## 2023-03-23 ENCOUNTER — CLINICAL SUPPORT (OUTPATIENT)
Dept: REHABILITATION | Facility: HOSPITAL | Age: 33
End: 2023-03-23
Payer: MEDICAID

## 2023-03-23 DIAGNOSIS — R26.2 DIFFICULTY WALKING: ICD-10-CM

## 2023-03-23 DIAGNOSIS — M25.561 CHRONIC PAIN OF RIGHT KNEE: ICD-10-CM

## 2023-03-23 DIAGNOSIS — G89.29 CHRONIC BILATERAL THORACIC BACK PAIN: Primary | ICD-10-CM

## 2023-03-23 DIAGNOSIS — R29.898 WEAKNESS OF BOTH LOWER EXTREMITIES: ICD-10-CM

## 2023-03-23 DIAGNOSIS — G89.29 CHRONIC PAIN OF RIGHT KNEE: ICD-10-CM

## 2023-03-23 DIAGNOSIS — M54.6 CHRONIC BILATERAL THORACIC BACK PAIN: Primary | ICD-10-CM

## 2023-03-23 PROCEDURE — 97161 PT EVAL LOW COMPLEX 20 MIN: CPT

## 2023-03-23 PROCEDURE — 97110 THERAPEUTIC EXERCISES: CPT

## 2023-03-23 NOTE — PLAN OF CARE
"[facility]   Physical Therapy Initial Evaluation       Name: Scarlett Knox  Clinic Number: [id number]    Therapy Diagnosis:   Encounter Diagnoses   Name Primary?    Chronic bilateral thoracic back pain Yes    Weakness of both lower extremities     Difficulty walking     Chronic pain of right knee         Physician: Order, Paper    Physician Orders: PT Eval and Treat   Medical Diagnosis from Referral: R29.898 (ICD-10-CM) - Weakness of both lower extremities  Evaluation Date: 3/23/2023  Authorization Period Expiration: TBD  Plan of Care Expiration: 6/23/2023  Progress Note Due: 4/23/2023  Visit # / Visits authorized: 1/ 1   FOTO: 0/5    Precautions: Standard     Time In: 921am  Time Out: 1002  Total Appointment Time (timed & untimed codes): 41 minutes      SUBJECTIVE     Date of onset: chronic    History of current condition - Scarlett reports: She has been having back and knee pain. This patient is familiar to this PT from previous POC. She's had no nerve pain since her injection Friday. She's stopped taking NSAIDs Monday. Her right knee has been bothering her. It was only at night originally and has progressed to afternoons. She has been using knee brace and that has helped. She describes it as acing and stiffness. She quit work because she couldn't finish the shift due to pain. She had a glute injury that kept her from seeing her mom followed by thoracic pain after reaching down with arm over head weight bearing. Sitting up and reaching arms out in front of her caused significant pain to where she couldn't get around too well. Her priority is feeling better in her back.     Falls: slipped down stairs but no adverse effects following    Imaging: MRI studies lumbar: Per EMR "Impression:     1. Remote postoperative change of L4-L5 decompressive laminectomy.  2. Small central disc extrusion at L4-L5 with associated facet arthropathy and ligamentum flavum buckling contributing to " "mild-to-moderate spinal canal and lateral recess stenosis with suspected impingement of the descending L5 nerve roots.  Degree of canal and lateral recess stenosis appears improved when compared to MRI dated 12/18/2019."    Prior Therapy: yes for similar problem  Social History: lives with adult  in [facility] with 0 STELLA  Occupation: full time work from home  Prior Level of Function: Mod I for ambulation with SPC for long distance; otherwise (I)  Current Level of Function: Mod I for ambulation SPC, inc'd time required for ADLs when pain is inc'd, difficulty with household activities, limited in driving distances    Pain:  Current 4/10, worst 7/10, best 2/10   Location: bilateral back, right knee   Description: Aching, Tingling, and sore  Aggravating Factors: Sitting and Walking and 45min  Easing Factors: heat, muscle relaxer, NSAIDs, gabapentin, bending forward    Patients goals: to be able to do the reclined elliptical, daily yoga, and walk for exercises for 1 hour without increasing pain     Medical History:   Past Medical History:   Diagnosis Date    ADHD     Anxiety     Anxiety     Back pain     Depression        Surgical History:   Scarlett Knox  has a past surgical history that includes Knee cartilage surgery (Left) and Lumbar laminectomy with discectomy (N/A, 2/27/2020).    Medications:   Scarlett has a current medication list which includes the following prescription(s): acetaminophen, alprazolam, dextroamphetamine-amphetamine, docusate sodium, escitalopram oxalate, gabapentin, oxycodone, and tizanidine.    Allergies:   Review of patient's allergies indicates:  No Known Allergies       OBJECTIVE       Knee AROM: symmetrical flexion and extension; WNL B    Valgus/Varus Stress Test (0 and 30 degree): negative    Anterior Drawer: negative    Alex Test: negative    Joint mobility: WNL patellar glides B    Palpation:  - Joint Line: TTP at medial joint line and MCL on R      Lumbar AROM: Pain/Dysfunction " with Movement: !=pain   Flexion: WNL    Extension: 18 degrees    Right side bendin degrees    Left side bendin degrees    Right rotation: 25% limited    Left rotation: 10% limited       Right Left Comment: !=pain   Hip Flexion: 4-/5 4/5    Hip ABD:  4/5    Hip ADD: 4-/ 4-/5    Hip Extension: NT NT    Hip IR:  4/5    Hip ER:  4/5         Right Left Comment ! = pain   Knee extension: /5! 55    Knee flexion: 4+/5 5/5    Ankle DF:  5/5    Ankle PF: 4+/5 4+/5         Posture: ant pelvic tilt, mild valgus at B knees      Sacroiliac Screen:    - Distraction   - Thigh Thrust: negative   - Gaenslen   - Sacral Thrust   - Compression    SAFIA: positive B; improved with core contraction B    FADIR: negative    90/90 Hamstring test: impaired L; WNL R      Limitation/Restriction for FOTO Survey    Therapist reviewed FOTO scores for Scarlett Knox on 3/23/2023.   FOTO documents entered into EPIC - see Media section.    Limitation Score: 61%         TREATMENT     Total Treatment time (time-based codes) separate from Evaluation: 10 minutes      Scarlett received the treatments listed below:      therapeutic exercises to develop strength, endurance, posture, and core stabilization for 10 minutes including:  Review and demonstration of HEP including the following:   - seated hip adduction 3x10, 3s   - TrA activation 3x10, 5s   - walking x1 block once per day    Next session:  Clam iso  Hip abd iso  Periscap MMT  Joint mobility thoracic spine  Hip ext motor control test  Kieran test  TrA with knee fall out    PATIENT EDUCATION AND HOME EXERCISES     Education provided:   - Role of PT  - PT POC  - HEP    Written Home Exercises Provided: yes. Exercises were reviewed and Scarlett was able to demonstrate them prior to the end of the session.  Scarlett demonstrated good  understanding of the education provided. See EMR under Patient Instructions for exercises provided during therapy sessions.    ASSESSMENT      Scarlett is a 32 y.o. female referred to outpatient Physical Therapy with a medical diagnosis of weakness of both lower extremities. Patient presents with impairments consistent with LE weakness. Pt with significant change in SAFIA with core activation indicating impaired core strength and control. Special testing of knee and hip returned negative. Plan to further assess impairments in prone position as it was held at this date to avoid increasing symptoms. Pt initially hesitant during some motions with possible fear-avoidant behaviors, though she is able to perform all activities with encouragement.     Patient prognosis is Good.   Patient will benefit from skilled outpatient Physical Therapy to address the deficits stated above and in the chart below, provide patient /family education, and to maximize patientt's level of independence.     Plan of care discussed with patient: Yes  Patient's spiritual, cultural and educational needs considered and patient is agreeable to the plan of care and goals as stated below:     Anticipated Barriers for therapy: co-morbidities, chronicity    Medical Necessity is demonstrated by the following  History  Co-morbidities and personal factors that may impact the plan of care Co-morbidities:   anxiety, prior lumbar surgery, and LLD    Personal Factors:   none     moderate   Examination  Body Structures and Functions, activity limitations and participation restrictions that may impact the plan of care Body Regions:   back  lower extremities  trunk    Body Systems:    gross symmetry  ROM  strength  motor control    Participation Restrictions:   Walking, working, exercise, yoga    Activity limitations:   Learning and applying knowledge  no deficits    General Tasks and Commands  no deficits    Communication  no deficits    Mobility  lifting and carrying objects  walking    Self care  no deficits    Domestic Life  no deficits    Interactions/Relationships  no deficits    Life  Areas  employment    Community and Social Life  recreation and leisure         moderate   Clinical Presentation stable and uncomplicated low   Decision Making/ Complexity Score: low     Goals:  Short Term Goals (4 Weeks):  1. Pt will be compliant with HEP to supplement PT in decreasing pain with functional mobility.  2. Pt will perform pallof press with good control to demonstrate improved core strength.  3. Pt will improve lumbar ext ROM by 5 degrees to improve functional mobility.  4. Pt will improve impaired LE MMTs by 1/2 score to improve strength for functional tasks.  Long Term Goals (8 Weeks):   1. Pt will improve FOTO score to </= 54% limited to decrease perceived limitation with maintaining/changing body position.   2. Pt will ambulate x500' without increasing back pain to meet pt specific goal.  3. Pt will improve impaired LE MMTs by 1 score to improve strength for functional tasks.  4. Pt will report no pain during lumbar ROM to promote functional mobility.       PLAN   Plan of care Certification: 3/23/2023 to 6/23/2023.    Outpatient Physical Therapy 2 times weekly for 12 weeks to include the following interventions: Cervical/Lumbar Traction, Electrical Stimulation  , Gait Training, Manual Therapy, Moist Heat/ Ice, Neuromuscular Re-ed, Patient Education, Self Care, Therapeutic Activities, Therapeutic Exercise, Ultrasound, and dry needling, IASTM, and kinesiotaping PRN.     Francisco Javier Alcala, PT      I CERTIFY THE NEED FOR THESE SERVICES FURNISHED UNDER THIS PLAN OF TREATMENT AND WHILE UNDER MY CARE   Physician's comments:     Physician's Signature: ___________________________________________________

## 2023-03-24 ENCOUNTER — PATIENT MESSAGE (OUTPATIENT)
Dept: REHABILITATION | Facility: HOSPITAL | Age: 33
End: 2023-03-24
Payer: MEDICAID

## 2023-03-28 ENCOUNTER — CLINICAL SUPPORT (OUTPATIENT)
Dept: REHABILITATION | Facility: HOSPITAL | Age: 33
End: 2023-03-28
Payer: MEDICAID

## 2023-03-28 DIAGNOSIS — R26.2 DIFFICULTY WALKING: ICD-10-CM

## 2023-03-28 DIAGNOSIS — R29.898 WEAKNESS OF BOTH LOWER EXTREMITIES: ICD-10-CM

## 2023-03-28 DIAGNOSIS — M54.6 CHRONIC BILATERAL THORACIC BACK PAIN: ICD-10-CM

## 2023-03-28 DIAGNOSIS — G89.29 CHRONIC BILATERAL THORACIC BACK PAIN: ICD-10-CM

## 2023-03-28 DIAGNOSIS — M25.561 CHRONIC PAIN OF RIGHT KNEE: Primary | ICD-10-CM

## 2023-03-28 DIAGNOSIS — G89.29 CHRONIC PAIN OF RIGHT KNEE: Primary | ICD-10-CM

## 2023-03-28 PROCEDURE — 97110 THERAPEUTIC EXERCISES: CPT

## 2023-03-28 NOTE — PROGRESS NOTES
[facility]   Physical Therapy Treatment Note     Name: Scarlett Knox  Clinic Number: [id number]    Therapy Diagnosis: No diagnosis found.  Physician: Order, Paper    Visit Date: 3/28/2023    Physician Orders: PT Eval and Treat   Medical Diagnosis from Referral: R29.898 (ICD-10-CM) - Weakness of both lower extremities  Evaluation Date: 3/23/2023  Authorization Period Expiration: 12/29/2023  Plan of Care Expiration: 6/23/2023  Progress Note Due: 4/23/2023  Visit # / Visits authorized: 1/ 1   FOTO: 0/5     Precautions: Standard     PTA Visit #: 0/5     Time In: 9:45am  Time Out: 10:30am  Total Billable Time: 45 minutes    SUBJECTIVE     Pt reports: she has been having an ache in her back since she walked her cat a few days ago. It was worsened when walking into the clinic from her far away parking spot today.  She was compliant with home exercise program.  Response to previous treatment: no adverse effects  Functional change: none noted    Pain: 2/10  Location: bilateral back      OBJECTIVE     Objective Measures updated at progress report unless specified.     Joint mobility: dec'd thoracic ROSEANNE barfield    MMT:    - lower trap: L = 4-/5; R  = 4-/5   - Middle trap: L = 4-/5; R = 4-/5   - Rhomboid: L = 4/5; R = 4/5    Hip extension motor control: dec'd glute activation B; WNL sequencing    Kieran test: positive for quad and hip flexor B    Treatment     Scarlett received the treatments listed below:      therapeutic exercises and objective measures above to develop strength, endurance, ROM, flexibility, posture, and core stabilization for 25 minutes including:  Seated hip adduction 3x10, 3s  TrA activation   - x10, 5s   - with SB 2x10  Modified piriformis 3x30s B     Next session:  TrA with knee fall out  Lower trap activation seated       manual therapy techniques: Joint mobilizations were applied to the: back for 10 minutes, including:  PA t-spine mobs Gr 2-3    neuromuscular re-education  activities to improve: Coordination and Posture for 10 minutes. The following activities were included:  SL clamshells iso 1x1' B  Hip abd iso 3x20s B  PNF lai stretch 4x20s stretch, 5s contract B    All interventions billed as therapeutic exercises per Medicaid guidelines.         Patient Education and Home Exercises     Home Exercises Provided and Patient Education Provided     Education provided:   - activity modifications  - avoiding over stretching    Written Home Exercises Provided: Patient instructed to cont prior HEP. Exercises were reviewed and Scarlett was able to demonstrate them prior to the end of the session.  Scarlett demonstrated good  understanding of the education provided. See EMR under Patient Instructions for exercises provided during therapy sessions    ASSESSMENT     Pt tolerated first session after initial evaluation fairly well today with no adverse effects. Continued objective measures that noted dec'd thoracic mobility, impaired motor control of hip extensors, and dec'd strength of periscapular muscles.     Scarlett Is progressing well towards her goals.   Patient prognosis is Good.   Patient will benefit from skilled outpatient Physical Therapy to address the deficits stated above and in the chart below, provide patient /family education, and to maximize patientt's level of independence.      Plan of care discussed with patient: Yes  Patient's spiritual, cultural and educational needs considered and patient is agreeable to the plan of care and goals as stated below:      Anticipated Barriers for therapy: co-morbidities, chronicity    Goals:   Short Term Goals (4 Weeks):  1. Pt will be compliant with HEP to supplement PT in decreasing pain with functional mobility.  2. Pt will perform pallof press with good control to demonstrate improved core strength.  3. Pt will improve lumbar ext ROM by 5 degrees to improve functional mobility.  4. Pt will improve impaired LE MMTs by 1/2 score to  improve strength for functional tasks.  Long Term Goals (8 Weeks):   1. Pt will improve FOTO score to </= 54% limited to decrease perceived limitation with maintaining/changing body position.   2. Pt will ambulate x500' without increasing back pain to meet pt specific goal.  3. Pt will improve impaired LE MMTs by 1 score to improve strength for functional tasks.  4. Pt will report no pain during lumbar ROM to promote functional mobility.        PLAN     Plan of care Certification: 3/23/2023 to 6/23/2023.     Outpatient Physical Therapy 2 times weekly for 12 weeks to include the following interventions: Cervical/Lumbar Traction, Electrical Stimulation  , Gait Training, Manual Therapy, Moist Heat/ Ice, Neuromuscular Re-ed, Patient Education, Self Care, Therapeutic Activities, Therapeutic Exercise, Ultrasound, and dry needling, IASTM, and kinesiotaping PRN.     Francisco Javier Alcala, PT

## 2023-03-30 NOTE — PROGRESS NOTES
[facility]   Physical Therapy Treatment Note     Name: Scarlett Knox  Clinic Number: [id number]    Therapy Diagnosis:   Encounter Diagnoses   Name Primary?    Chronic pain of right knee Yes    Difficulty walking     Chronic bilateral thoracic back pain     Weakness of both lower extremities      Physician: Order, Paper    Visit Date: 3/31/2023    Physician Orders: PT Eval and Treat   Medical Diagnosis from Referral: R29.898 (ICD-10-CM) - Weakness of both lower extremities  Evaluation Date: 3/23/2023  Authorization Period Expiration: 12/29/2023  Plan of Care Expiration: 6/23/2023  Progress Note Due: 4/23/2023  Visit # / Visits authorized: 1/ 1, 2/20   FOTO: 1/5     Precautions: Standard     PTA Visit #: 1/5     Time In: 8:32 am  Time Out: 9:16 am  Total Billable Time: 44 minutes    SUBJECTIVE     Pt reports: that she has been having some glute pain which she describes are nerve related in nature, not muscular. Pt states that she is hoping to get back on her medications so that she is not in as much pain, however has been taking some supplements in the mean time.   She was compliant with home exercise program.  Response to previous treatment: no adverse effects  Functional change: none noted    Pain: 2/10  Location: bilateral back      OBJECTIVE     Objective Measures updated at progress report unless specified.     Joint mobility: dec'd thoracic PA glides    MMT:    - lower trap: L = 4-/5; R  = 4-/5   - Middle trap: L = 4-/5; R = 4-/5   - Rhomboid: L = 4/5; R = 4/5    Hip extension motor control: dec'd glute activation B; WNL sequencing    Kieran test: positive for quad and hip flexor B    Treatment     Scarlett received the treatments listed below:      therapeutic exercises and objective measures above to develop strength, endurance, ROM, flexibility, posture, and core stabilization for 34 minutes including:  Seated hip adduction 3x10, 3s  TrA activation   - x10, 5s   - with SB  2x10  Modified piriformis 3x30s B  Extended leg bridges (on SB) 2x10   TrA with knee fall out 10x ea        Next session:  Lower trap activation seated       manual therapy techniques: Joint mobilizations were applied to the: back for 0 minutes, including:  PA t-spine mobs Gr 2-3    neuromuscular re-education activities to improve: Coordination and Posture for 10 minutes. The following activities were included:  SL clamshells iso 2x1' B  Hip abd iso 3x20s B  PNF lai stretch 4x20s stretch, 5s contract B    All interventions billed as therapeutic exercises per Medicaid guidelines.         Patient Education and Home Exercises     Home Exercises Provided and Patient Education Provided     Education provided:   - activity modifications  - avoiding over stretching    Written Home Exercises Provided: Patient instructed to cont prior HEP. Exercises were reviewed and Scarlett was able to demonstrate them prior to the end of the session.  Scarlett demonstrated good  understanding of the education provided. See EMR under Patient Instructions for exercises provided during therapy sessions    ASSESSMENT     Continued with several exercises this session with the addition of TrA bracing with LE fall outs. Pt given verbal cues for holds and transverse abdominus activation without compensations. Also had patient perform straight leg bridges to work on hip extension strength.     Scarlett Is progressing well towards her goals.   Patient prognosis is Good.   Patient will benefit from skilled outpatient Physical Therapy to address the deficits stated above and in the chart below, provide patient /family education, and to maximize patientt's level of independence.      Plan of care discussed with patient: Yes  Patient's spiritual, cultural and educational needs considered and patient is agreeable to the plan of care and goals as stated below:      Anticipated Barriers for therapy: co-morbidities, chronicity    Goals:   Short Term Goals  (4 Weeks):  1. Pt will be compliant with HEP to supplement PT in decreasing pain with functional mobility.  2. Pt will perform pallof press with good control to demonstrate improved core strength.  3. Pt will improve lumbar ext ROM by 5 degrees to improve functional mobility.  4. Pt will improve impaired LE MMTs by 1/2 score to improve strength for functional tasks.  Long Term Goals (8 Weeks):   1. Pt will improve FOTO score to </= 54% limited to decrease perceived limitation with maintaining/changing body position.   2. Pt will ambulate x500' without increasing back pain to meet pt specific goal.  3. Pt will improve impaired LE MMTs by 1 score to improve strength for functional tasks.  4. Pt will report no pain during lumbar ROM to promote functional mobility.        PLAN     Plan of care Certification: 3/23/2023 to 6/23/2023.     Outpatient Physical Therapy 2 times weekly for 12 weeks to include the following interventions: Cervical/Lumbar Traction, Electrical Stimulation  , Gait Training, Manual Therapy, Moist Heat/ Ice, Neuromuscular Re-ed, Patient Education, Self Care, Therapeutic Activities, Therapeutic Exercise, Ultrasound, and dry needling, IASTM, and kinesiotaping PRN.     Kirsten Blanc, PTA

## 2023-03-31 ENCOUNTER — CLINICAL SUPPORT (OUTPATIENT)
Dept: REHABILITATION | Facility: HOSPITAL | Age: 33
End: 2023-03-31
Payer: MEDICAID

## 2023-03-31 DIAGNOSIS — G89.29 CHRONIC PAIN OF RIGHT KNEE: Primary | ICD-10-CM

## 2023-03-31 DIAGNOSIS — G89.29 CHRONIC BILATERAL THORACIC BACK PAIN: ICD-10-CM

## 2023-03-31 DIAGNOSIS — M25.561 CHRONIC PAIN OF RIGHT KNEE: Primary | ICD-10-CM

## 2023-03-31 DIAGNOSIS — M54.6 CHRONIC BILATERAL THORACIC BACK PAIN: ICD-10-CM

## 2023-03-31 DIAGNOSIS — R26.2 DIFFICULTY WALKING: ICD-10-CM

## 2023-03-31 DIAGNOSIS — R29.898 WEAKNESS OF BOTH LOWER EXTREMITIES: ICD-10-CM

## 2023-03-31 PROCEDURE — 97110 THERAPEUTIC EXERCISES: CPT | Mod: CQ

## 2023-04-03 NOTE — PROGRESS NOTES
[facility]   Physical Therapy Treatment Note     Name: Scarlett Knox  Clinic Number: [id number]    Therapy Diagnosis:   Encounter Diagnoses   Name Primary?    Chronic pain of right knee Yes    Difficulty walking     Chronic bilateral thoracic back pain     Weakness of both lower extremities        Physician: Order, Paper    Visit Date: 4/4/2023    Physician Orders: PT Eval and Treat   Medical Diagnosis from Referral: R29.898 (ICD-10-CM) - Weakness of both lower extremities  Evaluation Date: 3/23/2023  Authorization Period Expiration: 12/29/2023  Plan of Care Expiration: 6/23/2023  Progress Note Due: 4/23/2023  Visit # / Visits authorized: 1/ 1, 3/20   FOTO: 2/5     Precautions: Standard     PTA Visit #: 2/5     Time In: 9:40 am  Time Out: 10:25 am  Total Billable Time: 45 minutes    SUBJECTIVE     Pt reports: that her right leg is in a lot of pain after walking from the end of the parking lot.   She was compliant with home exercise program.  Response to previous treatment: no adverse effects  Functional change: none noted    Pain: 2/10  Location: bilateral back      OBJECTIVE     Objective Measures updated at progress report unless specified.     Joint mobility: dec'd thoracic PA glides    MMT:    - lower trap: L = 4-/5; R  = 4-/5   - Middle trap: L = 4-/5; R = 4-/5   - Rhomboid: L = 4/5; R = 4/5    Hip extension motor control: dec'd glute activation B; WNL sequencing    Kieran test: positive for quad and hip flexor B    Treatment     Scarlett received the treatments listed below:      therapeutic exercises and objective measures above to develop strength, endurance, ROM, flexibility, posture, and core stabilization for 30 minutes including:  Seated hip adduction 3x10, 3s  TrA activation   - x10, 5s   - with SB 2x10  Modified piriformis 3x30s B  Seated pallof press DBL RTB 15x ea     Next session:  Extended leg bridges (on SB) 2x10   TrA with knee fall out 10x ea   Lower trap activation  seated       manual therapy techniques: Joint mobilizations were applied to the: back for 5 minutes, including:  PA t-spine mobs Gr 2-3  R hip lateral distraction     neuromuscular re-education activities to improve: Coordination and Posture for 10 minutes. The following activities were included:  SL clamshells iso 2x1' B  Hip abd iso 3x20s B (not performed)   PNF lai stretch 4x20s stretch, 5s contract B    All interventions billed as therapeutic exercises per Medicaid guidelines.         Patient Education and Home Exercises     Home Exercises Provided and Patient Education Provided     Education provided:   - activity modifications  - avoiding over stretching    Written Home Exercises Provided: Patient instructed to cont prior HEP. Exercises were reviewed and Scralett was able to demonstrate them prior to the end of the session.  Scarlett demonstrated good  understanding of the education provided. See EMR under Patient Instructions for exercises provided during therapy sessions    ASSESSMENT     Pt entered session with increased right glute pain, indicative of gluteal tendinitis. Pt encouraged to continue with sidelying hip isometrics. Also performed manual therapy techniques to reduce back and glute pain, with positive response from patient. Incorporated seated palloff press for increased core strength.     Scarlett Is progressing well towards her goals.   Patient prognosis is Good.   Patient will benefit from skilled outpatient Physical Therapy to address the deficits stated above and in the chart below, provide patient /family education, and to maximize patientt's level of independence.      Plan of care discussed with patient: Yes  Patient's spiritual, cultural and educational needs considered and patient is agreeable to the plan of care and goals as stated below:      Anticipated Barriers for therapy: co-morbidities, chronicity    Goals:   Short Term Goals (4 Weeks):  1. Pt will be compliant with HEP to  supplement PT in decreasing pain with functional mobility.  2. Pt will perform pallof press with good control to demonstrate improved core strength.  3. Pt will improve lumbar ext ROM by 5 degrees to improve functional mobility.  4. Pt will improve impaired LE MMTs by 1/2 score to improve strength for functional tasks.  Long Term Goals (8 Weeks):   1. Pt will improve FOTO score to </= 54% limited to decrease perceived limitation with maintaining/changing body position.   2. Pt will ambulate x500' without increasing back pain to meet pt specific goal.  3. Pt will improve impaired LE MMTs by 1 score to improve strength for functional tasks.  4. Pt will report no pain during lumbar ROM to promote functional mobility.        PLAN     Plan of care Certification: 3/23/2023 to 6/23/2023.     Outpatient Physical Therapy 2 times weekly for 12 weeks to include the following interventions: Cervical/Lumbar Traction, Electrical Stimulation  , Gait Training, Manual Therapy, Moist Heat/ Ice, Neuromuscular Re-ed, Patient Education, Self Care, Therapeutic Activities, Therapeutic Exercise, Ultrasound, and dry needling, IASTM, and kinesiotaping PRN.     Kirsten Blanc, PTA

## 2023-04-04 ENCOUNTER — CLINICAL SUPPORT (OUTPATIENT)
Dept: REHABILITATION | Facility: HOSPITAL | Age: 33
End: 2023-04-04
Payer: MEDICAID

## 2023-04-04 DIAGNOSIS — R29.898 WEAKNESS OF BOTH LOWER EXTREMITIES: ICD-10-CM

## 2023-04-04 DIAGNOSIS — M54.6 CHRONIC BILATERAL THORACIC BACK PAIN: ICD-10-CM

## 2023-04-04 DIAGNOSIS — R26.2 DIFFICULTY WALKING: ICD-10-CM

## 2023-04-04 DIAGNOSIS — M25.561 CHRONIC PAIN OF RIGHT KNEE: Primary | ICD-10-CM

## 2023-04-04 DIAGNOSIS — G89.29 CHRONIC BILATERAL THORACIC BACK PAIN: ICD-10-CM

## 2023-04-04 DIAGNOSIS — G89.29 CHRONIC PAIN OF RIGHT KNEE: Primary | ICD-10-CM

## 2023-04-04 PROCEDURE — 97110 THERAPEUTIC EXERCISES: CPT | Mod: CQ

## 2023-04-05 ENCOUNTER — DOCUMENTATION ONLY (OUTPATIENT)
Dept: REHABILITATION | Facility: HOSPITAL | Age: 33
End: 2023-04-05
Payer: MEDICAID

## 2023-04-06 ENCOUNTER — PATIENT MESSAGE (OUTPATIENT)
Dept: REHABILITATION | Facility: HOSPITAL | Age: 33
End: 2023-04-06

## 2023-04-12 ENCOUNTER — OFFICE VISIT (OUTPATIENT)
Dept: FAMILY MEDICINE | Facility: HOSPITAL | Age: 33
End: 2023-04-12
Payer: MEDICAID

## 2023-04-12 VITALS — BODY MASS INDEX: 29.84 KG/M2 | HEIGHT: 61 IN | WEIGHT: 158.06 LBS

## 2023-04-12 DIAGNOSIS — G89.29 CHRONIC BILATERAL THORACIC BACK PAIN: ICD-10-CM

## 2023-04-12 DIAGNOSIS — G47.00 INSOMNIA, UNSPECIFIED TYPE: ICD-10-CM

## 2023-04-12 DIAGNOSIS — E66.3 OVERWEIGHT WITH BODY MASS INDEX (BMI) OF 29 TO 29.9 IN ADULT: Primary | ICD-10-CM

## 2023-04-12 DIAGNOSIS — M54.6 CHRONIC BILATERAL THORACIC BACK PAIN: ICD-10-CM

## 2023-04-12 PROCEDURE — 99213 OFFICE O/P EST LOW 20 MIN: CPT

## 2023-04-12 RX ORDER — LEVOCETIRIZINE DIHYDROCHLORIDE 5 MG/1
5 TABLET, FILM COATED ORAL NIGHTLY
COMMUNITY
Start: 2023-03-30

## 2023-04-12 RX ORDER — NAPROXEN SODIUM 220 MG
220 TABLET ORAL
COMMUNITY
End: 2023-04-12

## 2023-04-12 RX ORDER — ALPRAZOLAM 0.25 MG/1
0.25 TABLET ORAL
COMMUNITY

## 2023-04-12 RX ORDER — GABAPENTIN 100 MG/1
CAPSULE ORAL
COMMUNITY
End: 2023-07-21 | Stop reason: CLARIF

## 2023-04-12 RX ORDER — PRAZOSIN HYDROCHLORIDE 1 MG/1
1 CAPSULE ORAL 2 TIMES DAILY
COMMUNITY
Start: 2023-03-23

## 2023-04-12 NOTE — PROGRESS NOTES
I assume primary medical responsibility for this patient. I have reviewed the history, physical, and assessment & treatment plan with the resident and agree that the care is reasonable and necessary. This service has been performed by a resident without the presence of a teaching physician under the primary care exception. If necessary, an addendum of additional findings or evaluation beyond the resident documentation will be noted below.        Willi Jolly Jr., DO    [facility]
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
## 2023-04-14 ENCOUNTER — CLINICAL SUPPORT (OUTPATIENT)
Dept: REHABILITATION | Facility: HOSPITAL | Age: 33
End: 2023-04-14
Payer: MEDICAID

## 2023-04-14 DIAGNOSIS — G89.29 CHRONIC PAIN OF RIGHT KNEE: Primary | ICD-10-CM

## 2023-04-14 DIAGNOSIS — M54.6 CHRONIC BILATERAL THORACIC BACK PAIN: ICD-10-CM

## 2023-04-14 DIAGNOSIS — R29.898 WEAKNESS OF BOTH LOWER EXTREMITIES: ICD-10-CM

## 2023-04-14 DIAGNOSIS — G89.29 CHRONIC BILATERAL THORACIC BACK PAIN: ICD-10-CM

## 2023-04-14 DIAGNOSIS — M25.561 CHRONIC PAIN OF RIGHT KNEE: Primary | ICD-10-CM

## 2023-04-14 DIAGNOSIS — R26.2 DIFFICULTY WALKING: ICD-10-CM

## 2023-04-14 PROCEDURE — 97110 THERAPEUTIC EXERCISES: CPT

## 2023-04-14 NOTE — PROGRESS NOTES
[facility]   Physical Therapy Treatment Note     Name: Scarlett Knox  Clinic Number: [id number]    Therapy Diagnosis:   No diagnosis found.      Physician: Order, Paper    Visit Date: 4/14/2023    Physician Orders: PT Eval and Treat   Medical Diagnosis from Referral: R29.898 (ICD-10-CM) - Weakness of both lower extremities  Evaluation Date: 3/23/2023  Authorization Period Expiration: 12/29/2023  Plan of Care Expiration: 6/23/2023  Progress Note Due: 4/23/2023  Visit # / Visits authorized: 1/ 1, 4/20   FOTO: 3/5     Precautions: Standard     PTA Visit #: 0/5     Time In: 9:15am  Time Out: 10:00am  Total Billable Time: 45 minutes    SUBJECTIVE     Pt reports: she was very busy with her mom being in town for the last five days. The pain has caught up with her after her mom left.   She was compliant with home exercise program.  Response to previous treatment: no adverse effects  Functional change: none noted    Pain: 2/10  Location: bilateral back      OBJECTIVE     Objective Measures updated at progress report unless specified.     Treatment     Scarlett received the treatments listed below:      therapeutic exercises and objective measures above to develop strength, endurance, ROM, flexibility, posture, and core stabilization for 35 minutes including:  Seated hip adduction 3x10, 3s NP  TrA activation   - x10, 5s   - with SB x10   - with knee fall out 2x10 ea    - with SB obliques x10, 5s B  Modified piriformis 3x30s B  Seated pallof press DBL RTB 15x ea  NP  Supine iso clam 2x10, 5s     Next session:  Extended leg bridges (on SB) 2x10   Lower trap activation seated       manual therapy techniques: Joint mobilizations were applied to the: back for 0 minutes, including:  PA t-spine mobs Gr 2-3  R hip lateral distraction     neuromuscular re-education activities to improve: Coordination and Posture for 10 minutes. The following activities were included:  SL clamshells iso 2x1' B  Hip abd iso  3x20s B (not performed)   PNF lai stretch 4x20s stretch, 5s contract B      All interventions billed as therapeutic exercises per Medicaid guidelines.         Patient Education and Home Exercises     Home Exercises Provided and Patient Education Provided     Education provided:   - activity modifications  - avoiding over stretching    Written Home Exercises Provided: Patient instructed to cont prior HEP. Exercises were reviewed and Scarlett was able to demonstrate them prior to the end of the session.  Scarlett demonstrated good  understanding of the education provided. See EMR under Patient Instructions for exercises provided during therapy sessions    ASSESSMENT     Session with emphasis on progressing core strength with good tolerance and no adverse effects. Will continue to progress as tolerated.     Scarlett Is progressing well towards her goals.   Patient prognosis is Good.   Patient will benefit from skilled outpatient Physical Therapy to address the deficits stated above and in the chart below, provide patient /family education, and to maximize patientt's level of independence.      Plan of care discussed with patient: Yes  Patient's spiritual, cultural and educational needs considered and patient is agreeable to the plan of care and goals as stated below:      Anticipated Barriers for therapy: co-morbidities, chronicity    Goals:   Short Term Goals (4 Weeks):  1. Pt will be compliant with HEP to supplement PT in decreasing pain with functional mobility.  2. Pt will perform pallof press with good control to demonstrate improved core strength.  3. Pt will improve lumbar ext ROM by 5 degrees to improve functional mobility.  4. Pt will improve impaired LE MMTs by 1/2 score to improve strength for functional tasks.  Long Term Goals (8 Weeks):   1. Pt will improve FOTO score to </= 54% limited to decrease perceived limitation with maintaining/changing body position.   2. Pt will ambulate x500' without increasing  back pain to meet pt specific goal.  3. Pt will improve impaired LE MMTs by 1 score to improve strength for functional tasks.  4. Pt will report no pain during lumbar ROM to promote functional mobility.        PLAN     Plan of care Certification: 3/23/2023 to 6/23/2023.     Outpatient Physical Therapy 2 times weekly for 12 weeks to include the following interventions: Cervical/Lumbar Traction, Electrical Stimulation  , Gait Training, Manual Therapy, Moist Heat/ Ice, Neuromuscular Re-ed, Patient Education, Self Care, Therapeutic Activities, Therapeutic Exercise, Ultrasound, and dry needling, IASTM, and kinesiotaping PRN.     Francisco Javier Alcala, PT

## 2023-04-17 ENCOUNTER — PATIENT MESSAGE (OUTPATIENT)
Dept: ORTHOPEDICS | Facility: CLINIC | Age: 33
End: 2023-04-17
Payer: MEDICAID

## 2023-04-17 ENCOUNTER — PATIENT MESSAGE (OUTPATIENT)
Dept: FAMILY MEDICINE | Facility: HOSPITAL | Age: 33
End: 2023-04-17
Payer: MEDICAID

## 2023-04-18 ENCOUNTER — CLINICAL SUPPORT (OUTPATIENT)
Dept: REHABILITATION | Facility: HOSPITAL | Age: 33
End: 2023-04-18
Payer: MEDICAID

## 2023-04-18 DIAGNOSIS — R29.898 WEAKNESS OF BOTH LOWER EXTREMITIES: ICD-10-CM

## 2023-04-18 DIAGNOSIS — R26.2 DIFFICULTY WALKING: ICD-10-CM

## 2023-04-18 DIAGNOSIS — M54.6 CHRONIC BILATERAL THORACIC BACK PAIN: ICD-10-CM

## 2023-04-18 DIAGNOSIS — M25.561 CHRONIC PAIN OF RIGHT KNEE: Primary | ICD-10-CM

## 2023-04-18 DIAGNOSIS — G89.29 CHRONIC PAIN OF RIGHT KNEE: Primary | ICD-10-CM

## 2023-04-18 DIAGNOSIS — G89.29 CHRONIC BILATERAL THORACIC BACK PAIN: ICD-10-CM

## 2023-04-18 PROCEDURE — 97110 THERAPEUTIC EXERCISES: CPT

## 2023-04-18 NOTE — PROGRESS NOTES
[facility]   Physical Therapy Treatment Note     Name: Scarlett Knox  Clinic Number: [id number]    Therapy Diagnosis:   Encounter Diagnoses   Name Primary?    Chronic pain of right knee Yes    Difficulty walking     Chronic bilateral thoracic back pain     Weakness of both lower extremities          Physician: Order, Paper    Visit Date: 4/18/2023    Physician Orders: PT Eval and Treat   Medical Diagnosis from Referral: R29.898 (ICD-10-CM) - Weakness of both lower extremities  Evaluation Date: 3/23/2023  Authorization Period Expiration: 12/29/2023  Plan of Care Expiration: 6/23/2023  Progress Note Due: 4/23/2023  Visit # / Visits authorized: 1/ 1, 5/20   FOTO: 5/5     Precautions: Standard     PTA Visit #: 0/5     Time In: 9:46am  Time Out: 10:31am  Total Billable Time: 45 minutes    SUBJECTIVE     Pt reports: She started with L sided mid-thoracic pain that started yesterday morning. It hurts her to be upright and feels better with back support.   She was compliant with home exercise program.  Response to previous treatment: no adverse effects  Functional change: none noted    Pain: 2/10  Location: bilateral back      OBJECTIVE     Objective Measures updated at progress report unless specified.     Treatment     Scarlett received the treatments listed below:      therapeutic exercises and objective measures above to develop strength, endurance, ROM, flexibility, posture, and core stabilization for 31 minutes including:  TrA activation    - 3x10, 5s (on heat)   - with SB x10 NP   - with knee fall out 2x10 ea NP   - with SB obliques x10, 5s B NP  Seated fwd press with yell theraball 2x10  Seated upper back stretch 5x15s    NP:  Modified piriformis 3x30s B  Seated pallof press DBL RTB 15x ea  NP  Supine iso clam 2x10, 5s   Seated hip adduction 3x10, 3s NP      Next session:  Extended leg bridges (on SB) 2x10       manual therapy techniques: Joint mobilizations were applied to the: back for  0 minutes, including:  PA t-spine mobs Gr 2-3  R hip lateral distraction     neuromuscular re-education activities to improve: Coordination and Posture for 14 minutes. The following activities were included:  SL clamshells iso 2x1' B NP  Hip abd iso 3x20s B (not performed) NP  PNF lai stretch 4x20s stretch, 5s contract B NP  90/90 breathing with SB and add. Squeeze x25 (on heat)  Lower trap activation seated 3x10, 5s    All interventions billed as therapeutic exercises per Medicaid guidelines.         Patient Education and Home Exercises     Home Exercises Provided and Patient Education Provided     Education provided:   - activity modifications  - avoiding over stretching    Written Home Exercises Provided: Patient instructed to cont prior HEP. Exercises were reviewed and Scarlett was able to demonstrate them prior to the end of the session.  Scarlett demonstrated good  understanding of the education provided. See EMR under Patient Instructions for exercises provided during therapy sessions    ASSESSMENT     Session modified due to inc'd pain at mid thoracic region today. Symptoms are likely due to strained muscle, so included MH during supine exercises to address pain with fairly good response. Inc'd time required at this date due to inc'd symptoms. Improved symptoms following interventions at this date.     Scarlett Is progressing well towards her goals.   Patient prognosis is Good.   Patient will benefit from skilled outpatient Physical Therapy to address the deficits stated above and in the chart below, provide patient /family education, and to maximize patientt's level of independence.      Plan of care discussed with patient: Yes  Patient's spiritual, cultural and educational needs considered and patient is agreeable to the plan of care and goals as stated below:      Anticipated Barriers for therapy: co-morbidities, chronicity    Goals:   Short Term Goals (4 Weeks):  1. Pt will be compliant with HEP to  supplement PT in decreasing pain with functional mobility.  2. Pt will perform pallof press with good control to demonstrate improved core strength.  3. Pt will improve lumbar ext ROM by 5 degrees to improve functional mobility.  4. Pt will improve impaired LE MMTs by 1/2 score to improve strength for functional tasks.  Long Term Goals (8 Weeks):   1. Pt will improve FOTO score to </= 54% limited to decrease perceived limitation with maintaining/changing body position.   2. Pt will ambulate x500' without increasing back pain to meet pt specific goal.  3. Pt will improve impaired LE MMTs by 1 score to improve strength for functional tasks.  4. Pt will report no pain during lumbar ROM to promote functional mobility.        PLAN     Plan of care Certification: 3/23/2023 to 6/23/2023.     Outpatient Physical Therapy 2 times weekly for 12 weeks to include the following interventions: Cervical/Lumbar Traction, Electrical Stimulation  , Gait Training, Manual Therapy, Moist Heat/ Ice, Neuromuscular Re-ed, Patient Education, Self Care, Therapeutic Activities, Therapeutic Exercise, Ultrasound, and dry needling, IASTM, and kinesiotaping PRN.     Francisco Javier Alcala, PT

## 2023-04-20 NOTE — PROGRESS NOTES
"[facility]   Physical Therapy Treatment Note     Name: Scarlett Knox  Clinic Number: [id number]    Therapy Diagnosis:   Encounter Diagnoses   Name Primary?    Chronic pain of right knee Yes    Difficulty walking     Chronic bilateral thoracic back pain     Weakness of both lower extremities            Physician: Order, Paper    Visit Date: 4/21/2023    Physician Orders: PT Eval and Treat   Medical Diagnosis from Referral: R29.898 (ICD-10-CM) - Weakness of both lower extremities  Evaluation Date: 3/23/2023  Authorization Period Expiration: 12/29/2023  Plan of Care Expiration: 6/23/2023  Progress Note Due: 4/23/2023  Visit # / Visits authorized: 1/ 1, 6/20   FOTO: 5/5     Precautions: Standard     PTA Visit #: 1/5     Time In: 9:15 am  Time Out: 9:56 am  Total Billable Time: 41 minutes    SUBJECTIVE     Pt reports: that she is feeling a slight pain and tightness in her back today. Pt states that her doctor had mentioned another injection for her back, however she is not ready or at the point where she feels that she needs it and would like to try to resolve symptoms with PT first.   She was compliant with home exercise program.  Response to previous treatment: no adverse effects  Functional change: none noted    Pain: 2/10  Location: bilateral back      OBJECTIVE     Objective Measures updated at progress report unless specified.     Treatment     Scarlett received the treatments listed below:      therapeutic exercises and objective measures above to develop strength, endurance, ROM, flexibility, posture, and core stabilization for 26 minutes including:  TrA activation    - 3x10, 5s    - with SB x15   - with knee fall out 2x10 ea    - with SB obliques x10, 5s B   DKTC c/ SB 15x 5"   Seated upper back stretch 5x15s    NP:  Seated fwd press with yell theraball 2x10  Modified piriformis 3x30s B  Seated pallof press DBL RTB 15x ea  NP  Supine iso clam 2x10, 5s   Seated hip adduction 3x10, 3s " NP      Next session:  Extended leg bridges (on SB) 2x10       manual therapy techniques: Joint mobilizations were applied to the: back for 0 minutes, including:  PA t-spine mobs Gr 2-3  R hip lateral distraction     neuromuscular re-education activities to improve: Coordination and Posture for 15 minutes. The following activities were included:  SL clamshells iso 2x1' B NP  Hip abd iso 3x20s B (not performed) NP  PNF lai stretch 4x20s stretch, 5s contract B   90/90 breathing with SB and add. Squeeze x25   Lower trap activation seated 2x10, 5s    All interventions billed as therapeutic exercises per Medicaid guidelines.         Patient Education and Home Exercises     Home Exercises Provided and Patient Education Provided     Education provided:   - activity modifications  - avoiding over stretching    Written Home Exercises Provided: Patient instructed to cont prior HEP. Exercises were reviewed and Scarlett was able to demonstrate them prior to the end of the session.  Scarlett demonstrated good  understanding of the education provided. See EMR under Patient Instructions for exercises provided during therapy sessions    ASSESSMENT     Continued with established therex this session with no adverse effects. Also included DKTC with ball in order to provide vertebral gapping and increased lumbar paraspinal extensibility. Pt with improved pain following session today. Will continue to progress towards improved LE and core strength, as well as decreased pain.     Scarlett Is progressing well towards her goals.   Patient prognosis is Good.   Patient will benefit from skilled outpatient Physical Therapy to address the deficits stated above and in the chart below, provide patient /family education, and to maximize patientt's level of independence.      Plan of care discussed with patient: Yes  Patient's spiritual, cultural and educational needs considered and patient is agreeable to the plan of care and goals as stated  below:      Anticipated Barriers for therapy: co-morbidities, chronicity    Goals:   Short Term Goals (4 Weeks):  1. Pt will be compliant with HEP to supplement PT in decreasing pain with functional mobility.  2. Pt will perform pallof press with good control to demonstrate improved core strength.  3. Pt will improve lumbar ext ROM by 5 degrees to improve functional mobility.  4. Pt will improve impaired LE MMTs by 1/2 score to improve strength for functional tasks.  Long Term Goals (8 Weeks):   1. Pt will improve FOTO score to </= 54% limited to decrease perceived limitation with maintaining/changing body position.   2. Pt will ambulate x500' without increasing back pain to meet pt specific goal.  3. Pt will improve impaired LE MMTs by 1 score to improve strength for functional tasks.  4. Pt will report no pain during lumbar ROM to promote functional mobility.        PLAN     Plan of care Certification: 3/23/2023 to 6/23/2023.     Outpatient Physical Therapy 2 times weekly for 12 weeks to include the following interventions: Cervical/Lumbar Traction, Electrical Stimulation  , Gait Training, Manual Therapy, Moist Heat/ Ice, Neuromuscular Re-ed, Patient Education, Self Care, Therapeutic Activities, Therapeutic Exercise, Ultrasound, and dry needling, IASTM, and kinesiotaping PRN.     Kirsten Blanc, PTA

## 2023-04-21 ENCOUNTER — CLINICAL SUPPORT (OUTPATIENT)
Dept: REHABILITATION | Facility: HOSPITAL | Age: 33
End: 2023-04-21
Payer: MEDICAID

## 2023-04-21 DIAGNOSIS — R29.898 WEAKNESS OF BOTH LOWER EXTREMITIES: ICD-10-CM

## 2023-04-21 DIAGNOSIS — M25.561 CHRONIC PAIN OF RIGHT KNEE: Primary | ICD-10-CM

## 2023-04-21 DIAGNOSIS — M54.6 CHRONIC BILATERAL THORACIC BACK PAIN: ICD-10-CM

## 2023-04-21 DIAGNOSIS — G89.29 CHRONIC PAIN OF RIGHT KNEE: Primary | ICD-10-CM

## 2023-04-21 DIAGNOSIS — R26.2 DIFFICULTY WALKING: ICD-10-CM

## 2023-04-21 DIAGNOSIS — G89.29 CHRONIC BILATERAL THORACIC BACK PAIN: ICD-10-CM

## 2023-04-21 PROCEDURE — 97110 THERAPEUTIC EXERCISES: CPT | Mod: CQ

## 2023-04-25 ENCOUNTER — CLINICAL SUPPORT (OUTPATIENT)
Dept: REHABILITATION | Facility: HOSPITAL | Age: 33
End: 2023-04-25
Payer: MEDICAID

## 2023-04-25 DIAGNOSIS — G89.29 CHRONIC BILATERAL THORACIC BACK PAIN: ICD-10-CM

## 2023-04-25 DIAGNOSIS — G89.29 CHRONIC PAIN OF RIGHT KNEE: Primary | ICD-10-CM

## 2023-04-25 DIAGNOSIS — M25.561 CHRONIC PAIN OF RIGHT KNEE: Primary | ICD-10-CM

## 2023-04-25 DIAGNOSIS — R26.2 DIFFICULTY WALKING: ICD-10-CM

## 2023-04-25 DIAGNOSIS — M54.6 CHRONIC BILATERAL THORACIC BACK PAIN: ICD-10-CM

## 2023-04-25 DIAGNOSIS — R29.898 WEAKNESS OF BOTH LOWER EXTREMITIES: ICD-10-CM

## 2023-04-25 PROCEDURE — 97112 NEUROMUSCULAR REEDUCATION: CPT

## 2023-04-25 PROCEDURE — 97110 THERAPEUTIC EXERCISES: CPT

## 2023-04-25 NOTE — PROGRESS NOTES
[facility]   Physical Therapy Treatment Note     Name: Scarlett Knox  Clinic Number: [id number]    Therapy Diagnosis:   Encounter Diagnoses   Name Primary?    Chronic pain of right knee Yes    Difficulty walking     Chronic bilateral thoracic back pain     Weakness of both lower extremities        Physician: Order, Paper    Visit Date: 4/25/2023    Physician Orders: PT Eval and Treat   Medical Diagnosis from Referral: R29.898 (ICD-10-CM) - Weakness of both lower extremities  Evaluation Date: 3/23/2023  Authorization Period Expiration: 12/29/2023  Plan of Care Expiration: 6/23/2023  Progress Note Due: 4/23/2023  Visit # / Visits authorized: 1/ 1, 7/20   FOTO: 5/5     Precautions: Standard     PTA Visit #: 1/5     Time In: 9:47 am  Time Out: 10:30 am  Total Billable Time: 43 minutes    SUBJECTIVE     Pt reports: she is having more pain in her mid back today. She feels like her back has stayed the same. Her pain is more aching not sharp. She is still not having to take the NSAIDs. Her knee pain is much better.   She was compliant with home exercise program.  Response to previous treatment: no adverse effects  Functional change: none noted    Pain: 4/10  Location: bilateral back      OBJECTIVE     Objective Measures updated at progress report unless specified.     Lumbar AROM: Pain/Dysfunction with Movement: !=pain   Flexion: WNL     Extension: 5 degrees  uncomfortable   Right side bending: NT     Left side bending: NT     Right rotation: NT     Left rotation: NT          Right Left Comment: !=pain   Hip Flexion: 4/5 4+/5     Hip ABD: 4/5 4/5     Hip ADD: 4/5 4/5     Hip Extension: NT NT     Hip IR: 5/5 5/5     Hip ER: 4+/5 4+/5           Right Left Comment ! = pain   Knee extension: 5/5 5/5     Knee flexion: 5/5 5/5     Ankle DF: 5/5 5/5     Ankle PF: 5/5 5/5         Right Left Comment: ! = pain   Shoulder flexion: 4/5 4/5    Shoulder Abduction: 4/5 4/5    Shoulder ER: 4+/5 4+/5   "  Shoulder IR: 4+/5 4+/5    Elbow Flexion: 4+/5 4+/5    Elbow Extension: 4+/5 4+/5      Treatment     Scarlett received the treatments listed below:      therapeutic exercises and objective measures above to develop strength, endurance, ROM, flexibility, posture, and core stabilization for 33 minutes including:  TrA activation    - 3x10, 5s    - with SB x15   - with knee fall out 2x10 ea    - with SB obliques x10, 5s B   DKTC c/ SB 15x 5"   Seated upper back stretch 5x15s  Bridges 3x10 (VC for putting weight through heel)    NP:  Seated fwd press with yell theraball 2x10  Modified piriformis 3x30s B  Seated pallof press DBL RTB 15x ea  NP  Supine iso clam 2x10, 5s   Seated hip adduction 3x10, 3s NP      Next session:  Extended leg bridges (on SB) 2x10       manual therapy techniques: Joint mobilizations were applied to the: back for 0 minutes, including:  PA t-spine mobs Gr 2-3  R hip lateral distraction     neuromuscular re-education activities to improve: Coordination and Posture for 10 minutes. The following activities were included:  SL clamshells iso 1x1' B  Hip abd iso 3x20s B (not performed)   PNF lai stretch 4x20s stretch, 5s contract B NP  90/90 breathing with SB and add. Squeeze x25 NP  Lower trap activation seated 2x10, 5s    All interventions billed as therapeutic exercises per Medicaid guidelines.         Patient Education and Home Exercises     Home Exercises Provided and Patient Education Provided     Education provided:   - activity modifications  - avoiding over stretching    Written Home Exercises Provided: Patient instructed to cont prior HEP. Exercises were reviewed and Scarlett was able to demonstrate them prior to the end of the session.  Scarlett demonstrated good  understanding of the education provided. See EMR under Patient Instructions for exercises provided during therapy sessions    ASSESSMENT     Pt limited by fear avoidance behaviors today during assessment, so evaluation limited at " this date. Improved strength noted. Good tolerance to all interventions.     Scarlett Is progressing well towards her goals.   Patient prognosis is Good.   Patient will benefit from skilled outpatient Physical Therapy to address the deficits stated above and in the chart below, provide patient /family education, and to maximize patientt's level of independence.      Plan of care discussed with patient: Yes  Patient's spiritual, cultural and educational needs considered and patient is agreeable to the plan of care and goals as stated below:      Anticipated Barriers for therapy: co-morbidities, chronicity    Goals:   Short Term Goals (4 Weeks):  1. Pt will be compliant with HEP to supplement PT in decreasing pain with functional mobility.  2. Pt will perform pallof press with good control to demonstrate improved core strength.  3. Pt will improve lumbar ext ROM by 5 degrees to improve functional mobility.  4. Pt will improve impaired LE MMTs by 1/2 score to improve strength for functional tasks.  Long Term Goals (8 Weeks):   1. Pt will improve FOTO score to </= 54% limited to decrease perceived limitation with maintaining/changing body position.   2. Pt will ambulate x500' without increasing back pain to meet pt specific goal.  3. Pt will improve impaired LE MMTs by 1 score to improve strength for functional tasks.  4. Pt will report no pain during lumbar ROM to promote functional mobility.        PLAN     Plan of care Certification: 3/23/2023 to 6/23/2023.     Outpatient Physical Therapy 2 times weekly for 12 weeks to include the following interventions: Cervical/Lumbar Traction, Electrical Stimulation  , Gait Training, Manual Therapy, Moist Heat/ Ice, Neuromuscular Re-ed, Patient Education, Self Care, Therapeutic Activities, Therapeutic Exercise, Ultrasound, and dry needling, IASTM, and kinesiotaping PRN.     Francisco Javier Alcala, PT

## 2023-04-27 NOTE — PROGRESS NOTES
"[facility]   Physical Therapy Treatment Note     Name: Scarlett Knox  Clinic Number: [id number]    Therapy Diagnosis:   Encounter Diagnoses   Name Primary?    Chronic pain of right knee Yes    Difficulty walking     Chronic bilateral thoracic back pain     Weakness of both lower extremities          Physician: Order, Paper    Visit Date: 4/28/2023    Physician Orders: PT Eval and Treat   Medical Diagnosis from Referral: R29.898 (ICD-10-CM) - Weakness of both lower extremities  Evaluation Date: 3/23/2023  Authorization Period Expiration: 12/29/2023  Plan of Care Expiration: 6/23/2023  Progress Note Due: 4/23/2023  Visit # / Visits authorized: 1/ 1, 8/20   FOTO: 5/5     Precautions: Standard     PTA Visit #: 2/5     Time In: 8:32 am  Time Out: 9:22 am  Total Billable Time: 50 minutes    SUBJECTIVE     Pt reports: that she is having her regular aches this morning with a little pain in her knee while walking.   She was compliant with home exercise program.  Response to previous treatment: no adverse effects  Functional change: none noted    Pain: 4/10  Location: bilateral back      OBJECTIVE     Objective Measures updated at progress report unless specified.       Treatment     Scarlett received the treatments listed below:      therapeutic exercises and objective measures above to develop strength, endurance, ROM, flexibility, posture, and core stabilization for 42 minutes including:  TrA activation    - 3x10, 5s    - with SB x15   - with knee fall out 2x10 ea    - with SB obliques x10, 5s B   DKTC c/ SB 15x 5"   Seated upper back stretch 5x15s  Bridges x10 (VC for putting weight through heel)  QL stretch off mat x 1 trial     NP:  Seated fwd press with yell theraball 2x10  Modified piriformis 3x30s B  Seated pallof press DBL RTB 15x ea  NP  Supine iso clam 2x10, 5s   Seated hip adduction 3x10, 3s NP      Next session:  Extended leg bridges (on SB) 2x10       manual therapy techniques: " Joint mobilizations were applied to the: back for 0 minutes, including:  PA t-spine mobs Gr 2-3  R hip lateral distraction     neuromuscular re-education activities to improve: Coordination and Posture for 8 minutes. The following activities were included:  SL clamshells iso 2x1' B  PNF lai stretch 4x20s stretch, 5s contract B        Hip abd iso 3x20s B (not performed)     90/90 breathing with SB and add. Squeeze x25 NP  Lower trap activation seated 2x10, 5s    All interventions billed as therapeutic exercises per Medicaid guidelines.         Patient Education and Home Exercises     Home Exercises Provided and Patient Education Provided     Education provided:   - activity modifications  - avoiding over stretching    Written Home Exercises Provided: Patient instructed to cont prior HEP. Exercises were reviewed and Scarlett was able to demonstrate them prior to the end of the session.  Scarlett demonstrated good  understanding of the education provided. See EMR under Patient Instructions for exercises provided during therapy sessions    ASSESSMENT     Pt exhibited tolerated all therex performed this session, however noted discomfort in low back after transverse abdominus activations with swiss ball isometrics. Performed trial of QL stretch before continuing with therex with relief noted by patient. Will continue to monitor pt's response to therapy sessions and progress towards goals.     Scarlett Is progressing well towards her goals.   Patient prognosis is Good.   Patient will benefit from skilled outpatient Physical Therapy to address the deficits stated above and in the chart below, provide patient /family education, and to maximize patientt's level of independence.      Plan of care discussed with patient: Yes  Patient's spiritual, cultural and educational needs considered and patient is agreeable to the plan of care and goals as stated below:      Anticipated Barriers for therapy: co-morbidities,  chronicity    Goals:   Short Term Goals (4 Weeks):  1. Pt will be compliant with HEP to supplement PT in decreasing pain with functional mobility.  2. Pt will perform pallof press with good control to demonstrate improved core strength.  3. Pt will improve lumbar ext ROM by 5 degrees to improve functional mobility.  4. Pt will improve impaired LE MMTs by 1/2 score to improve strength for functional tasks.  Long Term Goals (8 Weeks):   1. Pt will improve FOTO score to </= 54% limited to decrease perceived limitation with maintaining/changing body position.   2. Pt will ambulate x500' without increasing back pain to meet pt specific goal.  3. Pt will improve impaired LE MMTs by 1 score to improve strength for functional tasks.  4. Pt will report no pain during lumbar ROM to promote functional mobility.        PLAN     Plan of care Certification: 3/23/2023 to 6/23/2023.     Outpatient Physical Therapy 2 times weekly for 12 weeks to include the following interventions: Cervical/Lumbar Traction, Electrical Stimulation  , Gait Training, Manual Therapy, Moist Heat/ Ice, Neuromuscular Re-ed, Patient Education, Self Care, Therapeutic Activities, Therapeutic Exercise, Ultrasound, and dry needling, IASTM, and kinesiotaping PRN.     Kirsten Blanc, PTA

## 2023-04-28 ENCOUNTER — CLINICAL SUPPORT (OUTPATIENT)
Dept: REHABILITATION | Facility: HOSPITAL | Age: 33
End: 2023-04-28
Payer: MEDICAID

## 2023-04-28 DIAGNOSIS — R29.898 WEAKNESS OF BOTH LOWER EXTREMITIES: ICD-10-CM

## 2023-04-28 DIAGNOSIS — G89.29 CHRONIC PAIN OF RIGHT KNEE: Primary | ICD-10-CM

## 2023-04-28 DIAGNOSIS — M25.561 CHRONIC PAIN OF RIGHT KNEE: Primary | ICD-10-CM

## 2023-04-28 DIAGNOSIS — G89.29 CHRONIC BILATERAL THORACIC BACK PAIN: ICD-10-CM

## 2023-04-28 DIAGNOSIS — R26.2 DIFFICULTY WALKING: ICD-10-CM

## 2023-04-28 DIAGNOSIS — M54.6 CHRONIC BILATERAL THORACIC BACK PAIN: ICD-10-CM

## 2023-04-28 PROCEDURE — 97110 THERAPEUTIC EXERCISES: CPT | Mod: CQ

## 2023-05-01 ENCOUNTER — PATIENT MESSAGE (OUTPATIENT)
Dept: ORTHOPEDICS | Facility: CLINIC | Age: 33
End: 2023-05-01
Payer: MEDICAID

## 2023-05-01 NOTE — PROGRESS NOTES
"[facility]   Physical Therapy Treatment Note     Name: Scarlett Knox  Clinic Number: [id number]    Therapy Diagnosis:   Encounter Diagnoses   Name Primary?    Chronic pain of right knee Yes    Difficulty walking     Chronic bilateral thoracic back pain     Weakness of both lower extremities            Physician: Order, Paper    Visit Date: 5/2/2023    Physician Orders: PT Eval and Treat   Medical Diagnosis from Referral: R29.898 (ICD-10-CM) - Weakness of both lower extremities  Evaluation Date: 3/23/2023  Authorization Period Expiration: 12/29/2023  Plan of Care Expiration: 6/23/2023  Progress Note Due: 4/23/2023  Visit # / Visits authorized: 1/ 1, 9/20   FOTO: 5/5     Precautions: Standard     PTA Visit #: 3/5     Time In: 8:20 am  Time Out: 9:03 am  Total Billable Time: 43 minutes    SUBJECTIVE     Pt reports: that her back and left LE is a little more flared up today. Pt state that she cleaned her room, did her PT and her yoga but didn't feel like it was enough to flare her up. Pt states that she would like to incorporate exercises for her to be able to do her sun salutations again.   She was compliant with home exercise program.  Response to previous treatment: no adverse effects  Functional change: none noted    Pain: 4/10  Location: bilateral back      OBJECTIVE     Objective Measures updated at progress report unless specified.       Treatment     Scarlett received the treatments listed below:      therapeutic exercises and objective measures above to develop strength, endurance, ROM, flexibility, posture, and core stabilization for 35 minutes including:  TrA activation    - 3x10, 5s    - with SB x15   - with knee fall out 2x10 ea    - with SB obliques x10, 5s B   DKTC c/ SB 15x 5"   Seated upper back stretch 5x15s  Bridges x10 (VC for putting weight through heel)        NP:  QL stretch off mat x 1 trial   Seated fwd press with yell theraball 2x10  Modified piriformis 3x30s " B  Seated pallof press DBL RTB 15x ea  NP  Supine iso clam 2x10, 5s   Seated hip adduction 3x10, 3s NP      Next session:  Extended leg bridges (on SB) 2x10       manual therapy techniques: Joint mobilizations were applied to the: back for 8 minutes, including:  PA t-spine mobs Gr 2-3 (not performed)   L hip lateral distraction   L hip short axis inferior distraction       neuromuscular re-education activities to improve: Coordination and Posture for 0 minutes. The following activities were included:  SL clamshells iso 2x1' B  PNF lai stretch 4x20s stretch, 5s contract B    Hip abd iso 3x20s B (not performed)     90/90 breathing with SB and add. Squeeze x25 NP  Lower trap activation seated 2x10, 5s    All interventions billed as therapeutic exercises per Medicaid guidelines.         Patient Education and Home Exercises     Home Exercises Provided and Patient Education Provided     Education provided:   - activity modifications  - avoiding over stretching    Written Home Exercises Provided: Patient instructed to cont prior HEP. Exercises were reviewed and Scarlett was able to demonstrate them prior to the end of the session.  Scarlett demonstrated good  understanding of the education provided. See EMR under Patient Instructions for exercises provided during therapy sessions    ASSESSMENT     Performed manual therapy at beginning of session to decrease left back and LE pain. Pt noted improvements in bilateral hip pain and midline low back pain following manual therapy techniques. Proceeded with therex for core and LE strengthening and stability. Will speak with PT about adding additional therex next session to work towards pt's ability to perform yoga at home.     Scarlett Is progressing well towards her goals.   Patient prognosis is Good.   Patient will benefit from skilled outpatient Physical Therapy to address the deficits stated above and in the chart below, provide patient /family education, and to maximize  patientt's level of independence.      Plan of care discussed with patient: Yes  Patient's spiritual, cultural and educational needs considered and patient is agreeable to the plan of care and goals as stated below:      Anticipated Barriers for therapy: co-morbidities, chronicity    Goals:   Short Term Goals (4 Weeks):  1. Pt will be compliant with HEP to supplement PT in decreasing pain with functional mobility.  2. Pt will perform pallof press with good control to demonstrate improved core strength.  3. Pt will improve lumbar ext ROM by 5 degrees to improve functional mobility.  4. Pt will improve impaired LE MMTs by 1/2 score to improve strength for functional tasks.  Long Term Goals (8 Weeks):   1. Pt will improve FOTO score to </= 54% limited to decrease perceived limitation with maintaining/changing body position.   2. Pt will ambulate x500' without increasing back pain to meet pt specific goal.  3. Pt will improve impaired LE MMTs by 1 score to improve strength for functional tasks.  4. Pt will report no pain during lumbar ROM to promote functional mobility.        PLAN     Plan of care Certification: 3/23/2023 to 6/23/2023.     Outpatient Physical Therapy 2 times weekly for 12 weeks to include the following interventions: Cervical/Lumbar Traction, Electrical Stimulation  , Gait Training, Manual Therapy, Moist Heat/ Ice, Neuromuscular Re-ed, Patient Education, Self Care, Therapeutic Activities, Therapeutic Exercise, Ultrasound, and dry needling, IASTM, and kinesiotaping PRN.     Kirsten Blanc, PTA

## 2023-05-02 ENCOUNTER — CLINICAL SUPPORT (OUTPATIENT)
Dept: REHABILITATION | Facility: HOSPITAL | Age: 33
End: 2023-05-02
Payer: MEDICAID

## 2023-05-02 DIAGNOSIS — R26.2 DIFFICULTY WALKING: ICD-10-CM

## 2023-05-02 DIAGNOSIS — G89.29 CHRONIC BILATERAL THORACIC BACK PAIN: ICD-10-CM

## 2023-05-02 DIAGNOSIS — G89.29 CHRONIC PAIN OF RIGHT KNEE: Primary | ICD-10-CM

## 2023-05-02 DIAGNOSIS — R29.898 WEAKNESS OF BOTH LOWER EXTREMITIES: ICD-10-CM

## 2023-05-02 DIAGNOSIS — M25.561 CHRONIC PAIN OF RIGHT KNEE: Primary | ICD-10-CM

## 2023-05-02 DIAGNOSIS — M54.6 CHRONIC BILATERAL THORACIC BACK PAIN: ICD-10-CM

## 2023-05-02 PROCEDURE — 97110 THERAPEUTIC EXERCISES: CPT | Mod: CQ

## 2023-05-05 ENCOUNTER — CLINICAL SUPPORT (OUTPATIENT)
Dept: REHABILITATION | Facility: HOSPITAL | Age: 33
End: 2023-05-05
Payer: MEDICAID

## 2023-05-05 DIAGNOSIS — M25.561 CHRONIC PAIN OF RIGHT KNEE: Primary | ICD-10-CM

## 2023-05-05 DIAGNOSIS — G89.29 CHRONIC BILATERAL THORACIC BACK PAIN: ICD-10-CM

## 2023-05-05 DIAGNOSIS — M54.6 CHRONIC BILATERAL THORACIC BACK PAIN: ICD-10-CM

## 2023-05-05 DIAGNOSIS — G89.29 CHRONIC PAIN OF RIGHT KNEE: Primary | ICD-10-CM

## 2023-05-05 DIAGNOSIS — R26.2 DIFFICULTY WALKING: ICD-10-CM

## 2023-05-05 DIAGNOSIS — R29.898 WEAKNESS OF BOTH LOWER EXTREMITIES: ICD-10-CM

## 2023-05-05 PROCEDURE — 97110 THERAPEUTIC EXERCISES: CPT

## 2023-05-05 NOTE — PROGRESS NOTES
"[facility]   Physical Therapy Treatment Note     Name: Scarlett Knxo  Clinic Number: [id number]    Therapy Diagnosis:   Encounter Diagnoses   Name Primary?    Chronic pain of right knee Yes    Difficulty walking     Chronic bilateral thoracic back pain     Weakness of both lower extremities      Physician: Order, Paper    Visit Date: 5/5/2023    Physician Orders: PT Eval and Treat   Medical Diagnosis from Referral: R29.898 (ICD-10-CM) - Weakness of both lower extremities  Evaluation Date: 3/23/2023  Authorization Period Expiration: 12/29/2023  Plan of Care Expiration: 6/23/2023  Progress Note Due: 5/23/2023  Visit # / Visits authorized: 1/ 1, 10/20   FOTO: 0/5     Precautions: Standard     PTA Visit #: 0/5     Time In: 8:40 am  Time Out: 9:14 am  Total Billable Time: 43 minutes    SUBJECTIVE     Pt reports: she's been doing her sun salutations for about 12 minutes in the mornings. She would like to be able to get to doing it for 30 minutes 6 days per week. She did her activity every other day and that helped.   She was compliant with home exercise program.  Response to previous treatment: no adverse effects  Functional change: none noted    Pain: 4/10  Location: bilateral back      OBJECTIVE     Objective Measures updated at progress report unless specified.       Treatment     Scarlett received the treatments listed below:      therapeutic exercises and objective measures above to develop strength, endurance, ROM, flexibility, posture, and core stabilization for 43 minutes including:  TrA activation    - x10, 5s    - with SB x10   - with knee fall out 2x10 ea NP   - with SB obliques x10, 5s B   DKTC c/ SB 15x 5"  NP  Seated upper back stretch 5x15s  Rows green theraband 3x10  Bridges x10 (VC for putting weight through heel)  Modified plank with marching x10 B      manual therapy techniques: Joint mobilizations were applied to the: back for 0 minutes, including:  PA t-spine mobs Gr 2-3 " (not performed)   L hip lateral distraction   L hip short axis inferior distraction       neuromuscular re-education activities to improve: Coordination and Posture for 0 minutes. The following activities were included:  SL clamshells iso 2x1' B  PNF lai stretch 4x20s stretch, 5s contract B    Hip abd iso 3x20s B (not performed)     90/90 breathing with SB and add. Squeeze x25 NP  Lower trap activation seated 2x10, 5s    All interventions billed as therapeutic exercises per Medicaid guidelines.         Patient Education and Home Exercises     Home Exercises Provided and Patient Education Provided     Education provided:   - activity modifications  - avoiding over stretching    Written Home Exercises Provided: Patient instructed to cont prior HEP. Exercises were reviewed and Scarlett was able to demonstrate them prior to the end of the session.  Scarlett demonstrated good  understanding of the education provided. See EMR under Patient Instructions for exercises provided during therapy sessions    ASSESSMENT     Progressed core strengthening at this date with fairly good tolerance though she continues to be limited by pain and hesitancy around increasing pain. No adverse effects to interventions. Will continue to progress towards yoga goal as tolerated.     Scarlett Is progressing well towards her goals.   Patient prognosis is Good.   Patient will benefit from skilled outpatient Physical Therapy to address the deficits stated above and in the chart below, provide patient /family education, and to maximize patientt's level of independence.      Plan of care discussed with patient: Yes  Patient's spiritual, cultural and educational needs considered and patient is agreeable to the plan of care and goals as stated below:      Anticipated Barriers for therapy: co-morbidities, chronicity    Goals:   Short Term Goals (4 Weeks):  1. Pt will be compliant with HEP to supplement PT in decreasing pain with functional  mobility.  2. Pt will perform pallof press with good control to demonstrate improved core strength.  3. Pt will improve lumbar ext ROM by 5 degrees to improve functional mobility.  4. Pt will improve impaired LE MMTs by 1/2 score to improve strength for functional tasks.  Long Term Goals (8 Weeks):   1. Pt will improve FOTO score to </= 54% limited to decrease perceived limitation with maintaining/changing body position.   2. Pt will ambulate x500' without increasing back pain to meet pt specific goal.  3. Pt will improve impaired LE MMTs by 1 score to improve strength for functional tasks.  4. Pt will report no pain during lumbar ROM to promote functional mobility.   5. Pt will return to yoga 3x/week for 30 minutes in the mornings to work towards pt specific goal. (Added 5/5/23)       PLAN     Plan of care Certification: 3/23/2023 to 6/23/2023.     Outpatient Physical Therapy 2 times weekly for 12 weeks to include the following interventions: Cervical/Lumbar Traction, Electrical Stimulation  , Gait Training, Manual Therapy, Moist Heat/ Ice, Neuromuscular Re-ed, Patient Education, Self Care, Therapeutic Activities, Therapeutic Exercise, Ultrasound, and dry needling, IASTM, and kinesiotaping PRN.     Francisco Javier Alcala, PT

## 2023-05-08 NOTE — PROGRESS NOTES
"[facility]   Physical Therapy Treatment Note     Name: Scarlett Knox  Clinic Number: [id number]    Therapy Diagnosis:   Encounter Diagnoses   Name Primary?    Chronic pain of right knee Yes    Difficulty walking     Chronic bilateral thoracic back pain     Weakness of both lower extremities        Physician: Order, Paper    Visit Date: 5/9/2023    Physician Orders: PT Eval and Treat   Medical Diagnosis from Referral: R29.898 (ICD-10-CM) - Weakness of both lower extremities  Evaluation Date: 3/23/2023  Authorization Period Expiration: 12/29/2023  Plan of Care Expiration: 6/23/2023  Progress Note Due: 5/23/2023  Visit # / Visits authorized: 1/ 1, 11/20   FOTO: 1/5     Precautions: Standard     PTA Visit #: 1/5     Time In: 8:22 am (pt arrived late)   Time Out: 9:03 am  Total Billable Time: 41 minutes    SUBJECTIVE     Pt reports: that she is having slight pain in her left hip today, however is feeling a little better. Pt states that her right knee has been bothering her and gets a slight stabbing pain along her inside of her knee.   She was compliant with home exercise program.  Response to previous treatment: no adverse effects  Functional change: none noted    Pain: 4/10  Location: bilateral back      OBJECTIVE     Objective Measures updated at progress report unless specified.       Treatment     Scarlett received the treatments listed below:      therapeutic exercises and objective measures above to develop strength, endurance, ROM, flexibility, posture, and core stabilization for 36 minutes including:  TrA activation    - x10, 5s    - with SB x10   - with knee fall out 2x10 ea NP   - with SB obliques x10, 5s B   DKTC c/ SB 15x 5"   Seated upper back stretch 5x15s  Rows green theraband 3x10  Bridges x10 (VC for putting weight through heel)  Modified plank with marching 2x10 B      manual therapy techniques: Joint mobilizations were applied to the: back for 0 minutes, including:  PA " t-spine mobs Gr 2-3 (not performed)   L hip lateral distraction   L hip short axis inferior distraction       neuromuscular re-education activities to improve: Coordination and Posture for 5 minutes. The following activities were included:  SL clamshells iso 2x1' B  Lower trap activation seated 2x10, 5s      PNF lai stretch 4x20s stretch, 5s contract B  Hip abd iso 3x20s B (not performed)   90/90 breathing with SB and add. Squeeze x25 NP      All interventions billed as therapeutic exercises per Medicaid guidelines.         Patient Education and Home Exercises     Home Exercises Provided and Patient Education Provided     Education provided:   - activity modifications  - avoiding over stretching    Written Home Exercises Provided: Patient instructed to cont prior HEP. Exercises were reviewed and Scarlett was able to demonstrate them prior to the end of the session.  Scarlett demonstrated good  understanding of the education provided. See EMR under Patient Instructions for exercises provided during therapy sessions    ASSESSMENT   Performed kinesiotaping to right knee at beginning of session to aid in decreased knee pain with movement and ADLs. Continued with previously established activities with no adverse effects, however continued with the need for cues during lower trap activations and TrA bracing. Will continue to progress pt per her tolerance towards established goals.     Scarlett Is progressing well towards her goals.   Patient prognosis is Good.   Patient will benefit from skilled outpatient Physical Therapy to address the deficits stated above and in the chart below, provide patient /family education, and to maximize patientt's level of independence.      Plan of care discussed with patient: Yes  Patient's spiritual, cultural and educational needs considered and patient is agreeable to the plan of care and goals as stated below:      Anticipated Barriers for therapy: co-morbidities, chronicity    Goals:    Short Term Goals (4 Weeks):  1. Pt will be compliant with HEP to supplement PT in decreasing pain with functional mobility.  2. Pt will perform pallof press with good control to demonstrate improved core strength.  3. Pt will improve lumbar ext ROM by 5 degrees to improve functional mobility.  4. Pt will improve impaired LE MMTs by 1/2 score to improve strength for functional tasks.  Long Term Goals (8 Weeks):   1. Pt will improve FOTO score to </= 54% limited to decrease perceived limitation with maintaining/changing body position.   2. Pt will ambulate x500' without increasing back pain to meet pt specific goal.  3. Pt will improve impaired LE MMTs by 1 score to improve strength for functional tasks.  4. Pt will report no pain during lumbar ROM to promote functional mobility.   5. Pt will return to yoga 3x/week for 30 minutes in the mornings to work towards pt specific goal. (Added 5/5/23)       PLAN     Plan of care Certification: 3/23/2023 to 6/23/2023.     Outpatient Physical Therapy 2 times weekly for 12 weeks to include the following interventions: Cervical/Lumbar Traction, Electrical Stimulation  , Gait Training, Manual Therapy, Moist Heat/ Ice, Neuromuscular Re-ed, Patient Education, Self Care, Therapeutic Activities, Therapeutic Exercise, Ultrasound, and dry needling, IASTM, and kinesiotaping PRN.     Kirsten Blanc PTA

## 2023-05-09 ENCOUNTER — CLINICAL SUPPORT (OUTPATIENT)
Dept: REHABILITATION | Facility: HOSPITAL | Age: 33
End: 2023-05-09
Payer: MEDICAID

## 2023-05-09 DIAGNOSIS — R26.2 DIFFICULTY WALKING: ICD-10-CM

## 2023-05-09 DIAGNOSIS — G89.29 CHRONIC PAIN OF RIGHT KNEE: Primary | ICD-10-CM

## 2023-05-09 DIAGNOSIS — R29.898 WEAKNESS OF BOTH LOWER EXTREMITIES: ICD-10-CM

## 2023-05-09 DIAGNOSIS — G89.29 CHRONIC BILATERAL THORACIC BACK PAIN: ICD-10-CM

## 2023-05-09 DIAGNOSIS — M54.6 CHRONIC BILATERAL THORACIC BACK PAIN: ICD-10-CM

## 2023-05-09 DIAGNOSIS — M25.561 CHRONIC PAIN OF RIGHT KNEE: Primary | ICD-10-CM

## 2023-05-09 PROCEDURE — 97110 THERAPEUTIC EXERCISES: CPT | Mod: CQ

## 2023-05-11 NOTE — PROGRESS NOTES
"[facility]   Physical Therapy Treatment Note     Name: Scarlett Knox  Clinic Number: [id number]    Therapy Diagnosis:   Encounter Diagnoses   Name Primary?    Chronic pain of right knee Yes    Difficulty walking     Chronic bilateral thoracic back pain     Weakness of both lower extremities          Physician: Order, Paper    Visit Date: 5/12/2023    Physician Orders: PT Eval and Treat   Medical Diagnosis from Referral: R29.898 (ICD-10-CM) - Weakness of both lower extremities  Evaluation Date: 3/23/2023  Authorization Period Expiration: 12/29/2023  Plan of Care Expiration: 6/23/2023  Progress Note Due: 5/23/2023  Visit # / Visits authorized: 1/ 1, 12/20   FOTO: 2/5     Precautions: Standard     PTA Visit #: 0/5     Time In: 8:38am   Time Out: 9:18am  Total Billable Time: 40 minutes    SUBJECTIVE     Pt reports: she is having period pains today that is making her back hurt. She feels like the tape helped her knee pain a lot and she's been trying to keep it from rotating outwards.   She was compliant with home exercise program.  Response to previous treatment: no adverse effects  Functional change: none noted    Pain: 4/10  Location: bilateral back      OBJECTIVE     Objective Measures updated at progress report unless specified.       Treatment     Scarlett received the treatments listed below:      therapeutic exercises and objective measures above to develop strength, endurance, ROM, flexibility, posture, and core stabilization for 40 minutes including:  TrA activation    - x10, 5s    - with SB x10   - with knee fall out 2x10 ea NP   - with SB obliques x10, 5s B   DKTC c/ SB 15x 5"   Seated upper back stretch 5x15s  Rows green theraband 3x10  Bridges x10 (VC for putting weight through heel)  Modified plank with marching 2x10 B      manual therapy techniques: Joint mobilizations were applied to the: back for 0 minutes, including:  PA t-spine mobs Gr 2-3 (not performed)   L hip lateral " distraction   L hip short axis inferior distraction       neuromuscular re-education activities to improve: Coordination and Posture for 0 minutes. The following activities were included:  SL clamshells iso 2x1' B  Lower trap activation seated 2x10, 5s      PNF lai stretch 4x20s stretch, 5s contract B  Hip abd iso 3x20s B (not performed)   90/90 breathing with SB and add. Squeeze x25 NP      All interventions billed as therapeutic exercises per Medicaid guidelines.         Patient Education and Home Exercises     Home Exercises Provided and Patient Education Provided     Education provided:   - activity modifications  - avoiding over stretching    Written Home Exercises Provided: Patient instructed to cont prior HEP. Exercises were reviewed and Scarlett was able to demonstrate them prior to the end of the session.  Scarlett demonstrated good  understanding of the education provided. See EMR under Patient Instructions for exercises provided during therapy sessions    ASSESSMENT   Good tolerance to therapy session today with no adverse effects. Pt limited by pain during session today that was reportedly secondary to menstruation. Plan to progress HEP at next date of service pending pt tolerance.     Scarlett Is progressing well towards her goals.   Patient prognosis is Good.   Patient will benefit from skilled outpatient Physical Therapy to address the deficits stated above and in the chart below, provide patient /family education, and to maximize patientt's level of independence.      Plan of care discussed with patient: Yes  Patient's spiritual, cultural and educational needs considered and patient is agreeable to the plan of care and goals as stated below:      Anticipated Barriers for therapy: co-morbidities, chronicity    Goals:   Short Term Goals (4 Weeks):  1. Pt will be compliant with HEP to supplement PT in decreasing pain with functional mobility.  2. Pt will perform pallof press with good control to  demonstrate improved core strength.  3. Pt will improve lumbar ext ROM by 5 degrees to improve functional mobility.  4. Pt will improve impaired LE MMTs by 1/2 score to improve strength for functional tasks.  Long Term Goals (8 Weeks):   1. Pt will improve FOTO score to </= 54% limited to decrease perceived limitation with maintaining/changing body position.   2. Pt will ambulate x500' without increasing back pain to meet pt specific goal.  3. Pt will improve impaired LE MMTs by 1 score to improve strength for functional tasks.  4. Pt will report no pain during lumbar ROM to promote functional mobility.   5. Pt will return to yoga 3x/week for 30 minutes in the mornings to work towards pt specific goal. (Added 5/5/23)       PLAN     Plan of care Certification: 3/23/2023 to 6/23/2023.     Outpatient Physical Therapy 2 times weekly for 12 weeks to include the following interventions: Cervical/Lumbar Traction, Electrical Stimulation  , Gait Training, Manual Therapy, Moist Heat/ Ice, Neuromuscular Re-ed, Patient Education, Self Care, Therapeutic Activities, Therapeutic Exercise, Ultrasound, and dry needling, IASTM, and kinesiotaping PRN.     Francisco Javier Alcala, PT

## 2023-05-12 ENCOUNTER — CLINICAL SUPPORT (OUTPATIENT)
Dept: REHABILITATION | Facility: HOSPITAL | Age: 33
End: 2023-05-12
Payer: MEDICAID

## 2023-05-12 DIAGNOSIS — G89.29 CHRONIC BILATERAL THORACIC BACK PAIN: ICD-10-CM

## 2023-05-12 DIAGNOSIS — M25.561 CHRONIC PAIN OF RIGHT KNEE: Primary | ICD-10-CM

## 2023-05-12 DIAGNOSIS — M54.6 CHRONIC BILATERAL THORACIC BACK PAIN: ICD-10-CM

## 2023-05-12 DIAGNOSIS — G89.29 CHRONIC PAIN OF RIGHT KNEE: Primary | ICD-10-CM

## 2023-05-12 DIAGNOSIS — R26.2 DIFFICULTY WALKING: ICD-10-CM

## 2023-05-12 DIAGNOSIS — R29.898 WEAKNESS OF BOTH LOWER EXTREMITIES: ICD-10-CM

## 2023-05-12 PROCEDURE — 97110 THERAPEUTIC EXERCISES: CPT

## 2023-05-18 NOTE — PROGRESS NOTES
"[facility]   Physical Therapy Treatment Note     Name: Scarlett Knox  Clinic Number: [id number]    Therapy Diagnosis:   Encounter Diagnoses   Name Primary?    Chronic pain of right knee Yes    Difficulty walking     Chronic bilateral thoracic back pain     Weakness of both lower extremities            Physician: Order, Paper    Visit Date: 5/19/2023    Physician Orders: PT Eval and Treat   Medical Diagnosis from Referral: R29.898 (ICD-10-CM) - Weakness of both lower extremities  Evaluation Date: 3/23/2023  Authorization Period Expiration: 12/29/2023  Plan of Care Expiration: 6/23/2023  Progress Note Due: 5/23/2023  Visit # / Visits authorized: 1/ 1, 13/20   FOTO: 3/5     Precautions: Standard     PTA Visit #: 1/5     Time In: 9:28 am (late arrival)   Time Out: 10:09 am  Total Billable Time: 41 minutes    SUBJECTIVE     Pt reports: that she feels that her left leg is not progressing as far as strength and endurance as much as her right leg is.   She was compliant with home exercise program.  Response to previous treatment: no adverse effects  Functional change: none noted    Pain: 4/10  Location: bilateral back      OBJECTIVE     Objective Measures updated at progress report unless specified.       Treatment     Scarlett received the treatments listed below:      therapeutic exercises and objective measures above to develop strength, endurance, ROM, flexibility, posture, and core stabilization for 32 minutes including:  TrA activation    - x10, 5s    - with SB x10   - with knee fall out 2x10 ea NP   - with SB obliques x10, 5s B   DKTC c/ SB 15x 5"   Open books 10x 5"   Seated upper back stretch 5x15s  Modified plank with marching 2x10 B      Rows green theraband 3x10 (not performed today)   Bridges x10 (VC for putting weight through heel) (not performed today)         manual therapy techniques: Joint mobilizations were applied to the: back for 0 minutes, including:  PA t-spine mobs Gr " 2-3 (not performed)   L hip lateral distraction   L hip short axis inferior distraction       neuromuscular re-education activities to improve: Coordination and Posture for 9 minutes. The following activities were included:  SL clamshells iso 2x1' B  Lower trap activation seated 2x10, 5s      PNF lai stretch 4x20s stretch, 5s contract B  Hip abd iso 3x20s B (not performed)   90/90 breathing with SB and add. Squeeze x25 NP      All interventions billed as therapeutic exercises per Medicaid guidelines.         Patient Education and Home Exercises     Home Exercises Provided and Patient Education Provided     Education provided:   - activity modifications  - avoiding over stretching    Written Home Exercises Provided: Patient instructed to cont prior HEP. Exercises were reviewed and Scarlett was able to demonstrate them prior to the end of the session.  Scarlett demonstrated good  understanding of the education provided. See EMR under Patient Instructions for exercises provided during therapy sessions    ASSESSMENT   Progressed pt's HEP this session with the addition of several exercises that have been performed in clinic. Pt able to complete transverse abdominal bracing with decreased cuing this session. Pt noted good stretch with inclined plank marches. Will continue to progress pt per her tolerance during remaining sessions.     Scarlett Is progressing well towards her goals.   Patient prognosis is Good.   Patient will benefit from skilled outpatient Physical Therapy to address the deficits stated above and in the chart below, provide patient /family education, and to maximize patientt's level of independence.      Plan of care discussed with patient: Yes  Patient's spiritual, cultural and educational needs considered and patient is agreeable to the plan of care and goals as stated below:      Anticipated Barriers for therapy: co-morbidities, chronicity    Goals:   Short Term Goals (4 Weeks):  1. Pt will be  compliant with HEP to supplement PT in decreasing pain with functional mobility.  2. Pt will perform pallof press with good control to demonstrate improved core strength.  3. Pt will improve lumbar ext ROM by 5 degrees to improve functional mobility.  4. Pt will improve impaired LE MMTs by 1/2 score to improve strength for functional tasks.  Long Term Goals (8 Weeks):   1. Pt will improve FOTO score to </= 54% limited to decrease perceived limitation with maintaining/changing body position.   2. Pt will ambulate x500' without increasing back pain to meet pt specific goal.  3. Pt will improve impaired LE MMTs by 1 score to improve strength for functional tasks.  4. Pt will report no pain during lumbar ROM to promote functional mobility.   5. Pt will return to yoga 3x/week for 30 minutes in the mornings to work towards pt specific goal. (Added 5/5/23)       PLAN     Plan of care Certification: 3/23/2023 to 6/23/2023.     Outpatient Physical Therapy 2 times weekly for 12 weeks to include the following interventions: Cervical/Lumbar Traction, Electrical Stimulation  , Gait Training, Manual Therapy, Moist Heat/ Ice, Neuromuscular Re-ed, Patient Education, Self Care, Therapeutic Activities, Therapeutic Exercise, Ultrasound, and dry needling, IASTM, and kinesiotaping PRN.     Kirsten Blanc, PTA

## 2023-05-19 ENCOUNTER — CLINICAL SUPPORT (OUTPATIENT)
Dept: REHABILITATION | Facility: HOSPITAL | Age: 33
End: 2023-05-19
Payer: MEDICAID

## 2023-05-19 DIAGNOSIS — M25.561 CHRONIC PAIN OF RIGHT KNEE: Primary | ICD-10-CM

## 2023-05-19 DIAGNOSIS — G89.29 CHRONIC BILATERAL THORACIC BACK PAIN: ICD-10-CM

## 2023-05-19 DIAGNOSIS — G89.29 CHRONIC PAIN OF RIGHT KNEE: Primary | ICD-10-CM

## 2023-05-19 DIAGNOSIS — R26.2 DIFFICULTY WALKING: ICD-10-CM

## 2023-05-19 DIAGNOSIS — M54.6 CHRONIC BILATERAL THORACIC BACK PAIN: ICD-10-CM

## 2023-05-19 DIAGNOSIS — R29.898 WEAKNESS OF BOTH LOWER EXTREMITIES: ICD-10-CM

## 2023-05-19 PROCEDURE — 97110 THERAPEUTIC EXERCISES: CPT | Mod: CQ

## 2023-05-24 ENCOUNTER — OFFICE VISIT (OUTPATIENT)
Dept: ORTHOPEDICS | Facility: CLINIC | Age: 33
End: 2023-05-24
Payer: MEDICAID

## 2023-05-24 VITALS — WEIGHT: 154.75 LBS | BODY MASS INDEX: 29.22 KG/M2 | HEIGHT: 61 IN

## 2023-05-24 DIAGNOSIS — M54.16 LUMBAR RADICULOPATHY, CHRONIC: ICD-10-CM

## 2023-05-24 DIAGNOSIS — Z98.890 S/P DISKECTOMY: Primary | ICD-10-CM

## 2023-05-24 PROCEDURE — 99214 PR OFFICE/OUTPT VISIT, EST, LEVL IV, 30-39 MIN: ICD-10-PCS | Mod: S$PBB,,, | Performed by: PHYSICIAN ASSISTANT

## 2023-05-24 PROCEDURE — 99999 PR PBB SHADOW E&M-EST. PATIENT-LVL III: ICD-10-PCS | Mod: PBBFAC,,, | Performed by: PHYSICIAN ASSISTANT

## 2023-05-24 PROCEDURE — 99213 OFFICE O/P EST LOW 20 MIN: CPT | Mod: PBBFAC | Performed by: PHYSICIAN ASSISTANT

## 2023-05-24 PROCEDURE — 1159F MED LIST DOCD IN RCRD: CPT | Mod: CPTII,,, | Performed by: PHYSICIAN ASSISTANT

## 2023-05-24 PROCEDURE — 3008F BODY MASS INDEX DOCD: CPT | Mod: CPTII,,, | Performed by: PHYSICIAN ASSISTANT

## 2023-05-24 PROCEDURE — 3044F HG A1C LEVEL LT 7.0%: CPT | Mod: CPTII,,, | Performed by: PHYSICIAN ASSISTANT

## 2023-05-24 PROCEDURE — 99214 OFFICE O/P EST MOD 30 MIN: CPT | Mod: S$PBB,,, | Performed by: PHYSICIAN ASSISTANT

## 2023-05-24 PROCEDURE — 3044F PR MOST RECENT HEMOGLOBIN A1C LEVEL <7.0%: ICD-10-PCS | Mod: CPTII,,, | Performed by: PHYSICIAN ASSISTANT

## 2023-05-24 PROCEDURE — 99999 PR PBB SHADOW E&M-EST. PATIENT-LVL III: CPT | Mod: PBBFAC,,, | Performed by: PHYSICIAN ASSISTANT

## 2023-05-24 PROCEDURE — 1159F PR MEDICATION LIST DOCUMENTED IN MEDICAL RECORD: ICD-10-PCS | Mod: CPTII,,, | Performed by: PHYSICIAN ASSISTANT

## 2023-05-24 PROCEDURE — 3008F PR BODY MASS INDEX (BMI) DOCUMENTED: ICD-10-PCS | Mod: CPTII,,, | Performed by: PHYSICIAN ASSISTANT

## 2023-05-24 RX ORDER — PRAZOSIN HYDROCHLORIDE 1 MG/1
1 CAPSULE ORAL 2 TIMES DAILY
Status: ON HOLD | COMMUNITY
Start: 2023-04-24 | End: 2023-08-17 | Stop reason: HOSPADM

## 2023-05-24 RX ORDER — GABAPENTIN 300 MG/1
1 CAPSULE ORAL NIGHTLY
Status: ON HOLD | COMMUNITY
Start: 2023-05-19 | End: 2023-08-17 | Stop reason: HOSPADM

## 2023-05-24 RX ORDER — TIZANIDINE HYDROCHLORIDE 2 MG/1
2 CAPSULE, GELATIN COATED ORAL 3 TIMES DAILY
Status: ON HOLD | COMMUNITY
Start: 2023-05-19 | End: 2023-08-17 | Stop reason: HOSPADM

## 2023-05-24 RX ORDER — GABAPENTIN 100 MG/1
CAPSULE ORAL
COMMUNITY
Start: 2023-05-19 | End: 2023-07-21 | Stop reason: CLARIF

## 2023-05-24 RX ORDER — TRAMADOL HYDROCHLORIDE 50 MG/1
TABLET ORAL
Status: ON HOLD | COMMUNITY
Start: 2023-05-19 | End: 2023-08-17 | Stop reason: HOSPADM

## 2023-05-24 NOTE — PROGRESS NOTES
"DATE: 5/24/2023  PATIENT: Scarlett Knox    Attending Physician: Dominic Mike M.D.    HISTORY:  Scarlett nKox is a 33 y.o. female who returns to me today for follow up.  She was last seen by me 2/13/2023.  Today she is doing well but notes she had an DAVINA 3/17 with IR. The injection did give her good relief but she has started having pain on the left now too.  She was able to get back to her normal daily activities after the injection for about 2 weeks.     The Patient denies myelopathic symptoms such as handwriting changes or difficulty with buttons/coins/keys. Denies perineal paresthesias, bowel dysfunction.  She does report a handful of intermittent episodes of stress incontinence.        EXAM:  Ht 5' 1" (1.549 m)   Wt 70.2 kg (154 lb 12.2 oz)   BMI 29.24 kg/m²     Physical exam stable. Neuro exam stable.       IMAGING:    Today I personally re- reviewed AP, Lat and Flex/Ex  upright L-spine that demonstrate normal disc spacing and alignment.  There is dynamic instability at L4/5 with flexion.    MRI lumbar spine demonstrates a very mild bulging disc at L4/5 with lateral recess and mild central stenosis.     Body mass index is 29.24 kg/m².    Hemoglobin A1C   Date Value Ref Range Status   04/12/2023 5.0 4.0 - 5.6 % Final     Comment:     ADA Screening Guidelines:  5.7-6.4%  Consistent with prediabetes  >or=6.5%  Consistent with diabetes    High levels of fetal hemoglobin interfere with the HbA1C  assay. Heterozygous hemoglobin variants (HbS, HgC, etc)do  not significantly interfere with this assay.   However, presence of multiple variants may affect accuracy.           ASSESSMENT/PLAN:    Scarlett was seen today for pain.    Diagnoses and all orders for this visit:    S/P diskectomy  -     Ambulatory referral/consult to Interventional RAD; Future  -     IR DAVINA Lumbar w/ Img; Future    Lumbar radiculopathy, chronic  -     Ambulatory referral/consult to Interventional RAD; Future  -     IR DAVINA Lumbar w/ " Img; Future        Discussed with Dr. Mike. The patient is a candidate for L4/5 TLIF.  She would like to try one more injection first.  Orders placed today.  Follow up after injection if symptoms persist.

## 2023-05-25 NOTE — PROGRESS NOTES
"[facility]   Physical Therapy Treatment Note     Name: Scarlett Knox  Clinic Number: [id number]    Therapy Diagnosis:   Encounter Diagnoses   Name Primary?    Chronic pain of right knee Yes    Difficulty walking     Chronic bilateral thoracic back pain     Weakness of both lower extremities          Physician: Order, Paper    Visit Date: 5/26/2023    Physician Orders: PT Eval and Treat   Medical Diagnosis from Referral: R29.898 (ICD-10-CM) - Weakness of both lower extremities  Evaluation Date: 3/23/2023  Authorization Period Expiration: 12/29/2023  Plan of Care Expiration: 6/23/2023  Progress Note Due: 5/23/2023  Visit # / Visits authorized: 1/ 1, 14/20   FOTO: 4/5     Precautions: Standard     PTA Visit #: 2/5     Time In: 10:46 am   Time Out: 11:32 am  Total Billable Time: 46 minutes    SUBJECTIVE     Pt reports: that she went to the doctor and was recommended for a L4 fusion. Pt states that she would like to do some research and thinking before making a decision.   She was compliant with home exercise program.  Response to previous treatment: no adverse effects  Functional change: none noted    Pain: 4/10  Location: bilateral back      OBJECTIVE     Objective Measures updated at progress report unless specified.       Treatment     Scarlett received the treatments listed below:      therapeutic exercises and objective measures above to develop strength, endurance, ROM, flexibility, posture, and core stabilization for 36 minutes including:  TrA activation    - x10, 5s    - with SB x10   - with knee fall out 2x10 ea NP   - with SB obliques x10, 5s B   DKTC c/ SB 15x 5"   Seated upper back stretch 5x15s  Modified plank with marching 2x10 B      Open books 10x 5"   Rows green theraband 3x10 (not performed today)   Bridges x10 (VC for putting weight through heel) (not performed today)         manual therapy techniques: Joint mobilizations were applied to the: back for 0 minutes, " including:  PA t-spine mobs Gr 2-3 (not performed)   L hip lateral distraction   L hip short axis inferior distraction       neuromuscular re-education activities to improve: Coordination and Posture for 10 minutes. The following activities were included:  SL clamshells iso 2x1' B  Lower trap activation seated 2x10, 5s  Pallof press DBL RTB 10x radha       PNF lai stretch 4x20s stretch, 5s contract B  Hip abd iso 3x20s B (not performed)   90/90 breathing with SB and add. Squeeze x25 NP      All interventions billed as therapeutic exercises per Medicaid guidelines.         Patient Education and Home Exercises     Home Exercises Provided and Patient Education Provided     Education provided:   - activity modifications  - avoiding over stretching    Written Home Exercises Provided: Patient instructed to cont prior HEP. Exercises were reviewed and Scarlett was able to demonstrate them prior to the end of the session.  Scarlett demonstrated good  understanding of the education provided. See EMR under Patient Instructions for exercises provided during therapy sessions    ASSESSMENT   Incorporated pallof press in order to work towards established goal, as well as improve core stability. Pt noted fatigue of lumbar paraspinals during pallof press, as well as clamshell isometrics. Will continue to monitor pt's progress and modify sessions as needed.     Scarlett Is progressing well towards her goals.   Patient prognosis is Good.   Patient will benefit from skilled outpatient Physical Therapy to address the deficits stated above and in the chart below, provide patient /family education, and to maximize patientt's level of independence.      Plan of care discussed with patient: Yes  Patient's spiritual, cultural and educational needs considered and patient is agreeable to the plan of care and goals as stated below:      Anticipated Barriers for therapy: co-morbidities, chronicity    Goals:   Short Term Goals (4 Weeks):  1. Pt  will be compliant with HEP to supplement PT in decreasing pain with functional mobility.  2. Pt will perform pallof press with good control to demonstrate improved core strength.  3. Pt will improve lumbar ext ROM by 5 degrees to improve functional mobility.  4. Pt will improve impaired LE MMTs by 1/2 score to improve strength for functional tasks.  Long Term Goals (8 Weeks):   1. Pt will improve FOTO score to </= 54% limited to decrease perceived limitation with maintaining/changing body position.   2. Pt will ambulate x500' without increasing back pain to meet pt specific goal.  3. Pt will improve impaired LE MMTs by 1 score to improve strength for functional tasks.  4. Pt will report no pain during lumbar ROM to promote functional mobility.   5. Pt will return to yoga 3x/week for 30 minutes in the mornings to work towards pt specific goal. (Added 5/5/23)       PLAN     Plan of care Certification: 3/23/2023 to 6/23/2023.     Outpatient Physical Therapy 2 times weekly for 12 weeks to include the following interventions: Cervical/Lumbar Traction, Electrical Stimulation  , Gait Training, Manual Therapy, Moist Heat/ Ice, Neuromuscular Re-ed, Patient Education, Self Care, Therapeutic Activities, Therapeutic Exercise, Ultrasound, and dry needling, IASTM, and kinesiotaping PRN.     Kirsten Blanc, PTA

## 2023-05-26 ENCOUNTER — CLINICAL SUPPORT (OUTPATIENT)
Dept: REHABILITATION | Facility: HOSPITAL | Age: 33
End: 2023-05-26
Payer: MEDICAID

## 2023-05-26 ENCOUNTER — PATIENT MESSAGE (OUTPATIENT)
Dept: ORTHOPEDICS | Facility: CLINIC | Age: 33
End: 2023-05-26
Payer: MEDICAID

## 2023-05-26 DIAGNOSIS — R26.2 DIFFICULTY WALKING: ICD-10-CM

## 2023-05-26 DIAGNOSIS — M25.561 CHRONIC PAIN OF RIGHT KNEE: Primary | ICD-10-CM

## 2023-05-26 DIAGNOSIS — G89.29 CHRONIC PAIN OF RIGHT KNEE: Primary | ICD-10-CM

## 2023-05-26 DIAGNOSIS — M54.6 CHRONIC BILATERAL THORACIC BACK PAIN: ICD-10-CM

## 2023-05-26 DIAGNOSIS — G89.29 CHRONIC BILATERAL THORACIC BACK PAIN: ICD-10-CM

## 2023-05-26 DIAGNOSIS — R29.898 WEAKNESS OF BOTH LOWER EXTREMITIES: ICD-10-CM

## 2023-05-26 PROCEDURE — 97110 THERAPEUTIC EXERCISES: CPT | Mod: CQ

## 2023-05-31 ENCOUNTER — TELEPHONE (OUTPATIENT)
Dept: INTERVENTIONAL RADIOLOGY/VASCULAR | Facility: CLINIC | Age: 33
End: 2023-05-31
Payer: MEDICAID

## 2023-05-31 NOTE — TELEPHONE ENCOUNTER
Called and left patient a voice message to return call at [phone] to schedule lumbar DAVINA outpatient procedure. Please forward call.

## 2023-06-05 ENCOUNTER — DOCUMENTATION ONLY (OUTPATIENT)
Dept: REHABILITATION | Facility: HOSPITAL | Age: 33
End: 2023-06-05
Payer: MEDICAID

## 2023-06-12 ENCOUNTER — PATIENT MESSAGE (OUTPATIENT)
Dept: ORTHOPEDICS | Facility: CLINIC | Age: 33
End: 2023-06-12
Payer: MEDICAID

## 2023-06-14 ENCOUNTER — CLINICAL SUPPORT (OUTPATIENT)
Dept: REHABILITATION | Facility: HOSPITAL | Age: 33
End: 2023-06-14
Payer: MEDICAID

## 2023-06-14 ENCOUNTER — OFFICE VISIT (OUTPATIENT)
Dept: ORTHOPEDICS | Facility: CLINIC | Age: 33
End: 2023-06-14
Payer: MEDICAID

## 2023-06-14 VITALS — BODY MASS INDEX: 29.22 KG/M2 | WEIGHT: 154.75 LBS | HEIGHT: 61 IN

## 2023-06-14 DIAGNOSIS — M54.6 CHRONIC BILATERAL THORACIC BACK PAIN: ICD-10-CM

## 2023-06-14 DIAGNOSIS — G89.29 CHRONIC PAIN OF RIGHT KNEE: Primary | ICD-10-CM

## 2023-06-14 DIAGNOSIS — G89.29 CHRONIC BILATERAL THORACIC BACK PAIN: ICD-10-CM

## 2023-06-14 DIAGNOSIS — R26.2 DIFFICULTY WALKING: ICD-10-CM

## 2023-06-14 DIAGNOSIS — R29.898 WEAKNESS OF BOTH LOWER EXTREMITIES: ICD-10-CM

## 2023-06-14 DIAGNOSIS — Z98.1 STATUS POST LUMBAR SPINAL FUSION: Primary | ICD-10-CM

## 2023-06-14 DIAGNOSIS — M25.561 CHRONIC PAIN OF RIGHT KNEE: Primary | ICD-10-CM

## 2023-06-14 PROCEDURE — 97110 THERAPEUTIC EXERCISES: CPT

## 2023-06-14 PROCEDURE — 99213 OFFICE O/P EST LOW 20 MIN: CPT | Mod: PBBFAC | Performed by: ORTHOPAEDIC SURGERY

## 2023-06-14 PROCEDURE — 3044F HG A1C LEVEL LT 7.0%: CPT | Mod: CPTII,,, | Performed by: ORTHOPAEDIC SURGERY

## 2023-06-14 PROCEDURE — 99214 OFFICE O/P EST MOD 30 MIN: CPT | Mod: S$PBB,,, | Performed by: ORTHOPAEDIC SURGERY

## 2023-06-14 PROCEDURE — 3008F PR BODY MASS INDEX (BMI) DOCUMENTED: ICD-10-PCS | Mod: CPTII,,, | Performed by: ORTHOPAEDIC SURGERY

## 2023-06-14 PROCEDURE — 99999 PR PBB SHADOW E&M-EST. PATIENT-LVL III: CPT | Mod: PBBFAC,,, | Performed by: ORTHOPAEDIC SURGERY

## 2023-06-14 PROCEDURE — 3008F BODY MASS INDEX DOCD: CPT | Mod: CPTII,,, | Performed by: ORTHOPAEDIC SURGERY

## 2023-06-14 PROCEDURE — 1159F PR MEDICATION LIST DOCUMENTED IN MEDICAL RECORD: ICD-10-PCS | Mod: CPTII,,, | Performed by: ORTHOPAEDIC SURGERY

## 2023-06-14 PROCEDURE — 1159F MED LIST DOCD IN RCRD: CPT | Mod: CPTII,,, | Performed by: ORTHOPAEDIC SURGERY

## 2023-06-14 PROCEDURE — 3044F PR MOST RECENT HEMOGLOBIN A1C LEVEL <7.0%: ICD-10-PCS | Mod: CPTII,,, | Performed by: ORTHOPAEDIC SURGERY

## 2023-06-14 PROCEDURE — 99999 PR PBB SHADOW E&M-EST. PATIENT-LVL III: ICD-10-PCS | Mod: PBBFAC,,, | Performed by: ORTHOPAEDIC SURGERY

## 2023-06-14 PROCEDURE — 99214 PR OFFICE/OUTPT VISIT, EST, LEVL IV, 30-39 MIN: ICD-10-PCS | Mod: S$PBB,,, | Performed by: ORTHOPAEDIC SURGERY

## 2023-06-14 NOTE — PLAN OF CARE
[facility]  Physical Therapy Plan of Care Note     Name: Scarlett Knox  Clinic Number: [id number]    Therapy Diagnosis:   Encounter Diagnoses   Name Primary?    Chronic pain of right knee Yes    Difficulty walking     Chronic bilateral thoracic back pain     Weakness of both lower extremities      Physician: Order, Paper    Visit Date: 6/14/2023    Physician Orders: PT Eval and Treat   Medical Diagnosis from Referral: R29.898 (ICD-10-CM) - Weakness of both lower extremities  Evaluation Date: 3/23/2023  Authorization Period Expiration: 12/29/2023  Plan of Care Expiration: 6/23/2023  Progress Note Due: 5/23/2023  Visit # / Visits authorized: 1/ 1, 15/40   FOTO: 5/5     Precautions: Standard     SUBJECTIVE     Update: She had a recent hospital visit because of her back pain and had a surgical consult today where they determined she will be having an L4 fusion. The pain has gotten significantly worse during a recent lapse in care and she has started new medications. She states that she got a new order for PT do prep for surgery because of how long her recovery was after her last back surgery. She states that her biggest concern is getting stronger and being able to do all toileting independently after surgery.     OBJECTIVE     Update:       Right Left Comment: !=pain   Hip Flexion: 4/5 4+/5     Hip ABD: 4-/5 4-/5     Hip ADD: 4/5 4/5     Hip Extension: NT NT     Hip IR: 5/5 5/5     Hip ER: 4+/5 4+/5           Right Left Comment ! = pain   Knee extension: 4/5 4+/5     Knee flexion: 4/5 4/5     Ankle DF: 5/5 5/5     Ankle PF: 5/5 5/5          Right Left Comment: ! = pain   Shoulder flexion: 4+/5 4+/5     Shoulder Abduction: 4+/5 4+/5     Shoulder ER: 4+/5 4+/5     Shoulder IR: 4+/5 4+/5     Elbow Flexion: 4+/5 4+/5     Elbow Extension: 4+/5 4+/5          ASSESSMENT     Update: Pt presents to clinic after brief lapse in care with inc'd symptoms and new plan of having lumbar fusion surgery.  She presents with worsened strength in LEs as indicated by objective measures and worsened activity tolerance as reported in subjective reports. Pt presents to PT with new goal of preparing for her surgery. She would benefit from skilled PT services to address limitations and improve function prior to lumbar surgery that is to be scheduled soon.     Previous Short Term Goals Status:     1. Pt will be compliant with HEP to supplement PT in decreasing pain with functional mobility. - PROGRESSING  2. Pt will perform pallof press with good control to demonstrate improved core strength. - MET  3. Pt will improve lumbar ext ROM by 5 degrees to improve functional mobility. - NT  4. Pt will improve impaired LE MMTs by 1/2 score to improve strength for functional tasks. - NOT MET  New Short Term Goals Status:     5. Pt will be able to ambulate x15 minutes before she has to stop due to lumbar pain to improve activity tolerance.   Long Term Goal Status: continue per initial plan of care.  Reasons for Recertification of Therapy:   Expiring POC      PLAN     Updated Certification Period: 6/14/2023 to 7/28/2023   Recommended Treatment Plan: 1 times per week for 6 weeks:  Electrical Stimulation  , Gait Training, Manual Therapy, Moist Heat/ Ice, Neuromuscular Re-ed, Therapeutic Activities, Therapeutic Exercise, Ultrasound, and dry needling, IASTM, and kinesiotaping JEWELLN    Francisco Javier Alcala, PT

## 2023-06-14 NOTE — PROGRESS NOTES
[facility]   Physical Therapy Treatment Note     Name: Scarlett Knox  Clinic Number: [id number]    Therapy Diagnosis:   Encounter Diagnoses   Name Primary?    Chronic pain of right knee Yes    Difficulty walking     Chronic bilateral thoracic back pain     Weakness of both lower extremities      Physician: Order, Paper    Visit Date: 6/14/2023    Physician Orders: PT Eval and Treat   Medical Diagnosis from Referral: R29.898 (ICD-10-CM) - Weakness of both lower extremities  Evaluation Date: 3/23/2023  Authorization Period Expiration: 12/29/2023  Plan of Care Expiration: 6/23/2023  Progress Note Due: 5/23/2023  Visit # / Visits authorized: 1/ 1, 15/40   FOTO: 5/5     Precautions: Standard     PTA Visit #: 0/5     Time In: 4:05pm  Time Out: 4:50pm  Total Billable Time: 45 minutes    SUBJECTIVE     Pt reports: her knees are bothering her today and she feels like her right leg has been dragging. See treatment section for further details.   She was compliant with home exercise program.  Response to previous treatment: no adverse effects  Functional change: none noted    Pain: 4/10  Location: bilateral back      OBJECTIVE     Objective Measures updated at progress report unless specified.     Treatment     Scarlett received the treatments listed below:      therapeutic exercises and objective measures above to develop strength, endurance, ROM, flexibility, posture, and core stabilization for 35 minutes including:  TrA activation    - x10, 5s    - with SB x10   - with SB obliques x10, 5s B   Modified plank with marching 2x10 B NP  Rows green theraband 3x10, 5s  Sedated YMB fwd press 2x10, 5s      neuromuscular re-education activities to improve: Coordination and Posture for 10 minutes. The following activities were included:  SL clamshells iso 1x1' B  SL iso hip abd 1x1' B  Pallof press DBL RTB 10x radha NP    NP:  Lower trap activation seated 2x10, 5s  PNF lai stretch 4x20s stretch, 5s  contract B=  90/90 breathing with SB and add. Squeeze x25 NP      All interventions billed as therapeutic exercises per Medicaid guidelines.         Patient Education and Home Exercises     Home Exercises Provided and Patient Education Provided     Education provided:   - activity modifications  - avoiding over stretching    Written Home Exercises Provided: Patient instructed to cont prior HEP. Exercises were reviewed and Scarlett was able to demonstrate them prior to the end of the session.  Scarlett demonstrated good  understanding of the education provided. See EMR under Patient Instructions for exercises provided during therapy sessions    ASSESSMENT   Good tolerance to all interventions. Progressed reps of previously established exercises to promote increased core and hip stability and endurance.  See treatment section for further details.     Scarlett Is progressing well towards her goals.   Patient prognosis is Good.   Patient will benefit from skilled outpatient Physical Therapy to address the deficits stated above and in the chart below, provide patient /family education, and to maximize patientt's level of independence.      Plan of care discussed with patient: Yes  Patient's spiritual, cultural and educational needs considered and patient is agreeable to the plan of care and goals as stated below:      Anticipated Barriers for therapy: co-morbidities, chronicity    Goals:   Short Term Goals (4 Weeks):  1. Pt will be compliant with HEP to supplement PT in decreasing pain with functional mobility.  2. Pt will perform pallof press with good control to demonstrate improved core strength.  3. Pt will improve lumbar ext ROM by 5 degrees to improve functional mobility.  4. Pt will improve impaired LE MMTs by 1/2 score to improve strength for functional tasks.  5. Pt will be able to ambulate x15 minutes before she has to stop due to lumbar pain to improve activity tolerance. (Added 6/14/2023)  Long Term Goals (8  Weeks):   1. Pt will improve FOTO score to </= 54% limited to decrease perceived limitation with maintaining/changing body position.   2. Pt will ambulate x500' without increasing back pain to meet pt specific goal.  3. Pt will improve impaired LE MMTs by 1 score to improve strength for functional tasks.  4. Pt will report no pain during lumbar ROM to promote functional mobility.   5. Pt will return to yoga 3x/week for 30 minutes in the mornings to work towards pt specific goal. (Added 5/5/23)       PLAN     Plan of care Certification: 3/23/2023 to 6/23/2023.     Outpatient Physical Therapy 2 times weekly for 12 weeks to include the following interventions: Cervical/Lumbar Traction, Electrical Stimulation  , Gait Training, Manual Therapy, Moist Heat/ Ice, Neuromuscular Re-ed, Patient Education, Self Care, Therapeutic Activities, Therapeutic Exercise, Ultrasound, and dry needling, IASTM, and kinesiotaping PRN.     Francisco Javier Alcala, PT

## 2023-06-15 NOTE — PROGRESS NOTES
"DATE: 6/14/23  PATIENT: Scarlett Knox    Attending Physician: Dominic Mike M.D.    HISTORY:  Scarlett Knox is a 33 y.o. female who returns to me today for follow up.  She has hx of prior Left L4/L5 diskectomy in 2020 by me.  She had an DAVINA 3/17 with IR. The injection did give her good relief but she has started having pain on the right now too.  She says the LLE radiculopathy improved some. When she does a lot of activity one day, the next few days she is in severe pain. She is ambulating around house with a cane and uses wheelchair outside house for longer distances.     The Patient denies myelopathic symptoms such as handwriting changes or difficulty with buttons/coins/keys. Denies perineal paresthesias, bowel dysfunction.  She does report a handful of intermittent episodes of stress incontinence.        EXAM:  Ht 5' 1" (1.549 m)   Wt 70.2 kg (154 lb 12.2 oz)   BMI 29.24 kg/m²     Physical exam stable. Neuro exam stable.       IMAGING:    Today I personally re- reviewed AP, Lat and Flex/Ex  upright L-spine that demonstrate normal disc spacing and alignment.  There is dynamic instability at L4/5 with flexion.    MRI lumbar spine demonstrates a very mild bulging disc at L4/5 with lateral recess and mild central stenosis.     Body mass index is 29.24 kg/m².    Hemoglobin A1C   Date Value Ref Range Status   04/12/2023 5.0 4.0 - 5.6 % Final     Comment:     ADA Screening Guidelines:  5.7-6.4%  Consistent with prediabetes  >or=6.5%  Consistent with diabetes    High levels of fetal hemoglobin interfere with the HbA1C  assay. Heterozygous hemoglobin variants (HbS, HgC, etc)do  not significantly interfere with this assay.   However, presence of multiple variants may affect accuracy.           ASSESSMENT/PLAN:    Diagnoses and all orders for this visit:    Status post lumbar spinal fusion  -     HME - OTHER        Discussed treatment options. She has post laminectomy syndrome and L4/L5 spondylolisthesis on " flexion views. The patient is a candidate for L4/5 TLIF. Shoe lift ordered today.

## 2023-06-18 ENCOUNTER — PATIENT MESSAGE (OUTPATIENT)
Dept: ORTHOPEDICS | Facility: CLINIC | Age: 33
End: 2023-06-18
Payer: MEDICAID

## 2023-06-18 ENCOUNTER — PATIENT MESSAGE (OUTPATIENT)
Dept: REHABILITATION | Facility: HOSPITAL | Age: 33
End: 2023-06-18
Payer: MEDICAID

## 2023-06-20 ENCOUNTER — CLINICAL SUPPORT (OUTPATIENT)
Dept: REHABILITATION | Facility: HOSPITAL | Age: 33
End: 2023-06-20
Payer: MEDICAID

## 2023-06-20 DIAGNOSIS — R29.898 WEAKNESS OF BOTH LOWER EXTREMITIES: ICD-10-CM

## 2023-06-20 DIAGNOSIS — R26.2 DIFFICULTY WALKING: ICD-10-CM

## 2023-06-20 DIAGNOSIS — G89.29 CHRONIC BILATERAL THORACIC BACK PAIN: ICD-10-CM

## 2023-06-20 DIAGNOSIS — G89.29 CHRONIC PAIN OF RIGHT KNEE: Primary | ICD-10-CM

## 2023-06-20 DIAGNOSIS — M54.6 CHRONIC BILATERAL THORACIC BACK PAIN: ICD-10-CM

## 2023-06-20 DIAGNOSIS — M25.561 CHRONIC PAIN OF RIGHT KNEE: Primary | ICD-10-CM

## 2023-06-20 PROCEDURE — 97110 THERAPEUTIC EXERCISES: CPT

## 2023-06-20 NOTE — PROGRESS NOTES
[facility]   Physical Therapy Treatment Note     Name: Scarlett Knox  Clinic Number: [id number]    Therapy Diagnosis:   Encounter Diagnoses   Name Primary?    Chronic pain of right knee Yes    Difficulty walking     Chronic bilateral thoracic back pain     Weakness of both lower extremities      Physician: Order, Paper    Visit Date: 6/20/2023    Physician Orders: PT Eval and Treat   Medical Diagnosis from Referral: R29.898 (ICD-10-CM) - Weakness of both lower extremities  Evaluation Date: 3/23/2023  Authorization Period Expiration: 12/29/2023  Plan of Care Expiration: 6/23/2023  Progress Note Due: 5/23/2023  Visit # / Visits authorized: 1/ 1, 16/40   FOTO: 5/5     Precautions: Standard     PTA Visit #: 0/5     Time In: 8:21am  Time Out: 9:04am  Total Billable Time: 43 minutes    SUBJECTIVE     Pt reports: she has been having increased pain in her back and leg. She has tommy doing all of her yoga exercises including bending forward.   She was compliant with home exercise program.  Response to previous treatment: no adverse effects  Functional change: none noted    Pain: 4/10  Location: bilateral back      OBJECTIVE     Objective Measures updated at progress report unless specified.     Treatment     Scarlett received the treatments listed below:      therapeutic exercises and objective measures above to develop strength, endurance, ROM, flexibility, posture, and core stabilization for 33 minutes including:  Significant time spent educating pt on importance of avoiding end-ranges of motion, especially into flexion  Reviewed printout that pt brought to PT  (included in pt message to this PT in EMR on 6/18) - discussed all options with lumbar flexion to be avoided.  Discussed plan for HEP maintenance with pt.     TrA activation    - x10, 5s    - with SB x10   - with SB obliques x10, 5s B   Modified plank with marching 2x10 B NP  Rows green theraband 3x10, 5s  Sedated YMB fwd press 2x10,  5s      neuromuscular re-education activities to improve: Coordination and Posture for 10 minutes. The following activities were included:  SL clamshells iso 1x1' B  SL iso hip abd 1x1' B  Pallof press DBL RTB 10x radha NP  RMB overhead hold with marching 4x5 B  B simultaneous D2 flex with 1# 3x10    NP:  Lower trap activation seated 2x10, 5s  PNF lai stretch 4x20s stretch, 5s contract B=  90/90 breathing with SB and add. Squeeze x25 NP      All interventions billed as therapeutic exercises per Medicaid guidelines.         Patient Education and Home Exercises     Home Exercises Provided and Patient Education Provided     Education provided:   - activity modifications  - avoiding over stretching    Written Home Exercises Provided: Patient instructed to cont prior HEP. Exercises were reviewed and Scarlett was able to demonstrate them prior to the end of the session.  Scarlett demonstrated good  understanding of the education provided. See EMR under Patient Instructions for exercises provided during therapy sessions    ASSESSMENT   Significant time spent on education as pt with dec'd carry over of education from last date of service. Pt required assistance in determining strategies to ensure HEP compliance. Progressed core strengthening with good tolerance and no adverse effects.     Scarlett Is progressing well towards her goals.   Patient prognosis is Good.   Patient will benefit from skilled outpatient Physical Therapy to address the deficits stated above and in the chart below, provide patient /family education, and to maximize patientt's level of independence.      Plan of care discussed with patient: Yes  Patient's spiritual, cultural and educational needs considered and patient is agreeable to the plan of care and goals as stated below:      Anticipated Barriers for therapy: co-morbidities, chronicity    Goals:   Short Term Goals (4 Weeks):  1. Pt will be compliant with HEP to supplement PT in decreasing pain with  functional mobility.  2. Pt will perform pallof press with good control to demonstrate improved core strength.  3. Pt will improve lumbar ext ROM by 5 degrees to improve functional mobility.  4. Pt will improve impaired LE MMTs by 1/2 score to improve strength for functional tasks.  5. Pt will be able to ambulate x15 minutes before she has to stop due to lumbar pain to improve activity tolerance. (Added 6/14/2023)  Long Term Goals (8 Weeks):   1. Pt will improve FOTO score to </= 54% limited to decrease perceived limitation with maintaining/changing body position.   2. Pt will ambulate x500' without increasing back pain to meet pt specific goal.  3. Pt will improve impaired LE MMTs by 1 score to improve strength for functional tasks.  4. Pt will report no pain during lumbar ROM to promote functional mobility.   5. Pt will return to yoga 3x/week for 30 minutes in the mornings to work towards pt specific goal. (Added 5/5/23)       PLAN     Plan of care Certification: 3/23/2023 to 6/23/2023.     Outpatient Physical Therapy 2 times weekly for 12 weeks to include the following interventions: Cervical/Lumbar Traction, Electrical Stimulation  , Gait Training, Manual Therapy, Moist Heat/ Ice, Neuromuscular Re-ed, Patient Education, Self Care, Therapeutic Activities, Therapeutic Exercise, Ultrasound, and dry needling, IASTM, and kinesiotaping PRN.     Francisco Javier Alcala, PT

## 2023-06-30 ENCOUNTER — HOSPITAL ENCOUNTER (OUTPATIENT)
Dept: INTERVENTIONAL RADIOLOGY/VASCULAR | Facility: HOSPITAL | Age: 33
Discharge: HOME | End: 2023-06-30
Attending: PHYSICIAN ASSISTANT
Payer: MEDICAID

## 2023-06-30 VITALS
HEART RATE: 88 BPM | OXYGEN SATURATION: 100 % | DIASTOLIC BLOOD PRESSURE: 58 MMHG | RESPIRATION RATE: 16 BRPM | SYSTOLIC BLOOD PRESSURE: 103 MMHG

## 2023-06-30 DIAGNOSIS — M54.16 LUMBAR RADICULOPATHY, CHRONIC: ICD-10-CM

## 2023-06-30 DIAGNOSIS — Z98.890 S/P DISKECTOMY: ICD-10-CM

## 2023-06-30 LAB
B-HCG UR QL: NEGATIVE
CTP QC/QA: YES

## 2023-06-30 PROCEDURE — 63600175 PHARM REV CODE 636 W HCPCS: Performed by: RADIOLOGY

## 2023-06-30 PROCEDURE — 64483 NJX AA&/STRD TFRM EPI L/S 1: CPT | Mod: 50 | Performed by: RADIOLOGY

## 2023-06-30 PROCEDURE — 27200940 IR ESI LUMBAR W/ IMG

## 2023-06-30 PROCEDURE — A4215 STERILE NEEDLE: HCPCS

## 2023-06-30 PROCEDURE — 25500020 PHARM REV CODE 255: Performed by: RADIOLOGY

## 2023-06-30 PROCEDURE — 64483 IR ESI LUMBAR W/ IMG: ICD-10-PCS | Mod: 50,,, | Performed by: RADIOLOGY

## 2023-06-30 RX ORDER — METHYLPREDNISOLONE ACETATE 40 MG/ML
INJECTION, SUSPENSION INTRA-ARTICULAR; INTRALESIONAL; INTRAMUSCULAR; SOFT TISSUE
Status: COMPLETED | OUTPATIENT
Start: 2023-06-30 | End: 2023-06-30

## 2023-06-30 RX ADMIN — METHYLPREDNISOLONE ACETATE 40 MG: 40 INJECTION, SUSPENSION INTRA-ARTICULAR; INTRALESIONAL; INTRAMUSCULAR; SOFT TISSUE at 01:06

## 2023-06-30 RX ADMIN — IOHEXOL 2 ML: 180 INJECTION INTRAVENOUS at 01:06

## 2023-06-30 NOTE — PLAN OF CARE
Pt arrived to IR room 190 for DAVINA. Pt oriented to unit and staff. Plan of care reviewed with patient, patient verbalizes understanding. Comfort measures utilized. Pt safely transferred to procedural table. Fall risk reviewed with patient, fall risk interventions maintained. Safety strap applied, positioner pillows utilized to minimize pressure points. Blankets applied. Pt prepped and draped utilizing standard sterile technique.Timeouts completed utilizing standard universal time-out, per department and facility policy. RN to remain at bedside, continuous monitoring maintained. Pt resting comfortably. Denies pain/discomfort. Will continue to monitor. See flow sheets for monitoring, medication administration, and updates.

## 2023-06-30 NOTE — H&P
Radiology Minor Procedure Note    Procedure Requested: Transforaminal DAVINA    History of Present Illness:  Scarlett Knox is a 33 y.o. female with history of low back pain and radiculopathy. Had previous bilateral L4-5 TF ESIs with good relief.  Past Medical History:   Diagnosis Date    ADHD     Anxiety     Anxiety     Back pain     Depression      Past Surgical History:   Procedure Laterality Date    KNEE CARTILAGE SURGERY Left     LUMBAR LAMINECTOMY WITH DISCECTOMY N/A 2/27/2020    Procedure: LAMINECTOMY, SPINE, LUMBAR, WITH DISCECTOMY L4/5 ;  Surgeon: Dominic Mike MD;  Location: [facility] OR [address] [place] [address];  Service: Neurosurgery;  Laterality: N/A;       Allergies: Review of patient's allergies indicates:  No Known Allergies    Current Inpatient Meds:   Home Meds:   Prior to Admission medications    Medication Sig Start Date End Date Taking? Authorizing Provider   ALPRAZolam (XANAX) 0.25 MG tablet Take 0.25 mg by mouth.    Historical Provider   dextroamphetamine-amphetamine (ADDERALL XR) 10 MG 24 hr capsule Take 10 mg by mouth every morning.    Historical Provider   escitalopram oxalate (LEXAPRO) 20 MG tablet Take 20 mg by mouth once daily.    Historical Provider   gabapentin (NEURONTIN) 100 MG capsule     Historical Provider   gabapentin (NEURONTIN) 100 MG capsule Take by mouth. 5/19/23 8/17/23  Historical Provider   gabapentin (NEURONTIN) 300 MG capsule Take 300 mg by mouth 3 (three) times daily.    Historical Provider   gabapentin (NEURONTIN) 300 MG capsule Take 1 capsule by mouth every evening. 5/19/23 8/17/23  Historical Provider   levocetirizine (XYZAL) 5 MG tablet Take 5 mg by mouth every evening. 3/30/23   Historical Provider   prazosin (MINIPRESS) 1 MG Cap Take 1 mg by mouth 2 (two) times daily. 3/23/23   Historical Provider   prazosin (MINIPRESS) 1 MG Cap Take 1 capsule by mouth 2 (two) times daily. 4/24/23   Historical Provider   tiZANidine 2 mg Cap Take 1 capsule by mouth 3 (three) times daily. 5/19/23    Historical Provider   tiZANidine 4 mg Cap Take by mouth 2 (two) times daily.    Historical Provider   traMADoL (ULTRAM) 50 mg tablet Take by mouth. 5/19/23   Historical Provider      Anticoagulants/Antiplatelets: no anticoagulation    Labs:  Lab Results   Component Value Date    INR 1.0 02/20/2020       Lab Results   Component Value Date    WBC 6.94 04/12/2023    HGB 12.9 04/12/2023    HCT 38.1 04/12/2023    MCV 89 04/12/2023     04/12/2023      Lab Results   Component Value Date    GLU 85 04/12/2023     05/29/2023    K 3.7 05/29/2023     04/12/2023    CO2 22 (L) 05/29/2023    BUN 11.0 05/29/2023    CREATININE 0.53 05/29/2023    CALCIUM 9.6 05/29/2023    ALT 9 05/29/2023    AST 13 05/29/2023    ALBUMIN 4.3 05/29/2023       Objective:  Vitals: Pulse: 88 (06/30/23 1244)  Resp: 16 (06/30/23 1244)  BP: (!) 103/58 (06/30/23 1244)  SpO2: 100 % (06/30/23 1244)   PE:  Strength: equal bilaterally    Plan: Bilateral Transforaminal ESIs at L4-5. Informed consent obtained. Local anesthesia.    Dominic Ng MD  Department of Radiology

## 2023-06-30 NOTE — DISCHARGE INSTRUCTIONS
Patient Discharge Instructions for Spine Injections    About your injection:    Your injection today was at the level Bilateral L4-L5 TF DAVINA    You received Depomedrol 80mg        Special instructions:    No driving today.   You may remove the band-aid tomorrow.   No tub baths, swimming, or hot tubs for 2 days. Showers are permitted.   You may experience some numbness/tingling at your legs/feet. This is often expected and should subside within several hours.    You may experience some discomfort at the site, but it should not be excruciating. It is ok to take over the counter pain reliever, if needed. It is also ok to use an ice pack if needed. Avoid heating pad use for 2 days.    It may take up to a week for you to notice any positive results from this procedure. Please inform your ordering physician if your symptoms return or get worse.    When to call:    Severe pain or headache  Loss of bowel/bladder control  Fever or chills  Redness or swelling around the injection site    Contact information:  For immediate concerns that are not emergent, you may call our interventional radiology clinic at [phone] or [phone]     ** After hours and weekends: Call the paging  at [phone] and ask for the Radiology Resident on call**

## 2023-06-30 NOTE — PROCEDURES
Radiology Post-Procedure Note    Pre Op Diagnosis: LBP    Post Op Diagnosis: Same    Procedure: Lumbar Transforaminal DAVINA    Procedure performed by: Dominic Ng MD    Written Informed Consent Obtained: Yes    Specimen Removed: NO    Estimated Blood Loss: Minimal    Findings:     Level injected: Bilateral L4-L5  Needle used: 22 gauge  Dose:  80 mg Depo-methylprednisolone   4 mL  Bupivicaine 0.25% MPF    Patient tolerated procedure well.    Dominic Ng MD  Department of Radiology

## 2023-07-03 ENCOUNTER — CLINICAL SUPPORT (OUTPATIENT)
Dept: REHABILITATION | Facility: HOSPITAL | Age: 33
End: 2023-07-03
Payer: MEDICAID

## 2023-07-03 DIAGNOSIS — R26.2 DIFFICULTY WALKING: ICD-10-CM

## 2023-07-03 DIAGNOSIS — G89.29 CHRONIC PAIN OF RIGHT KNEE: Primary | ICD-10-CM

## 2023-07-03 DIAGNOSIS — M25.561 CHRONIC PAIN OF RIGHT KNEE: Primary | ICD-10-CM

## 2023-07-03 DIAGNOSIS — G89.29 CHRONIC BILATERAL THORACIC BACK PAIN: ICD-10-CM

## 2023-07-03 DIAGNOSIS — M54.6 CHRONIC BILATERAL THORACIC BACK PAIN: ICD-10-CM

## 2023-07-03 DIAGNOSIS — R29.898 WEAKNESS OF BOTH LOWER EXTREMITIES: ICD-10-CM

## 2023-07-03 PROCEDURE — 97110 THERAPEUTIC EXERCISES: CPT | Mod: CQ

## 2023-07-03 NOTE — PROGRESS NOTES
[facility]   Physical Therapy Treatment Note     Name: Scarlett Knox  Clinic Number: [id number]    Therapy Diagnosis:   Encounter Diagnoses   Name Primary?    Chronic pain of right knee Yes    Difficulty walking     Chronic bilateral thoracic back pain     Weakness of both lower extremities        Physician: Order, Paper    Visit Date: 7/3/2023    Physician Orders: PT Eval and Treat   Medical Diagnosis from Referral: R29.898 (ICD-10-CM) - Weakness of both lower extremities  Evaluation Date: 3/23/2023  Authorization Period Expiration: 12/29/2023  Plan of Care Expiration: 6/23/2023  Progress Note Due: 5/23/2023  Visit # / Visits authorized: 1/ 1, 17/40   FOTO: 5/5     Precautions: Standard     PTA Visit #: 1/5     Time In: 10:46 am  Time Out: 11:32 am  Total Billable Time: 46 minutes    SUBJECTIVE     Pt reports: that she is still trying to be conscious about movements with lumbar flexion. Pt states that she had her injection, however noticed that she is still limping. Pt states that she was given a referral for an orthotic heel lift, however called the facility and did not get a straight answer on wether or not that do what she needs.   She was compliant with home exercise program.  Response to previous treatment: no adverse effects  Functional change: none noted    Pain: 4/10  Location: bilateral back      OBJECTIVE     Objective Measures updated at progress report unless specified.     Treatment     Scarlett received the treatments listed below:      therapeutic exercises and objective measures above to develop strength, endurance, ROM, flexibility, posture, and core stabilization for 28 minutes including:    TrA activation    - x10, 5s    - with SB x10   - with SB obliques x10, 5s B   Modified plank with marching 2x10 B NP  Rows green theraband 3x10, 5s  Sedated YMB fwd press 2x10, 5s      neuromuscular re-education activities to improve: Coordination and Posture for 18 minutes. The  following activities were included:  SL clamshells iso 1x1' B  SL iso hip abd 1x1' B  Pallof press DBL GTB 10x radha   RMB overhead hold with marching 4x5 B  B simultaneous D2 flex with 1# 3x10    NP:  Lower trap activation seated 2x10, 5s  PNF lai stretch 4x20s stretch, 5s contract B=  90/90 breathing with SB and add. Squeeze x25 NP      All interventions billed as therapeutic exercises per Medicaid guidelines.         Patient Education and Home Exercises     Home Exercises Provided and Patient Education Provided     Education provided:   - activity modifications  - avoiding over stretching    Written Home Exercises Provided: Patient instructed to cont prior HEP. Exercises were reviewed and Scarlett was able to demonstrate them prior to the end of the session.  Scarlett demonstrated good  understanding of the education provided. See EMR under Patient Instructions for exercises provided during therapy sessions    ASSESSMENT     Pt with no adverse effects during session today. Pt required verbal cues during sidelying activities in order to maintain proper alignment and avoid compensation from hip flexors. Also provided demonstrations during seated exercises for proper form. Pt given information for orthotic company in order for her to obtain heel lift before her lumbar fusion. Will continue to progress pt per her tolerance.       Scarlett Is progressing well towards her goals.   Patient prognosis is Good.   Patient will benefit from skilled outpatient Physical Therapy to address the deficits stated above and in the chart below, provide patient /family education, and to maximize patientt's level of independence.      Plan of care discussed with patient: Yes  Patient's spiritual, cultural and educational needs considered and patient is agreeable to the plan of care and goals as stated below:      Anticipated Barriers for therapy: co-morbidities, chronicity    Goals:   Short Term Goals (4 Weeks):  1. Pt will be compliant  with HEP to supplement PT in decreasing pain with functional mobility.  2. Pt will perform pallof press with good control to demonstrate improved core strength.  3. Pt will improve lumbar ext ROM by 5 degrees to improve functional mobility.  4. Pt will improve impaired LE MMTs by 1/2 score to improve strength for functional tasks.  5. Pt will be able to ambulate x15 minutes before she has to stop due to lumbar pain to improve activity tolerance. (Added 6/14/2023)  Long Term Goals (8 Weeks):   1. Pt will improve FOTO score to </= 54% limited to decrease perceived limitation with maintaining/changing body position.   2. Pt will ambulate x500' without increasing back pain to meet pt specific goal.  3. Pt will improve impaired LE MMTs by 1 score to improve strength for functional tasks.  4. Pt will report no pain during lumbar ROM to promote functional mobility.   5. Pt will return to yoga 3x/week for 30 minutes in the mornings to work towards pt specific goal. (Added 5/5/23)       PLAN     Plan of care Certification: 3/23/2023 to 6/23/2023.     Outpatient Physical Therapy 2 times weekly for 12 weeks to include the following interventions: Cervical/Lumbar Traction, Electrical Stimulation  , Gait Training, Manual Therapy, Moist Heat/ Ice, Neuromuscular Re-ed, Patient Education, Self Care, Therapeutic Activities, Therapeutic Exercise, Ultrasound, and dry needling, IASTM, and kinesiotaping PRN.     Kirsten Blanc, PTA

## 2023-07-05 ENCOUNTER — CLINICAL SUPPORT (OUTPATIENT)
Dept: REHABILITATION | Facility: HOSPITAL | Age: 33
End: 2023-07-05
Payer: MEDICAID

## 2023-07-05 DIAGNOSIS — R29.898 WEAKNESS OF BOTH LOWER EXTREMITIES: ICD-10-CM

## 2023-07-05 DIAGNOSIS — R26.2 DIFFICULTY WALKING: ICD-10-CM

## 2023-07-05 DIAGNOSIS — M54.6 CHRONIC BILATERAL THORACIC BACK PAIN: ICD-10-CM

## 2023-07-05 DIAGNOSIS — G89.29 CHRONIC BILATERAL THORACIC BACK PAIN: ICD-10-CM

## 2023-07-05 DIAGNOSIS — G89.29 CHRONIC PAIN OF RIGHT KNEE: Primary | ICD-10-CM

## 2023-07-05 DIAGNOSIS — M25.561 CHRONIC PAIN OF RIGHT KNEE: Primary | ICD-10-CM

## 2023-07-05 PROCEDURE — 97110 THERAPEUTIC EXERCISES: CPT

## 2023-07-05 NOTE — PROGRESS NOTES
[facility]   Physical Therapy Treatment Note     Name: Scarlett Knox  Clinic Number: [id number]    Therapy Diagnosis:   Encounter Diagnoses   Name Primary?    Chronic pain of right knee Yes    Difficulty walking     Chronic bilateral thoracic back pain     Weakness of both lower extremities          Physician: Order, Paper    Visit Date: 7/5/2023    Physician Orders: PT Eval and Treat   Medical Diagnosis from Referral: R29.898 (ICD-10-CM) - Weakness of both lower extremities  Evaluation Date: 3/23/2023  Authorization Period Expiration: 12/29/2023  Plan of Care Expiration: 7/28/2023  Visit # / Visits authorized: 1/ 1, 18/40   FOTO: 5/5     Precautions: Standard     PTA Visit #: 0/5     Time In: 9:53am  Time Out: 10:33am  Total Billable Time: 40 minutes    SUBJECTIVE     Pt reports: she feels like the pain is slightly improved since the shot but it was just enough to take the edge off.     She was compliant with home exercise program.  Response to previous treatment: no adverse effects  Functional change: none noted    Pain: 4/10  Location: bilateral back      OBJECTIVE     Objective Measures updated at progress report unless specified.     Treatment     Scarlett received the treatments listed below:      therapeutic exercises and objective measures above to develop strength, endurance, ROM, flexibility, posture, and core stabilization for 30 minutes including:    TrA activation    - x10, 5s    - with SB x10   - with SB obliques x10, 5s B   Modified plank with marching 2x10 B NP  Blue green theraband 3x10, 5s  Sedated YMB fwd press 2x10, 5s      neuromuscular re-education activities to improve: Coordination and Posture for 10 minutes. The following activities were included:  SL clamshells iso 1x1' B  SL iso hip abd 1x1' B  Pallof press DBL GTB 10x radha   RMB overhead hold with marching x10, x5,x5 B  B simultaneous D2 flex with 1# 2x15    NP:  Lower trap activation seated 2x10, 5s  PNF  lai stretch 4x20s stretch, 5s contract B=  90/90 breathing with SB and add. Squeeze x25 NP      All interventions billed as therapeutic exercises per Medicaid guidelines.         Patient Education and Home Exercises     Home Exercises Provided and Patient Education Provided     Education provided:   - activity modifications  - avoiding over stretching    Written Home Exercises Provided: Patient instructed to cont prior HEP. Exercises were reviewed and Scarlett was able to demonstrate them prior to the end of the session.  Scarlett demonstrated good  understanding of the education provided. See EMR under Patient Instructions for exercises provided during therapy sessions    ASSESSMENT     Good tolerance to therapy session today with mild progressions made in resistance at this date with no adverse effects. Discussed long-term management while awaiting surgery and pt verbalized understanding.       Scarlett Is progressing well towards her goals.   Patient prognosis is Good.   Patient will benefit from skilled outpatient Physical Therapy to address the deficits stated above and in the chart below, provide patient /family education, and to maximize patientt's level of independence.      Plan of care discussed with patient: Yes  Patient's spiritual, cultural and educational needs considered and patient is agreeable to the plan of care and goals as stated below:      Anticipated Barriers for therapy: co-morbidities, chronicity    Goals:   Short Term Goals (4 Weeks):  1. Pt will be compliant with HEP to supplement PT in decreasing pain with functional mobility.  2. Pt will perform pallof press with good control to demonstrate improved core strength.  3. Pt will improve lumbar ext ROM by 5 degrees to improve functional mobility.  4. Pt will improve impaired LE MMTs by 1/2 score to improve strength for functional tasks.  5. Pt will be able to ambulate x15 minutes before she has to stop due to lumbar pain to improve activity  tolerance. (Added 6/14/2023)  Long Term Goals (8 Weeks):   1. Pt will improve FOTO score to </= 54% limited to decrease perceived limitation with maintaining/changing body position.   2. Pt will ambulate x500' without increasing back pain to meet pt specific goal.  3. Pt will improve impaired LE MMTs by 1 score to improve strength for functional tasks.  4. Pt will report no pain during lumbar ROM to promote functional mobility.   5. Pt will return to yoga 3x/week for 30 minutes in the mornings to work towards pt specific goal. (Added 5/5/23)       PLAN     Updated Certification Period: 6/14/2023 to 7/28/2023   Recommended Treatment Plan: 1 times per week for 6 weeks:  Electrical Stimulation  , Gait Training, Manual Therapy, Moist Heat/ Ice, Neuromuscular Re-ed, Therapeutic Activities, Therapeutic Exercise, Ultrasound, and dry needling, IASTM, and kinesiotaping PRN    Francisco Javier Alcala, PT

## 2023-07-11 ENCOUNTER — CLINICAL SUPPORT (OUTPATIENT)
Dept: REHABILITATION | Facility: HOSPITAL | Age: 33
End: 2023-07-11
Payer: MEDICAID

## 2023-07-11 DIAGNOSIS — G89.29 CHRONIC BILATERAL THORACIC BACK PAIN: ICD-10-CM

## 2023-07-11 DIAGNOSIS — M54.6 CHRONIC BILATERAL THORACIC BACK PAIN: ICD-10-CM

## 2023-07-11 DIAGNOSIS — M25.561 CHRONIC PAIN OF RIGHT KNEE: Primary | ICD-10-CM

## 2023-07-11 DIAGNOSIS — G89.29 CHRONIC PAIN OF RIGHT KNEE: Primary | ICD-10-CM

## 2023-07-11 DIAGNOSIS — R26.2 DIFFICULTY WALKING: ICD-10-CM

## 2023-07-11 DIAGNOSIS — R29.898 WEAKNESS OF BOTH LOWER EXTREMITIES: ICD-10-CM

## 2023-07-11 PROCEDURE — 97110 THERAPEUTIC EXERCISES: CPT

## 2023-07-11 NOTE — PROGRESS NOTES
[facility]   Physical Therapy Treatment Note     Name: Scarlett Knox  Clinic Number: [id number]    Therapy Diagnosis:   Encounter Diagnoses   Name Primary?    Chronic pain of right knee Yes    Difficulty walking     Chronic bilateral thoracic back pain     Weakness of both lower extremities        Physician: Order, Paper    Visit Date: 7/11/2023    Physician Orders: PT Eval and Treat   Medical Diagnosis from Referral: R29.898 (ICD-10-CM) - Weakness of both lower extremities  Evaluation Date: 3/23/2023  Authorization Period Expiration: 12/29/2023  Plan of Care Expiration: 7/28/2023  Visit # / Visits authorized: 1/ 1, 19/40   FOTO: 5/5     Precautions: Standard     PTA Visit #: 0/5     Time In: 9:45am  Time Out: 10:30am  Total Billable Time: 45 minutes    SUBJECTIVE     Pt reports: the shot did continue to help improve her symptoms. She follows up with her surgeon next week. She has been consistent with her HEP and is ready to discharge.     She was compliant with home exercise program.  Response to previous treatment: no adverse effects  Functional change: none noted    Pain: 4/10  Location: bilateral back      OBJECTIVE     Objective Measures updated at progress report unless specified.       L/E Strength w/ MicroFET Muscle Je Dynamometer Right Left Pain/Dysfunction with Movement   (approx 4 sec hold w/ max contraction)   Hip Flexion 11.9 kg  15.4 kg     Hip Abduction 18.4 kg  15.1 kg     Quadriceps 14.7 kg  18.5 kg     Hamstrings 9.5 kg  8.7 kg          Treatment     Scarlett received the treatments listed below:      therapeutic exercises and objective measures above to develop strength, endurance, ROM, flexibility, posture, and core stabilization for 45 minutes including:  Review of HEP  Education on safety and importance of maintaining physical activity to avoid worsening symptoms.   TrA activation    - x10, 5s    - with SB x10   - with SB obliques x10, 5s B   Modified plank  with marching 2x10 B NP  Blue theraband 3x10, 5s  Sedated YMB fwd press 2x10, 5s        All interventions billed as therapeutic exercises per Medicaid guidelines.         Patient Education and Home Exercises     Home Exercises Provided and Patient Education Provided     Education provided:   - activity modifications  - avoiding over stretching    Written Home Exercises Provided: Patient instructed to cont prior HEP. Exercises were reviewed and Scarlett was able to demonstrate them prior to the end of the session.  Scarlett demonstrated good  understanding of the education provided. See EMR under Patient Instructions for exercises provided during therapy sessions    ASSESSMENT     Pt presents with plateau of symptoms as indicated by subjective reports. Dynamometer testing is taken at this date to assess strength to be compared to for after upcoming surgery. Significant education and encouragement provided at this date and pt is ready for discharge from skilled PT services at this time with HEP. Pt is agreeable to plan.        Plan of care discussed with patient: Yes  Patient's spiritual, cultural and educational needs considered and patient is agreeable to the plan of care and goals as stated below:      Anticipated Barriers for therapy: co-morbidities, chronicity    Goals:   Short Term Goals (4 Weeks):  1. Pt will be compliant with HEP to supplement PT in decreasing pain with functional mobility. - MET  2. Pt will perform pallof press with good control to demonstrate improved core strength. - MET  3. Pt will improve lumbar ext ROM by 5 degrees to improve functional mobility. - NOT MET  4. Pt will improve impaired LE MMTs by 1/2 score to improve strength for functional tasks. - NOT MET  5. Pt will be able to ambulate x15 minutes before she has to stop due to lumbar pain to improve activity tolerance. (Added 6/14/2023) - NOT MET  Long Term Goals (8 Weeks):   1. Pt will improve FOTO score to </= 54% limited to decrease  perceived limitation with maintaining/changing body position. - NOT MET  2. Pt will ambulate x500' without increasing back pain to meet pt specific goal. - MET  3. Pt will improve impaired LE MMTs by 1 score to improve strength for functional tasks. - NOT MET  4. Pt will report no pain during lumbar ROM to promote functional mobility. - NOT MET  5. Pt will return to yoga 3x/week for 30 minutes in the mornings to work towards pt specific goal. (Added 5/5/23) - NOT MET       PLAN     Pt to be discharged from outpatient physical therapy services with HEP at this time.       Francisco Javier Alcala, PT

## 2023-07-19 ENCOUNTER — OFFICE VISIT (OUTPATIENT)
Dept: ORTHOPEDICS | Facility: CLINIC | Age: 33
End: 2023-07-19
Payer: MEDICAID

## 2023-07-19 VITALS — BODY MASS INDEX: 29.22 KG/M2 | HEIGHT: 61 IN | WEIGHT: 154.75 LBS

## 2023-07-19 DIAGNOSIS — Z98.890 S/P DISKECTOMY: Primary | ICD-10-CM

## 2023-07-19 PROCEDURE — 3008F BODY MASS INDEX DOCD: CPT | Mod: CPTII,,, | Performed by: ORTHOPAEDIC SURGERY

## 2023-07-19 PROCEDURE — 99214 PR OFFICE/OUTPT VISIT, EST, LEVL IV, 30-39 MIN: ICD-10-PCS | Mod: S$PBB,,, | Performed by: ORTHOPAEDIC SURGERY

## 2023-07-19 PROCEDURE — 3044F HG A1C LEVEL LT 7.0%: CPT | Mod: CPTII,,, | Performed by: ORTHOPAEDIC SURGERY

## 2023-07-19 PROCEDURE — 99999 PR PBB SHADOW E&M-EST. PATIENT-LVL IV: CPT | Mod: PBBFAC,,, | Performed by: ORTHOPAEDIC SURGERY

## 2023-07-19 PROCEDURE — 99214 OFFICE O/P EST MOD 30 MIN: CPT | Mod: PBBFAC | Performed by: ORTHOPAEDIC SURGERY

## 2023-07-19 PROCEDURE — 99214 OFFICE O/P EST MOD 30 MIN: CPT | Mod: S$PBB,,, | Performed by: ORTHOPAEDIC SURGERY

## 2023-07-19 PROCEDURE — 3008F PR BODY MASS INDEX (BMI) DOCUMENTED: ICD-10-PCS | Mod: CPTII,,, | Performed by: ORTHOPAEDIC SURGERY

## 2023-07-19 PROCEDURE — 1159F PR MEDICATION LIST DOCUMENTED IN MEDICAL RECORD: ICD-10-PCS | Mod: CPTII,,, | Performed by: ORTHOPAEDIC SURGERY

## 2023-07-19 PROCEDURE — 99999 PR PBB SHADOW E&M-EST. PATIENT-LVL IV: ICD-10-PCS | Mod: PBBFAC,,, | Performed by: ORTHOPAEDIC SURGERY

## 2023-07-19 PROCEDURE — 1159F MED LIST DOCD IN RCRD: CPT | Mod: CPTII,,, | Performed by: ORTHOPAEDIC SURGERY

## 2023-07-19 PROCEDURE — 3044F PR MOST RECENT HEMOGLOBIN A1C LEVEL <7.0%: ICD-10-PCS | Mod: CPTII,,, | Performed by: ORTHOPAEDIC SURGERY

## 2023-07-19 NOTE — PROGRESS NOTES
"DATE: 6/14/23  PATIENT: Scarlett Knox    Attending Physician: Dominic Mike M.D.    HISTORY:  Scarlett Knox is a 33 y.o. female who returns to me today for follow up.  She has hx of prior Left L4/L5 diskectomy in 2020 by me.  She had an DAVINA 3/17 with IR. The injection did give her good relief but she has started having pain on the right now too.  She says the LLE radiculopathy improved some. When she does a lot of activity one day, the next few days she is in severe pain. She is ambulating around house with a cane and uses wheelchair outside house for longer distances.     The Patient denies myelopathic symptoms such as handwriting changes or difficulty with buttons/coins/keys. Denies perineal paresthesias, bowel dysfunction.  She does report a handful of intermittent episodes of stress incontinence.      Interval history: 7/19/23  Returns for repeat evaluation of her lumbar radiculopathy.  She underwent a 2nd injection which gave her even less relief than the 1st.  At prior visit we discussed L4/5 TLIF.  At this time she presents for preoperative evaluation and wants to proceed with surgery.  She has had to increase her intake of gabapentin and is now taking 300 mg 4 times daily.  No other changes to her medical or surgical history since last visit.  Of note she was unable to obtain a shoe lift which was ordered at last visit as she can not find a provider in [facility] that supplies it.      EXAM:  Ht 5' 1" (1.549 m)   Wt 70.2 kg (154 lb 12.2 oz)   BMI 29.24 kg/m²     Physical exam stable. Neuro exam stable.       IMAGING:    Today I personally re- reviewed AP, Lat and Flex/Ex  upright L-spine that demonstrate normal disc spacing and alignment.  There is dynamic instability at L4/5 with flexion.    MRI lumbar spine demonstrates a very mild bulging disc at L4/5 with lateral recess and mild central stenosis.     Body mass index is 29.24 kg/m².    Hemoglobin A1C   Date Value Ref Range Status   04/12/2023 5.0 " 4.0 - 5.6 % Final     Comment:     ADA Screening Guidelines:  5.7-6.4%  Consistent with prediabetes  >or=6.5%  Consistent with diabetes    High levels of fetal hemoglobin interfere with the HbA1C  assay. Heterozygous hemoglobin variants (HbS, HgC, etc)do  not significantly interfere with this assay.   However, presence of multiple variants may affect accuracy.           ASSESSMENT/PLAN:    Scarlett was seen today for follow-up and pain.    Diagnoses and all orders for this visit:    S/P diskectomy  -     Case Request Operating Room: FUSION, SPINE, LUMBAR, TLIF, POSTERIOR APPROACH, USING PEDICLE SCREW  L4/5  spinewave  SNS:SSEP/EMG          Discussed treatment options. She has post laminectomy syndrome and L4/L5 spondylolisthesis on flexion views. The patient is a candidate for L4/5 TLIF.  We discussed the risks and benefits to surgery and at this time she wishes to proceed with surgery.  Date for surgery finalized she will return for a preoperative visit.

## 2023-07-21 ENCOUNTER — TELEPHONE (OUTPATIENT)
Dept: PREADMISSION TESTING | Facility: HOSPITAL | Age: 33
End: 2023-07-21
Payer: MEDICAID

## 2023-07-21 ENCOUNTER — PATIENT MESSAGE (OUTPATIENT)
Dept: SURGERY | Facility: HOSPITAL | Age: 33
End: 2023-07-21
Payer: MEDICAID

## 2023-07-21 DIAGNOSIS — Z01.818 PREOPERATIVE TESTING: Primary | ICD-10-CM

## 2023-07-21 RX ORDER — METHYLPHENIDATE HYDROCHLORIDE 5 MG/1
5 TABLET ORAL 2 TIMES DAILY
COMMUNITY

## 2023-07-21 NOTE — ANESTHESIA PAT ROS NOTE
07/21/2023  Scarlett Knox is a 33 y.o., female.      Pre-op Assessment          Review of Systems  Anesthesia Hx:             Denies Family Hx of Anesthesia complications.   Personal Hx of Anesthesia complications (STATED WOKE UP DURING SURGERY 2/2020)                    Social:  Smoker, Social Alcohol Use ALSO SMOKES MARIJUANA      Hematology/Oncology:  Hematology Normal   Oncology Normal                                   EENT/Dental:  EENT/Dental Normal           Cardiovascular:            Denies Angina.          Functional Capacity unable to determine, ABLE TO GO UP ONE FLIGHT OF STAIRS VERY SLOWLY DUE TO PAIN  limited by disability                        Pulmonary:  Pulmonary Normal      Denies Shortness of breath.  Denies Recent URI.                 Renal/:  Renal/ Normal                 Hepatic/GI:  Hepatic/GI Normal                 Musculoskeletal:   Musculoskeletal General/Symptoms:  Functional capacity is ambulatory with cane. USES WHEELCHAIR FOR DISTANCE         Lumbar Spine Disorders, Lumbar Disc Disease, Spondylolisthesis   Neurological:   Neuro Symptoms of pain OF BACK AND LLE                           Endocrine:  Endocrine Normal            Psych:     Sleep Disorder and Insomnia. Anxiety Disorder.  Attention Deficit Disorder (ADHD). Depression.     Sleep Disorder and Insomnia.           Anesthesia Assessment: Preoperative EQUATION    Planned Procedure: Procedure(s) (LRB):  FUSION, SPINE, LUMBAR, TLIF, POSTERIOR APPROACH, USING PEDICLE SCREW L4/5 spinewave SNS:SSEP/EMG (N/A)  Requested Anesthesia Type:General  Surgeon: Dominic Mike MD  Service: Neurosurgery  Known or anticipated Date of Surgery:8/17/2023    Surgeon notes: reviewed    Electronic QUestionnaire Assessment completed via nurse interview with patient.        Triage considerations:     The patient has no apparent active  cardiac condition (No unstable coronary Syndrome such as severe unstable angina or recent [<1 month] myocardial infarction, decompensated CHF, severe valvular   disease or significant arrhythmia)    Previous anesthesia records:GETA and PATIENT SAID SHE WOKE UP DURING SURGERY  2/27/2020 LAMINECTOMY, SPINE, LUMBAR, WITH DISCECTOMY L4/5  (Spine Lumbar)  Airway/Jaw/Neck:  Airway Findings: Mouth Opening: Normal General Airway Assessment: Adult  Mallampati: III  TM Distance: Normal, at least 6 cm  Jaw/Neck Findings:     Neck ROM: Normal ROM     Airway Present Prior to Hospital Arrival?: No Placement Date: 02/27/20 Placement Time: 1540 Method of Intubation: Direct laryngoscopy Inserted by: CRNA Airway Device: Endotracheal Tube Mask Ventilation: Easy Intubated: Postinduction Blade: Barillas #2 Airway Device Size: 7.0 Style: Cuffed Cuff Inflation: Minimal occlusive pressure Placement Verified By: Auscultation;Capnometry;ETT Condensation Grade: Grade I Complicating Factors: Small mouth Findings Post-Intubation: Positive EtCO2;Bilateral breath sounds;Atraumatic/Condition of teeth unchanged Depth of Insertion (cm): 22 Secured at: Lips Complications: None Breath Sounds: Equal Bilateral Insertion attempts (enter comment if more than 2 attempts): 1 Removal Date: 02/27/20 Removal Time: 1744     Last PCP note: outside [facility]   Subspecialty notes: Ortho    Other important co-morbidities: Smoker and LUMBAR SPONDYLOLISTHESIS       Tests already available:  Available tests,  within 3 months , 6-12 months ago , within [facility] .   5/29/2023 UA, MICRO UA, CMP, TSH, 2/8/2023 MRI LUMBAR SPINE W W/O CONTRAST, 1/25/2023 XRAY LUMBAR SPINE AP/LAT W F/E          Instructions given. (See in Nurse's note)    Optimization:  Anesthesia Preop Clinic Assessment - not Indicated for this surgery    Medical Opinion Indicated           Plan:    Testing:  Hematology Profile, PT/INR, PTT, and T&S      Consultation:Patient's PCP for re-evaluation     Patient   has previously scheduled Medical Appointment:NONE    Navigation: Tests Scheduled. TBD             Consults scheduled.TBD             Results will be tracked by Preop Clinic.  7/21 Medical clearance given by Dr.Jennifer Ngo on 7/21: The patient is at moderately low risk for an adverse cardiac event in the perioperative period. EKG reveals no significant abnormalities and no ischemia. Discussed smoking cessation before and after surgery. ( Note in  care everywhere in documents.)  8/10 Labs resulted and noted by Dr. Clarence Quinones.  Anila Adair RN BSN

## 2023-07-21 NOTE — PRE-PROCEDURE INSTRUCTIONS
Patent stated awoke during surgery 2/2020. Will need medical clearance from your PCP,Dr. Liberty Ngo with [facility].  She said she has a Virtual visit today with her this am. She will see if can get her clearance then. If not, she will need to make a preop clearance appt. Will need labs. Our  will call to set up this appt.Patient stated uses medical Marijuana.Instructed to stop using one week prior to surgery.  Preop instructions given. Hold aspirin, aspirin containing products, nsaids(aleve, advil, motrin, ibuprofen, naprosyn, naproxen, voltaren, diclofenac), vitamins and supplements one week prior to surgery.     May take Tylenol.  Medicartion instructions given:  ALPRAZolam (XANAX) 0.25 MG tablet       Sig: Take 0.25 mg by mouth as needed.   Class: Historical Med   Route: Kenan Adair RN 7/21/2023 10:19 AM   TAKE IF NEEDED            escitalopram oxalate (LEXAPRO) 20 MG tablet       Sig: Take 20 mg by mouth once daily.   Class: Historical Med   Route: Kenan Adair RN 7/21/2023 10:20 AM   TAKE THE NIGHT BEFORE SURGERY.                  gabapentin (NEURONTIN) 300 MG capsule       Sig: Take 300 mg by mouth 3 (three) times daily.   Class: Historical Med   Route: Kenan Adair RN 7/21/2023 10:20 AM   TAKE IN AM OF SURGERY.              gabapentin (NEURONTIN) 300 MG capsule   5/19/2023 8/17/2023   Sig: Take 1 capsule by mouth every evening.   Class: Historical Med   Route: Oral   levocetirizine (XYZAL) 5 MG tablet   3/30/2023    Sig: Take 5 mg by mouth every evening.   Class: Historical Med   Route: Kenan Adair RN 7/21/2023 10:21 AM   TAKE THE NIGHT BEFORE SURGERY.              methylphenidate HCl (RITALIN) 5 MG tablet       Sig: Take 5 mg by mouth 2 (two) times daily.   Class: Historical Med   Route: Kenan Adair RN 7/21/2023 10:24 AM   HOLD IN AM OF SURGERY.             prazosin (MINIPRESS) 1 MG Cap   3/23/2023    Sig: Take 1 mg by mouth 2 (two) times  daily.   Class: Historical Med   Route: Kenan Adair RN 7/21/2023 10:21 AM   TAKE IN AM OF SURGERY.              prazosin (MINIPRESS) 1 MG Cap   4/24/2023    Sig: Take 1 capsule by mouth 2 (two) times daily.   Class: Historical Med   Route: Oral   tiZANidine 2 mg Cap   5/19/2023    Sig: Take 2 capsules by mouth 3 (three) times daily. 4MG TWICE A DAY   Class: Historical Med   Route: Kenan Adair RN 7/21/2023 10:23 AM   TAKE IN AM OF SURGERY.              traMADoL (ULTRAM) 50 mg tablet   5/19/2023    Sig: Take by mouth as needed.   Class: Historical Med   Route: Kenan Adair RN 7/21/2023 10:24 AM   HOLD IN AM OF SURGERY            Your surgery has been scheduled for:_Thursday 8/17/2023_________________________________________  Periop Center (Anila) [phone]  You should report to:  ____[facility], located on the [place] side of the first floor of the           [facility] ([phone])  __x__[facility] Second [facility] Center, located on the [facility] side of the            Second floor of the [facility] ([phone])  ____3rd Floor [facility] located on the [facility] side of the [facility] [phone]  Please Note   Tell your doctor if you take Aspirin, products containing Aspirin, herbal medications  or blood thinners, such as Coumadin, Ticlid, or Plavix.  (Consult your provider regarding holding or stopping before surgery).  Arrange for someone to drive you home following surgery.  You will not be allowed to leave the surgical facility alone or drive yourself home following sedation and anesthesia.  Before Surgery  Stop taking all vitamins/ herbal medications 14days prior to surgery  No Motrin/Advil (Ibuprofen) 7 days before surgery  No Aleve (Naproxen) 7 days before surgery  Stop Taking Asprin, products containing Asprin __7___days before surgery  Stop taking blood thinners_______days before  surgery  No Goody's/BC  Powder 7 days before surgery  Refrain from drinking alcoholic beverages for 24hours before and after surgery  Stop or limit smoking ____7_____days before surgery. No smoking the day of surgery.This also includes Marijuana.  You may take Tylenol for pain  Night before Surgery  Nothing to eat or drink after midnight.  Take a shower or bath (shower is recommended).  Bathe with Hibiclens soap or an antibacterial soap from the neck down.  If not supplied by your surgeon, hibiclens soap will need to be purchased over the counter in pharmacy.  Rinse soap off thoroughly.  Shampoo your hair with your regular shampoo  The Day of Surgery  NOTHING TO  DRINK 2 hours before arrival time. If you are told to take medication on the morning of surgery, it may be taken with a sip of water.   Take another bath or shower with hibiclens or any antibacterial soap, to reduce the chance of infection.  Take heart and blood pressure medications with a small sip of water, as advised by the perioperative team.  Do not take fluid pills  You may brush your teeth and rinse your mouth, but do not swall any additional water.   Do not apply perfumes, powder, body lotions or deodorant on the day of surgery.  Nail polish should be removed.  Do not wear makeup or moisturizer  Wear comfortable clothes, such as a button front shirt and loose fitting pants.  Leave all jewelry, including body piercings, and valuables at home.    Bring any devices you will neeed after surgery such as crutches or canes.  If you have sleep apnea, please bring your CPAP machine  In the event that your physical condition changes including the onset of a cold or respiratory illness, or if you have to delay or cancel your surgery, please notify your surgeon.    Stated know where the surgery center is located.Verbalizes understanding. Also Sent preop and medication instructions to [facility] portal.

## 2023-07-21 NOTE — TELEPHONE ENCOUNTER
----- Message from Anila Adair RN sent at 7/21/2023 10:52 AM CDT -----  Surgery 8/17  Please schedule labs.  Thanks!

## 2023-08-08 ENCOUNTER — TELEPHONE (OUTPATIENT)
Dept: PREADMISSION TESTING | Facility: HOSPITAL | Age: 33
End: 2023-08-08
Payer: MEDICAID

## 2023-08-09 ENCOUNTER — IMMUNIZATION (OUTPATIENT)
Dept: INTERNAL MEDICINE | Facility: CLINIC | Age: 33
End: 2023-08-09
Payer: MEDICAID

## 2023-08-09 ENCOUNTER — LAB VISIT (OUTPATIENT)
Dept: LAB | Facility: HOSPITAL | Age: 33
End: 2023-08-09
Attending: ANESTHESIOLOGY
Payer: MEDICAID

## 2023-08-09 DIAGNOSIS — Z01.818 PREOPERATIVE TESTING: ICD-10-CM

## 2023-08-09 DIAGNOSIS — Z23 NEED FOR VACCINATION: Primary | ICD-10-CM

## 2023-08-09 LAB
ABO + RH BLD: NORMAL
APTT PPP: 25.4 SEC (ref 21–32)
BLD GP AB SCN CELLS X3 SERPL QL: NORMAL
ERYTHROCYTE [DISTWIDTH] IN BLOOD BY AUTOMATED COUNT: 12.7 % (ref 11.5–14.5)
HCT VFR BLD AUTO: 39.7 % (ref 37–48.5)
HGB BLD-MCNC: 13.3 G/DL (ref 12–16)
INR PPP: 1 (ref 0.8–1.2)
MCH RBC QN AUTO: 30.3 PG (ref 27–31)
MCHC RBC AUTO-ENTMCNC: 33.5 G/DL (ref 32–36)
MCV RBC AUTO: 90 FL (ref 82–98)
PLATELET # BLD AUTO: 339 K/UL (ref 150–450)
PMV BLD AUTO: 10.4 FL (ref 9.2–12.9)
PROTHROMBIN TIME: 10.6 SEC (ref 9–12.5)
RBC # BLD AUTO: 4.39 M/UL (ref 4–5.4)
SPECIMEN OUTDATE: NORMAL
WBC # BLD AUTO: 7.51 K/UL (ref 3.9–12.7)

## 2023-08-09 PROCEDURE — 99999PBSHW COVID-19, MRNA, LNP-S, BIVALENT BOOSTER, PF, 30 MCG/0.3 ML DOSE: Mod: PBBFAC,,,

## 2023-08-09 PROCEDURE — 0124A COVID-19, MRNA, LNP-S, BIVALENT BOOSTER, PF, 30 MCG/0.3 ML DOSE: CPT | Mod: PBBFAC,CV19

## 2023-08-09 PROCEDURE — 86900 BLOOD TYPING SEROLOGIC ABO: CPT | Performed by: ANESTHESIOLOGY

## 2023-08-09 PROCEDURE — 85730 THROMBOPLASTIN TIME PARTIAL: CPT | Performed by: ANESTHESIOLOGY

## 2023-08-09 PROCEDURE — 99999PBSHW COVID-19, MRNA, LNP-S, BIVALENT BOOSTER, PF, 30 MCG/0.3 ML DOSE: ICD-10-PCS | Mod: PBBFAC,,,

## 2023-08-09 PROCEDURE — 91312 COVID-19, MRNA, LNP-S, BIVALENT BOOSTER, PF, 30 MCG/0.3 ML DOSE: CPT | Mod: PBBFAC

## 2023-08-09 PROCEDURE — 36415 COLL VENOUS BLD VENIPUNCTURE: CPT | Performed by: ANESTHESIOLOGY

## 2023-08-09 PROCEDURE — 85027 COMPLETE CBC AUTOMATED: CPT | Performed by: ANESTHESIOLOGY

## 2023-08-09 PROCEDURE — 85610 PROTHROMBIN TIME: CPT | Performed by: ANESTHESIOLOGY

## 2023-08-16 NOTE — H&P
DATE: 6/14/23  PATIENT: Scarlett Knox    Attending Physician: Dominic Mike M.D.    HISTORY:  Scarlett Knox is a 33 y.o. female who returns to me today for follow up.  She has hx of prior Left L4/L5 diskectomy in 2020 by me.  She had an DAVINA 3/17 with IR. The injection did give her good relief but she has started having pain on the right now too.  She says the LLE radiculopathy improved some. When she does a lot of activity one day, the next few days she is in severe pain. She is ambulating around house with a cane and uses wheelchair outside house for longer distances.     The Patient denies myelopathic symptoms such as handwriting changes or difficulty with buttons/coins/keys. Denies perineal paresthesias, bowel dysfunction.  She does report a handful of intermittent episodes of stress incontinence.      Interval history: 7/19/23  Returns for repeat evaluation of her lumbar radiculopathy.  She underwent a 2nd injection which gave her even less relief than the 1st.  At prior visit we discussed L4/5 TLIF.  At this time she presents for preoperative evaluation and wants to proceed with surgery.  She has had to increase her intake of gabapentin and is now taking 300 mg 4 times daily.  No other changes to her medical or surgical history since last visit.  Of note she was unable to obtain a shoe lift which was ordered at last visit as she can not find a provider in [facility] that supplies it.      EXAM:  There were no vitals taken for this visit.    My physical examination was notable for the following findings:      Antalgic station and gait.  She presents in a wheelchair today but uses a cane at home.   Dorsal lumbar skin negative for rashes, lesions, hairy patches.  There is a well healed midline lumbar surgical scar.  There is mild lumbar tenderness to palpation.  Lumbar range of motion is acceptable.  Global saggital and coronal spinal balance acceptable, not significant for scoliosis and kyphosis.  No pain  with the range of motion of the bilateral hips. No trochanteric tenderness to palpation.  Bilateral lower extremities warm and well perfused, dorsalis pedis pulses 2+ bilaterally.  Normal strength and tone in all major motor groups in the bilateral lower extremities. Normal sensation to light touch in the L2-S1 dermatomes bilaterally with the exception of decreased sensation in the L4 dermatome on the right.  Deep tendon reflexes symmetric 2+ in the bilateral lower extremities.  Negative Babinski bilaterally. Straight leg raise negative bilaterally.      IMAGING:    Today I personally re- reviewed AP, Lat and Flex/Ex  upright L-spine that demonstrate normal disc spacing and alignment.  There is dynamic instability at L4/5 with flexion.    MRI lumbar spine demonstrates a very mild bulging disc at L4/5 with lateral recess and mild central stenosis.         There is no height or weight on file to calculate BMI.    Hemoglobin A1C   Date Value Ref Range Status   04/12/2023 5.0 4.0 - 5.6 % Final     Comment:     ADA Screening Guidelines:  5.7-6.4%  Consistent with prediabetes  >or=6.5%  Consistent with diabetes    High levels of fetal hemoglobin interfere with the HbA1C  assay. Heterozygous hemoglobin variants (HbS, HgC, etc)do  not significantly interfere with this assay.   However, presence of multiple variants may affect accuracy.           ASSESSMENT/PLAN:    Plan for L4/5 TLIF.

## 2023-08-17 ENCOUNTER — HOSPITAL ENCOUNTER (INPATIENT)
Facility: HOSPITAL | Age: 33
LOS: 2 days | Discharge: HOME | DRG: 455 | End: 2023-08-19
Attending: ORTHOPAEDIC SURGERY | Admitting: ORTHOPAEDIC SURGERY
Payer: MEDICAID

## 2023-08-17 ENCOUNTER — ANESTHESIA EVENT (OUTPATIENT)
Dept: SURGERY | Facility: HOSPITAL | Age: 33
DRG: 455 | End: 2023-08-17
Payer: MEDICAID

## 2023-08-17 ENCOUNTER — ANESTHESIA (OUTPATIENT)
Dept: SURGERY | Facility: HOSPITAL | Age: 33
DRG: 455 | End: 2023-08-17
Payer: MEDICAID

## 2023-08-17 DIAGNOSIS — M54.16 LUMBAR RADICULOPATHY: Primary | ICD-10-CM

## 2023-08-17 DIAGNOSIS — R29.898 WEAKNESS OF BOTH LOWER EXTREMITIES: ICD-10-CM

## 2023-08-17 LAB
B-HCG UR QL: NEGATIVE
CTP QC/QA: YES

## 2023-08-17 PROCEDURE — 20936 PR AUTOGRAFT SPINE SURGERY LOCAL FROM SAME INCISION: ICD-10-PCS | Mod: ,,, | Performed by: ORTHOPAEDIC SURGERY

## 2023-08-17 PROCEDURE — 37000009 HC ANESTHESIA EA ADD 15 MINS: Performed by: ORTHOPAEDIC SURGERY

## 2023-08-17 PROCEDURE — 71000016 HC POSTOP RECOV ADDL HR: Performed by: ORTHOPAEDIC SURGERY

## 2023-08-17 PROCEDURE — 22633 PR ARTHRODESIS, COMBINED TECHN, SNGL INTERSPACE, LUMBAR: ICD-10-PCS | Mod: 22,,, | Performed by: ORTHOPAEDIC SURGERY

## 2023-08-17 PROCEDURE — 71000015 HC POSTOP RECOV 1ST HR: Performed by: ORTHOPAEDIC SURGERY

## 2023-08-17 PROCEDURE — 11000001 HC ACUTE MED/SURG PRIVATE ROOM

## 2023-08-17 PROCEDURE — 63052 PR LAMINECT/FACETECT/FORAMINOT, ARTHRODESIS, 1 VERTEBR SEGM: ICD-10-PCS | Mod: AS,59,, | Performed by: PHYSICIAN ASSISTANT

## 2023-08-17 PROCEDURE — 63600175 PHARM REV CODE 636 W HCPCS

## 2023-08-17 PROCEDURE — 22853 INSJ BIOMECHANICAL DEVICE: CPT | Mod: AS,,, | Performed by: PHYSICIAN ASSISTANT

## 2023-08-17 PROCEDURE — 22840 PR POSTERIOR NON-SEGMENTAL INSTRUMENTATION: ICD-10-PCS | Mod: AS,,, | Performed by: PHYSICIAN ASSISTANT

## 2023-08-17 PROCEDURE — 20936 SP BONE AGRFT LOCAL ADD-ON: CPT | Mod: ,,, | Performed by: ORTHOPAEDIC SURGERY

## 2023-08-17 PROCEDURE — 94761 N-INVAS EAR/PLS OXIMETRY MLT: CPT

## 2023-08-17 PROCEDURE — 37000008 HC ANESTHESIA 1ST 15 MINUTES: Performed by: ORTHOPAEDIC SURGERY

## 2023-08-17 PROCEDURE — 25000003 PHARM REV CODE 250: Performed by: STUDENT IN AN ORGANIZED HEALTH CARE EDUCATION/TRAINING PROGRAM

## 2023-08-17 PROCEDURE — 22840 INSERT SPINE FIXATION DEVICE: CPT | Mod: AS,,, | Performed by: PHYSICIAN ASSISTANT

## 2023-08-17 PROCEDURE — 25000003 PHARM REV CODE 250: Performed by: NURSE ANESTHETIST, CERTIFIED REGISTERED

## 2023-08-17 PROCEDURE — 22853 INSJ BIOMECHANICAL DEVICE: CPT | Mod: ,,, | Performed by: ORTHOPAEDIC SURGERY

## 2023-08-17 PROCEDURE — 63600175 PHARM REV CODE 636 W HCPCS: Performed by: PHYSICIAN ASSISTANT

## 2023-08-17 PROCEDURE — C1729 CATH, DRAINAGE: HCPCS | Performed by: ORTHOPAEDIC SURGERY

## 2023-08-17 PROCEDURE — D9220A PRA ANESTHESIA: ICD-10-PCS | Mod: CRNA,,, | Performed by: NURSE ANESTHETIST, CERTIFIED REGISTERED

## 2023-08-17 PROCEDURE — 63600175 PHARM REV CODE 636 W HCPCS: Performed by: ANESTHESIOLOGY

## 2023-08-17 PROCEDURE — 22840 INSERT SPINE FIXATION DEVICE: CPT | Mod: ,,, | Performed by: ORTHOPAEDIC SURGERY

## 2023-08-17 PROCEDURE — 25000003 PHARM REV CODE 250: Performed by: ORTHOPAEDIC SURGERY

## 2023-08-17 PROCEDURE — 63052 PR LAMINECT/FACETECT/FORAMINOT, ARTHRODESIS, 1 VERTEBR SEGM: ICD-10-PCS | Mod: 59,,, | Performed by: ORTHOPAEDIC SURGERY

## 2023-08-17 PROCEDURE — C1713 ANCHOR/SCREW BN/BN,TIS/BN: HCPCS | Performed by: ORTHOPAEDIC SURGERY

## 2023-08-17 PROCEDURE — 22633 ARTHRD CMBN 1NTRSPC LUMBAR: CPT | Mod: AS,,, | Performed by: PHYSICIAN ASSISTANT

## 2023-08-17 PROCEDURE — 36000711: Performed by: ORTHOPAEDIC SURGERY

## 2023-08-17 PROCEDURE — 63052 LAM FACETC/FRMT ARTHRD LUM 1: CPT | Mod: AS,59,, | Performed by: PHYSICIAN ASSISTANT

## 2023-08-17 PROCEDURE — 22633 ARTHRD CMBN 1NTRSPC LUMBAR: CPT | Mod: 22,,, | Performed by: ORTHOPAEDIC SURGERY

## 2023-08-17 PROCEDURE — 22853 PR INSERT BIOMECH DEV W/INTERBODY ARTHRODESIS, EA CONTIGUOUS DEFECT: ICD-10-PCS | Mod: AS,,, | Performed by: PHYSICIAN ASSISTANT

## 2023-08-17 PROCEDURE — 27201423 OPTIME MED/SURG SUP & DEVICES STERILE SUPPLY: Performed by: ORTHOPAEDIC SURGERY

## 2023-08-17 PROCEDURE — 25000003 PHARM REV CODE 250: Performed by: PHYSICIAN ASSISTANT

## 2023-08-17 PROCEDURE — 22840 PR POSTERIOR NON-SEGMENTAL INSTRUMENTATION: ICD-10-PCS | Mod: ,,, | Performed by: ORTHOPAEDIC SURGERY

## 2023-08-17 PROCEDURE — D9220A PRA ANESTHESIA: ICD-10-PCS | Mod: ANES,,, | Performed by: ANESTHESIOLOGY

## 2023-08-17 PROCEDURE — D9220A PRA ANESTHESIA: Mod: CRNA,,, | Performed by: NURSE ANESTHETIST, CERTIFIED REGISTERED

## 2023-08-17 PROCEDURE — 71000033 HC RECOVERY, INTIAL HOUR: Performed by: ORTHOPAEDIC SURGERY

## 2023-08-17 PROCEDURE — 25000003 PHARM REV CODE 250: Performed by: ANESTHESIOLOGY

## 2023-08-17 PROCEDURE — 22853 PR INSERT BIOMECH DEV W/INTERBODY ARTHRODESIS, EA CONTIGUOUS DEFECT: ICD-10-PCS | Mod: ,,, | Performed by: ORTHOPAEDIC SURGERY

## 2023-08-17 PROCEDURE — 63600175 PHARM REV CODE 636 W HCPCS: Performed by: NURSE ANESTHETIST, CERTIFIED REGISTERED

## 2023-08-17 PROCEDURE — 22633 PR ARTHRODESIS, COMBINED TECHN, SNGL INTERSPACE, LUMBAR: ICD-10-PCS | Mod: AS,,, | Performed by: PHYSICIAN ASSISTANT

## 2023-08-17 PROCEDURE — D9220A PRA ANESTHESIA: Mod: ANES,,, | Performed by: ANESTHESIOLOGY

## 2023-08-17 PROCEDURE — 36000710: Performed by: ORTHOPAEDIC SURGERY

## 2023-08-17 PROCEDURE — 63600175 PHARM REV CODE 636 W HCPCS: Performed by: ORTHOPAEDIC SURGERY

## 2023-08-17 PROCEDURE — 27201037 HC PRESSURE MONITORING SET UP

## 2023-08-17 PROCEDURE — 63052 LAM FACETC/FRMT ARTHRD LUM 1: CPT | Mod: 59,,, | Performed by: ORTHOPAEDIC SURGERY

## 2023-08-17 PROCEDURE — 81025 URINE PREGNANCY TEST: CPT | Performed by: ORTHOPAEDIC SURGERY

## 2023-08-17 DEVICE — SCREW SALVO 5.5X40MM: Type: IMPLANTABLE DEVICE | Site: SPINE LUMBAR | Status: FUNCTIONAL

## 2023-08-17 DEVICE — ROD CAPSURE CURVED 5.5X40MM: Type: IMPLANTABLE DEVICE | Site: SPINE LUMBAR | Status: FUNCTIONAL

## 2023-08-17 DEVICE — SCREW SALVO YOKE SPINE 5.5X6MM: Type: IMPLANTABLE DEVICE | Site: SPINE LUMBAR | Status: FUNCTIONAL

## 2023-08-17 DEVICE — IMPLANTABLE DEVICE: Type: IMPLANTABLE DEVICE | Site: SPINE LUMBAR | Status: FUNCTIONAL

## 2023-08-17 DEVICE — SCREW SPINE BONE LOCK 5.5X6MM: Type: IMPLANTABLE DEVICE | Site: SPINE LUMBAR | Status: FUNCTIONAL

## 2023-08-17 DEVICE — ROD CAPSURE CURVED 5.5X35MM: Type: IMPLANTABLE DEVICE | Site: SPINE LUMBAR | Status: FUNCTIONAL

## 2023-08-17 RX ORDER — METHOCARBAMOL 750 MG/1
750 TABLET, FILM COATED ORAL 3 TIMES DAILY
Status: DISCONTINUED | OUTPATIENT
Start: 2023-08-17 | End: 2023-08-19 | Stop reason: HOSPADM

## 2023-08-17 RX ORDER — CELECOXIB 200 MG/1
200 CAPSULE ORAL DAILY
Status: DISCONTINUED | OUTPATIENT
Start: 2023-08-18 | End: 2023-08-19 | Stop reason: HOSPADM

## 2023-08-17 RX ORDER — HYDROMORPHONE HYDROCHLORIDE 1 MG/ML
0.2 INJECTION, SOLUTION INTRAMUSCULAR; INTRAVENOUS; SUBCUTANEOUS EVERY 5 MIN PRN
Status: COMPLETED | OUTPATIENT
Start: 2023-08-17 | End: 2023-08-17

## 2023-08-17 RX ORDER — DIAZEPAM 10 MG/2ML
2.5 INJECTION INTRAMUSCULAR
Status: DISCONTINUED | OUTPATIENT
Start: 2023-08-17 | End: 2023-08-17 | Stop reason: HOSPADM

## 2023-08-17 RX ORDER — HALOPERIDOL 5 MG/ML
0.5 INJECTION INTRAMUSCULAR EVERY 10 MIN PRN
Status: DISCONTINUED | OUTPATIENT
Start: 2023-08-17 | End: 2023-08-17 | Stop reason: HOSPADM

## 2023-08-17 RX ORDER — ESCITALOPRAM OXALATE 20 MG/1
20 TABLET ORAL DAILY
Status: DISCONTINUED | OUTPATIENT
Start: 2023-08-17 | End: 2023-08-19 | Stop reason: HOSPADM

## 2023-08-17 RX ORDER — POLYETHYLENE GLYCOL 3350 17 G/17G
17 POWDER, FOR SOLUTION ORAL DAILY
Status: DISCONTINUED | OUTPATIENT
Start: 2023-08-17 | End: 2023-08-19 | Stop reason: HOSPADM

## 2023-08-17 RX ORDER — ACETAMINOPHEN 325 MG/1
650 TABLET ORAL EVERY 6 HOURS PRN
Status: DISCONTINUED | OUTPATIENT
Start: 2023-08-17 | End: 2023-08-17

## 2023-08-17 RX ORDER — MIDAZOLAM HYDROCHLORIDE 1 MG/ML
2 INJECTION INTRAMUSCULAR; INTRAVENOUS
Status: COMPLETED | OUTPATIENT
Start: 2023-08-17 | End: 2023-08-17

## 2023-08-17 RX ORDER — METHYLPHENIDATE HYDROCHLORIDE 5 MG/1
5 TABLET ORAL 2 TIMES DAILY
Status: DISCONTINUED | OUTPATIENT
Start: 2023-08-17 | End: 2023-08-17

## 2023-08-17 RX ORDER — FENTANYL CITRATE 50 UG/ML
25 INJECTION, SOLUTION INTRAMUSCULAR; INTRAVENOUS EVERY 5 MIN PRN
Status: DISCONTINUED | OUTPATIENT
Start: 2023-08-17 | End: 2023-08-17 | Stop reason: HOSPADM

## 2023-08-17 RX ORDER — METHOCARBAMOL 500 MG/1
1000 TABLET, FILM COATED ORAL 3 TIMES DAILY
Qty: 60 TABLET | Refills: 0 | Status: SHIPPED | OUTPATIENT
Start: 2023-08-17

## 2023-08-17 RX ORDER — METHOCARBAMOL 500 MG/1
500 TABLET, FILM COATED ORAL ONCE
Status: COMPLETED | OUTPATIENT
Start: 2023-08-17 | End: 2023-08-17

## 2023-08-17 RX ORDER — AMOXICILLIN 250 MG
1 CAPSULE ORAL 2 TIMES DAILY
Status: DISCONTINUED | OUTPATIENT
Start: 2023-08-17 | End: 2023-08-19 | Stop reason: HOSPADM

## 2023-08-17 RX ORDER — LIDOCAINE HYDROCHLORIDE AND EPINEPHRINE 10; 10 MG/ML; UG/ML
INJECTION, SOLUTION INFILTRATION; PERINEURAL
Status: DISCONTINUED | OUTPATIENT
Start: 2023-08-17 | End: 2023-08-17

## 2023-08-17 RX ORDER — CEFAZOLIN SODIUM 1 G/3ML
INJECTION, POWDER, FOR SOLUTION INTRAMUSCULAR; INTRAVENOUS
Status: DISCONTINUED | OUTPATIENT
Start: 2023-08-17 | End: 2023-08-17

## 2023-08-17 RX ORDER — EPHEDRINE SULFATE 50 MG/ML
INJECTION, SOLUTION INTRAVENOUS
Status: DISCONTINUED | OUTPATIENT
Start: 2023-08-17 | End: 2023-08-17

## 2023-08-17 RX ORDER — HYDROMORPHONE HCL IN 0.9% NACL 6 MG/30 ML
PATIENT CONTROLLED ANALGESIA SYRINGE INTRAVENOUS
Status: COMPLETED
Start: 2023-08-17 | End: 2023-08-17

## 2023-08-17 RX ORDER — PRAZOSIN HYDROCHLORIDE 1 MG/1
1 CAPSULE ORAL 2 TIMES DAILY
Status: DISCONTINUED | OUTPATIENT
Start: 2023-08-17 | End: 2023-08-17

## 2023-08-17 RX ORDER — DEXAMETHASONE SODIUM PHOSPHATE 4 MG/ML
INJECTION, SOLUTION INTRA-ARTICULAR; INTRALESIONAL; INTRAMUSCULAR; INTRAVENOUS; SOFT TISSUE
Status: DISCONTINUED | OUTPATIENT
Start: 2023-08-17 | End: 2023-08-17

## 2023-08-17 RX ORDER — SODIUM CHLORIDE 0.9 % (FLUSH) 0.9 %
5 SYRINGE (ML) INJECTION
Status: DISCONTINUED | OUTPATIENT
Start: 2023-08-17 | End: 2023-08-19 | Stop reason: HOSPADM

## 2023-08-17 RX ORDER — LIDOCAINE HYDROCHLORIDE 20 MG/ML
INJECTION, SOLUTION EPIDURAL; INFILTRATION; INTRACAUDAL; PERINEURAL
Status: DISCONTINUED | OUTPATIENT
Start: 2023-08-17 | End: 2023-08-17

## 2023-08-17 RX ORDER — HYDROMORPHONE HCL IN 0.9% NACL 6 MG/30 ML
PATIENT CONTROLLED ANALGESIA SYRINGE INTRAVENOUS CONTINUOUS
Status: DISCONTINUED | OUTPATIENT
Start: 2023-08-17 | End: 2023-08-19

## 2023-08-17 RX ORDER — FAMOTIDINE 20 MG/1
20 TABLET, FILM COATED ORAL 2 TIMES DAILY
Status: CANCELLED | OUTPATIENT
Start: 2023-08-17

## 2023-08-17 RX ORDER — GABAPENTIN 300 MG/1
300 CAPSULE ORAL 3 TIMES DAILY
Status: DISCONTINUED | OUTPATIENT
Start: 2023-08-17 | End: 2023-08-19 | Stop reason: HOSPADM

## 2023-08-17 RX ORDER — MEPERIDINE HYDROCHLORIDE 50 MG/ML
12.5 INJECTION INTRAMUSCULAR; INTRAVENOUS; SUBCUTANEOUS EVERY 10 MIN PRN
Status: DISCONTINUED | OUTPATIENT
Start: 2023-08-17 | End: 2023-08-17 | Stop reason: HOSPADM

## 2023-08-17 RX ORDER — OXYCODONE HYDROCHLORIDE 5 MG/1
5 TABLET ORAL EVERY 4 HOURS PRN
Status: DISCONTINUED | OUTPATIENT
Start: 2023-08-17 | End: 2023-08-17

## 2023-08-17 RX ORDER — ONDANSETRON 8 MG/1
8 TABLET, ORALLY DISINTEGRATING ORAL EVERY 8 HOURS PRN
Status: DISCONTINUED | OUTPATIENT
Start: 2023-08-17 | End: 2023-08-19 | Stop reason: HOSPADM

## 2023-08-17 RX ORDER — DOCUSATE SODIUM 100 MG/1
100 CAPSULE, LIQUID FILLED ORAL 2 TIMES DAILY
Qty: 60 CAPSULE | Refills: 0 | Status: SHIPPED | OUTPATIENT
Start: 2023-08-17

## 2023-08-17 RX ORDER — ONDANSETRON 4 MG/1
4 TABLET, ORALLY DISINTEGRATING ORAL EVERY 8 HOURS PRN
Qty: 60 TABLET | Refills: 0 | Status: SHIPPED | OUTPATIENT
Start: 2023-08-17

## 2023-08-17 RX ORDER — OXYCODONE HYDROCHLORIDE 5 MG/1
5 TABLET ORAL EVERY 4 HOURS PRN
Qty: 30 TABLET | Refills: 0 | Status: SHIPPED | OUTPATIENT
Start: 2023-08-17 | End: 2023-08-25 | Stop reason: SDUPTHER

## 2023-08-17 RX ORDER — SODIUM CHLORIDE 9 MG/ML
INJECTION, SOLUTION INTRAVENOUS CONTINUOUS
Status: ACTIVE | OUTPATIENT
Start: 2023-08-17 | End: 2023-08-18

## 2023-08-17 RX ORDER — PRAZOSIN HYDROCHLORIDE 1 MG/1
1 CAPSULE ORAL NIGHTLY
Status: DISCONTINUED | OUTPATIENT
Start: 2023-08-17 | End: 2023-08-19 | Stop reason: HOSPADM

## 2023-08-17 RX ORDER — FENTANYL CITRATE 50 UG/ML
INJECTION, SOLUTION INTRAMUSCULAR; INTRAVENOUS
Status: DISCONTINUED | OUTPATIENT
Start: 2023-08-17 | End: 2023-08-17

## 2023-08-17 RX ORDER — MUPIROCIN 20 MG/G
OINTMENT TOPICAL 2 TIMES DAILY
Status: DISCONTINUED | OUTPATIENT
Start: 2023-08-17 | End: 2023-08-19 | Stop reason: HOSPADM

## 2023-08-17 RX ORDER — ONDANSETRON 2 MG/ML
4 INJECTION INTRAMUSCULAR; INTRAVENOUS EVERY 12 HOURS PRN
Status: DISCONTINUED | OUTPATIENT
Start: 2023-08-17 | End: 2023-08-19 | Stop reason: HOSPADM

## 2023-08-17 RX ORDER — HEPARIN SODIUM 5000 [USP'U]/ML
5000 INJECTION, SOLUTION INTRAVENOUS; SUBCUTANEOUS EVERY 8 HOURS
Status: DISCONTINUED | OUTPATIENT
Start: 2023-08-18 | End: 2023-08-19 | Stop reason: HOSPADM

## 2023-08-17 RX ORDER — PROPOFOL 10 MG/ML
VIAL (ML) INTRAVENOUS
Status: DISCONTINUED | OUTPATIENT
Start: 2023-08-17 | End: 2023-08-17

## 2023-08-17 RX ORDER — KETAMINE HCL IN 0.9 % NACL 50 MG/5 ML
SYRINGE (ML) INTRAVENOUS
Status: DISCONTINUED | OUTPATIENT
Start: 2023-08-17 | End: 2023-08-17

## 2023-08-17 RX ORDER — LITHIUM CARBONATE 300 MG/1
300 TABLET, FILM COATED, EXTENDED RELEASE ORAL DAILY
Status: DISCONTINUED | OUTPATIENT
Start: 2023-08-17 | End: 2023-08-19 | Stop reason: HOSPADM

## 2023-08-17 RX ORDER — ACETAMINOPHEN 10 MG/ML
INJECTION, SOLUTION INTRAVENOUS
Status: DISCONTINUED | OUTPATIENT
Start: 2023-08-17 | End: 2023-08-17

## 2023-08-17 RX ORDER — DEXMEDETOMIDINE HYDROCHLORIDE 100 UG/ML
INJECTION, SOLUTION INTRAVENOUS
Status: DISCONTINUED | OUTPATIENT
Start: 2023-08-17 | End: 2023-08-17

## 2023-08-17 RX ORDER — VANCOMYCIN HYDROCHLORIDE 1 G/20ML
INJECTION, POWDER, LYOPHILIZED, FOR SOLUTION INTRAVENOUS
Status: DISCONTINUED | OUTPATIENT
Start: 2023-08-17 | End: 2023-08-17

## 2023-08-17 RX ORDER — NALOXONE HCL 0.4 MG/ML
0.02 VIAL (ML) INJECTION
Status: DISCONTINUED | OUTPATIENT
Start: 2023-08-17 | End: 2023-08-19 | Stop reason: HOSPADM

## 2023-08-17 RX ORDER — OXYCODONE HYDROCHLORIDE 10 MG/1
10 TABLET ORAL EVERY 4 HOURS PRN
Status: DISCONTINUED | OUTPATIENT
Start: 2023-08-17 | End: 2023-08-17

## 2023-08-17 RX ORDER — LITHIUM CARBONATE 300 MG/1
300 TABLET, FILM COATED, EXTENDED RELEASE ORAL DAILY
COMMUNITY

## 2023-08-17 RX ORDER — GABAPENTIN 300 MG/1
300 CAPSULE ORAL 3 TIMES DAILY
Qty: 90 CAPSULE | Refills: 11 | Status: SHIPPED | OUTPATIENT
Start: 2023-08-17 | End: 2024-08-16

## 2023-08-17 RX ORDER — ACETAMINOPHEN 325 MG/1
650 TABLET ORAL EVERY 8 HOURS
Status: DISCONTINUED | OUTPATIENT
Start: 2023-08-17 | End: 2023-08-19 | Stop reason: HOSPADM

## 2023-08-17 RX ORDER — ROCURONIUM BROMIDE 10 MG/ML
INJECTION, SOLUTION INTRAVENOUS
Status: DISCONTINUED | OUTPATIENT
Start: 2023-08-17 | End: 2023-08-17

## 2023-08-17 RX ORDER — ONDANSETRON 2 MG/ML
4 INJECTION INTRAMUSCULAR; INTRAVENOUS DAILY PRN
Status: DISCONTINUED | OUTPATIENT
Start: 2023-08-17 | End: 2023-08-17 | Stop reason: HOSPADM

## 2023-08-17 RX ORDER — ACETAMINOPHEN 500 MG
500 TABLET ORAL EVERY 12 HOURS
Qty: 14 TABLET | Refills: 0 | Status: SHIPPED | OUTPATIENT
Start: 2023-08-17 | End: 2023-08-24

## 2023-08-17 RX ORDER — CELECOXIB 100 MG/1
100 CAPSULE ORAL 2 TIMES DAILY
Qty: 14 CAPSULE | Refills: 0 | Status: SHIPPED | OUTPATIENT
Start: 2023-08-17

## 2023-08-17 RX ORDER — PHENYLEPHRINE HYDROCHLORIDE 10 MG/ML
INJECTION INTRAVENOUS
Status: DISCONTINUED | OUTPATIENT
Start: 2023-08-17 | End: 2023-08-17

## 2023-08-17 RX ORDER — ONDANSETRON 2 MG/ML
INJECTION INTRAMUSCULAR; INTRAVENOUS
Status: DISCONTINUED | OUTPATIENT
Start: 2023-08-17 | End: 2023-08-17

## 2023-08-17 RX ORDER — OXYCODONE HYDROCHLORIDE 5 MG/1
5 TABLET ORAL ONCE AS NEEDED
Status: DISCONTINUED | OUTPATIENT
Start: 2023-08-17 | End: 2023-08-17

## 2023-08-17 RX ORDER — ALPRAZOLAM 0.25 MG/1
0.25 TABLET ORAL DAILY PRN
Status: DISCONTINUED | OUTPATIENT
Start: 2023-08-17 | End: 2023-08-19 | Stop reason: HOSPADM

## 2023-08-17 RX ADMIN — FENTANYL CITRATE 50 MCG: 50 INJECTION, SOLUTION INTRAMUSCULAR; INTRAVENOUS at 12:08

## 2023-08-17 RX ADMIN — SODIUM CHLORIDE: 0.9 INJECTION, SOLUTION INTRAVENOUS at 09:08

## 2023-08-17 RX ADMIN — PHENYLEPHRINE HYDROCHLORIDE 100 MCG: 10 INJECTION INTRAVENOUS at 09:08

## 2023-08-17 RX ADMIN — EPHEDRINE SULFATE 10 MG: 50 INJECTION INTRAVENOUS at 10:08

## 2023-08-17 RX ADMIN — DIAZEPAM 2.5 MG: 10 INJECTION, SOLUTION INTRAMUSCULAR; INTRAVENOUS at 12:08

## 2023-08-17 RX ADMIN — CEFAZOLIN 2 G: 2 INJECTION, POWDER, FOR SOLUTION INTRAMUSCULAR; INTRAVENOUS at 05:08

## 2023-08-17 RX ADMIN — HYDROMORPHONE HYDROCHLORIDE 0.2 MG: 1 INJECTION, SOLUTION INTRAMUSCULAR; INTRAVENOUS; SUBCUTANEOUS at 12:08

## 2023-08-17 RX ADMIN — MIDAZOLAM HYDROCHLORIDE 2 MG: 2 INJECTION, SOLUTION INTRAMUSCULAR; INTRAVENOUS at 09:08

## 2023-08-17 RX ADMIN — SODIUM CHLORIDE, SODIUM GLUCONATE, SODIUM ACETATE, POTASSIUM CHLORIDE, MAGNESIUM CHLORIDE, SODIUM PHOSPHATE, DIBASIC, AND POTASSIUM PHOSPHATE: .53; .5; .37; .037; .03; .012; .00082 INJECTION, SOLUTION INTRAVENOUS at 10:08

## 2023-08-17 RX ADMIN — GABAPENTIN 300 MG: 300 CAPSULE ORAL at 09:08

## 2023-08-17 RX ADMIN — METHOCARBAMOL 750 MG: 750 TABLET ORAL at 09:08

## 2023-08-17 RX ADMIN — ESCITALOPRAM OXALATE 20 MG: 20 TABLET ORAL at 12:08

## 2023-08-17 RX ADMIN — METHOCARBAMOL 500 MG: 500 TABLET ORAL at 01:08

## 2023-08-17 RX ADMIN — ACETAMINOPHEN 1000 MG: 10 INJECTION, SOLUTION INTRAVENOUS at 10:08

## 2023-08-17 RX ADMIN — SENNOSIDES AND DOCUSATE SODIUM 1 TABLET: 50; 8.6 TABLET ORAL at 09:08

## 2023-08-17 RX ADMIN — FENTANYL CITRATE 50 MCG: 50 INJECTION, SOLUTION INTRAMUSCULAR; INTRAVENOUS at 09:08

## 2023-08-17 RX ADMIN — Medication 50 MG: at 09:08

## 2023-08-17 RX ADMIN — SUGAMMADEX 200 MG: 100 INJECTION, SOLUTION INTRAVENOUS at 10:08

## 2023-08-17 RX ADMIN — SODIUM CHLORIDE 0.1 MCG/KG/MIN: 9 INJECTION, SOLUTION INTRAVENOUS at 10:08

## 2023-08-17 RX ADMIN — DEXAMETHASONE SODIUM PHOSPHATE 8 MG: 4 INJECTION, SOLUTION INTRAMUSCULAR; INTRAVENOUS at 10:08

## 2023-08-17 RX ADMIN — PROPOFOL 150 MG: 10 INJECTION, EMULSION INTRAVENOUS at 09:08

## 2023-08-17 RX ADMIN — DEXMEDETOMIDINE 8 MCG: 100 INJECTION, SOLUTION, CONCENTRATE INTRAVENOUS at 11:08

## 2023-08-17 RX ADMIN — ROCURONIUM BROMIDE 25 MG: 10 INJECTION, SOLUTION INTRAVENOUS at 10:08

## 2023-08-17 RX ADMIN — ROCURONIUM BROMIDE 35 MG: 10 INJECTION, SOLUTION INTRAVENOUS at 09:08

## 2023-08-17 RX ADMIN — ONDANSETRON 4 MG: 2 INJECTION INTRAMUSCULAR; INTRAVENOUS at 10:08

## 2023-08-17 RX ADMIN — DEXMEDETOMIDINE 4 MCG: 100 INJECTION, SOLUTION, CONCENTRATE INTRAVENOUS at 10:08

## 2023-08-17 RX ADMIN — Medication: at 07:08

## 2023-08-17 RX ADMIN — CEFAZOLIN 2 G: 330 INJECTION, POWDER, FOR SOLUTION INTRAMUSCULAR; INTRAVENOUS at 10:08

## 2023-08-17 RX ADMIN — SODIUM CHLORIDE: 9 INJECTION, SOLUTION INTRAVENOUS at 12:08

## 2023-08-17 RX ADMIN — FENTANYL CITRATE 50 MCG: 50 INJECTION, SOLUTION INTRAMUSCULAR; INTRAVENOUS at 11:08

## 2023-08-17 RX ADMIN — LIDOCAINE HYDROCHLORIDE 100 MG: 20 INJECTION, SOLUTION EPIDURAL; INFILTRATION; INTRACAUDAL; PERINEURAL at 09:08

## 2023-08-17 RX ADMIN — DEXMEDETOMIDINE 8 MCG: 100 INJECTION, SOLUTION, CONCENTRATE INTRAVENOUS at 10:08

## 2023-08-17 RX ADMIN — EPHEDRINE SULFATE 5 MG: 50 INJECTION INTRAVENOUS at 10:08

## 2023-08-17 RX ADMIN — MUPIROCIN: 20 OINTMENT TOPICAL at 12:08

## 2023-08-17 RX ADMIN — PRAZOSIN HYDROCHLORIDE 1 MG: 1 CAPSULE ORAL at 09:08

## 2023-08-17 RX ADMIN — Medication: at 12:08

## 2023-08-17 RX ADMIN — LITHIUM CARBONATE 300 MG: 300 TABLET, FILM COATED, EXTENDED RELEASE ORAL at 01:08

## 2023-08-17 RX ADMIN — MUPIROCIN: 20 OINTMENT TOPICAL at 09:08

## 2023-08-17 RX ADMIN — ACETAMINOPHEN 650 MG: 325 TABLET ORAL at 09:08

## 2023-08-17 NOTE — ANESTHESIA PREPROCEDURE EVALUATION
"                                                                                                             08/17/2023  Scarlett Knox is a 33 y.o., female.      Pre-op Assessment    I have reviewed the Patient Summary Reports.       I have reviewed the Medications.     Review of Systems  Anesthesia Hx:  Brief episode of awareness with prior anesthetic; no pain experienced but describes as "terrifying" Neg history of prior surgery. Denies Family Hx of Anesthesia complications.   Denies Personal Hx of Anesthesia complications.   Hematology/Oncology:  Hematology Normal   Oncology Normal     EENT/Dental:EENT/Dental Normal   Cardiovascular:   Exercise tolerance: good  Denies Angina.  Functional Capacity good / => 4 METS  Carotoid Artery Disease    Pulmonary:  Pulmonary Normal    Renal/:  Renal/ Normal     Hepatic/GI:   Denies GERD.    Musculoskeletal:  Spine Disorders: lumbar    Neurological:  Denies Pain    Endocrine:  Endocrine Normal    Psych:   anxiety  Phobia and Claustrophobia.         Physical Exam  General: Well nourished and Cooperative    Airway:  Mallampati: III / II  Mouth Opening: Normal  TM Distance: Normal  Tongue: Normal  Neck ROM: Normal ROM    Dental:  Intact, Braces    Chest/Lungs:  Clear to auscultation, Normal Respiratory Rate    Heart:  Rate: Normal        Anesthesia Plan  Type of Anesthesia, risks & benefits discussed:    Anesthesia Type: Gen ETT  Intra-op Monitoring Plan: Standard ASA Monitors  Post Op Pain Control Plan: multimodal analgesia and IV/PO Opioids PRN  Induction:  IV  Airway Plan: , Post-Induction  Informed Consent: Informed consent signed with the Patient and all parties understand the risks and agree with anesthesia plan.  All questions answered.   ASA Score: 2  Day of Surgery Review of History & Physical: H&P Update referred to the surgeon/provider.  Anesthesia Plan Notes: Will remove rubber bands from braces  Recommend BIS monitoring     Ready For Surgery From Anesthesia " Perspective.     .

## 2023-08-17 NOTE — OPERATIVE NOTE ADDENDUM
Certification of Assistant at Surgery       Surgery Date: 8/17/2023     Participating Surgeons:  Surgeon(s) and Role:     * Dominic Mike MD - Primary    Procedures:  Procedure(s) (LRB):  FUSION, SPINE, LUMBAR, TLIF, POSTERIOR APPROACH, USING PEDICLE SCREW L4/5  (N/A)    Assistant Surgeon's Certification of Necessity:  I understand that section 1842 (b) (6) (d) of the Social Security Act generally prohibits Medicare Part B reasonable charge payment for the services of assistants at surgery in teaching hospitals when qualified residents are available to furnish such services. I certify that the services for which payment is claimed were medically necessary, and that no qualified resident was available to perform the services. I further understand that these services are subject to post-payment review by the Medicare carrier.      Lou Block PA-C    08/17/2023  11:53 AM

## 2023-08-17 NOTE — NURSING TRANSFER
Nursing Transfer Note      8/17/2023   4:23 PM    Nurse giving handoff:NAYANA Seo PACU   Nurse receiving handoff:NAYANA Mahoney POSS    Reason patient is being transferred: to room from pacu    Transfer To: 527    Transfer via stretcher    Transfer with IV pump, PCA pump    Transported by PCT    Transfer Vital Signs:  See last chart vitals in flowsheets    Telemetry: Rhythm NSR  Order for Tele Monitor? No    Additional Lines: None    Medicines sent: None    Any special needs or follow-up needed: None    Patient belongings transferred with patient: No    Chart send with patient: Yes    Notified: spouse    Patient reassessed at: 1600 on 8/17/23

## 2023-08-17 NOTE — PATIENT INSTRUCTIONS
DR. KASSI LLAMAS'S POSTOPERATIVE INSTRUCTIONS -   LUMBAR FUSION       Antibiotics: You do not need additional antibiotics at home.    NSAIDs: Please refrain from taking ibuprofen (Advil), naproxen (Aleve), and other non steroidal anti-inflammatory medications other than the Celebrex that will be prescribed to you after surgery.    Wound Care: You may remove your dressing and shower 7 days after surgery. Until then please keep your wound clean and dry. Sponge baths are acceptable. Do not go in a pool or hot tub until seen in clinic. Please leave the small steri-strips covering your wound in place until they fall off naturally (2 weeks). You may notice clear suture ends hanging from the sides of your incision after the steri-strips are removed, it is ok to clip these with scissors.    Brace: You may be prescribed a brace, please wear this when up and walking, it is not necessary to wear at night when sleeping.    Pain: We will use a multimodal approach for pain management after your surgery.  You will be given a prescription for pain medicine when you are discharged from the hospital.  You will also be given prescriptions for Robaxin (a muscle relaxer), Gabapentin, Celebrex and Tylenol.  Please note: you will only be given ONE prescription for narcotics when you are discharged from the hospital.  This medication is for breakthrough pain only. This medication will not be refilled.  The other medications given to you may be refilled if needed.      Infection: Signs of infection include increasing wound drainage and redness around the wound, as well as a temperature over 101.5 degrees. It is unnecessary to take your temperature on a routine basis. Please call the below number if you are concerned about an infection.    Driving and Work: It is ok to return to driving and work as long as you are not taking narcotic pain medications and can walk greater than 100 feet. Please do not lift over 10 pounds or participate in  exercise or sports until cleared by Dr. Mike.    Deep Venous Thrombosis (Blood Clots): Symptoms include swelling in the legs and shortness of breath. Please call the office or proceed to the nearest emergency room if you have any of these symptoms.    Physical Therapy: The best physical therapy immediately after surgery is walking. Please try to walk as much as possible.    Follow-up: You will be scheduled for a follow-up appointment in 4 weeks with either Dr. Mike or his physician assistant, Lou May PA-C.    Questions: During business hours please call [phone] for routine questions. For after hours questions please call [phone] and ask to speak with the Orthopaedic resident on call.    Disability: If you submitted short term disability paperwork for us to complete and would like to check the status, please call the Disability Department at [phone].  You may fax any necessary paperwork to [phone].

## 2023-08-17 NOTE — TRANSFER OF CARE
"Anesthesia Transfer of Care Note    Patient: Scarlett Knox    Procedure(s) Performed: Procedure(s) (LRB):  FUSION, SPINE, LUMBAR, TLIF, POSTERIOR APPROACH, USING PEDICLE SCREW L4/5  (N/A)    Patient location: PACU    Anesthesia Type: general    Transport from OR: Transported from OR on 6-10 L/min O2 by face mask with adequate spontaneous ventilation    Post pain: adequate analgesia    Post assessment: no apparent anesthetic complications and tolerated procedure well    Post vital signs: stable    Level of consciousness: awake and alert    Nausea/Vomiting: no nausea/vomiting    Complications: none    Transfer of care protocol was followed      Last vitals:   Visit Vitals  /60 (BP Location: Right arm, Patient Position: Lying)   Pulse (!) 112   Temp 36.2 °C (97.2 °F) (Temporal)   Resp 18   Ht 5' 1" (1.549 m)   Wt 68 kg (150 lb)   LMP 08/03/2023 (Exact Date)   SpO2 99%   Breastfeeding No   BMI 28.34 kg/m²     "

## 2023-08-17 NOTE — ANESTHESIA RELEASE NOTE
"Anesthesia Release from PACU Note    Patient: Scarlett Knox    Procedure(s) Performed: Procedure(s) (LRB):  FUSION, SPINE, LUMBAR, TLIF, POSTERIOR APPROACH, USING PEDICLE SCREW L4/5  (N/A)    Anesthesia type: general    Post pain: Adequate analgesia    Post assessment: no apparent anesthetic complications    Last Vitals:   Visit Vitals  BP (!) 96/55   Pulse 84   Temp 36.2 °C (97.2 °F) (Temporal)   Resp 20   Ht 5' 1" (1.549 m)   Wt 68 kg (150 lb)   LMP 08/03/2023 (Exact Date)   SpO2 100%   Breastfeeding No   BMI 28.34 kg/m²       Post vital signs: stable    Level of consciousness: awake    Nausea/Vomiting: no nausea/no vomiting    Complications: none    Airway Patency: patent    Respiratory: unassisted    Cardiovascular: stable and blood pressure at baseline    Hydration: euvolemic  "

## 2023-08-17 NOTE — OP NOTE
DATE OF PROCEDURE: 8/17/23     SURGEON: Dominic Mike M.D.      FIRST ASSISTANT-SURGEON: Lou May PA-C, note no resident was available.     PREOPERATIVE DIAGNOSIS:  1.  History of L4-5 diskectomy  2.  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  History of L4-5 diskectomy  2.  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   Posterior nonsegmental instrumentation L4-5  3.   Application of titanium intervertebral spacer device L4-5 disc defect  4.   Revision L4 laminectomy for decompression of central and bilateral foraminal stenosis  5.   Local bone graft      ANESTHESIA:  General endotracheal anesthesia.      SPECIMEN:  None     FINDINGS:  None     BLOOD LOSS:  200 cc     BLOOD PRODUCTS TRANSFUSED:  None     COMPLICATIONS: None.      COUNTS: Correct x 2.      DISPOSITION: Recovery Room, stable.      IMPLANTS:  Spine wave screws, and a size 11 titanium intervertebral spacer     NEUROLOGICAL MONITORING NOTES:  Somatosensory-evoked potentials, free run EMG, an EMG stimulation lumbar pedicle screws.  Please note that there were no changes to stable unreliable bilateral upper and lower extremity somatosensory evoked potentials throughout the entire procedure, and no spontaneous EMG activity.  All screws stimulated at greater than 19 milliamp.  The left L5 nerve root stimulated at 4 milliamps.     INDICATIONS FOR PROCEDURE:  The patient is a 33-year-old female with a history of a prior L4-5 diskectomy.  She has dynamic instability with refractory low back pain and radiculopathy at the L4-5 segment.  She has failed extensive conservative management, and is here for surgery today.      DESCRIPTION OF INFORMED CONSENT:  I had a sit down discussion with the patient and her boyfriend regarding the planned procedure.  We specifically discussed the risks, benefits, and alternatives to surgery. We discussed the surgical procedure including the skin incision, nerve decompression, bone fusion, allograft, iliac crest bone graft, and surgical implants including pedicle screws and interbody devices as indicated: they understand the risks include but are not limited to death, paralysis, blindness, bleeding, infection, damage to arteries, veins and nerves, spinal fluid leak, continued or worsening pain, no improvement in symptoms, non-union, and the possible need for more surgery in the future, the possible need for perioperative blood transfusion, as well as the possibility other unforseen and unknown complications. We talked about expected hospital stay and recovery period. All questions were answered; they understand and wish to proceed.        PROCEDURE IN DETAIL: The patient was met in the preoperative area where all final questions were answered. Their lumbar spine was marked as the operative site. The patient was then brought to the operating room where anesthesia was induced. A pan catheter was inserted in a standard sterile fashion. The patient was then flipped prone onto the Case spine table. All bony prominences were padded, paying special attention to the eyes, nose and mouth, axillae, ulnar nerve and hands, genitalia, knees and feet, this was confirmed to be adequate with the anesthesia team. Sequential compression devices were run throughout the case on the bilateral calves.  The surgical field was prepped and draped in normal sterile manner after using fluoroscopy to localize our incisoin.  A full time-out was then called, verifying patient's identity, surgery, site, and that they had been administered a weight appropriate dose of Ancef, no specific nursing, anesthetic or surgical concerns were identified and it was decided that it was safe to proceed with surgery. I informed all members of the operative team that they  were empowered to speak up regarding concerns at any time during the procedure.     At this point I made a midline lumbar incision incorporating her prior scar and performed a preliminary subperiosteal exposure of the L4 and L5 lamina, the bilateral L3-4 and L4-5 facet joints, and the transverse processes at L4 and L5 bilaterally.  At the conclusion of my preliminary exposure I placed a marker in what I took to be the left L4 pedicle, took a lateral radiograph, and this confirm my levels I then finalized my exposure.  Next I performed bilateral L4-5 facetectomies with an osteotome.  I then placed freehand pedicle screws bilaterally into the L4 and L5 pedicles using anatomic landmarks and freehand technique.  Acceptable position as well as correct level of all implants was then confirmed on AP and lateral intraoperative fluoroscopic radiographs.  Acceptable position of the screws and correct level was confirmed on AP and lateral intraoperative fluoroscopic views.  Next I began my neurological decompression portion procedure.  The residual L4 lamina was thinned and removed in a standard fashion.  I then performed a revision central as well as bilateral foraminal decompression with a Kerrison punch, this was more than what was required for cage placement.  Working on the left of midline performed a subtotal L4-5 diskectomy including decompression of the exiting and traversing nerve roots.  I then placed a size 11 spacer packed with bone graft here.  AP and lateral radiographs were taken demonstrating correct level as well as acceptable position of cages.  Rods were measured contoured and locked into place with set plugs and torque wrench.  The wound was copiously irrigated.  The L4 and L5 transverse processeswere decorticated bilaterally.  The patient's morselized bone graft was mixed with 1 g vancomycin powder and laid from L4-5 along the decorticated transverse processes/sacral ala bilaterally.  A deep drain was then  placed.     500 mg of vancomycin powder was placed in the deep space, and a deep drain was then placed.  The deep fascia was then closed with #1 Vicryl sutures in an interrupted, figure-of-eight fashion.  The superficial layer was then irrigated and closed over a drain and an additional 500 mg of vancomycin powder with 2-0 Vicryl, 3-0 Monocryl, Dermabond and Steri-Strips.  A soft dressing was then placed.  The drains were secured in place with silk sutures.  The patient was then flipped supine, extubated, and transferred to the recovery room in stable condition.    JUSTIFICATION CO-SURGEON AND DASH 22 MODIFIER:  This is a complex revision operation in a patient with history of a prior lumbar discectomy.   All aspects of this patient's care more difficult from preoperative decision-making to intraoperative dissection, decompression, instrumentation, and fusion.

## 2023-08-17 NOTE — ANESTHESIA POSTPROCEDURE EVALUATION
Anesthesia Post Evaluation    Patient: Scarlett Knox    Procedure(s) Performed: Procedure(s) (LRB):  FUSION, SPINE, LUMBAR, TLIF, POSTERIOR APPROACH, USING PEDICLE SCREW L4/5  (N/A)    Final Anesthesia Type: general      Patient location during evaluation: PACU  Patient participation: Yes- Able to Participate  Level of consciousness: awake and alert  Post-procedure vital signs: reviewed and stable  Pain management: pain needs to be addressed  Airway patency: patent    PONV status at discharge: No PONV  Anesthetic complications: no      Cardiovascular status: blood pressure returned to baseline  Respiratory status: unassisted  Hydration status: euvolemic  Follow-up not needed.          Vitals Value Taken Time   /69 08/17/23 1235   Temp 36.2 °C (97.2 °F) 08/17/23 1159   Pulse 110 08/17/23 1236   Resp 23 08/17/23 1236   SpO2 95 % 08/17/23 1236   Vitals shown include unvalidated device data.      No case tracking events are documented in the log.      Pain/Carlos A Score: Pain Rating Prior to Med Admin: 10 (8/17/2023 12:35 PM)

## 2023-08-18 LAB
BASOPHILS # BLD AUTO: 0.02 K/UL (ref 0–0.2)
BASOPHILS NFR BLD: 0.1 % (ref 0–1.9)
DIFFERENTIAL METHOD: ABNORMAL
EOSINOPHIL # BLD AUTO: 0 K/UL (ref 0–0.5)
EOSINOPHIL NFR BLD: 0 % (ref 0–8)
ERYTHROCYTE [DISTWIDTH] IN BLOOD BY AUTOMATED COUNT: 12.6 % (ref 11.5–14.5)
HCT VFR BLD AUTO: 31.6 % (ref 37–48.5)
HGB BLD-MCNC: 10.3 G/DL (ref 12–16)
IMM GRANULOCYTES # BLD AUTO: 0.09 K/UL (ref 0–0.04)
IMM GRANULOCYTES NFR BLD AUTO: 0.5 % (ref 0–0.5)
LYMPHOCYTES # BLD AUTO: 2.2 K/UL (ref 1–4.8)
LYMPHOCYTES NFR BLD: 13.5 % (ref 18–48)
MCH RBC QN AUTO: 30.1 PG (ref 27–31)
MCHC RBC AUTO-ENTMCNC: 32.6 G/DL (ref 32–36)
MCV RBC AUTO: 92 FL (ref 82–98)
MONOCYTES # BLD AUTO: 1.2 K/UL (ref 0.3–1)
MONOCYTES NFR BLD: 7 % (ref 4–15)
NEUTROPHILS # BLD AUTO: 13.1 K/UL (ref 1.8–7.7)
NEUTROPHILS NFR BLD: 78.9 % (ref 38–73)
NRBC BLD-RTO: 0 /100 WBC
PLATELET # BLD AUTO: 284 K/UL (ref 150–450)
PMV BLD AUTO: 11.1 FL (ref 9.2–12.9)
RBC # BLD AUTO: 3.42 M/UL (ref 4–5.4)
WBC # BLD AUTO: 16.54 K/UL (ref 3.9–12.7)

## 2023-08-18 PROCEDURE — 63600175 PHARM REV CODE 636 W HCPCS: Performed by: PHYSICIAN ASSISTANT

## 2023-08-18 PROCEDURE — 97116 GAIT TRAINING THERAPY: CPT

## 2023-08-18 PROCEDURE — 25000003 PHARM REV CODE 250: Performed by: STUDENT IN AN ORGANIZED HEALTH CARE EDUCATION/TRAINING PROGRAM

## 2023-08-18 PROCEDURE — 97161 PT EVAL LOW COMPLEX 20 MIN: CPT

## 2023-08-18 PROCEDURE — 85025 COMPLETE CBC W/AUTO DIFF WBC: CPT | Performed by: PHYSICIAN ASSISTANT

## 2023-08-18 PROCEDURE — 97535 SELF CARE MNGMENT TRAINING: CPT

## 2023-08-18 PROCEDURE — 99900035 HC TECH TIME PER 15 MIN (STAT)

## 2023-08-18 PROCEDURE — 36415 COLL VENOUS BLD VENIPUNCTURE: CPT | Performed by: PHYSICIAN ASSISTANT

## 2023-08-18 PROCEDURE — 11000001 HC ACUTE MED/SURG PRIVATE ROOM

## 2023-08-18 PROCEDURE — 25000003 PHARM REV CODE 250: Performed by: PHYSICIAN ASSISTANT

## 2023-08-18 PROCEDURE — 97165 OT EVAL LOW COMPLEX 30 MIN: CPT

## 2023-08-18 PROCEDURE — S4991 NICOTINE PATCH NONLEGEND: HCPCS | Performed by: STUDENT IN AN ORGANIZED HEALTH CARE EDUCATION/TRAINING PROGRAM

## 2023-08-18 RX ORDER — IBUPROFEN 200 MG
1 TABLET ORAL DAILY
Status: DISCONTINUED | OUTPATIENT
Start: 2023-08-18 | End: 2023-08-19 | Stop reason: HOSPADM

## 2023-08-18 RX ADMIN — ACETAMINOPHEN 650 MG: 325 TABLET ORAL at 02:08

## 2023-08-18 RX ADMIN — PRAZOSIN HYDROCHLORIDE 1 MG: 1 CAPSULE ORAL at 09:08

## 2023-08-18 RX ADMIN — Medication: at 06:08

## 2023-08-18 RX ADMIN — Medication: at 03:08

## 2023-08-18 RX ADMIN — SODIUM CHLORIDE: 9 INJECTION, SOLUTION INTRAVENOUS at 03:08

## 2023-08-18 RX ADMIN — MUPIROCIN: 20 OINTMENT TOPICAL at 09:08

## 2023-08-18 RX ADMIN — Medication: at 11:08

## 2023-08-18 RX ADMIN — LITHIUM CARBONATE 300 MG: 300 TABLET, FILM COATED, EXTENDED RELEASE ORAL at 08:08

## 2023-08-18 RX ADMIN — MUPIROCIN: 20 OINTMENT TOPICAL at 08:08

## 2023-08-18 RX ADMIN — ONDANSETRON 8 MG: 8 TABLET, ORALLY DISINTEGRATING ORAL at 04:08

## 2023-08-18 RX ADMIN — HEPARIN SODIUM 5000 UNITS: 5000 INJECTION INTRAVENOUS; SUBCUTANEOUS at 05:08

## 2023-08-18 RX ADMIN — ESCITALOPRAM OXALATE 20 MG: 20 TABLET ORAL at 08:08

## 2023-08-18 RX ADMIN — HEPARIN SODIUM 5000 UNITS: 5000 INJECTION INTRAVENOUS; SUBCUTANEOUS at 02:08

## 2023-08-18 RX ADMIN — ACETAMINOPHEN 650 MG: 325 TABLET ORAL at 09:08

## 2023-08-18 RX ADMIN — SENNOSIDES AND DOCUSATE SODIUM 1 TABLET: 50; 8.6 TABLET ORAL at 08:08

## 2023-08-18 RX ADMIN — GABAPENTIN 300 MG: 300 CAPSULE ORAL at 08:08

## 2023-08-18 RX ADMIN — METHOCARBAMOL 750 MG: 750 TABLET ORAL at 02:08

## 2023-08-18 RX ADMIN — NICOTINE 1 PATCH: 14 PATCH, EXTENDED RELEASE TRANSDERMAL at 02:08

## 2023-08-18 RX ADMIN — SENNOSIDES AND DOCUSATE SODIUM 1 TABLET: 50; 8.6 TABLET ORAL at 09:08

## 2023-08-18 RX ADMIN — CEFAZOLIN 2 G: 2 INJECTION, POWDER, FOR SOLUTION INTRAMUSCULAR; INTRAVENOUS at 08:08

## 2023-08-18 RX ADMIN — CEFAZOLIN 2 G: 2 INJECTION, POWDER, FOR SOLUTION INTRAMUSCULAR; INTRAVENOUS at 12:08

## 2023-08-18 RX ADMIN — METHOCARBAMOL 750 MG: 750 TABLET ORAL at 08:08

## 2023-08-18 RX ADMIN — CELECOXIB 200 MG: 200 CAPSULE ORAL at 08:08

## 2023-08-18 RX ADMIN — HEPARIN SODIUM 5000 UNITS: 5000 INJECTION INTRAVENOUS; SUBCUTANEOUS at 09:08

## 2023-08-18 RX ADMIN — METHOCARBAMOL 750 MG: 750 TABLET ORAL at 09:08

## 2023-08-18 RX ADMIN — GABAPENTIN 300 MG: 300 CAPSULE ORAL at 02:08

## 2023-08-18 RX ADMIN — ACETAMINOPHEN 650 MG: 325 TABLET ORAL at 05:08

## 2023-08-18 RX ADMIN — GABAPENTIN 300 MG: 300 CAPSULE ORAL at 09:08

## 2023-08-18 RX ADMIN — POLYETHYLENE GLYCOL 3350 17 G: 17 POWDER, FOR SOLUTION ORAL at 08:08

## 2023-08-18 NOTE — PT/OT/SLP EVAL
Occupational Therapy   Evaluation    Name: Scarlett Knox  MRN: [id number]  Admitting Diagnosis: Lumbar radiculopathy  Recent Surgery: Procedure(s) (LRB):  FUSION, SPINE, LUMBAR, TLIF, POSTERIOR APPROACH, USING PEDICLE SCREW L4/5  (N/A) 1 Day Post-Op    Recommendations:     Discharge Recommendations: outpatient PT  Discharge Equipment Recommendations:  walker, rolling  Barriers to discharge:  None    Assessment:     Scarlett Knox is a 33 y.o. female with a medical diagnosis of Lumbar radiculopathy.  She presents with the following performance deficits affecting function: impaired endurance, weakness, gait instability, impaired balance, impaired self care skills, impaired functional mobility, pain, decreased ROM.      Rehab Prognosis: Good; patient would benefit from acute skilled OT services to address these deficits and reach maximum level of function.       Plan:     Patient to be seen 4 x/week to address the above listed problems via self-care/home management, therapeutic activities, therapeutic exercises, neuromuscular re-education  Plan of Care Expires: 09/18/23  Plan of Care Reviewed with: patient, family    Subjective     Chief Complaint: Concern about toileting  Patient/Family Comments/goals: Return home    Occupational Profile:  Living Environment: Patient lives in [facility], [address], B [place], t/s with [facility]  Previous level of function: Mod I with SPC; patient's friend/roommate   Roles and Routines: None stated  Equipment Used at Home: wheelchair, cane, quad, cane, straight, walker, rolling, shower chair  Assistance upon Discharge: Roommate     Pain/Comfort:  Pain Rating 1: 4/10  Location - Orientation 1: generalized  Location 1: back  Pain Addressed 1: Reposition, Distraction  Pain Rating Post-Intervention 1: 4/10    Patients cultural, spiritual, Temple conflicts given the current situation: no    Objective:     Communicated with: Nursing prior to session.  Patient found right sidelying with PCA, peripheral  IV, pulse ox (continuous) upon OT entry to room.    General Precautions: Standard, fall  Orthopedic Precautions: N/A  Braces: N/A  Respiratory Status: Room air    Occupational Performance:    Bed Mobility:    Patient completed Rolling/Turning to Right with supervision  Patient completed Supine to Sit with supervision  Patient completed Sit to Supine with supervision    Functional Mobility/Transfers:  Patient completed Sit <> Stand Transfer with stand by assistance  with  no assistive device and unilateral UE support on IV pole   Patient completed Toilet Transfer Step Transfer technique with stand by assistance with  no AD and grab bars  Functional Mobility: SBA with unilateral support on IV pole, wide KEVIN ~20 ft within room    Activities of Daily Living:  Grooming: stand by assistance standing at sink in bathroom  Upper Body Dressing: minimum assistance seated edge of bed   Lower Body Dressing: minimum assistance to don house slippers  Toileting: SBA for use of adaptive strategy to complete ROM required for toileting while maintaining safety/precautions    Cognitive/Visual Perceptual:  Cognitive/Psychosocial Skills:     -       Oriented to: Person, Place, Time, and Situation   -       Follows Commands/attention:Follows multistep  commands  -       Safety awareness/insight to disability: intact   -       Mood/Affect/Coping skills/emotional control: Appropriate to situation and Cooperative    Physical Exam:  Postural examination/scapula alignment:    -       No postural abnormalities identified  Upper Extremity Range of Motion:     -       Right Upper Extremity: WFL  -       Left Upper Extremity: WFL  Upper Extremity Strength:    -       Right Upper Extremity: WFL  -       Left Upper Extremity: WFL   Strength:    -       Right Upper Extremity: WFL  -       Left Upper Extremity: WFL    AMPAC 6 Click ADL:  AMPAC Total Score: 16    Treatment & Education:  -Evaluation completed, plan of care established.  -Patient and  family educated on roles/goals of OT and POC.  -White board updated.  -Therapist provided time for questions and answered within scope of practice.  -Patient educated on importance of EOB/OOB activity to maximize recovery.     Patient left HOB elevated with all lines intact and call button in reach    GOALS:   Multidisciplinary Problems       Occupational Therapy Goals          Problem: Occupational Therapy    Goal Priority Disciplines Outcome Interventions   Occupational Therapy Goal     OT, PT/OT Ongoing, Progressing    Description: Goals to be met by: 9/18/23     Patient will increase functional independence with ADLs by performing:    UE Dressing with Modified [place].  LE Dressing with Modified [place].  Grooming while standing at sink with Modified [place].  Toileting from toilet with Modified [place] for hygiene and clothing management.   Sitting at edge of bed x10 minutes with Modified [place].  Rolling to Bilateral with Modified [place].   Supine to sit with Modified [place].  Step transfer with Modified [place]  Toilet transfer to toilet with Modified [place].                         History:     Past Medical History:   Diagnosis Date    ADHD     Anxiety     Anxiety     Back pain     Depression          Past Surgical History:   Procedure Laterality Date    FUSION, SPINE, LUMBAR, TLIF, POSTERIOR APPROACH, USING PEDICLE SCREW N/A 8/17/2023    Procedure: FUSION, SPINE, LUMBAR, TLIF, POSTERIOR APPROACH, USING PEDICLE SCREW L4/5 ;  Surgeon: Dominic Mike MD;  Location: [facility] OR [address] [place] [address];  Service: Neurosurgery;  Laterality: N/A;    KNEE CARTILAGE SURGERY Left     LUMBAR LAMINECTOMY WITH DISCECTOMY N/A 2/27/2020    Procedure: LAMINECTOMY, SPINE, LUMBAR, WITH DISCECTOMY L4/5 ;  Surgeon: Dominic Mike MD;  Location: [address];  Service: Neurosurgery;  Laterality: N/A;       Time Tracking:     OT Date of Treatment: 08/18/23  OT Start Time: 1312  OT Stop  Time: 1331  OT Total Time (min): 19 min    Billable Minutes:Evaluation 9  Self Care/Home Management 10    8/18/2023

## 2023-08-18 NOTE — PROGRESS NOTES
"[facility] - Surgery  Orthopedics  Progress Note    Patient Name: Scarlett Knox  MRN: [id number]  Admission Date: 8/17/2023  Hospital Length of Stay: 1 days  Attending Provider: Dominic Mike MD  Primary Care Provider: Liberty Ngo MD  Follow-up For: Procedure(s) (LRB):  FUSION, SPINE, LUMBAR, TLIF, POSTERIOR APPROACH, USING PEDICLE SCREW L4/5  (N/A)    Post-Operative Day: 1 Day Post-Op  Subjective:     Principal Problem:Lumbar radiculopathy    Principal Orthopedic Problem: s/p L4-5 TLIF 8/17    Interval History: POD1. Vitals stable on RA. Drain output 90cc. Voiding independently. Reports marked improvement in pre op symptoms. PCA helping with pain control.     Review of patient's allergies indicates:  No Known Allergies    Current Facility-Administered Medications   Medication    0.9%  NaCl infusion    acetaminophen tablet 650 mg    ALPRAZolam tablet 0.25 mg    ceFAZolin 2 g in dextrose 5 % in water (D5W) 50 mL IVPB (MB+)    celecoxib capsule 200 mg    EScitalopram oxalate tablet 20 mg    gabapentin capsule 300 mg    heparin (porcine) injection 5,000 Units    HYDROmorphone PCA syringe 6 mg/30 mL (0.2 mg/mL) NS    lithium CR tablet 300 mg    methocarbamoL tablet 750 mg    mupirocin 2 % ointment    naloxone 0.4 mg/mL injection 0.02 mg    ondansetron disintegrating tablet 8 mg    ondansetron injection 4 mg    polyethylene glycol packet 17 g    prazosin capsule 1 mg    senna-docusate 8.6-50 mg per tablet 1 tablet    sodium chloride 0.9% flush 5 mL     Objective:     Vital Signs (Most Recent):  Temp: 98.1 °F (36.7 °C) (08/18/23 0434)  Pulse: 71 (08/18/23 0434)  Resp: 18 (08/18/23 0434)  BP: (!) 98/53 (08/18/23 0434)  SpO2: (!) 93 % (08/18/23 0434) Vital Signs (24h Range):  Temp:  [97.2 °F (36.2 °C)-99.1 °F (37.3 °C)] 98.1 °F (36.7 °C)  Pulse:  [] 71  Resp:  [12-26] 18  SpO2:  [93 %-100 %] 93 %  BP: ()/(43-70) 98/53     Weight: 68 kg (150 lb)  Height: 5' 1" (154.9 cm)  Body " mass index is 28.34 kg/m².      Intake/Output Summary (Last 24 hours) at 8/18/2023 0608  Last data filed at 8/18/2023 0537  Gross per 24 hour   Intake 2033.06 ml   Output 230 ml   Net 1803.06 ml        Ortho/SPM Exam.  BLE strength 5/5   Drain intact with s/s output      Significant Labs:   All pertinent labs within the past 24 hours have been reviewed.    Significant Imaging: I have reviewed and interpreted all pertinent imaging results/findings.    Assessment/Plan:     * Lumbar radiculopathy  Scarlett Knox is a 33 y.o. female s/p L4-5 TLIF on 8/17    VS:  Stable   Labs:  AM labs in progress   Pain control: multimodal regimen  PT/OT: WBAT with spine precautions   DVT PPx:  Heparin 5K tid   Abx:  24hr post op ancef   Ashraf:  Voiding independently   Drain:  90cc overnight   F/u: 4wk clinic appt     Dispo: PT/OT eval today. Continue drain care               Hiram Gonzalez MD  Orthopedics  [facility] - Surgery

## 2023-08-18 NOTE — PLAN OF CARE
Problem: Adult Inpatient Plan of Care  Goal: Plan of Care Review  Outcome: Ongoing, Progressing     Problem: Fall Injury Risk  Goal: Absence of Fall and Fall-Related Injury  Outcome: Ongoing, Progressing     Problem: Pain Acute  Goal: Acceptable Pain Control and Functional Ability  Outcome: Ongoing, Progressing     Problem: Bleeding (Spinal Surgery)  Goal: Absence of Bleeding  Outcome: Ongoing, Progressing     Problem: Functional Ability Impaired (Spinal Surgery)  Goal: Optimal Functional Ability  Outcome: Ongoing, Progressing     Problem: Infection (Spinal Surgery)  Goal: Absence of Infection Signs and Symptoms  Outcome: Ongoing, Progressing     Problem: Pain (Spinal Surgery)  Goal: Acceptable Pain Control  Outcome: Ongoing, Progressing

## 2023-08-18 NOTE — PLAN OF CARE
[facility] - [facility]  Initial Discharge Assessment       Primary Care Provider: Liberty Ngo MD    Admission Diagnosis: S/P diskectomy [Z98.890]  Lumbar radiculopathy [M54.16]    Admission Date: 8/17/2023  Expected Discharge Date: 8/19/2023    Transition of Care Barriers: None    Payor: MEDICAID / Plan: [facility] COMMUNITY PLAN \A [facility]ology [facility] [place] [facility]\"" Job36 ([place] MEDICAID) / Product Type: Managed Medicaid /     Extended Emergency Contact Information  Primary Emergency Contact: Jd Lopez  Address: [address] [place] [address] [place]  Mobile Phone: [phone]  Relation: Roommate    Discharge Plan A: Home with family  Discharge Plan B: Home with family      [id number] - [facility] [address] AT [facility] [address]  [address] [facility] [address]  Phone: [phone] Fax: [phone]      Initial Assessment (most recent)       Adult Discharge Assessment - 08/18/23 1110          Discharge Assessment    Assessment Type Discharge Planning Assessment     Confirmed/corrected address, phone number and insurance Yes     Confirmed Demographics Correct on Facesheet     Source of Information patient;family   Jd - roommate    Communicated ALICE with patient/caregiver Yes     People in Home --   Jd - roommate    Do you expect to return to your current living situation? Yes     Do you have help at home or someone to help you manage your care at home? Yes     Who are your caregiver(s) and their phone number(s)? Jd - roommate     Prior to hospitilization cognitive status: Alert/Oriented     Current cognitive status: Alert/Oriented     Home Layout Able to live on 1st floor     Equipment Currently Used at Home wheelchair;cane, quad;cane, straight;walker, rolling;shower chair     Do you currently have service(s) that help you manage your care at home? No     Do you take prescription medications? Yes     Do you have prescription coverage? Yes     Do you have any  problems affording any of your prescribed medications? No     Is the patient taking medications as prescribed? yes     How do you get to doctors appointments? car, drives self;family or friend will provide     Are you on dialysis? No     Do you take coumadin? No     DME Needed Upon Discharge  none     Discharge Plan discussed with: Patient;Caregiver     Name(s) and Number(s) Jd - roommate     Transition of Care Barriers None     Discharge Plan A Home with family     Discharge Plan B Home with family        Physical Activity    On average, how many days per week do you engage in moderate to strenuous exercise (like a brisk walk)? 0 days     On average, how many minutes do you engage in exercise at this level? 0 min        Financial Resource Strain    How hard is it for you to pay for the very basics like food, housing, medical care, and heating? Not hard at all        Food Insecurity    Within the past 12 months, you worried that your food would run out before you got the money to buy more. Never true        Stress    Do you feel stress - tense, restless, nervous, or anxious, or unable to sleep at night because your mind is troubled all the time - these days? Very much        Social Connections    In a typical week, how many times do you talk on the phone with family, friends, or neighbors? More than three times a week     How often do you get together with friends or relatives? Once a week     How often do you attend Denominational or Yazidi services? Never     Do you belong to any clubs or organizations such as Denominational groups, unions, fraternal or athletic groups, or school groups? No     Are you , , , , never , or living with a partner?         Alcohol Use    Q1: How often do you have a drink containing alcohol? 2-4 times a month     Q2: How many drinks containing alcohol do you have on a typical day when you are drinking? 3 or 4     Q3: How often do you have six or more  drinks on one occasion? Never                   Patient lives with Jd - roommate, in a single story home with three entry steps. Patient has all equipment. Jd is primary caregiver and will provide transportation home.

## 2023-08-18 NOTE — PLAN OF CARE
Problem: Adult Inpatient Plan of Care  Goal: Plan of Care Review  Outcome: Ongoing, Progressing  Flowsheets (Taken 8/18/2023 0626)  Plan of Care Reviewed With:   patient   friend  Goal: Patient-Specific Goal (Individualized)  Outcome: Ongoing, Progressing  Flowsheets (Taken 8/18/2023 0626)  Anxieties, Fears or Concerns: Ability to leave doing independent activites.  Individualized Care Needs: Updates, pain management, home meds res-started.  Goal: Absence of Hospital-Acquired Illness or Injury  Outcome: Ongoing, Progressing  Intervention: Identify and Manage Fall Risk  Flowsheets (Taken 8/18/2023 0626)  Safety Promotion/Fall Prevention: assistive device/personal item within reach  Intervention: Prevent Skin Injury  Flowsheets (Taken 8/18/2023 0626)  Body Position: position changed independently  Skin Protection:   adhesive use limited   tubing/devices free from skin contact  Intervention: Prevent and Manage VTE (Venous Thromboembolism) Risk  Flowsheets (Taken 8/18/2023 0626)  Activity Management: Standing - L3  VTE Prevention/Management: remove, assess skin, and reapply sequential compression device  Range of Motion: active ROM (range of motion) encouraged  Goal: Optimal Comfort and Wellbeing  Outcome: Ongoing, Progressing  Intervention: Monitor Pain and Promote Comfort  Flowsheets (Taken 8/18/2023 0626)  Pain Management Interventions: medication offered  Intervention: Provide Person-Centered Care  Flowsheets (Taken 8/18/2023 0626)  Trust Relationship/Rapport: care explained  Goal: Readiness for Transition of Care  Outcome: Ongoing, Progressing

## 2023-08-18 NOTE — SUBJECTIVE & OBJECTIVE
"Principal Problem:Lumbar radiculopathy    Principal Orthopedic Problem: s/p L4-5 TLIF 8/17    Interval History: POD1. Vitals stable on RA. Drain output 90cc. Voiding independently. Reports marked improvement in pre op symptoms. PCA helping with pain control.     Review of patient's allergies indicates:  No Known Allergies    Current Facility-Administered Medications   Medication    0.9%  NaCl infusion    acetaminophen tablet 650 mg    ALPRAZolam tablet 0.25 mg    ceFAZolin 2 g in dextrose 5 % in water (D5W) 50 mL IVPB (MB+)    celecoxib capsule 200 mg    EScitalopram oxalate tablet 20 mg    gabapentin capsule 300 mg    heparin (porcine) injection 5,000 Units    HYDROmorphone PCA syringe 6 mg/30 mL (0.2 mg/mL) NS    lithium CR tablet 300 mg    methocarbamoL tablet 750 mg    mupirocin 2 % ointment    naloxone 0.4 mg/mL injection 0.02 mg    ondansetron disintegrating tablet 8 mg    ondansetron injection 4 mg    polyethylene glycol packet 17 g    prazosin capsule 1 mg    senna-docusate 8.6-50 mg per tablet 1 tablet    sodium chloride 0.9% flush 5 mL     Objective:     Vital Signs (Most Recent):  Temp: 98.1 °F (36.7 °C) (08/18/23 0434)  Pulse: 71 (08/18/23 0434)  Resp: 18 (08/18/23 0434)  BP: (!) 98/53 (08/18/23 0434)  SpO2: (!) 93 % (08/18/23 0434) Vital Signs (24h Range):  Temp:  [97.2 °F (36.2 °C)-99.1 °F (37.3 °C)] 98.1 °F (36.7 °C)  Pulse:  [] 71  Resp:  [12-26] 18  SpO2:  [93 %-100 %] 93 %  BP: ()/(43-70) 98/53     Weight: 68 kg (150 lb)  Height: 5' 1" (154.9 cm)  Body mass index is 28.34 kg/m².      Intake/Output Summary (Last 24 hours) at 8/18/2023 0608  Last data filed at 8/18/2023 0537  Gross per 24 hour   Intake 2033.06 ml   Output 230 ml   Net 1803.06 ml        Ortho/SPM Exam.  BLE strength 5/5   Drain intact with s/s output      Significant Labs:   All pertinent labs within the past 24 hours have been reviewed.    Significant Imaging: I have reviewed and interpreted all pertinent imaging " results/findings.   Caregiver/Family/Aunt

## 2023-08-18 NOTE — RESPIRATORY THERAPY
"RAPID RESPONSE RESPIRATORY THERAPY ETCO2 CHECK         Time of visit: 922     Code Status: Prior   : 1990  Bed: 527/527 A:   MRN: [id number]  Time spent at the bedside: < 15 min    SITUATION    Evaluated patient for: ETCo2 compliance    BACKGROUND    Why is the patient in the hospital?: Lumbar radiculopathy    Patient has a past medical history of ADHD, Anxiety, Anxiety, Back pain, and Depression.    24 Hours Vitals Range:  Temp:  [97.2 °F (36.2 °C)-98.1 °F (36.7 °C)]   Pulse:  []   Resp:  [12-26]   BP: ()/(43-70)   SpO2:  [93 %-100 %]     Labs:    No results for input(s): "NA", "K", "CL", "CO2", "BUN", "CREATININE", "GLU", "PHOS", "MG" in the last 72 hours.    Invalid input(s): "CMP", "TBIL"     No results for input(s): "PH", "PCO2", "PO2", "HCO3", "POCSATURATED", "BE" in the last 72 hours.    ASSESSMENT/INTERVENTIONS      Last VS   Temp: 97.8 °F (36.6 °C) (738)  Pulse: 71 (738)  Resp: 13 ( 1113)  BP: 94/55 (738)  SpO2: 97 % (738)    Level of Consciousness: Level of Consciousness (AVPU): alert  Respiratory Effort:   Expansion/Accessory Muscle Usage:    All Lung Field Breath Sounds:    Is the ETCO2 monitor on? Yes  Is the patient wearing a cannula? Yes  Are ETCO2 orders placed? Yes  Is the patient on a PCA pump? Yes  ETCO2 monitored: ETCO2 (mmHg): 40 mmHg  Ambu at bedside:      Active Orders   Respiratory Care    END TIDAL CO2 MONITOR Q12H     Frequency: Q12H     Number of Occurrences: Until Specified    Incentive spirometry     Frequency: Q4H     Number of Occurrences: Until Specified     Order Comments: Q4 while awake until discharge.  Educate patient in use; Keep incentive spirometer within reach; and Document incentive spirometer volume every 4 hours while awake.    ((10 sets per hour (3-5 inspirations per set)) on original order)      Incentive spirometry     Frequency: Q6H WAKE     Number of Occurrences: Until Specified     Order Comments: while awake      " Oxygen Continuous     Frequency: Continuous     Number of Occurrences: Until Specified     Order Comments: Discontinue when Sp02 is greater than or equal to 95% of equal to Preop Sp02       Order Questions:      Device type: Low flow      Device: Simple Face Mask      Titrate O2 per Oxygen Titration Protocol: Yes      Notify MD of: Inability to achieve desired SpO2    Oxygen Continuous     Frequency: Continuous     Number of Occurrences: Until Specified     Order Questions:      Device type: Low flow      Device: Nasal Cannula (1- 5 Liters)      LPM: 2      Titrate O2 per Oxygen Titration Protocol: Yes      To maintain SpO2 goal of: >= 90%      Notify MD of: Inability to achieve desired SpO2; Sudden change in patient status and requires 20% increase in FiO2; Patient requires >60% FiO2    Pulse Oximetry Q Shift     Frequency: Q Shift     Number of Occurrences: Until Specified    SMOKING CESSATION EDUCATION PER RESPIRATORY Once     Frequency: Once     Number of Occurrences: 1 Occurrences       RECOMMENDATIONS    We recommend: RRT Recs: Continue POC per primary team.      FOLLOW-UP    Please call back the Rapid Response RTChanel RRT at x [phone] for any questions or concerns.

## 2023-08-18 NOTE — PLAN OF CARE
Patient tolerated PT session well. She ambulated 80ft with RW and stand by assistance. She had complaint of being lightheaded while ambulating, but never required a seated rest break. She was educated in spinal precautions. She is okay to ambulate with nursing staff supervision and RW.     Problem: Physical Therapy  Goal: Physical Therapy Goal  Description: Goals to be met by: 2023    Patient will increase functional independence with mobility by performin. Sit to stand transfer with Supervision  2. Gait x150 feet with Supervision using Rolling Walker  3. Ascend/descend 3 stairs with bilateral handrails and Supervision     Outcome: Ongoing, Progressing

## 2023-08-18 NOTE — PLAN OF CARE
Evaluation completed, plan of care established.    Problem: Occupational Therapy  Goal: Occupational Therapy Goal  Description: Goals to be met by: 9/18/23     Patient will increase functional independence with ADLs by performing:    UE Dressing with Modified [place].  LE Dressing with Modified [place].  Grooming while standing at sink with Modified [place].  Toileting from toilet with Modified [place] for hygiene and clothing management.   Sitting at edge of bed x10 minutes with Modified [place].  Rolling to Bilateral with Modified [place].   Supine to sit with Modified [place].  Step transfer with Modified [place]  Toilet transfer to toilet with Modified [place].    Outcome: Ongoing, Progressing

## 2023-08-18 NOTE — NURSING
Nurses Note -- 4 Eyes      8/18/2023   8:29 AM      Skin assessed during: Admit      [x] No Altered Skin Integrity Present    []Prevention Measures Documented      [] Yes- Altered Skin Integrity Present or Discovered   [] LDA Added if Not in Epic (Describe Wound)   [] New Altered Skin Integrity was Present on Admit and Documented in LDA   [] Wound Image Taken    Wound Care Consulted? No    Attending Nurse: María Kenny RN    Second RN/Staff Member: Lizette Najera RN

## 2023-08-18 NOTE — ASSESSMENT & PLAN NOTE
Scarlett Knox is a 33 y.o. female s/p L4-5 TLIF on 8/17    VS:  Stable   Labs:  AM labs in progress   Pain control: multimodal regimen  PT/OT: WBAT with spine precautions   DVT PPx:  Heparin 5K tid   Abx:  24hr post op ancef   Ashraf:  Voiding independently   Drain:  90cc overnight   F/u: 4wk clinic appt     Dispo: PT/OT eval today. Continue drain care

## 2023-08-18 NOTE — PT/OT/SLP EVAL
Physical Therapy Evaluation and Treatment    Patient Name: Scarlett Knox   MRN: [id number]  Recent Surgery: Procedure(s) (LRB):  FUSION, SPINE, LUMBAR, TLIF, POSTERIOR APPROACH, USING PEDICLE SCREW L4/5  (N/A) 1 Day Post-Op    Recommendations:     Discharge Recommendations: outpatient PT (when MD approves)   Discharge Equipment Recommendations: none   Barriers to discharge: None    Assessment:     Scarlett Knox is a 33 y.o. female admitted with a medical diagnosis of Lumbar radiculopathy. She presents with the following impairments/functional limitations: weakness, impaired endurance, impaired functional mobility, gait instability, impaired balance, pain, orthopedic precautions, impaired skin. Patient tolerated PT session well. She ambulated 80ft with RW and stand by assistance. She had complaint of being lightheaded while ambulating, but never required a seated rest break. She was educated in spinal precautions. She is okay to ambulate with nursing staff supervision and RW.     Rehab Prognosis: Good; patient would benefit from acute PT services to address these deficits and reach maximum level of function.    Plan:     During this hospitalization, patient to be seen 4 x/week to address the above listed problems via gait training, therapeutic activities, therapeutic exercises, neuromuscular re-education    Plan of Care Expires: 09/18/23    Subjective     Chief Complaint: Back pain.   Patient Comments/Goals: To walk without pain.   Pain/Comfort:  Pain Rating 1: 4/10  Location 1: back  Pain Addressed 1: Pre-medicate for activity, Reposition, Distraction    Social History:  Living Environment: Patient lives in a [facility] with 3 steps and bilateral handrails to enter.   Prior Level of Function: Prior to admission, patient was modified independent with ADLs using straight cane for mobility  Equipment at Home: walker, rolling, wheelchair, cane, quad, cane, straight, shower chair  Assistance Upon Discharge:   roommate    Objective:     Communicated with RN prior to session. Patient found right sidelying with PCA, peripheral IV, hemovac (CO2 monitor) upon PT entry to room.    General Precautions: Standard, fall   Orthopedic Precautions: spinal precautions   Braces: N/A    Respiratory Status: Room air    Exams:  RLE ROM: WFL  RLE Strength: WFL  LLE ROM: WFL  LLE Strength: WFL  Cognitive: Patient is oriented to Person, Place, Time, Situation    Functional Mobility:  Bed Mobility  Rolling Right: supervision  Supine to Sit: supervision   Sit to Supine: supervision   Transfers  Sit to Stand: stand by assistance with rolling walker x1 from bed  Gait  Patient ambulated 80ft with rolling walker and stand by assistance. Patient required cues for position in walker and sequencing to increase independence and safety.    Therapeutic Activities and Exercises:  Patient educated in:  -PT role and POC  -safety with transfers including hand placement  -gait sequencing and RW management  -OOB activity to maximize recovery including ambulating at home to prevent DVT   -spinal precautions     AM-PAC 6 CLICK MOBILITY  Total Score:22    Patient left HOB elevated with all lines intact, call button in reach, RN notified, and roommate present.    GOALS:   Multidisciplinary Problems       Physical Therapy Goals          Problem: Physical Therapy    Goal Priority Disciplines Outcome Goal Variances Interventions   Physical Therapy Goal     PT, PT/OT Ongoing, Progressing     Description: Goals to be met by: 2023    Patient will increase functional independence with mobility by performin. Sit to stand transfer with Supervision  2. Gait x150 feet with Supervision using Rolling Walker  3. Ascend/descend 3 stairs with bilateral handrails and Supervision                          History:     Past Medical History:   Diagnosis Date    ADHD     Anxiety     Anxiety     Back pain     Depression        Past Surgical History:   Procedure Laterality  Date    FUSION, SPINE, LUMBAR, TLIF, POSTERIOR APPROACH, USING PEDICLE SCREW N/A 8/17/2023    Procedure: FUSION, SPINE, LUMBAR, TLIF, POSTERIOR APPROACH, USING PEDICLE SCREW L4/5 ;  Surgeon: Dominic Mike MD;  Location: [facility] OR [address] [place] [address];  Service: Neurosurgery;  Laterality: N/A;    KNEE CARTILAGE SURGERY Left     LUMBAR LAMINECTOMY WITH DISCECTOMY N/A 2/27/2020    Procedure: LAMINECTOMY, SPINE, LUMBAR, WITH DISCECTOMY L4/5 ;  Surgeon: Dominic Mike MD;  Location: [facility] OR [address] [place] [address];  Service: Neurosurgery;  Laterality: N/A;       Time Tracking:     PT Received On: 08/18/23  PT Start Time: 1032  PT Stop Time: 1050  PT Total Time (min): 18 min     Billable Minutes: Evaluation 8 and Gait Training 10    8/18/2023

## 2023-08-19 VITALS
BODY MASS INDEX: 28.32 KG/M2 | DIASTOLIC BLOOD PRESSURE: 59 MMHG | TEMPERATURE: 98 F | RESPIRATION RATE: 16 BRPM | HEART RATE: 88 BPM | HEIGHT: 61 IN | SYSTOLIC BLOOD PRESSURE: 88 MMHG | WEIGHT: 150 LBS | OXYGEN SATURATION: 97 %

## 2023-08-19 PROCEDURE — 94761 N-INVAS EAR/PLS OXIMETRY MLT: CPT

## 2023-08-19 PROCEDURE — 99900035 HC TECH TIME PER 15 MIN (STAT)

## 2023-08-19 PROCEDURE — 63600175 PHARM REV CODE 636 W HCPCS: Performed by: PHYSICIAN ASSISTANT

## 2023-08-19 PROCEDURE — 25000003 PHARM REV CODE 250: Performed by: STUDENT IN AN ORGANIZED HEALTH CARE EDUCATION/TRAINING PROGRAM

## 2023-08-19 PROCEDURE — 25000003 PHARM REV CODE 250: Performed by: PHYSICIAN ASSISTANT

## 2023-08-19 PROCEDURE — 97116 GAIT TRAINING THERAPY: CPT | Mod: CQ

## 2023-08-19 PROCEDURE — 97530 THERAPEUTIC ACTIVITIES: CPT | Mod: CQ

## 2023-08-19 PROCEDURE — S4991 NICOTINE PATCH NONLEGEND: HCPCS | Performed by: STUDENT IN AN ORGANIZED HEALTH CARE EDUCATION/TRAINING PROGRAM

## 2023-08-19 RX ADMIN — CELECOXIB 200 MG: 200 CAPSULE ORAL at 08:08

## 2023-08-19 RX ADMIN — Medication: at 03:08

## 2023-08-19 RX ADMIN — GABAPENTIN 300 MG: 300 CAPSULE ORAL at 08:08

## 2023-08-19 RX ADMIN — LITHIUM CARBONATE 300 MG: 300 TABLET, FILM COATED, EXTENDED RELEASE ORAL at 08:08

## 2023-08-19 RX ADMIN — HEPARIN SODIUM 5000 UNITS: 5000 INJECTION INTRAVENOUS; SUBCUTANEOUS at 04:08

## 2023-08-19 RX ADMIN — ACETAMINOPHEN 650 MG: 325 TABLET ORAL at 04:08

## 2023-08-19 RX ADMIN — POLYETHYLENE GLYCOL 3350 17 G: 17 POWDER, FOR SOLUTION ORAL at 08:08

## 2023-08-19 RX ADMIN — NICOTINE 1 PATCH: 14 PATCH, EXTENDED RELEASE TRANSDERMAL at 08:08

## 2023-08-19 RX ADMIN — METHOCARBAMOL 750 MG: 750 TABLET ORAL at 08:08

## 2023-08-19 RX ADMIN — MUPIROCIN: 20 OINTMENT TOPICAL at 08:08

## 2023-08-19 RX ADMIN — SENNOSIDES AND DOCUSATE SODIUM 1 TABLET: 50; 8.6 TABLET ORAL at 08:08

## 2023-08-19 RX ADMIN — ESCITALOPRAM OXALATE 20 MG: 20 TABLET ORAL at 08:08

## 2023-08-19 NOTE — SUBJECTIVE & OBJECTIVE
"Principal Problem:Lumbar radiculopathy    Principal Orthopedic Problem: s/p L4-5 TLIF 8/17    Interval History: POD2. Vitals stable on RA. Drain output 0 cc overnight. Voiding independently. Reports marked improvement in pre op symptoms. PCA helping with pain control. Tolerating an oral diet. Passing gas though no BM yet. Patient feels comfortable going home when able.     Review of patient's allergies indicates:  No Known Allergies    Current Facility-Administered Medications   Medication    acetaminophen tablet 650 mg    ALPRAZolam tablet 0.25 mg    celecoxib capsule 200 mg    EScitalopram oxalate tablet 20 mg    gabapentin capsule 300 mg    heparin (porcine) injection 5,000 Units    HYDROmorphone PCA syringe 6 mg/30 mL (0.2 mg/mL) NS    lithium CR tablet 300 mg    methocarbamoL tablet 750 mg    mupirocin 2 % ointment    naloxone 0.4 mg/mL injection 0.02 mg    nicotine 14 mg/24 hr 1 patch    ondansetron disintegrating tablet 8 mg    ondansetron injection 4 mg    polyethylene glycol packet 17 g    prazosin capsule 1 mg    senna-docusate 8.6-50 mg per tablet 1 tablet    sodium chloride 0.9% flush 5 mL     Objective:     Vital Signs (Most Recent):  Temp: 97.5 °F (36.4 °C) (08/18/23 2357)  Pulse: 82 (08/19/23 0014)  Resp: 14 (08/19/23 0345)  BP: (!) 99/52 (08/19/23 0459)  SpO2: (!) 92 % (08/18/23 2357) Vital Signs (24h Range):  Temp:  [97.5 °F (36.4 °C)-98.2 °F (36.8 °C)] 97.5 °F (36.4 °C)  Pulse:  [] 82  Resp:  [12-18] 14  SpO2:  [92 %-97 %] 92 %  BP: ()/(51-59) 99/52     Weight: 68 kg (150 lb)  Height: 5' 1" (154.9 cm)  Body mass index is 28.34 kg/m².      Intake/Output Summary (Last 24 hours) at 8/19/2023 0683  Last data filed at 8/19/2023 0345  Gross per 24 hour   Intake 1080 ml   Output 25 ml   Net 1055 ml          Ortho/SPM Exam.  BLE strength 5/5   Drain intact with minimal output   Sensation intact in BLE with exception of diminished sensation in lateral aspect of right leg which is unchanged from " her baseline     Significant Labs:   All pertinent labs within the past 24 hours have been reviewed.    Significant Imaging: I have reviewed and interpreted all pertinent imaging results/findings.

## 2023-08-19 NOTE — PT/OT/SLP PROGRESS
Physical Therapy Treatment    Patient Name:  Scarlett Knox   MRN:  [id number]    Recommendations:     Discharge Recommendations: outpatient PT  Discharge Equipment Recommendations: walker, rolling  Barriers to discharge: None    Assessment:     Scarlett Knox is a 33 y.o. female admitted with a medical diagnosis of Lumbar radiculopathy.  She presents with the following impairments/functional limitations: weakness, impaired endurance, impaired functional mobility, gait instability, impaired balance, pain, orthopedic precautions, impaired skin . Pt was  motivated and cooperative with treatment session. Pt Progressing with PT Intervention. Pt Progressing with improving gait distance. Pt would continue to benefit from skilled PT to address overall functional mobility, goals , and to return to functional baseline.  Goals remain appropriate.      Rehab Prognosis: Good; patient would benefit from acute skilled PT services to address these deficits and reach maximum level of function.    Recent Surgery: Procedure(s) (LRB):  FUSION, SPINE, LUMBAR, TLIF, POSTERIOR APPROACH, USING PEDICLE SCREW L4/5  (N/A) 2 Days Post-Op    Plan:     During this hospitalization, patient to be seen 4 x/week to address the identified rehab impairments via gait training, therapeutic activities, therapeutic exercises, neuromuscular re-education and progress toward the following goals:    Plan of Care Expires:  09/18/23    Subjective     Chief Complaint: back incision pain   Pain/Comfort:  Pain Rating 1: 4/10  Location - Orientation 1: generalized  Location 1: back  Pain Addressed 1: Reposition, Distraction  Pain Rating Post-Intervention 1: 4/10      Objective:     Communicated with RN prior to session.  Patient found HOB elevated with   upon PT entry to room.     General Precautions: Standard, fall  Orthopedic Precautions: N/A  Braces: N/A  Respiratory Status: Room air     Functional Mobility:  Bed Mobility:     Rolling Right:  supervision  Scooting: supervision  Supine to Sit: supervision  Transfers:     Sit to Stand:  supervision with rolling walker  Gait: pt ambulated with RW  180 ft   and stand by assistance. Patient required cues for position in walker and sequencing to increase independence and safety.     Ascend/descend 5 stairs with bilateral handrails and SBA with vcs for sequencing    AM-PAC 6 CLICK MOBILITY  Turning over in bed (including adjusting bedclothes, sheets and blankets)?: 4  Sitting down on and standing up from a chair with arms (e.g., wheelchair, bedside commode, etc.): 4  Moving from lying on back to sitting on the side of the bed?: 4  Moving to and from a bed to a chair (including a wheelchair)?: 4  Need to walk in hospital room?: 3  Climbing 3-5 steps with a railing?: 3  Basic Mobility Total Score: 22       Treatment & Education:  Therapist provided instruction and educated of  patient on progress, safety,d/c,PT POC,   proper body mechanics, energy conservation, and fall prevention strategies during tasks listed above, on the effects of prolonged immobility and the importance of performing OOB activity and exercises to promote healing and reduce recovery time      Patient  facilitated therex  seated in bedside chair B LE AROM AP, LAQ, Hip Flexion, Hip Abd/Add. Patient required skilled PTA for instruction of exercises and appropriate cues to perform exercises safely, sequencing and appropriately.   Exercises performed to develop and maintain pt's strength, endurance and flexibility.  Updated white board with appropriate PT mobility information for medical team notification  Education on spinal precautions (no BLT: bending, lifting >?5 lbs, and twisting.) Education on log roll for bed mobility in order to maintain spinal precautions   Donned an extra gown    Call nursing/pct to transfer to chair/use bathroom. Pt stated understanding      Bedside table in front of patient and area set up for function, convenience,  and safety. RN aware of patient's mobility needs and status. Questions/concerns addressed within PTA scope of practice; patient  with no further questions. Time was provided for active listening, discussion of health disposition, and discussion of safe discharge. Pt?verbalized?agreement .    Patient left up in chair with all lines intact, call button in reach, and nsg notified..    GOALS:   Multidisciplinary Problems       Physical Therapy Goals          Problem: Physical Therapy    Goal Priority Disciplines Outcome Goal Variances Interventions   Physical Therapy Goal     PT, PT/OT Ongoing, Progressing     Description: Goals to be met by: 2023    Patient will increase functional independence with mobility by performin. Sit to stand transfer with Supervision  2. Gait x150 feet with Supervision using Rolling Walker  3. Ascend/descend 3 stairs with bilateral handrails and Supervision                          Time Tracking:     PT Received On: 23  PT Start Time: 1101     PT Stop Time: 1124  PT Total Time (min): 23 min     Billable Minutes: Gait Training 15 and Therapeutic Activity 8    Treatment Type: Treatment  PT/PTA: PTA     Number of PTA visits since last PT visit: 1     2023

## 2023-08-19 NOTE — PROGRESS NOTES
[facility]  Orthopedics  Progress Note    Patient Name: Scarlett Knox  MRN: [id number]  Admission Date: 8/17/2023  Hospital Length of Stay: 2 days  Attending Provider: Dominic Mike MD  Primary Care Provider: Liberty Ngo MD  Follow-up For: Procedure(s) (LRB):  FUSION, SPINE, LUMBAR, TLIF, POSTERIOR APPROACH, USING PEDICLE SCREW L4/5  (N/A)    Post-Operative Day: 2   Subjective:     Principal Problem:Lumbar radiculopathy    Principal Orthopedic Problem: s/p L4-5 TLIF 8/17    Interval History: POD2. Vitals stable on RA. Drain output 0 cc overnight. Voiding independently. Reports marked improvement in pre op symptoms. PCA helping with pain control. Tolerating an oral diet. Passing gas though no BM yet. Patient feels comfortable going home when able.     Review of patient's allergies indicates:  No Known Allergies    Current Facility-Administered Medications   Medication    acetaminophen tablet 650 mg    ALPRAZolam tablet 0.25 mg    celecoxib capsule 200 mg    EScitalopram oxalate tablet 20 mg    gabapentin capsule 300 mg    heparin (porcine) injection 5,000 Units    HYDROmorphone PCA syringe 6 mg/30 mL (0.2 mg/mL) NS    lithium CR tablet 300 mg    methocarbamoL tablet 750 mg    mupirocin 2 % ointment    naloxone 0.4 mg/mL injection 0.02 mg    nicotine 14 mg/24 hr 1 patch    ondansetron disintegrating tablet 8 mg    ondansetron injection 4 mg    polyethylene glycol packet 17 g    prazosin capsule 1 mg    senna-docusate 8.6-50 mg per tablet 1 tablet    sodium chloride 0.9% flush 5 mL     Objective:     Vital Signs (Most Recent):  Temp: 97.5 °F (36.4 °C) (08/18/23 2357)  Pulse: 82 (08/19/23 0014)  Resp: 14 (08/19/23 0345)  BP: (!) 99/52 (08/19/23 0459)  SpO2: (!) 92 % (08/18/23 2357) Vital Signs (24h Range):  Temp:  [97.5 °F (36.4 °C)-98.2 °F (36.8 °C)] 97.5 °F (36.4 °C)  Pulse:  [] 82  Resp:  [12-18] 14  SpO2:  [92 %-97 %] 92 %  BP: ()/(51-59) 99/52     Weight: 68  "kg (150 lb)  Height: 5' 1" (154.9 cm)  Body mass index is 28.34 kg/m².      Intake/Output Summary (Last 24 hours) at 8/19/2023 0633  Last data filed at 8/19/2023 0345  Gross per 24 hour   Intake 1080 ml   Output 25 ml   Net 1055 ml         Ortho/SPM Exam.  BLE strength 5/5   Drain intact with minimal output   Sensation intact in BLE with exception of diminished sensation in lateral aspect of right leg which is unchanged from her baseline     Significant Labs:   All pertinent labs within the past 24 hours have been reviewed.    Significant Imaging: I have reviewed and interpreted all pertinent imaging results/findings.    Assessment/Plan:     * Lumbar radiculopathy  Scarlett Knox is a 33 y.o. female s/p L4-5 TLIF on 8/17    VS:  Stable   Pain control: multimodal regimen  PT/OT: WBAT with spine precautions   DVT PPx:  Heparin 5K tid   Abx:  24hr post op ancef complete  Ashraf:  Voiding independently   Drain:  0cc overnight   F/u: 4wk clinic appt     Dispo: Patient is stable for discharge from an orthopedic perspective      ZOILA Grossman MD  Orthopedics  [facility] - Surgery  "

## 2023-08-19 NOTE — DISCHARGE SUMMARY
[facility]  Orthopedics  Discharge Summary      Patient Name: Scarlett Knox  MRN: [id number]  Admission Date: 8/17/2023  Hospital Length of Stay: 2 days  Discharge Date and Time:  08/19/2023 10:22 AM  Attending Physician: Dominic Mike MD   Discharging Provider: ZOILA Grossman MD  Primary Care Provider: Liberty Ngo MD    HPI: Scarlett Knox is a 33 y.o. female who has a hx of prior Left L4/L5 diskectomy in 2020 by me.  She had an DAVINA 3/17 with IR. The injection did give her good relief but she has started having pain on the right now too.  She says the LLE radiculopathy improved some. When she does a lot of activity one day, the next few days she is in severe pain. She is ambulating around house with a cane and uses wheelchair outside house for longer distances.      The Patient denies myelopathic symptoms such as handwriting changes or difficulty with buttons/coins/keys. Denies perineal paresthesias, bowel dysfunction.  She does report a handful of intermittent episodes of stress incontinence.       Interval history: 7/19/23  Returns for repeat evaluation of her lumbar radiculopathy. She underwent a 2nd injection which gave her even less relief than the 1st.  At prior visit we discussed L4/5 TLIF.  At this time she presents for preoperative evaluation and wants to proceed with surgery.  She has had to increase her intake of gabapentin and is now taking 300 mg 4 times daily.  No other changes to her medical or surgical history since last visit.  Of note she was unable to obtain a shoe lift which was ordered at last visit as she can not find a provider in [facility] that supplies it.      Procedure(s) (LRB):  FUSION, SPINE, LUMBAR, TLIF, POSTERIOR APPROACH, USING PEDICLE SCREW L4/5  (N/A)      Hospital Course: On 8/17/23, the patient arrived to the [facility] for proper pre-operative management.  Upon completion of pre-operative preparation, the patient was taken back to the  operative theatre. An L4-5 TLIF was performed without complication and the patient was transported to the post anesthesia care unit in stable condition.  After appropriate recovery from the anaesthetic agents used during the surgery, the patient was then transported to the hospital inpatient floor.  The interim of the hospital stay from arrival on the floor up to discharge has been uncomplicated. The patient has tolerated regular diet.  The patient's pain has been controlled using a multimodal approach. Currently, the patient's pain is well controlled on an oral regimen.  The patient has been voiding without difficulty.  The patient began participation in physical therapy after surgery and has progressed throughout the entire hospital stay.  Currently, the patient's progress is sufficient to allow the them to be discharged to home with outpatient physical therapy safely.  The patient agrees with this assessment and desires a discharge today.    Goals of Care Treatment Preferences:  Code Status: Full Code      Consults (From admission, onward)          Status Ordering Provider     IP consult case management/social work  Once        Provider:  (Not yet assigned)    Acknowledged BRANDON BLANK            Pending Diagnostic Studies:       None          Final Active Diagnoses:    Diagnosis Date Noted POA    PRINCIPAL PROBLEM:  Lumbar radiculopathy [M54.16] 08/17/2023 Yes      Problems Resolved During this Admission:      Discharged Condition: stable    Disposition: Home or Self Care with outpatient PT    Follow Up: In orthopedic spine clinic. Patient with be contacted with appointment details.     Patient Instructions:      Ambulatory referral/consult to Physical/Occupational Therapy   Standing Status: Future   Referral Priority: Routine Referral Type: Physical Medicine   Referral Reason: Specialty Services Required   Number of Visits Requested: 1     Medications:  Reconciled Home Medications:      Medication List         START taking these medications      acetaminophen 500 MG tablet  Commonly known as: TYLENOL  Take 1 tablet (500 mg total) by mouth every 12 (twelve) hours. for 7 days     celecoxib 100 MG capsule  Commonly known as: CeleBREX  Take 1 capsule (100 mg total) by mouth 2 (two) times daily.     docusate sodium 100 MG capsule  Commonly known as: COLACE  Take 1 capsule (100 mg total) by mouth 2 (two) times daily.     methocarbamoL 500 MG Tab  Commonly known as: ROBAXIN  Take 2 tablets (1,000 mg total) by mouth 3 (three) times daily.     ondansetron 4 MG Tbdl  Commonly known as: ZOFRAN-ODT  Take 1 tablet (4 mg total) by mouth every 8 (eight) hours as needed.     oxyCODONE 5 MG immediate release tablet  Commonly known as: ROXICODONE  Take 1 tablet (5 mg total) by mouth every 4 (four) hours as needed for Pain.            CHANGE how you take these medications      gabapentin 300 MG capsule  Commonly known as: NEURONTIN  Take 1 capsule (300 mg total) by mouth 3 (three) times daily.  What changed: Another medication with the same name was removed. Continue taking this medication, and follow the directions you see here.     prazosin 1 MG Cap  Commonly known as: MINIPRESS  Take 1 mg by mouth 2 (two) times daily.  What changed: Another medication with the same name was removed. Continue taking this medication, and follow the directions you see here.            CONTINUE taking these medications      ALPRAZolam 0.25 MG tablet  Commonly known as: XANAX  Take 0.25 mg by mouth as needed.     EScitalopram oxalate 20 MG tablet  Commonly known as: LEXAPRO  Take 20 mg by mouth once daily.     levocetirizine 5 MG tablet  Commonly known as: XYZAL  Take 5 mg by mouth every evening.     lithium 300 MG CR tablet  Commonly known as: LITHOBID  Take 300 mg by mouth once daily.     methylphenidate HCl 5 MG tablet  Commonly known as: RITALIN  Take 5 mg by mouth 2 (two) times daily.            STOP taking these medications      tiZANidine 2 mg  Cap     traMADoL 50 mg tablet  Commonly known as: ANNITAM              ZOILA Grossman MD  Orthopedics  [facility] - Surgery

## 2023-08-19 NOTE — PLAN OF CARE
Problem: Adult Inpatient Plan of Care  Goal: Plan of Care Review  Outcome: Ongoing, Progressing  Goal: Patient-Specific Goal (Individualized)  Outcome: Ongoing, Progressing  Goal: Absence of Hospital-Acquired Illness or Injury  Outcome: Ongoing, Progressing  Goal: Optimal Comfort and Wellbeing  Outcome: Ongoing, Progressing  Goal: Readiness for Transition of Care  Outcome: Ongoing, Progressing     Problem: Adult Inpatient Plan of Care  Goal: Plan of Care Review  Outcome: Ongoing, Progressing     Problem: Adult Inpatient Plan of Care  Goal: Patient-Specific Goal (Individualized)  Outcome: Ongoing, Progressing     Problem: Adult Inpatient Plan of Care  Goal: Absence of Hospital-Acquired Illness or Injury  Outcome: Ongoing, Progressing     Problem: Adult Inpatient Plan of Care  Goal: Optimal Comfort and Wellbeing  Outcome: Ongoing, Progressing     Problem: Fall Injury Risk  Goal: Absence of Fall and Fall-Related Injury  Outcome: Ongoing, Progressing    .Explained plan of care, verbalized understanding. Educated pt .on new medicine . No injury during shift, Side rails up x 2, call light by bedside.

## 2023-08-19 NOTE — ASSESSMENT & PLAN NOTE
Scarlett Knox is a 33 y.o. female s/p L4-5 TLIF on 8/17    VS:  Stable   Pain control: multimodal regimen  PT/OT: WBAT with spine precautions   DVT PPx:  Heparin 5K tid   Abx:  24hr post op ancef complete  Ashraf:  Voiding independently   Drain:  0cc overnight   F/u: 4wk clinic appt     Dispo: Patient is stable for discharge from an orthopedic perspective

## 2023-08-19 NOTE — PLAN OF CARE
[facility]  Discharge Final Note    Primary Care Provider: Liberty Ngo MD    Expected Discharge Date: 8/19/2023    Final Discharge Note (most recent)       Final Note - 08/19/23 1046          Final Note    Assessment Type Final Discharge Note     Anticipated Discharge Disposition Home or Self Care     Hospital Resources/Appts/Education Provided Provided patient/caregiver with written discharge plan information        Post-Acute Status    Post-Acute Authorization Other     Other Status No Post-Acute Service Needs     Discharge Delays None known at this time                     Important Message from Medicare  Important Message from Medicare regarding Discharge Appeal Rights: Given to patient/caregiver, Explained to patient/caregiver, Signed/date by patient/caregiver     Date IMM was signed: 08/19/23  Time IMM was signed: 0941    Contact Info       Liberty Ngo MD   Relationship: PCP - General    [address] [place] [address] [address] [id number]   Phone: [phone]       Next Steps: Follow up          Michelle Gonzales, MSW, LCSW  [facility]  [facility] [phone]

## 2023-08-25 ENCOUNTER — PATIENT MESSAGE (OUTPATIENT)
Dept: ORTHOPEDICS | Facility: CLINIC | Age: 33
End: 2023-08-25
Payer: MEDICAID

## 2023-08-25 RX ORDER — OXYCODONE HYDROCHLORIDE 5 MG/1
5 TABLET ORAL EVERY 4 HOURS PRN
Qty: 30 TABLET | Refills: 0 | Status: SHIPPED | OUTPATIENT
Start: 2023-08-25

## 2023-08-28 ENCOUNTER — PATIENT MESSAGE (OUTPATIENT)
Dept: ORTHOPEDICS | Facility: CLINIC | Age: 33
End: 2023-08-28
Payer: MEDICAID

## 2023-08-29 RX ORDER — HYDROXYZINE HYDROCHLORIDE 25 MG/1
25 TABLET, FILM COATED ORAL 3 TIMES DAILY
Qty: 30 TABLET | Refills: 0 | Status: SHIPPED | OUTPATIENT
Start: 2023-08-29

## 2023-09-01 ENCOUNTER — TELEPHONE (OUTPATIENT)
Dept: ORTHOPEDICS | Facility: CLINIC | Age: 33
End: 2023-09-01
Payer: MEDICAID

## 2023-09-01 NOTE — TELEPHONE ENCOUNTER
I left message for patient reminding her about her appointment on 9-. She has an x-ray and appointment to see Lou.

## 2023-09-12 ENCOUNTER — TELEPHONE (OUTPATIENT)
Dept: ORTHOPEDICS | Facility: CLINIC | Age: 33
End: 2023-09-12
Payer: MEDICAID

## 2023-09-13 ENCOUNTER — OFFICE VISIT (OUTPATIENT)
Dept: ORTHOPEDICS | Facility: CLINIC | Age: 33
End: 2023-09-13
Payer: MEDICAID

## 2023-09-13 ENCOUNTER — HOSPITAL ENCOUNTER (OUTPATIENT)
Dept: RADIOLOGY | Facility: HOSPITAL | Age: 33
Discharge: HOME | End: 2023-09-13
Attending: ORTHOPAEDIC SURGERY
Payer: MEDICAID

## 2023-09-13 VITALS — WEIGHT: 152.13 LBS | HEIGHT: 61 IN | BODY MASS INDEX: 28.72 KG/M2

## 2023-09-13 DIAGNOSIS — Z98.890 S/P SPINAL SURGERY: ICD-10-CM

## 2023-09-13 DIAGNOSIS — Z98.1 S/P LUMBAR FUSION: Primary | ICD-10-CM

## 2023-09-13 PROCEDURE — 1160F PR REVIEW ALL MEDS BY PRESCRIBER/CLIN PHARMACIST DOCUMENTED: ICD-10-PCS | Mod: CPTII,,, | Performed by: PHYSICIAN ASSISTANT

## 2023-09-13 PROCEDURE — 99999 PR PBB SHADOW E&M-EST. PATIENT-LVL III: ICD-10-PCS | Mod: PBBFAC,,, | Performed by: PHYSICIAN ASSISTANT

## 2023-09-13 PROCEDURE — 99024 POSTOP FOLLOW-UP VISIT: CPT | Mod: ,,, | Performed by: PHYSICIAN ASSISTANT

## 2023-09-13 PROCEDURE — 3008F BODY MASS INDEX DOCD: CPT | Mod: CPTII,,, | Performed by: PHYSICIAN ASSISTANT

## 2023-09-13 PROCEDURE — 72100 X-RAY EXAM L-S SPINE 2/3 VWS: CPT | Mod: 26,,, | Performed by: RADIOLOGY

## 2023-09-13 PROCEDURE — 99024 PR POST-OP FOLLOW-UP VISIT: ICD-10-PCS | Mod: ,,, | Performed by: PHYSICIAN ASSISTANT

## 2023-09-13 PROCEDURE — 1160F RVW MEDS BY RX/DR IN RCRD: CPT | Mod: CPTII,,, | Performed by: PHYSICIAN ASSISTANT

## 2023-09-13 PROCEDURE — 3008F PR BODY MASS INDEX (BMI) DOCUMENTED: ICD-10-PCS | Mod: CPTII,,, | Performed by: PHYSICIAN ASSISTANT

## 2023-09-13 PROCEDURE — 72100 X-RAY EXAM L-S SPINE 2/3 VWS: CPT | Mod: TC

## 2023-09-13 PROCEDURE — 72100 XR LUMBAR SPINE AP AND LATERAL: ICD-10-PCS | Mod: 26,,, | Performed by: RADIOLOGY

## 2023-09-13 PROCEDURE — 99213 OFFICE O/P EST LOW 20 MIN: CPT | Mod: PBBFAC | Performed by: PHYSICIAN ASSISTANT

## 2023-09-13 PROCEDURE — 1159F MED LIST DOCD IN RCRD: CPT | Mod: CPTII,,, | Performed by: PHYSICIAN ASSISTANT

## 2023-09-13 PROCEDURE — 3044F PR MOST RECENT HEMOGLOBIN A1C LEVEL <7.0%: ICD-10-PCS | Mod: CPTII,,, | Performed by: PHYSICIAN ASSISTANT

## 2023-09-13 PROCEDURE — 3044F HG A1C LEVEL LT 7.0%: CPT | Mod: CPTII,,, | Performed by: PHYSICIAN ASSISTANT

## 2023-09-13 PROCEDURE — 1159F PR MEDICATION LIST DOCUMENTED IN MEDICAL RECORD: ICD-10-PCS | Mod: CPTII,,, | Performed by: PHYSICIAN ASSISTANT

## 2023-09-13 PROCEDURE — 99999 PR PBB SHADOW E&M-EST. PATIENT-LVL III: CPT | Mod: PBBFAC,,, | Performed by: PHYSICIAN ASSISTANT

## 2023-09-13 NOTE — PROGRESS NOTES
Date: 09/13/2023    Supervising Physician: Dominic Mike M.D.    Date of Surgery: 8/17/2023    Procedure: L4/5 TLIF    History: Scarlett Knox is seen today for follow-up following the above listed procedure. Overall the patient is doing well but today notes her pain is significantly improved.  She is very pleased.   Pain is well controlled with current pain medication.  she denies fever, chills, and sweats since the time of the surgery.       Exam:  Incision is healing well, clean, dry and intact.   There is no sign of infection. Neuro exam is stable. No signs of DVT.    Radiographs: imaging today shows hardware in place, no evidence of failure.     Assessment/Plan: 4 weeks post op.    Doing well postoperatively. No lifting over 10 lbs.     I will plan to see the patient back for the next postop visit in 2 months with imaging.     Thank you for the opportunity to participate in this patient's care. Please give me a call if there are any concerns or questions.

## 2023-09-14 ENCOUNTER — PATIENT MESSAGE (OUTPATIENT)
Dept: ORTHOPEDICS | Facility: CLINIC | Age: 33
End: 2023-09-14
Payer: MEDICAID

## 2023-09-22 ENCOUNTER — CLINICAL SUPPORT (OUTPATIENT)
Dept: REHABILITATION | Facility: HOSPITAL | Age: 33
End: 2023-09-22
Payer: MEDICAID

## 2023-09-22 DIAGNOSIS — R26.2 DIFFICULTY WALKING: Primary | ICD-10-CM

## 2023-09-22 DIAGNOSIS — R29.898 WEAKNESS OF BOTH LOWER EXTREMITIES: ICD-10-CM

## 2023-09-22 DIAGNOSIS — M54.16 LUMBAR RADICULOPATHY: ICD-10-CM

## 2023-09-22 PROCEDURE — 97161 PT EVAL LOW COMPLEX 20 MIN: CPT

## 2023-09-22 NOTE — PLAN OF CARE
"[facility]   Physical Therapy Initial Evaluation      Name: Scarlett Knox  Clinic Number: [id number]    Therapy Diagnosis:   Encounter Diagnoses   Name Primary?    Lumbar radiculopathy     Difficulty walking Yes    Weakness of both lower extremities         Physician: JACQUELINE Grossman MD    Physician Orders: PT Eval and Treat   Medical Diagnosis from Referral: M54.16 (ICD-10-CM) - Lumbar radiculopathy  Evaluation Date: 9/22/2023  Authorization Period Expiration: TBD  Plan of Care Expiration: 12/22/2023  Progress Note Due: 10/22/2023  Date of Surgery: 8/17/2023 lumbar fusion  Visit # / Visits authorized: 1/ 1   FOTO: 0/ 3    Precautions: Standard and spinal and no lumbar fusion     Time In: 7:50am  Time Out: 8:15am  Total Billable Time: 25 minutes    Subjective     Date of onset: 8/17/2023    History of current condition - Scarlett reports: she hasn't had much pain since surgery. She only uses the cane sometimes. She would like a safe way to bend down. Pain is better, but still occasional peripheralization into R LE and some burning in R buttock. She has some numbness in glutes and some in lower legs. She uses bone stimulator 2 hours a day.    Falls: none since surgery    Imaging: X-ray: Per EMR "FINDINGS: Patient status post posterior fusion of L4 and L5 with hardware in place including a disc spacer.  The vertebral body heights and spinal alignment are satisfactorily preserved.  There is no fracture, dislocation, or bony erosion."    Prior Therapy: for back pain before surgery  Social History: lives with roommate with 0 SSH  Occupation: not working  Prior Level of Function: independent   Current Level of Function: Mod I with SPC and ADLs    Pain:  Current 0/10, worst 3/10, best 0/10   Location: right upper legs and soreness in back  Description: Aching and sore and burning  Aggravating Factors: Walking, Flexing, and evenings  Easing Factors: gabapentin, muscle relaxer, and " ice    Patients goals: To be able to get stronger and get off the floor; to be able to sleep without having to use a muscle relaxer.      Medical History:   Past Medical History:   Diagnosis Date    ADHD     Anxiety     Anxiety     Back pain     Depression        Surgical History:   Scarlett Knox  has a past surgical history that includes Knee cartilage surgery (Left); Lumbar laminectomy with discectomy (N/A, 2/27/2020); and fusion, spine, lumbar, tlif, posterior approach, using pedicle screw (N/A, 8/17/2023).    Medications:   Scarlett has a current medication list which includes the following prescription(s): alprazolam, celecoxib, docusate sodium, escitalopram oxalate, gabapentin, hydroxyzine hcl, levocetirizine, lithium, methocarbamol, methylphenidate hcl, ondansetron, oxycodone, and prazosin.    Allergies:   Review of patient's allergies indicates:  No Known Allergies     Objective         Right Left Comment: ! = pain   Hip Flexion: 4/5 4/5    Hip ABD: 4/5 4/5    Hip ADD: 4/5 4/5    Hip Extension: nt nt    Hip IR: 4/5 4/5    Hip ER: 4/5 4/5        Right Left Comment ! = pain   Knee extension: 4+/5 4+/5    Knee flexion: 4+/5 4+/5    Ankle DF: 4+/5 4+/5    Ankle PF: 4+/5 4+/5         Intake Outcome Measure for FOTO Survey    Therapist reviewed FOTO scores for Scarlett Knox on 9/22/2023.   FOTO report - see Media section or FOTO account episode details.    Intake Score: 41%         Treatment     Total Treatment time (time-based codes) separate from Evaluation: 10 minutes     Scarlett received the treatments listed below:      therapeutic exercises to develop strength, endurance, ROM, posture, and core stabilization for 10 minutes including:  Review and demonstration of HEP including the following:  TrA activation  Seated clams  Seated marching  Rows  Education on spinal precautions      Patient Education and Home Exercises     Education provided:   - Role of PT  - PT POC  - HEP    Written Home Exercises  Provided: yes. Exercises were reviewed and Scarlett was able to demonstrate them prior to the end of the session.  Scarlett demonstrated good  understanding of the education provided. See EMR under Patient Instructions for exercises provided during therapy sessions.    Assessment     Scarlett is a 33 y.o. female referred to outpatient Physical Therapy with a medical diagnosis of lumbar radiculopathy. Patient presents with impairments consistent with 4 weeks s/p lumbar fusion, and impairments that are effecting her daily life. She presents with dec'd LE strength and required education no spinal precautions and activity modifications. Deferred ROM testing per spinal precautions.       Patient prognosis is Good.   Patient will benefit from skilled outpatient Physical Therapy to address the deficits stated above and in the chart below, provide patient /family education, and to maximize patientt's level of independence.     Plan of care discussed with patient: Yes  Patient's spiritual, cultural and educational needs considered and patient is agreeable to the plan of care and goals as stated below:     Anticipated Barriers for therapy: chronic pain, co-morbidities    Medical Necessity is demonstrated by the following  History  Co-morbidities and personal factors that may impact the plan of care [] LOW: no personal factors / co-morbidities  [x] MODERATE: 1-2 personal factors / co-morbidities  [] HIGH: 3+ personal factors / co-morbidities    Moderate / High Support Documentation:   Co-morbidities affecting plan of care: prior lumbar surgery, LLD    Personal Factors:   no deficits     Examination  Body Structures and Functions, activity limitations and participation restrictions that may impact the plan of care [x] LOW: addressing 1-2 elements  [] MODERATE: 3+ elements  [] HIGH: 4+ elements (please support below)    Moderate / High Support Documentation: n/a     Clinical Presentation [x] LOW: stable  [] MODERATE: Evolving  []  HIGH: Unstable     Decision Making/ Complexity Score: low       Goals:  Short Term Goals (4 Weeks):  1. Pt will be compliant with HEP to supplement PT in decreasing pain with functional mobility.  2. Pt will perform pallof press with good control to demonstrate improved core strength.  3. Pt will perform lumbar ROM without increasing symptoms.  4. Pt will improve impaired LE MMTs by 1/2 score to improve strength for functional tasks.  Long Term Goals (8 Weeks):   1. Pt will improve FOTO score to </= 49% limited to decrease perceived limitation with maintaining/changing body position.   2. Pt will perform floor to waist lift with good control to demonstrate improved core strength.  3. Pt will improve impaired LE MMTs by 1 score to improve strength for functional tasks.  4. Pt will report no pain during lumbar ROM to promote functional mobility.     Plan     Plan of care Certification: 9/22/2023 to 12/22/2023.    Outpatient Physical Therapy 2 times weekly for 12 weeks to include the following interventions: Electrical Stimulation  , Gait Training, Manual Therapy, Moist Heat/ Ice, Neuromuscular Re-ed, Therapeutic Activities, Therapeutic Exercise, Ultrasound, and dry needling, IASTM, and kinesiotaping PRN.     Francisco Javier Alcala, PT        Physician's Signature: _________________________________________ Date: ________________

## 2023-09-29 NOTE — PROGRESS NOTES
"[facility]   Physical Therapy Treatment Note      Name: Scarlett Knox  Clinic Number: [id number]    Therapy Diagnosis:   Encounter Diagnoses   Name Primary?    Difficulty walking Yes    Weakness of both lower extremities     Lumbar radiculopathy      Physician: JACQUELINE Grossman MD    Visit Date: 10/2/2023    Physician Orders: PT Eval and Treat   Medical Diagnosis from Referral: M54.16 (ICD-10-CM) - Lumbar radiculopathy  Evaluation Date: 9/22/2023  Authorization Period Expiration: 12/31/2023  Plan of Care Expiration: 12/22/2023  Progress Note Due: 10/22/2023  Date of Surgery: 8/17/2023 lumbar fusion  Visit # / Visits authorized: 1/ 1, 1/20  FOTO: 0/ 3     Precautions: Standard and spinal,  lumbar fusion, No lifting over 10 lbs      PTA Visit #: 1/5     Time In: 4:02 pm   Time Out: 4:44 pm   Total Billable Time: 42 minutes    Subjective     Pt reports: that she is still having alight "achiness" in her back, but isn't experiencing the same pain that she was before the surgery.   She was compliant with home exercise program.  Response to previous treatment: last session was initial eval  Functional change: none at this time     Pain: 0/10  Location: n/a     Objective      Objective Measures updated at progress report unless specified.     Treatment     Scarlett received the treatments listed below:      therapeutic exercises to develop strength, endurance, ROM, posture, and core stabilization for 10 minutes including:  Seated clams RTB 2x10   Seated marching 2x10       neuromuscular re-education activities to improve: Coordination, Proprioception, Posture, Core Stabilization and Motor Control for 32 minutes. The following activities were included:  TrA activation c/ SB iso   TrA activation c/ LE fallout 10x ea   No money RTB 3" 2x10   Rows GTB 2x10       therapeutic activities to improve functional performance for 0  minutes, including:          Patient Education and Home Exercises   "     Education provided:   - HEP    Written Home Exercises Provided: yes. Exercises were reviewed and Scarlett was able to demonstrate them prior to the end of the session.  Scarlett demonstrated good  understanding of the education provided. See EMR under Patient Instructions for exercises provided during therapy sessions    Assessment     Initiated therapy program following pt's initial evaluation with no adverse effects. Focused on core stabilization and LE and posture strengthening. Verbal cues provided for upright posture and correct execution of no moneys. Will continue to progress pt per her tolerance and precautions during upcoming sessions.     Scarlett Is progressing well towards her goals.   Pt prognosis is Good.     Pt will continue to benefit from skilled outpatient physical therapy to address the deficits listed in the problem list box on initial evaluation, provide pt/family education and to maximize pt's level of independence in the home and community environment.     Pt's spiritual, cultural and educational needs considered and pt agreeable to plan of care and goals.     Anticipated barriers to physical therapy: chronic pain, co-morbidities     Goals:   Short Term Goals (4 Weeks):  1. Pt will be compliant with HEP to supplement PT in decreasing pain with functional mobility.  2. Pt will perform pallof press with good control to demonstrate improved core strength.  3. Pt will perform lumbar ROM without increasing symptoms.  4. Pt will improve impaired LE MMTs by 1/2 score to improve strength for functional tasks.  Long Term Goals (8 Weeks):   1. Pt will improve FOTO score to </= 49% limited to decrease perceived limitation with maintaining/changing body position.   2. Pt will perform floor to waist lift with good control to demonstrate improved core strength.  3. Pt will improve impaired LE MMTs by 1 score to improve strength for functional tasks.  4. Pt will report no pain during lumbar ROM to promote  functional mobility.     Plan     Plan of care Certification: 9/22/2023 to 12/22/2023.     Kirsten Blanc, PTA

## 2023-10-02 ENCOUNTER — CLINICAL SUPPORT (OUTPATIENT)
Dept: REHABILITATION | Facility: HOSPITAL | Age: 33
End: 2023-10-02
Payer: MEDICAID

## 2023-10-02 DIAGNOSIS — R26.2 DIFFICULTY WALKING: Primary | ICD-10-CM

## 2023-10-02 DIAGNOSIS — M54.16 LUMBAR RADICULOPATHY: ICD-10-CM

## 2023-10-02 DIAGNOSIS — R29.898 WEAKNESS OF BOTH LOWER EXTREMITIES: ICD-10-CM

## 2023-10-02 PROCEDURE — 97110 THERAPEUTIC EXERCISES: CPT | Mod: CQ

## 2023-10-02 PROCEDURE — 97112 NEUROMUSCULAR REEDUCATION: CPT | Mod: CQ

## 2023-10-09 NOTE — PROGRESS NOTES
"[facility]   Physical Therapy Treatment Note      Name: Scarlett Knox  Clinic Number: [id number]    Therapy Diagnosis:   Encounter Diagnoses   Name Primary?    Difficulty walking Yes    Weakness of both lower extremities     Lumbar radiculopathy        Physician: JACQUELINE Grossman MD    Visit Date: 10/10/2023    Physician Orders: PT Eval and Treat   Medical Diagnosis from Referral: M54.16 (ICD-10-CM) - Lumbar radiculopathy  Evaluation Date: 9/22/2023  Authorization Period Expiration: 12/31/2023  Plan of Care Expiration: 12/22/2023  Progress Note Due: 10/22/2023  Date of Surgery: 8/17/2023 lumbar fusion  Visit # / Visits authorized: 1/ 1, 2/20  FOTO: 0/ 3     Precautions: Standard and spinal,  lumbar fusion, No lifting over 10 lbs      PTA Visit #: 2/5     Time In: 9:53 am   Time Out: 10:36 am   Total Billable Time: 43 minutes    Subjective     Pt reports: that she started with a new symptom in her LLE where she gets a cold sensation/loss of sensation intermittently. Pt states that she was able to stand for about an hour and a half to cook dinner.    She was compliant with home exercise program.  Response to previous treatment: no adverse effects   Functional change: none at this time     Pain: 0/10  Location: n/a     Objective      Objective Measures updated at progress report unless specified.     Treatment     Scarlett received the treatments listed below:      therapeutic exercises to develop strength, endurance, ROM, posture, and core stabilization for 8 minutes including:  Seated clams RTB 2x10   Seated marching 2x10       neuromuscular re-education activities to improve: Coordination, Proprioception, Posture, Core Stabilization and Motor Control for 35 minutes. The following activities were included:  TrA activation c/ SB iso   TrA activation c/ LE fallout 10x ea   No money RTB 3" 2x10   Rows GTB 2x10   Seated adductor squeeze 3" 2x10 (for improved adductor activation during " ambulation)       therapeutic activities to improve functional performance for 0  minutes, including:          Patient Education and Home Exercises       Education provided:   - HEP    Written Home Exercises Provided: yes. Exercises were reviewed and Scarlett was able to demonstrate them prior to the end of the session.  Scarlett demonstrated good  understanding of the education provided. See EMR under Patient Instructions for exercises provided during therapy sessions    Assessment     Incorporated adductor squeeze due to pt's concerns for LE compensations during ambulation. Alternated between UE and LE activities in order to decrease musculare fatigue. Discussed modifications of HEP in order to make all exercises attainable without additional equipment. Will continue to progress pt per her tolerance during upcoming sessions.     Scarlett Is progressing well towards her goals.   Pt prognosis is Good.     Pt will continue to benefit from skilled outpatient physical therapy to address the deficits listed in the problem list box on initial evaluation, provide pt/family education and to maximize pt's level of independence in the home and community environment.     Pt's spiritual, cultural and educational needs considered and pt agreeable to plan of care and goals.     Anticipated barriers to physical therapy: chronic pain, co-morbidities     Goals:   Short Term Goals (4 Weeks):  1. Pt will be compliant with HEP to supplement PT in decreasing pain with functional mobility.  2. Pt will perform pallof press with good control to demonstrate improved core strength.  3. Pt will perform lumbar ROM without increasing symptoms.  4. Pt will improve impaired LE MMTs by 1/2 score to improve strength for functional tasks.  Long Term Goals (8 Weeks):   1. Pt will improve FOTO score to </= 49% limited to decrease perceived limitation with maintaining/changing body position.   2. Pt will perform floor to waist lift with good control to  demonstrate improved core strength.  3. Pt will improve impaired LE MMTs by 1 score to improve strength for functional tasks.  4. Pt will report no pain during lumbar ROM to promote functional mobility.     Plan     Plan of care Certification: 9/22/2023 to 12/22/2023.     Kirsten Blanc, PTA

## 2023-10-10 ENCOUNTER — CLINICAL SUPPORT (OUTPATIENT)
Dept: REHABILITATION | Facility: HOSPITAL | Age: 33
End: 2023-10-10
Payer: MEDICAID

## 2023-10-10 DIAGNOSIS — R26.2 DIFFICULTY WALKING: Primary | ICD-10-CM

## 2023-10-10 DIAGNOSIS — R29.898 WEAKNESS OF BOTH LOWER EXTREMITIES: ICD-10-CM

## 2023-10-10 DIAGNOSIS — M54.16 LUMBAR RADICULOPATHY: ICD-10-CM

## 2023-10-10 PROCEDURE — 97110 THERAPEUTIC EXERCISES: CPT | Mod: CQ

## 2023-10-10 PROCEDURE — 97112 NEUROMUSCULAR REEDUCATION: CPT | Mod: CQ

## 2023-10-12 NOTE — PROGRESS NOTES
"[facility]   Physical Therapy Treatment Note      Name: Scarlett Knox  Clinic Number: [id number]    Therapy Diagnosis:   Encounter Diagnoses   Name Primary?    Difficulty walking Yes    Weakness of both lower extremities     Lumbar radiculopathy          Physician: JACQUELINE Grossman MD    Visit Date: 10/17/2023    Physician Orders: PT Eval and Treat   Medical Diagnosis from Referral: M54.16 (ICD-10-CM) - Lumbar radiculopathy  Evaluation Date: 9/22/2023  Authorization Period Expiration: 12/31/2023  Plan of Care Expiration: 12/22/2023  Progress Note Due: 10/22/2023  Date of Surgery: 8/17/2023 lumbar fusion  Visit # / Visits authorized: 1/ 1, 3/20  FOTO: 0/ 3     Precautions: Standard and spinal,  lumbar fusion, No lifting over 10 lbs      PTA Visit #: 3/5     Time In: 8:20 am   Time Out: 9:08 am   Total Billable Time: 48 minutes    Subjective     Pt reports: that her legs are sore after incorporating lunges to her HEP, as well as from doing ADLs, such as laundry.   She was compliant with home exercise program.  Response to previous treatment: no adverse effects   Functional change: none at this time     Pain: 0/10  Location: n/a     Objective      Objective Measures updated at progress report unless specified.     Treatment     Scarlett received the treatments listed below:      therapeutic exercises to develop strength, endurance, ROM, posture, and core stabilization for 8 minutes including:  Seated clams RTB 2x10   Seated marching 2x10       neuromuscular re-education activities to improve: Coordination, Proprioception, Posture, Core Stabilization and Motor Control for 37 minutes. The following activities were included:  TrA activation c/ SB iso   TrA activation c/ LE fallout 10x ea   No money RTB 3" 2x10   Rows GTB 2x10   Seated adductor squeeze 3" 2x10 (for improved adductor activation during ambulation)   Shld extension + TrA brace RTB 2x10       therapeutic activities to improve " functional performance for 3 minutes, including:  Mini squats to chair c/ 5lb weight 2x5         Patient Education and Home Exercises       Education provided:   - HEP    Written Home Exercises Provided: yes. Exercises were reviewed and Scarlett was able to demonstrate them prior to the end of the session.  Scarlett demonstrated good  understanding of the education provided. See EMR under Patient Instructions for exercises provided during therapy sessions    Assessment     Incorporated therapeutic activities with the addition of sit to stand with five pound weight. This included in order to work towards improved functional mobility. Also had patient perform shoulder extensions with abdominal bracing for increased core activation. Discussed pt performing static lunges instead of dynamic lunges for decreased push off and more stability with exercise.     Scarlett Is progressing well towards her goals.   Pt prognosis is Good.     Pt will continue to benefit from skilled outpatient physical therapy to address the deficits listed in the problem list box on initial evaluation, provide pt/family education and to maximize pt's level of independence in the home and community environment.     Pt's spiritual, cultural and educational needs considered and pt agreeable to plan of care and goals.     Anticipated barriers to physical therapy: chronic pain, co-morbidities     Goals:   Short Term Goals (4 Weeks):  1. Pt will be compliant with HEP to supplement PT in decreasing pain with functional mobility.  2. Pt will perform pallof press with good control to demonstrate improved core strength.  3. Pt will perform lumbar ROM without increasing symptoms.  4. Pt will improve impaired LE MMTs by 1/2 score to improve strength for functional tasks.  Long Term Goals (8 Weeks):   1. Pt will improve FOTO score to </= 49% limited to decrease perceived limitation with maintaining/changing body position.   2. Pt will perform floor to waist lift  with good control to demonstrate improved core strength.  3. Pt will improve impaired LE MMTs by 1 score to improve strength for functional tasks.  4. Pt will report no pain during lumbar ROM to promote functional mobility.     Plan     Plan of care Certification: 9/22/2023 to 12/22/2023.     Kirsten Blanc, JODY

## 2023-10-17 ENCOUNTER — CLINICAL SUPPORT (OUTPATIENT)
Dept: REHABILITATION | Facility: HOSPITAL | Age: 33
End: 2023-10-17
Payer: MEDICAID

## 2023-10-17 DIAGNOSIS — R26.2 DIFFICULTY WALKING: Primary | ICD-10-CM

## 2023-10-17 DIAGNOSIS — M54.16 LUMBAR RADICULOPATHY: ICD-10-CM

## 2023-10-17 DIAGNOSIS — R29.898 WEAKNESS OF BOTH LOWER EXTREMITIES: ICD-10-CM

## 2023-10-17 PROCEDURE — 97110 THERAPEUTIC EXERCISES: CPT | Mod: CQ

## 2023-10-17 PROCEDURE — 97112 NEUROMUSCULAR REEDUCATION: CPT | Mod: CQ

## 2023-10-19 ENCOUNTER — DOCUMENTATION ONLY (OUTPATIENT)
Dept: REHABILITATION | Facility: HOSPITAL | Age: 33
End: 2023-10-19
Payer: MEDICAID

## 2023-10-24 ENCOUNTER — CLINICAL SUPPORT (OUTPATIENT)
Dept: REHABILITATION | Facility: HOSPITAL | Age: 33
End: 2023-10-24
Payer: MEDICAID

## 2023-10-24 DIAGNOSIS — R29.898 WEAKNESS OF BOTH LOWER EXTREMITIES: ICD-10-CM

## 2023-10-24 DIAGNOSIS — M54.16 LUMBAR RADICULOPATHY: ICD-10-CM

## 2023-10-24 DIAGNOSIS — R26.2 DIFFICULTY WALKING: Primary | ICD-10-CM

## 2023-10-24 PROCEDURE — 97110 THERAPEUTIC EXERCISES: CPT

## 2023-10-24 NOTE — PROGRESS NOTES
"[facility]   Physical Therapy Treatment Note      Name: Scarlett Knox  Clinic Number: [id number]    Therapy Diagnosis:   Encounter Diagnoses   Name Primary?    Difficulty walking Yes    Weakness of both lower extremities     Lumbar radiculopathy            Physician: JACQUELINE Grossman MD    Visit Date: 10/24/2023    Physician Orders: PT Eval and Treat   Medical Diagnosis from Referral: M54.16 (ICD-10-CM) - Lumbar radiculopathy  Evaluation Date: 9/22/2023  Authorization Period Expiration: 12/31/2023  Plan of Care Expiration: 12/22/2023  Progress Note Due: 10/22/2023  Date of Surgery: 8/17/2023 lumbar fusion  Visit # / Visits authorized: 1/ 1, 4/20  FOTO: 0/ 3     Precautions: Standard and spinal,  lumbar fusion, No lifting over 10 lbs      PTA Visit #: 0/5     Time In: 8:27 am (pt arrived late)  Time Out: 9:02 am   Total Billable Time: 35 minutes    Subjective     Pt reports: that her leg weakness is asymmetrical which she thinks is due to using the cane for years.   She was compliant with home exercise program.  Response to previous treatment: no adverse effects   Functional change: none at this time     Pain: 0/10  Location: n/a     Objective      Objective Measures updated at progress report unless specified.     Treatment     Scarlett received the treatments listed below:      therapeutic exercises to develop strength, endurance, ROM, posture, and core stabilization for 10 minutes including:  Seated clams GTB 3x10   Seated marching 2x10       neuromuscular re-education activities to improve: Coordination, Proprioception, Posture, Core Stabilization and Motor Control for 22 minutes. The following activities were included:  TrA activation c/ SB iso x10, 3s  TrA activation c/ LE fallout 10x ea   No money RTB 3" 2x10   Rows GTB 2x10   Seated adductor squeeze 3" 2x10 (for improved adductor activation during ambulation)   Shld extension + TrA brace RTB 2x10     therapeutic activities to " improve functional performance for 3 minutes, including:  Mini squats to chair c/ 5lb weight 2x5   Standing lunges x15 B        Patient Education and Home Exercises       Education provided:   - HEP    Written Home Exercises Provided: yes. Exercises were reviewed and Scarlett was able to demonstrate them prior to the end of the session.  Scarlett demonstrated good  understanding of the education provided. See EMR under Patient Instructions for exercises provided during therapy sessions    Assessment     Education on proper lunging mechanics and POC provided today with good understanding. No adverse effects to treatment today.     Scarlett Is progressing well towards her goals.   Pt prognosis is Good.     Pt will continue to benefit from skilled outpatient physical therapy to address the deficits listed in the problem list box on initial evaluation, provide pt/family education and to maximize pt's level of independence in the home and community environment.     Pt's spiritual, cultural and educational needs considered and pt agreeable to plan of care and goals.     Anticipated barriers to physical therapy: chronic pain, co-morbidities     Goals:   Short Term Goals (4 Weeks):  1. Pt will be compliant with HEP to supplement PT in decreasing pain with functional mobility.  2. Pt will perform pallof press with good control to demonstrate improved core strength.  3. Pt will perform lumbar ROM without increasing symptoms.  4. Pt will improve impaired LE MMTs by 1/2 score to improve strength for functional tasks.  Long Term Goals (8 Weeks):   1. Pt will improve FOTO score to </= 49% limited to decrease perceived limitation with maintaining/changing body position.   2. Pt will perform floor to waist lift with good control to demonstrate improved core strength.  3. Pt will improve impaired LE MMTs by 1 score to improve strength for functional tasks.  4. Pt will report no pain during lumbar ROM to promote functional mobility.      Plan     Plan of care Certification: 9/22/2023 to 12/22/2023.     Francisco Javier Alcala, PT

## 2023-10-31 ENCOUNTER — CLINICAL SUPPORT (OUTPATIENT)
Dept: REHABILITATION | Facility: HOSPITAL | Age: 33
End: 2023-10-31
Payer: MEDICAID

## 2023-10-31 DIAGNOSIS — R29.898 WEAKNESS OF BOTH LOWER EXTREMITIES: ICD-10-CM

## 2023-10-31 DIAGNOSIS — R26.2 DIFFICULTY WALKING: Primary | ICD-10-CM

## 2023-10-31 DIAGNOSIS — M54.16 LUMBAR RADICULOPATHY: ICD-10-CM

## 2023-10-31 PROCEDURE — 97110 THERAPEUTIC EXERCISES: CPT

## 2023-10-31 NOTE — PROGRESS NOTES
"[facility]   Physical Therapy Treatment Note      Name: Scarlett Knox  Clinic Number: [id number]    Therapy Diagnosis:   Encounter Diagnoses   Name Primary?    Difficulty walking Yes    Weakness of both lower extremities     Lumbar radiculopathy        Physician: JACQUELINE Grossman MD    Visit Date: 10/31/2023    Physician Orders: PT Eval and Treat   Medical Diagnosis from Referral: M54.16 (ICD-10-CM) - Lumbar radiculopathy  Evaluation Date: 9/22/2023  Authorization Period Expiration: 12/31/2023  Plan of Care Expiration: 12/22/2023  Progress Note Due: 10/22/2023  Date of Surgery: 8/17/2023 lumbar fusion  Visit # / Visits authorized: 1/ 1, 5/20  FOTO: 0/ 3     Precautions: Standard and spinal,  lumbar fusion, No lifting over 10 lbs      PTA Visit #: 0/5     Time In: 1:02pm  Time Out: 1:49 pm   Total Billable Time: 47 minutes    Subjective     Pt reports: she cooked for two and half hours yesterday and is sore in back and hips. No nerve pain.   She was compliant with home exercise program.  Response to previous treatment: no adverse effects   Functional change: none at this time     Pain: 0/10  Location: n/a     Objective      Objective Measures updated at progress report unless specified.     Treatment     Scarlett received the treatments listed below:      therapeutic exercises to develop strength, endurance, ROM, posture, and core stabilization for 10 minutes including:  Seated clams GTB 3x10   Seated marching on Purple thera-disc 2x10       neuromuscular re-education activities to improve: Coordination, Proprioception, Posture, Core Stabilization and Motor Control for 27 minutes. The following activities were included:  TrA activation c/ SB iso 3 directions x10, 3s   TrA activation c/ LE fallout 10x ea   No money RTB 3" 2x10   Rows GTB 2x10   Seated adductor squeeze 3" 3x10 (for improved adductor activation during ambulation)   Shld extension + TrA brace RTB 2x10     therapeutic " activities to improve functional performance for 10 minutes, including:  Mini squats to chair c/ 5lb weight 2x5   Standing lunges x15 B      All interventions billed as Therapeutic Exercises due to Medicaid guidelines.    Patient Education and Home Exercises       Education provided:   - HEP    Written Home Exercises Provided: yes. Exercises were reviewed and Scarlett was able to demonstrate them prior to the end of the session.  Scarlett demonstrated good  understanding of the education provided. See EMR under Patient Instructions for exercises provided during therapy sessions    Assessment     Modified session due to pt's reported muscular fatigue today following inc'd activity yesterday. No adverse effects to treatment. Plan to progress dynamic core stability next session.     Scarlett Is progressing well towards her goals.   Pt prognosis is Good.     Pt will continue to benefit from skilled outpatient physical therapy to address the deficits listed in the problem list box on initial evaluation, provide pt/family education and to maximize pt's level of independence in the home and community environment.     Pt's spiritual, cultural and educational needs considered and pt agreeable to plan of care and goals.     Anticipated barriers to physical therapy: chronic pain, co-morbidities     Goals:   Short Term Goals (4 Weeks):  1. Pt will be compliant with HEP to supplement PT in decreasing pain with functional mobility.  2. Pt will perform pallof press with good control to demonstrate improved core strength.  3. Pt will perform lumbar ROM without increasing symptoms.  4. Pt will improve impaired LE MMTs by 1/2 score to improve strength for functional tasks.  Long Term Goals (8 Weeks):   1. Pt will improve FOTO score to </= 49% limited to decrease perceived limitation with maintaining/changing body position.   2. Pt will perform floor to waist lift with good control to demonstrate improved core strength.  3. Pt will  improve impaired LE MMTs by 1 score to improve strength for functional tasks.  4. Pt will report no pain during lumbar ROM to promote functional mobility.     Plan     Plan of care Certification: 9/22/2023 to 12/22/2023.     Francisco Javier Alcala, PT

## 2023-11-02 ENCOUNTER — PATIENT MESSAGE (OUTPATIENT)
Dept: ORTHOPEDICS | Facility: CLINIC | Age: 33
End: 2023-11-02
Payer: MEDICAID

## 2023-11-02 ENCOUNTER — PATIENT MESSAGE (OUTPATIENT)
Dept: REHABILITATION | Facility: HOSPITAL | Age: 33
End: 2023-11-02
Payer: MEDICAID

## 2023-11-09 ENCOUNTER — CLINICAL SUPPORT (OUTPATIENT)
Dept: REHABILITATION | Facility: HOSPITAL | Age: 33
End: 2023-11-09
Payer: MEDICAID

## 2023-11-09 DIAGNOSIS — M54.16 LUMBAR RADICULOPATHY: ICD-10-CM

## 2023-11-09 DIAGNOSIS — R29.898 WEAKNESS OF BOTH LOWER EXTREMITIES: ICD-10-CM

## 2023-11-09 DIAGNOSIS — R26.2 DIFFICULTY WALKING: Primary | ICD-10-CM

## 2023-11-09 PROCEDURE — 97110 THERAPEUTIC EXERCISES: CPT

## 2023-11-09 NOTE — PROGRESS NOTES
"[facility]   Physical Therapy Treatment Note      Name: Scarlett Knox  Clinic Number: [id number]    Therapy Diagnosis:   Encounter Diagnoses   Name Primary?    Difficulty walking Yes    Weakness of both lower extremities     Lumbar radiculopathy          Physician: JACQUELINE Grossman MD    Visit Date: 11/9/2023    Physician Orders: PT Eval and Treat   Medical Diagnosis from Referral: M54.16 (ICD-10-CM) - Lumbar radiculopathy  Evaluation Date: 9/22/2023  Authorization Period Expiration: 12/31/2023  Plan of Care Expiration: 12/22/2023  Progress Note Due: 10/22/2023  Date of Surgery: 8/17/2023 lumbar fusion  Visit # / Visits authorized: 1/ 1, 6/20  FOTO: 0/ 3     Precautions: Standard and spinal,  lumbar fusion, No lifting over 10 lbs      PTA Visit #: 0/5     Time In: 11:33am  Time Out: 12:14pm   Total Billable Time: 41 minutes    Subjective     Pt reports: she had a sharp pain for 3 days after cooking with resolution after that. She feels like she needs more resistance for her HEP.   She was compliant with home exercise program.  Response to previous treatment: no adverse effects   Functional change: none at this time     Pain: 0/10  Location: n/a     Objective      Objective Measures updated at progress report unless specified.     Treatment     Scarlett received the treatments listed below:      therapeutic exercises to develop strength, endurance, ROM, posture, and core stabilization for 10 minutes including:  Seated clams GTB 3x10   Seated marching on Purple thera-disc 2x10       neuromuscular re-education activities to improve: Coordination, Proprioception, Posture, Core Stabilization and Motor Control for 21 minutes. The following activities were included:  TrA activation c/ SB iso 3 directions x10, 3s   TrA activation c/ LE fallout 10x ea   No money RTB 3" 2x10   Rows GTB 2x10   Seated adductor squeeze 3" 3x10 (for improved adductor activation during ambulation)   Shld extension + TrA " brace RTB 2x10       therapeutic activities to improve functional performance for 10 minutes, including:  Mini squats to chair c/ 5lb weight 2x5   Standing lunges x15 B  Supine SLR with QS 2x10 B      All interventions billed as Therapeutic Exercises due to Medicaid guidelines.    Patient Education and Home Exercises       Education provided:   - HEP    Written Home Exercises Provided: yes. Exercises were reviewed and Scarlett was able to demonstrate them prior to the end of the session.  Scarlett demonstrated good  understanding of the education provided. See EMR under Patient Instructions for exercises provided during therapy sessions    Assessment     Progressed hip flexion strengthening with good tolerance and no adverse effects. Added ITB stretch to address lateral hip pain.     Scarlett Is progressing well towards her goals.   Pt prognosis is Good.     Pt will continue to benefit from skilled outpatient physical therapy to address the deficits listed in the problem list box on initial evaluation, provide pt/family education and to maximize pt's level of independence in the home and community environment.     Pt's spiritual, cultural and educational needs considered and pt agreeable to plan of care and goals.     Anticipated barriers to physical therapy: chronic pain, co-morbidities     Goals:   Short Term Goals (4 Weeks):  1. Pt will be compliant with HEP to supplement PT in decreasing pain with functional mobility.  2. Pt will perform pallof press with good control to demonstrate improved core strength.  3. Pt will perform lumbar ROM without increasing symptoms.  4. Pt will improve impaired LE MMTs by 1/2 score to improve strength for functional tasks.  Long Term Goals (8 Weeks):   1. Pt will improve FOTO score to </= 49% limited to decrease perceived limitation with maintaining/changing body position.   2. Pt will perform floor to waist lift with good control to demonstrate improved core strength.  3. Pt will  improve impaired LE MMTs by 1 score to improve strength for functional tasks.  4. Pt will report no pain during lumbar ROM to promote functional mobility.     Plan     Plan of care Certification: 9/22/2023 to 12/22/2023.     Francisco Javier Alcala, PT

## 2023-11-13 ENCOUNTER — HOSPITAL ENCOUNTER (OUTPATIENT)
Dept: RADIOLOGY | Facility: HOSPITAL | Age: 33
Discharge: HOME | End: 2023-11-13
Attending: PHYSICIAN ASSISTANT
Payer: MEDICAID

## 2023-11-13 ENCOUNTER — OFFICE VISIT (OUTPATIENT)
Dept: ORTHOPEDICS | Facility: CLINIC | Age: 33
End: 2023-11-13
Payer: MEDICAID

## 2023-11-13 VITALS — BODY MASS INDEX: 28.72 KG/M2 | WEIGHT: 152.13 LBS | HEIGHT: 61 IN

## 2023-11-13 DIAGNOSIS — Z98.1 S/P LUMBAR FUSION: ICD-10-CM

## 2023-11-13 DIAGNOSIS — Z98.1 S/P LUMBAR FUSION: Primary | ICD-10-CM

## 2023-11-13 PROCEDURE — 3044F HG A1C LEVEL LT 7.0%: CPT | Mod: CPTII,,, | Performed by: PHYSICIAN ASSISTANT

## 2023-11-13 PROCEDURE — 99999 PR PBB SHADOW E&M-EST. PATIENT-LVL III: ICD-10-PCS | Mod: PBBFAC,,, | Performed by: PHYSICIAN ASSISTANT

## 2023-11-13 PROCEDURE — 99999 PR PBB SHADOW E&M-EST. PATIENT-LVL III: CPT | Mod: PBBFAC,,, | Performed by: PHYSICIAN ASSISTANT

## 2023-11-13 PROCEDURE — 99024 PR POST-OP FOLLOW-UP VISIT: ICD-10-PCS | Mod: ,,, | Performed by: PHYSICIAN ASSISTANT

## 2023-11-13 PROCEDURE — 1159F PR MEDICATION LIST DOCUMENTED IN MEDICAL RECORD: ICD-10-PCS | Mod: CPTII,,, | Performed by: PHYSICIAN ASSISTANT

## 2023-11-13 PROCEDURE — 1159F MED LIST DOCD IN RCRD: CPT | Mod: CPTII,,, | Performed by: PHYSICIAN ASSISTANT

## 2023-11-13 PROCEDURE — 3044F PR MOST RECENT HEMOGLOBIN A1C LEVEL <7.0%: ICD-10-PCS | Mod: CPTII,,, | Performed by: PHYSICIAN ASSISTANT

## 2023-11-13 PROCEDURE — 72100 X-RAY EXAM L-S SPINE 2/3 VWS: CPT | Mod: 26,,, | Performed by: RADIOLOGY

## 2023-11-13 PROCEDURE — 72100 XR LUMBAR SPINE AP AND LATERAL: ICD-10-PCS | Mod: 26,,, | Performed by: RADIOLOGY

## 2023-11-13 PROCEDURE — 99213 OFFICE O/P EST LOW 20 MIN: CPT | Mod: PBBFAC | Performed by: PHYSICIAN ASSISTANT

## 2023-11-13 PROCEDURE — 99024 POSTOP FOLLOW-UP VISIT: CPT | Mod: ,,, | Performed by: PHYSICIAN ASSISTANT

## 2023-11-13 PROCEDURE — 72100 X-RAY EXAM L-S SPINE 2/3 VWS: CPT | Mod: TC

## 2023-11-13 NOTE — PROGRESS NOTES
Date: 11/13/2023    Supervising Physician: Dominic Mike M.D.    Date of Surgery: 8/17/2023    Procedure: L4/5 TLIF    History: Scarlett Knox is seen today for follow-up following the above listed procedure. Overall the patient is doing well but today notes her pain is significantly improved.  She is very pleased.  She does have some right hip pain but she is overall pleased.      Exam:  Incision is healing well.   There is no sign of infection. Neuro exam is stable. No signs of DVT.    Radiographs: imaging today shows hardware in place, no evidence of failure.     Assessment/Plan: 12 weeks post op.    Doing well postoperatively. She may progress activities as tolerated.    I will plan to see the patient back for the next postop at a year from surgery.    Thank you for the opportunity to participate in this patient's care. Please give me a call if there are any concerns or questions.

## 2023-11-14 ENCOUNTER — CLINICAL SUPPORT (OUTPATIENT)
Dept: REHABILITATION | Facility: HOSPITAL | Age: 33
End: 2023-11-14
Payer: MEDICAID

## 2023-11-14 DIAGNOSIS — R26.2 DIFFICULTY WALKING: Primary | ICD-10-CM

## 2023-11-14 DIAGNOSIS — M54.16 LUMBAR RADICULOPATHY: ICD-10-CM

## 2023-11-14 DIAGNOSIS — R29.898 WEAKNESS OF BOTH LOWER EXTREMITIES: ICD-10-CM

## 2023-11-14 PROCEDURE — 97110 THERAPEUTIC EXERCISES: CPT

## 2023-11-14 NOTE — PROGRESS NOTES
"[facility]   Physical Therapy Treatment Note      Name: Scarlett Knox  Clinic Number: [id number]    Therapy Diagnosis:   Encounter Diagnoses   Name Primary?    Difficulty walking Yes    Weakness of both lower extremities     Lumbar radiculopathy          Physician: JACQUELINE Grossman MD    Visit Date: 11/14/2023    Physician Orders: PT Eval and Treat   Medical Diagnosis from Referral: M54.16 (ICD-10-CM) - Lumbar radiculopathy  Evaluation Date: 9/22/2023  Authorization Period Expiration: 12/31/2023  Plan of Care Expiration: 12/22/2023  Progress Note Due: 10/22/2023  Date of Surgery: 8/17/2023 lumbar fusion  Visit # / Visits authorized: 1/ 1, 7/20  FOTO: 0/ 3     Precautions: Standard and spinal,  lumbar fusion, No lifting over 10 lbs      PTA Visit #: 0/5     Time In: 9:46am  Time Out: 10:28am   Total Billable Time: 42 minutes    Subjective     Pt reports: she had a sharp pain for 3 days after cooking with resolution after that. She feels like she needs more resistance for her HEP.   She was compliant with home exercise program.  Response to previous treatment: no adverse effects   Functional change: none at this time     Pain: 0/10  Location: n/a     Objective      Objective Measures updated at progress report unless specified.     Treatment     Scarlett received the treatments listed below:      therapeutic exercises to develop strength, endurance, ROM, posture, and core stabilization for 25 minutes including:    Triple flexion x15  LTR x3'   Ball roll outs fwd x10  Open books x10 B    NP:  Seated clams GTB 3x10   Seated marching on Purple thera-disc 2x10       neuromuscular re-education activities to improve: Coordination, Proprioception, Posture, Core Stabilization and Motor Control for 10 minutes. The following activities were included:  TrA activation c/ SB iso 3 directions x10, 3s   TrA activation c/ LE fallout 10x ea   SB AROM x15 B  PPT 2x10    NP:  No money RTB 3" 2x10   Rows GTB " "2x10   Seated adductor squeeze 3" 3x10 (for improved adductor activation during ambulation)   Shld extension + TrA brace RTB 2x10       therapeutic activities to improve functional performance for 10 minutes, including:  Mini squats to chair c/ 5lb weight 2x5   Standing lunges x15 B  Supine SLR with QS 2x10 B      All interventions billed as Therapeutic Exercises due to Medicaid guidelines.    Patient Education and Home Exercises       Education provided:   - HEP    Written Home Exercises Provided: yes. Exercises were reviewed and Scarlett was able to demonstrate them prior to the end of the session.  Scarlett demonstrated good  understanding of the education provided. See EMR under Patient Instructions for exercises provided during therapy sessions    Assessment     Progressed lumbar ROM per protocol with good tolerance and no adverse effects. Will assess readiness for discharge at next date of service.     Scarlett Is progressing well towards her goals.   Pt prognosis is Good.     Pt will continue to benefit from skilled outpatient physical therapy to address the deficits listed in the problem list box on initial evaluation, provide pt/family education and to maximize pt's level of independence in the home and community environment.     Pt's spiritual, cultural and educational needs considered and pt agreeable to plan of care and goals.     Anticipated barriers to physical therapy: chronic pain, co-morbidities     Goals:   Short Term Goals (4 Weeks):  1. Pt will be compliant with HEP to supplement PT in decreasing pain with functional mobility.  2. Pt will perform pallof press with good control to demonstrate improved core strength.  3. Pt will perform lumbar ROM without increasing symptoms.  4. Pt will improve impaired LE MMTs by 1/2 score to improve strength for functional tasks.  Long Term Goals (8 Weeks):   1. Pt will improve FOTO score to </= 49% limited to decrease perceived limitation with maintaining/changing " body position.   2. Pt will perform floor to waist lift with good control to demonstrate improved core strength.  3. Pt will improve impaired LE MMTs by 1 score to improve strength for functional tasks.  4. Pt will report no pain during lumbar ROM to promote functional mobility.     Plan     Plan of care Certification: 9/22/2023 to 12/22/2023.     Francisco Javier Alcala, PT

## 2023-11-20 ENCOUNTER — PATIENT MESSAGE (OUTPATIENT)
Dept: REHABILITATION | Facility: HOSPITAL | Age: 33
End: 2023-11-20
Payer: MEDICAID

## 2023-11-21 ENCOUNTER — CLINICAL SUPPORT (OUTPATIENT)
Dept: REHABILITATION | Facility: HOSPITAL | Age: 33
End: 2023-11-21
Payer: MEDICAID

## 2023-11-21 DIAGNOSIS — R26.2 DIFFICULTY WALKING: Primary | ICD-10-CM

## 2023-11-21 DIAGNOSIS — R29.898 WEAKNESS OF BOTH LOWER EXTREMITIES: ICD-10-CM

## 2023-11-21 DIAGNOSIS — M54.16 LUMBAR RADICULOPATHY: ICD-10-CM

## 2023-11-21 PROCEDURE — 97110 THERAPEUTIC EXERCISES: CPT

## 2023-11-21 NOTE — PROGRESS NOTES
[facility]   Physical Therapy Discharge and Treatment Note      Name: Scarlett Knox  Clinic Number: [id number]    Therapy Diagnosis:   Encounter Diagnoses   Name Primary?    Difficulty walking Yes    Weakness of both lower extremities     Lumbar radiculopathy            Physician: JACQUELINE Grossman MD    Visit Date: 11/21/2023    Physician Orders: PT Eval and Treat   Medical Diagnosis from Referral: M54.16 (ICD-10-CM) - Lumbar radiculopathy  Evaluation Date: 9/22/2023  Authorization Period Expiration: 12/31/2023  Plan of Care Expiration: 12/22/2023  Progress Note Due: 10/22/2023  Date of Surgery: 8/17/2023 lumbar fusion  Visit # / Visits authorized: 1/ 1, 8/20  FOTO: 0/ 3     Precautions: Standard and spinal,  lumbar fusion, No lifting over 10 lbs      PTA Visit #: 0/5     Time In: 9:00am  Time Out: 9:46am  Total Billable Time: 46 minutes    Subjective     Pt reports: she has been slowly increasing her time on her feet and during physical activity. She has no new complaints aside form knee pain. She feels ready to discharge.   She was compliant with home exercise program.  Response to previous treatment: no adverse effects   Functional change: none at this time     Pain: 0/10  Location: n/a     Objective      Objective Measures updated at progress report unless specified.     Treatment     Scarlett received the treatments listed below:      therapeutic exercises to develop strength, endurance, ROM, posture, and core stabilization for 16 minutes including:  Screening of knee - all special tests negative although dec'd patellar mobility noted.   Review of HEP Documented in patient instructions section.       neuromuscular re-education activities to improve: Coordination, Proprioception, Posture, Core Stabilization and Motor Control for 15 minutes. The following activities were included:  TrA activation c/ SB iso 3 directions x10, 3s   TrA activation c/ LE fallout 10x ea   SB AROM x15 B  PPT  "2x10    NP:  No money RTB 3" 2x10   Rows GTB 2x10   Seated adductor squeeze 3" 3x10 (for improved adductor activation during ambulation)   Shld extension + TrA brace RTB 2x10       therapeutic activities to improve functional performance for 15 minutes, including:  Mini squats to chair c/ 5lb weight 2x5   Standing lunges x15 B  Supine SLR with QS 2x10 B      All interventions billed as Therapeutic Exercises due to Medicaid guidelines.    Patient Education and Home Exercises       Education provided:   - HEP    Written Home Exercises Provided: yes. Exercises were reviewed and Scarlett was able to demonstrate them prior to the end of the session.  Scarlett demonstrated good  understanding of the education provided. See EMR under Patient Instructions for exercises provided during therapy sessions    Assessment     Pt presents with significant improvements in subjective reports and objective measures as compared to initial evaluation as indicated above. Pt has met all tested goals and demonstrates good understanding and performance of all HEP exercises. Pt is ready to be discharged from skilled PT services with HEP at this time. Pt is agreeable to plan.       Pt's spiritual, cultural and educational needs considered and pt agreeable to plan of care and goals.     Anticipated barriers to physical therapy: chronic pain, co-morbidities     Goals:   Short Term Goals (4 Weeks):  1. Pt will be compliant with HEP to supplement PT in decreasing pain with functional mobility. - MET  2. Pt will perform pallof press with good control to demonstrate improved core strength. - MET  3. Pt will perform lumbar ROM without increasing symptoms. - MET  4. Pt will improve impaired LE MMTs by 1/2 score to improve strength for functional tasks. - MET  Long Term Goals (8 Weeks):   1. Pt will improve FOTO score to </= 49% limited to decrease perceived limitation with maintaining/changing body position. - NT  2. Pt will perform floor to waist lift " with good control to demonstrate improved core strength. - MET  3. Pt will improve impaired LE MMTs by 1 score to improve strength for functional tasks. - MET  4. Pt will report no pain during lumbar ROM to promote functional mobility. - MET    Plan     Pt to be discharged from outpatient physical therapy services with HEP at this time.     Francisco Javier Alcala, PT

## 2023-12-01 ENCOUNTER — PATIENT MESSAGE (OUTPATIENT)
Dept: ORTHOPEDICS | Facility: CLINIC | Age: 33
End: 2023-12-01
Payer: MEDICAID

## 2024-03-20 DIAGNOSIS — M51.36 DDD (DEGENERATIVE DISC DISEASE), LUMBAR: Primary | ICD-10-CM

## 2024-03-26 ENCOUNTER — HOSPITAL ENCOUNTER (OUTPATIENT)
Dept: RADIOLOGY | Facility: HOSPITAL | Age: 34
Discharge: HOME | End: 2024-03-26
Attending: PHYSICIAN ASSISTANT
Payer: MEDICAID

## 2024-03-26 ENCOUNTER — OFFICE VISIT (OUTPATIENT)
Dept: ORTHOPEDICS | Facility: CLINIC | Age: 34
End: 2024-03-26
Payer: MEDICAID

## 2024-03-26 VITALS — HEIGHT: 61 IN | WEIGHT: 163.13 LBS | BODY MASS INDEX: 30.8 KG/M2

## 2024-03-26 DIAGNOSIS — M51.36 DDD (DEGENERATIVE DISC DISEASE), LUMBAR: ICD-10-CM

## 2024-03-26 DIAGNOSIS — Z98.1 S/P LUMBAR FUSION: Primary | ICD-10-CM

## 2024-03-26 PROCEDURE — 3008F BODY MASS INDEX DOCD: CPT | Mod: CPTII,,, | Performed by: PHYSICIAN ASSISTANT

## 2024-03-26 PROCEDURE — 99999 PR PBB SHADOW E&M-EST. PATIENT-LVL III: CPT | Mod: PBBFAC,,, | Performed by: PHYSICIAN ASSISTANT

## 2024-03-26 PROCEDURE — 72100 X-RAY EXAM L-S SPINE 2/3 VWS: CPT | Mod: 26,,, | Performed by: RADIOLOGY

## 2024-03-26 PROCEDURE — 99213 OFFICE O/P EST LOW 20 MIN: CPT | Mod: S$PBB,,, | Performed by: PHYSICIAN ASSISTANT

## 2024-03-26 PROCEDURE — 72100 X-RAY EXAM L-S SPINE 2/3 VWS: CPT | Mod: TC

## 2024-03-26 PROCEDURE — 1159F MED LIST DOCD IN RCRD: CPT | Mod: CPTII,,, | Performed by: PHYSICIAN ASSISTANT

## 2024-03-26 PROCEDURE — 99213 OFFICE O/P EST LOW 20 MIN: CPT | Mod: PBBFAC,25 | Performed by: PHYSICIAN ASSISTANT

## 2024-03-26 NOTE — PROGRESS NOTES
Date: 03/26/2024    Supervising Physician: Dominic Mike M.D.    Date of Surgery: 8/17/2023    Procedure: L4/5 TLIF    History: Scarlett Knox is seen today for follow-up following the above listed procedure. Overall the patient is doing well but today notes her pain is significantly improved, however over the last few months she has some increased right leg pain and occasional left leg pain.  She also reports back pain.  This is making it difficult to be on her feet longer than 15 minutes.       Exam:  Incision is healing well.   There is no sign of infection. Neuro exam is stable. No signs of DVT.    Radiographs: imaging today shows hardware in place, no evidence of failure.     Assessment/Plan: 7 months post op.    Referral for the healthy back program placed today.  She will let me know how she progresses.  We may get a new MRI ifher symptoms persist.     I will plan to see the patient back for the next postop at a year from surgery.    Thank you for the opportunity to participate in this patient's care. Please give me a call if there are any concerns or questions.
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
## 2024-04-25 ENCOUNTER — CLINICAL SUPPORT (OUTPATIENT)
Dept: REHABILITATION | Facility: OTHER | Age: 34
End: 2024-04-25
Payer: MEDICAID

## 2024-04-25 DIAGNOSIS — G89.29 CHRONIC BILATERAL THORACIC BACK PAIN: ICD-10-CM

## 2024-04-25 DIAGNOSIS — Z98.1 S/P LUMBAR FUSION: ICD-10-CM

## 2024-04-25 DIAGNOSIS — M54.6 CHRONIC BILATERAL THORACIC BACK PAIN: ICD-10-CM

## 2024-04-25 DIAGNOSIS — M46.1 SACROILIITIS: Primary | ICD-10-CM

## 2024-04-25 DIAGNOSIS — M54.16 LUMBAR RADICULOPATHY: ICD-10-CM

## 2024-04-25 PROCEDURE — 97530 THERAPEUTIC ACTIVITIES: CPT

## 2024-04-25 PROCEDURE — 97161 PT EVAL LOW COMPLEX 20 MIN: CPT
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
## 2024-04-26 ENCOUNTER — PATIENT MESSAGE (OUTPATIENT)
Dept: ORTHOPEDICS | Facility: CLINIC | Age: 34
End: 2024-04-26
Payer: MEDICAID

## 2024-05-27 ENCOUNTER — CLINICAL SUPPORT (OUTPATIENT)
Dept: REHABILITATION | Facility: OTHER | Age: 34
End: 2024-05-27
Payer: MEDICAID

## 2024-05-27 DIAGNOSIS — M54.6 CHRONIC BILATERAL THORACIC BACK PAIN: ICD-10-CM

## 2024-05-27 DIAGNOSIS — M46.1 SACROILIITIS: Primary | ICD-10-CM

## 2024-05-27 DIAGNOSIS — G89.29 CHRONIC BILATERAL THORACIC BACK PAIN: ICD-10-CM

## 2024-05-27 DIAGNOSIS — M54.16 LUMBAR RADICULOPATHY: ICD-10-CM

## 2024-05-27 PROCEDURE — 97110 THERAPEUTIC EXERCISES: CPT

## 2024-05-27 PROCEDURE — 97112 NEUROMUSCULAR REEDUCATION: CPT

## 2024-05-27 NOTE — PROGRESS NOTES
"[facility]  Physical Therapy Treatment Note     Name: Scarlett Knox  Clinic Number: [id number]    Therapy Diagnosis:   Encounter Diagnoses   Name Primary?    Sacroiliitis Yes    Chronic bilateral thoracic back pain     Lumbar radiculopathy      Physician: Lou Block P*    Visit Date: 5/27/2024    Physician Orders: PT Eval and Treat  Medical Diagnosis from Referral: Z98.1 (ICD-10-CM) - S/P lumbar fusion  Evaluation Date: 4/25/2024  Authorization Period Expiration: 3/26/2025  Plan of Care Expiration: 7/4/2024  Reassessment Due: 5/25/2024  Visit # / Visits authorized: 2/20  MedX testing visit 2    PTA Visit #: 0/5     Time In: 4:00 PM  Time Out: 4:59 PM  Total Billable Time: 58 minutes  INSURANCE and OUTCOMES: Fee for Service with FOTO Outcomes 1/3     Precautions: standard, anxiety, NO male therapist physical contact     Pattern of pain determined: Pattern 3    Subjective     Scarlett Knox reports worsening of symptoms. Patient says that she wound up getting a deep low back back that kept her up all night. She has since returned to baseline, and says that HEP has been going well. She's been getting muscle soreness from the prone press-ups, but feels comfortable with that sensation of discomfort.    Patient reports tolerating previous visit not well with increased deep low back pain that kept her awake that night.  Patient reports their pain to be 3/10 on a 0-10 scale with 0 being no pain and 10 being the worst pain imaginable.  Pain Location: left hip and bilateral back, right LE to anterolateral shin     Occupation: Scribe for physicians  Leisure: Play video games, cross stitch, hillary   Pt goals: "I want to go to [place] to do a genetic counseling program, so I want to be able to walk at least 30 minutes and get back to doing yoga at 6 days a week".     Objective      Lumbar  Isometric Testing on Med X equipment: Testing administered by PT    Test Initial " Baseline Midpoint Final   Date 5/2724     ROM 0-42 deg     Max Peak Torque 140       Min Peak Torque 14      Flex/Ext Ratio 10     % variance  normative data 52     % change from initial test N/A visit 1       Lumbar testing Visit 2     OUTCOMES SELECTION:   Intake Outcome Measure for FOTO Lumbar Survey     Therapist reviewed FOTO scores for Scarlett Knox on 4/25/2024.   FOTO documents entered into [employer] - see Media section.     Intake Score: 47% functional ability  Goal Score: 57% functional ability          Treatment     Scarlett received the treatments listed below:      Medical MedX Treatment as follows:  MedX testing performed day 2: Patient  received neuromuscular education to engage spinal musculature correctly for motor control and engagement of musculature for 10 minutes including the MedX exercise component and practice and standard testing. MedX dynamic exercise and baseline isometric test performed with instructions to guide the patient safely through the testing procedure. Patient instructed to perform isometric test correctly and safely while building to an optimal force with a pain-free effort. Patient also instructed that they should feel support/pressure from MedX restraints but no pain/discomfort, and encouraged to report any pain to therapist. Patient demonstrated appropriate understanding of information and tolerance of test.  Education regarding purpose of test, safety during test given, and reviewed possible more soreness and strategies.           5/27/2024     4:09 PM   [facility]   Visit Number 2   VAS Pain Rating 3   Recumbent Bike Seat Pos. 5   Lumbar Stretches - Slouch Overcorrection 10   Extension in Lying 10   Lumbar Extension Seat Pad 1   Femur Restraint 6   Counterweight 133   Lumbar Flexion 42   Lumbar Extension 0   Lumbar Peak Torque 140 ft. lbs.   Min Torque 14   Test Percent Below Normative Data 52 %   Ice - Z Lie (in min.) 5       Scarlett participated in neuromuscular  "re-education activities to improve balance, coordination, proprioception, motor control and/or posture for 20 minutes. The following activities were included:    EIL x 10  PPT x 5" x 10  Bridges w/PPT x 10  90/90 TrA marching x 10     Scarlett participated in therapeutic exercises to develop strength, endurance, ROM, flexibility, posture, and core stabilization for 28 minutes including:      Peripheral muscle strengthening which included one set of 15-20 repetitions at a slow and controlled 10-13 second per rep pace focused on strengthening supporting musculature in order to improve body mechanics and functional mobility. Patient and therapist focused on proper form during treatment to ensure optimal strengthening of each targeted muscle group.  Machines utilized included:Torso rotation, Leg Ext, Leg Curl, Chest Press, and Rowing  To be added next visit:Triceps, Biceps, Hip Abd, Hip Add, and Leg Press      Scarlett participated in dynamic functional therapeutic activities to improve functional performance and simulate household and community activities for 0  minutes. The following activities were included:      Scarlett received manual therapy techniques for 0  minutes. The following activities were included:    Pt given cold pack for 0 minutes to lumbar spine in Z-lie position.    Patient Education and Home Exercises     Home exercises include:  EIL  PPT   Bridges w/PPT  90/90 TrA marching     Cardio program (V5): -  Lifting education (V11): -  Posture/Lumbar roll: -  Fridge Magnet Discharge handout (date given): -  Equipment at home/gym membership: No    Education provided:   - PT role and POC  - HEP      Written Home Exercises Provided: Patient instructed to cont prior HEP.  Exercises were reviewed and Scarlett was able to demonstrate them prior to the end of the session.  Scarlett demonstrated good  understanding of the education provided.     See EMR under Patient Instructions for exercises provided prior " visit.    Assessment     Scarlett presents to second healthy back visit reporting increased pain at night following evaluation, however returned to baseline within 48 hours. Patient was able to demo HEP with Max VC for form. Pt was able to tolerate Medical MedX machine well as follows:  MedX testing performed and patient tolerated test well.  Pt was also able to complete half of the peripheral strengthening exercises without increased discomfort and will complete the complete circuit next visit as tolerated.    Patient is making fair progress towards established goals.  Pt will continue to benefit from skilled outpatient physical therapy to address the deficits stated in the impairment chart, provide pt/family education and to maximize pt's level of independence in the home and community environment.     Anticipated Barriers for therapy: High fear avoidance, chronicity of condition     Pt's spiritual, cultural and educational needs considered and pt agreeable to plan of care and goals as stated below:     GOALS: Pt is in agreement with the following goals.     Short term goals:  6 weeks or 10 visits   - Pt will demonstrate increased lumbar MedX ROM by at least 5 degrees from the initial ROM value with improvements noted in functional ROM and ability to perform ADLs. Appropriate and Ongoing  - Pt will demonstrate increased MedX average isometric strength value by 10% from initial test resulting in improved ability to perform bending, lifting, and carrying activities safely, confidently. Appropriate and Ongoing  - Pt will report a reduction in worst pain score by 1-2 points for improved tolerance for community ambulation of at least 200 meters. Appropriate and Ongoing  - Pt able to perform HEP correctly with minimal cueing or supervision from therapist to encourage independent management of symptoms. Appropriate and Ongoing  -Pt able to perform at least 30 minutes of yoga 4 times per week with postural modifications in  order to return to recreational fitness routine.     Long term goals: 10 weeks or 20 visits   - Pt will demonstrate increased lumbar MedX ROM by at least 10 degrees from initial ROM value, resulting in improved ability to perform functional forward bending while standing and sitting. Appropriate and Ongoing  - Pt will demonstrate increased MedX average isometric strength value by 25% from initial test resulting in improved ability to perform bending, lifting, and carrying activities safely and confidently. Appropriate and Ongoing  - Pt to demonstrate ability to independently control and reduce their pain through posture positioning and mechanical movements throughout a typical day. Appropriate and Ongoing  - Pt will demonstrate reduced pain and improved functional outcomes as reported on the FOTO by reaching an intake score of >/= 57% functional ability in order to demonstrate subjective improvement in patient's condition. . Appropriate and Ongoing  - Pt will demonstrate independence with the HEP at discharge. Appropriate and Ongoing  - Patient able to demonstrate adequate transverse abdominus strength to perform at least 3 sets of forearm planks with no lumbar flexion in order to achieve adequate core stability to stand for at least 15 minutes. Appropriate and Ongoing    Plan     Continue with established Plan of Care towards established PT goals.     Therapist: Stacey Brown, PT  5/27/2024

## 2024-06-11 ENCOUNTER — CLINICAL SUPPORT (OUTPATIENT)
Dept: REHABILITATION | Facility: OTHER | Age: 34
End: 2024-06-11
Payer: MEDICAID

## 2024-06-11 DIAGNOSIS — M54.16 LUMBAR RADICULOPATHY: ICD-10-CM

## 2024-06-11 DIAGNOSIS — G89.29 CHRONIC BILATERAL THORACIC BACK PAIN: ICD-10-CM

## 2024-06-11 DIAGNOSIS — M54.6 CHRONIC BILATERAL THORACIC BACK PAIN: ICD-10-CM

## 2024-06-11 DIAGNOSIS — M46.1 SACROILIITIS: Primary | ICD-10-CM

## 2024-06-11 PROCEDURE — 97110 THERAPEUTIC EXERCISES: CPT

## 2024-06-11 NOTE — PROGRESS NOTES
"[facility]  Physical Therapy Treatment Note     Name: Scarlett Knox  Clinic Number: [id number]    Therapy Diagnosis:   Encounter Diagnoses   Name Primary?    Sacroiliitis Yes    Chronic bilateral thoracic back pain     Lumbar radiculopathy      Physician: Lou Block P*    Visit Date: 6/11/2024    Physician Orders: PT Eval and Treat  Medical Diagnosis from Referral: Z98.1 (ICD-10-CM) - S/P lumbar fusion  Evaluation Date: 4/25/2024  Authorization Period Expiration: 3/26/2025  Plan of Care Expiration: 7/4/2024  Reassessment Due: 7/11/2024  Visit # / Visits authorized: 3/20  MedX testing visit 2    PTA Visit #: 0/5     Time In: 4:12PM  Time Out: 4:59 PM  Total Billable Time: 48 minutes  INSURANCE and OUTCOMES: Fee for Service with FOTO Outcomes 1/3    **[facility] only**     Precautions: standard, anxiety, NO male therapist physical contact     Pattern of pain determined: Pattern 3    Subjective     Scarlett Knox reports some weakness in the leg, she has been very active today did stretches, yoga, and work.    Increased tizanidine to 4 mg a night and is sleeping well from that    Patient reports tolerating previous visit not well with increased deep low back pain that kept her awake that night.  Patient reports their pain to be 3/10 on a 0-10 scale with 0 being no pain and 10 being the worst pain imaginable.  Pain Location: left hip and bilateral back, right LE to anterolateral shin     Occupation: Scribe for physicians  Leisure: Play video games, cross stitch, hillary   Pt goals: "I want to go to [place] to do a genetic counseling program, so I want to be able to walk at least 30 minutes and get back to doing yoga at 6 days a week".     Objective      Lumbar  Isometric Testing on Med X equipment: Testing administered by PT    Test Initial Baseline Midpoint Final   Date 5/2724     ROM 0-42 deg     Max Peak Torque 140       Min Peak Torque 14      Flex/Ext " "Ratio 10     % variance  normative data 52     % change from initial test N/A visit 1       Lumbar testing Visit 2     OUTCOMES SELECTION:   Intake Outcome Measure for FOTO Lumbar Survey     Therapist reviewed FOTO scores for Scarlett Knox on 4/25/2024.   FOTO documents entered into [employer] - see Media section.     Intake Score: 47% functional ability  Goal Score: 57% functional ability        LLD R: 76 cm  LLR L: 78 cm    Treatment     Scarlett received the treatments listed below:      Medical MedX Treatment as follows:  MedX testing performed day 2: Patient  received neuromuscular education to engage spinal musculature correctly for motor control and engagement of musculature for 10 minutes including the MedX exercise component and practice and standard testing. MedX dynamic exercise and baseline isometric test performed with instructions to guide the patient safely through the testing procedure. Patient instructed to perform isometric test correctly and safely while building to an optimal force with a pain-free effort. Patient also instructed that they should feel support/pressure from MedX restraints but no pain/discomfort, and encouraged to report any pain to therapist. Patient demonstrated appropriate understanding of information and tolerance of test.  Education regarding purpose of test, safety during test given, and reviewed possible more soreness and strategies.           6/11/2024     4:36 PM   [facility]   Visit Number 3   Extension in Lying 10   Lumbar Weight 50 lbs   Repetitions 15   Rating of Perceived Exertion 4   Ice - Z Lie (in min.) 5         Scarlett participated in neuromuscular re-education activities to improve balance, coordination, proprioception, motor control and/or posture for 20 minutes. The following activities were included:    EIL x 10  PPT x 5" x 10  Bridges w/PPT x 10  90/90 TrA marching x 10   +TrA isometric with ball x10  +TrA isometric with SLR x10    Scarlett " participated in therapeutic exercises to develop strength, endurance, ROM, flexibility, posture, and core stabilization for 28 minutes including:      Peripheral muscle strengthening which included one set of 15-20 repetitions at a slow and controlled 10-13 second per rep pace focused on strengthening supporting musculature in order to improve body mechanics and functional mobility. Patient and therapist focused on proper form during treatment to ensure optimal strengthening of each targeted muscle group.  Machines utilized included:Torso rotation, Leg Ext, Leg Curl, Chest Press, and Rowing  To be added next visit:Triceps, Biceps, Hip Abd, Hip Add, and Leg Press      Scarlett participated in dynamic functional therapeutic activities to improve functional performance and simulate household and community activities for 0  minutes. The following activities were included:      Scarlett received manual therapy techniques for 0  minutes. The following activities were included:    Pt given cold pack for 0 minutes to lumbar spine in Z-lie position.    Patient Education and Home Exercises     Home exercises include:  EIL  PPT   Bridges w/PPT  90/90 TrA marching     Cardio program (V5): -  Lifting education (V11): -  Posture/Lumbar roll: -  Fridge Magnet Discharge handout (date given): -  Equipment at home/gym membership: No    Education provided:   - PT role and POC  - HEP      Written Home Exercises Provided: Patient instructed to cont prior HEP.  Exercises were reviewed and Scarlett was able to demonstrate them prior to the end of the session.  Scarlett demonstrated good  understanding of the education provided.     See EMR under Patient Instructions for exercises provided prior visit.    Assessment     Scarlett presents to visit reporting minimal complaints of pain. Complete stretches and strengthening exercises as well as adding Trunk stability exercises that she has been completing in sitting. Pt required Mod cues for  execution of exercises. Also assessed LLD with difference noted.  Scarlett was able to start strengthening and endurance training on the lumbar MedX at 50%of max peak torque according to the initial visit isometric test and flexion extension strength ratio. Pt was able to complete 15 reps, with 4/10 RPE. Pt was also able to complete all of the peripheral strengthening exercises without increased discomfort and will continue to complete the full circuit as tolerated.    Patient is making fair progress towards established goals.  Pt will continue to benefit from skilled outpatient physical therapy to address the deficits stated in the impairment chart, provide pt/family education and to maximize pt's level of independence in the home and community environment.     Anticipated Barriers for therapy: High fear avoidance, chronicity of condition     Pt's spiritual, cultural and educational needs considered and pt agreeable to plan of care and goals as stated below:     GOALS: Pt is in agreement with the following goals.     Short term goals:  6 weeks or 10 visits   - Pt will demonstrate increased lumbar MedX ROM by at least 5 degrees from the initial ROM value with improvements noted in functional ROM and ability to perform ADLs. Appropriate and Ongoing  - Pt will demonstrate increased MedX average isometric strength value by 10% from initial test resulting in improved ability to perform bending, lifting, and carrying activities safely, confidently. Appropriate and Ongoing  - Pt will report a reduction in worst pain score by 1-2 points for improved tolerance for community ambulation of at least 200 meters. Appropriate and Ongoing  - Pt able to perform HEP correctly with minimal cueing or supervision from therapist to encourage independent management of symptoms. Appropriate and Ongoing  -Pt able to perform at least 30 minutes of yoga 4 times per week with postural modifications in order to return to recreational fitness  routine.     Long term goals: 10 weeks or 20 visits   - Pt will demonstrate increased lumbar MedX ROM by at least 10 degrees from initial ROM value, resulting in improved ability to perform functional forward bending while standing and sitting. Appropriate and Ongoing  - Pt will demonstrate increased MedX average isometric strength value by 25% from initial test resulting in improved ability to perform bending, lifting, and carrying activities safely and confidently. Appropriate and Ongoing  - Pt to demonstrate ability to independently control and reduce their pain through posture positioning and mechanical movements throughout a typical day. Appropriate and Ongoing  - Pt will demonstrate reduced pain and improved functional outcomes as reported on the FOTO by reaching an intake score of >/= 57% functional ability in order to demonstrate subjective improvement in patient's condition. . Appropriate and Ongoing  - Pt will demonstrate independence with the HEP at discharge. Appropriate and Ongoing  - Patient able to demonstrate adequate transverse abdominus strength to perform at least 3 sets of forearm planks with no lumbar flexion in order to achieve adequate core stability to stand for at least 15 minutes. Appropriate and Ongoing    Plan     Continue with established Plan of Care towards established PT goals.     Therapist: Lori Grimaldo, PT  6/11/2024

## 2024-06-24 ENCOUNTER — PATIENT MESSAGE (OUTPATIENT)
Dept: ORTHOPEDICS | Facility: CLINIC | Age: 34
End: 2024-06-24
Payer: MEDICAID

## 2024-06-27 ENCOUNTER — CLINICAL SUPPORT (OUTPATIENT)
Dept: REHABILITATION | Facility: OTHER | Age: 34
End: 2024-06-27
Payer: MEDICAID

## 2024-06-27 DIAGNOSIS — G89.29 CHRONIC BILATERAL THORACIC BACK PAIN: ICD-10-CM

## 2024-06-27 DIAGNOSIS — M54.6 CHRONIC BILATERAL THORACIC BACK PAIN: ICD-10-CM

## 2024-06-27 DIAGNOSIS — M54.16 LUMBAR RADICULOPATHY: ICD-10-CM

## 2024-06-27 DIAGNOSIS — M46.1 SACROILIITIS: Primary | ICD-10-CM

## 2024-06-27 PROCEDURE — 97110 THERAPEUTIC EXERCISES: CPT

## 2024-06-27 NOTE — PROGRESS NOTES
"[facility]  Physical Therapy Treatment Note     Name: Scarlett Knox  Clinic Number: [id number]    Therapy Diagnosis:   Encounter Diagnoses   Name Primary?    Sacroiliitis Yes    Chronic bilateral thoracic back pain     Lumbar radiculopathy        Physician: Lou Block P*    Visit Date: 6/27/2024    Physician Orders: PT Eval and Treat  Medical Diagnosis from Referral: Z98.1 (ICD-10-CM) - S/P lumbar fusion  Evaluation Date: 4/25/2024  Authorization Period Expiration: 3/26/2025  Plan of Care Expiration: 7/4/2024  Reassessment Due: 7/11/2024  Visit # / Visits authorized: 4/20  **Charge TherEx only**  MedX testing visit 2    PTA Visit #: 0/5     Time In: 8:00 am  Time Out: 9:00 am   Total Billable Time: 60 minutes  INSURANCE and OUTCOMES: Fee for Service with FOTO Outcomes 1/3    **Charge TherEx only**     Precautions: standard, anxiety, NO male therapist physical contact     Pattern of pain determined: Pattern 3    Subjective     Scarlett Knox reports purchasing 2 cm lift for RLE and noting immediate improved sx presentation. Patient report 15 min walking bouts in AM for cardio.    Patient note continue improved sleep quality but attributes this to muscle relaxer and notes no intent to discontinue use.    Patient report including yoga and other exercises into her "days off" from tx.        Patient reports tolerating previous visit well /c no c/o of excess discomfort    Patient reports their pain to be 3/10 on a 0-10 scale with 0 being no pain and 10 being the worst pain imaginable.  Pain Location: left hip and bilateral back, right LE to anterolateral shin     Occupation: Scribe for physicians  Leisure: Play video games, cross stitch, hillary   Pt goals: "I want to go to [place] to do a genetic counseling program, so I want to be able to walk at least 30 minutes and get back to doing yoga at 6 days a week".     Objective      Lumbar  Isometric Testing on Med X " "equipment: Testing administered by PT    Test Initial Baseline Midpoint Final   Date 5/2724     ROM 0-42 deg     Max Peak Torque 140       Min Peak Torque 14      Flex/Ext Ratio 10     % variance  normative data 52     % change from initial test N/A visit 1       Lumbar testing Visit 2     OUTCOMES SELECTION:   Intake Outcome Measure for FOTO Lumbar Survey     Therapist reviewed FOTO scores for Scarlett Knox on 4/25/2024.   FOTO documents entered into [facility] - see Media section.     Intake Score: 47% functional ability      Goal Score: 57% functional ability        06/11/24   LLD R: 76 cm  LLR L: 78 cm    Treatment     Scarlett received the treatments listed below:        Scarlett participated in therapeutic exercises to develop strength, endurance, ROM, flexibility, posture, and core stabilization for 60 minutes including:      TM 5 min - 1st use     LTR x 10   EIL x 4 -> Glute set x 5 5 sec hold focus on release for improved motor control /c EIL to reach end range   EIL x 10 cue for controlled breathing for end range sag     PPT x 5" x 10 cue for dec BLE use   Bridges w/PPT x 10    NP:   90/90 TrA marching x 10   TrA isometric with ball x10  TrA isometric with SLR x10      Medical MedX Treatment as follows:  Patient received strengthening 10 minutes via participation on the Medical MedX Machine. Therapist assisted patient in isolating and engaging spinal stabilization musculature in order to improve functional ability and postural control. Patient performed exercise with therapist guidance in order to accurately use pacer function, avoid valsalva, and optimally exert effort within a safe and effective range via the Lu Exertion Rating Scale. Patient instructed to perform at a midrange of exertion and to complete 15-20 repetitions within appropriate split time, with proper technique, and while maintaining safety.          6/27/2024     8:41 AM   [facility]   Visit Number 4   VAS Pain Rating 3   Treadmill " Time (in min.) 5 min   Extension in Lying 15   Lumbar Flexion 45   Lumbar Extension 0   Lumbar Weight 50 lbs   Repetitions 16   Rating of Perceived Exertion 6   Ice - Z Lie (in min.) 5         Peripheral muscle strengthening which included one set of 15-20 repetitions at a slow and controlled 10-13 second per rep pace focused on strengthening supporting musculature in order to improve body mechanics and functional mobility. Patient and therapist focused on proper form during treatment to ensure optimal strengthening of each targeted muscle group.  Machines utilized included:Torso rotation, Leg Ext, Leg Curl, Chest Press, and RowingTriceps, Biceps, Hip Abd, Hip Add, and Leg Press      Scarlett participated in dynamic functional therapeutic activities to improve functional performance and simulate household and community activities for 0  minutes. The following activities were included:      Scarlett received manual therapy techniques for 0  minutes. The following activities were included:    Pt given cold pack for 0 minutes to lumbar spine in Z-lie position.    Patient Education and Home Exercises     Home exercises include:  EIL  PPT   Bridges w/PPT  90/90 TrA [facility]     Cardio program (V5): -  Lifting education (V11): -  Posture/Lumbar roll: not obtained as of 06/27/24  [facility] Discharge handout (date given): -  Equipment at home/gym membership: No    Education provided:   - PT role and POC  - HEP      Written Home Exercises Provided: Patient instructed to cont prior HEP.  Exercises were reviewed and Scarlett was able to demonstrate them prior to the end of the session.  Scarlett demonstrated good  understanding of the education provided.     See EMR under Patient Instructions for exercises provided prior visit.    Assessment     Patient subjective report indicate improved sx relief /c shoe lift addressing LLD.  Recommend f/u next visit to assess longevity of sx relief especially in face of Patient also  demonstrate dec fear avoidance /c choosing to use TM at onset of tx.  Tx today continue /c HEP review to confirm her other exercises are complimentary to purposeful HEP.  Patient continue to need cue for TA activation indicating need for continue performance before progressions.  Lumbar MedX weight maintained per pt tolerance last visit. Patient demonstrate comfort /c inc flex range during MedX set up, therefore, inc flex ROM for mobility challenge.  However, today pt perform 16 reps at 6 RPE indicating sig paraspinal weakness as range inc.  Recommend maintain resistance and range for strength challenge per HB protocol.       Patient is making fair progress towards established goals.  Pt will continue to benefit from skilled outpatient physical therapy to address the deficits stated in the impairment chart, provide pt/family education and to maximize pt's level of independence in the home and community environment.     Anticipated Barriers for therapy: High fear avoidance, chronicity of condition     Pt's spiritual, cultural and educational needs considered and pt agreeable to plan of care and goals as stated below:     GOALS: Pt is in agreement with the following goals.     Short term goals:  6 weeks or 10 visits   - Pt will demonstrate increased lumbar MedX ROM by at least 6 degrees from the initial ROM value with improvements noted in functional ROM and ability to perform ADLs. Appropriate and Ongoing  - Pt will demonstrate increased MedX average isometric strength value by 10% from initial test resulting in improved ability to perform bending, lifting, and carrying activities safely, confidently. Appropriate and Ongoing  - Pt will report a reduction in worst pain score by 1-2 points for improved tolerance for community ambulation of at least 200 meters. Appropriate and Ongoing  - Pt able to perform HEP correctly with minimal cueing or supervision from therapist to encourage independent management of symptoms.  Appropriate and Ongoing  -Pt able to perform at least 30 minutes of yoga 4 times per week with postural modifications in order to return to recreational fitness routine.     Long term goals: 10 weeks or 20 visits   - Pt will demonstrate increased lumbar MedX ROM by at least 9 degrees from initial ROM value, resulting in improved ability to perform functional forward bending while standing and sitting. Appropriate and Ongoing  - Pt will demonstrate increased MedX average isometric strength value by 25% from initial test resulting in improved ability to perform bending, lifting, and carrying activities safely and confidently. Appropriate and Ongoing  - Pt to demonstrate ability to independently control and reduce their pain through posture positioning and mechanical movements throughout a typical day. Appropriate and Ongoing  - Pt will demonstrate reduced pain and improved functional outcomes as reported on the FOTO by reaching an intake score of >/= 57% functional ability in order to demonstrate subjective improvement in patient's condition. . Appropriate and Ongoing  - Pt will demonstrate independence with the HEP at discharge. Appropriate and Ongoing  - Patient able to demonstrate adequate transverse abdominus strength to perform at least 3 sets of forearm planks with no lumbar flexion in order to achieve adequate core stability to stand for at least 15 minutes. Appropriate and Ongoing    Plan     Continue with established Plan of Care towards established PT goals.     Therapist: Mathieu Sorto, PT  6/27/2024

## 2024-07-02 ENCOUNTER — PATIENT MESSAGE (OUTPATIENT)
Dept: REHABILITATION | Facility: OTHER | Age: 34
End: 2024-07-02
Payer: MEDICAID

## 2024-07-03 ENCOUNTER — CLINICAL SUPPORT (OUTPATIENT)
Dept: REHABILITATION | Facility: OTHER | Age: 34
End: 2024-07-03
Payer: MEDICAID

## 2024-07-03 DIAGNOSIS — M54.16 LUMBAR RADICULOPATHY: ICD-10-CM

## 2024-07-03 DIAGNOSIS — G89.29 CHRONIC BILATERAL THORACIC BACK PAIN: ICD-10-CM

## 2024-07-03 DIAGNOSIS — M46.1 SACROILIITIS: Primary | ICD-10-CM

## 2024-07-03 DIAGNOSIS — M54.6 CHRONIC BILATERAL THORACIC BACK PAIN: ICD-10-CM

## 2024-07-03 PROCEDURE — 97110 THERAPEUTIC EXERCISES: CPT

## 2024-07-03 NOTE — PROGRESS NOTES
"[facility]  Physical Therapy Treatment Note     Name: Scarlett Knox  Clinic Number: [id number]    Therapy Diagnosis:   Encounter Diagnoses   Name Primary?    Sacroiliitis Yes    Chronic bilateral thoracic back pain     Lumbar radiculopathy        Physician: Lou Block P*    Visit Date: 7/3/2024    Physician Orders: PT Eval and Treat  Medical Diagnosis from Referral: Z98.1 (ICD-10-CM) - S/P lumbar fusion  Evaluation Date: 4/25/2024  Authorization Period Expiration: 3/26/2025  Plan of Care Expiration: 7/4/2024  Reassessment Due: 7/11/2024  Visit # / Visits authorized: 5/20  **Charge TherEx only**  MedX testing visit 2    PTA Visit #: 0/5     Time In: 4:30 pm  Time Out: 5:30 pm   Total Billable Time: 60 minutes  INSURANCE and OUTCOMES: Fee for Service with FOTO Outcomes 1/3    **Charge TherEx only**     Precautions: standard, anxiety, NO male therapist physical contact     Pattern of pain determined: Pattern 3    Subjective     Scarlett Knox reports increased pain since last visit, she is not sure if it is due to family stress or recently getting heel lift. Has been more sore in across low back, mostly L low back and outside of L hip       Patient reports tolerating previous visit well /c no c/o of excess discomfort    Patient reports their pain to be 7/10 on a 0-10 scale with 0 being no pain and 10 being the worst pain imaginable.  Pain Location: left hip and bilateral back, right LE to anterolateral shin     Occupation: Scribe for physicians  Leisure: Play video games, cross stitch, hillary   Pt goals: "I want to go to [place] to do a genetic counseling program, so I want to be able to walk at least 30 minutes and get back to doing yoga at 6 days a week".     Objective      Lumbar  Isometric Testing on Med X equipment: Testing administered by PT    Test Initial Baseline Midpoint Final   Date 5/2724     ROM 0-42 deg     Max Peak Torque 140       Min Peak " "Torque 14      Flex/Ext Ratio 10     % variance  normative data 52     % change from initial test N/A visit 1       Lumbar testing Visit 2     OUTCOMES SELECTION:   Intake Outcome Measure for FOTO Lumbar Survey     Therapist reviewed FOTO scores for Scarlett Knox on 4/25/2024.   FOTO documents entered into [employer] - see Media section.     Intake Score: 47% functional ability      Goal Score: 57% functional ability        06/11/24   LLD R: 76 cm  LLR L: 78 cm    Treatment     Scarlett received the treatments listed below:        Scarlett participated in therapeutic exercises to develop strength, endurance, ROM, flexibility, posture, and core stabilization for 60 minutes including:      TM 5 min - 1st use     LTR x 10   EIL x 10  BKFO GTB 10x3 ea  TrA isometric with ball x10  L QL stretch in hookyling 5x10"  PPT x 5" x 10 cue for dec BLE use   Bridges w/PPT x 10  Cat cow 10x3"  Ann pose rocking 10x5"    NP:   90/90 TrA marching x 10   TrA isometric with SLR x10      Medical MedX Treatment as follows:  Patient received strengthening 10 minutes via participation on the Medical MedX Machine. Therapist assisted patient in isolating and engaging spinal stabilization musculature in order to improve functional ability and postural control. Patient performed exercise with therapist guidance in order to accurately use pacer function, avoid valsalva, and optimally exert effort within a safe and effective range via the Lu Exertion Rating Scale. Patient instructed to perform at a midrange of exertion and to complete 15-20 repetitions within appropriate split time, with proper technique, and while maintaining safety.          7/3/2024     4:40 PM   [facility]   Visit Number 5   VAS Pain Rating 7   Extension in Lying 10   Lumbar Weight 45 lbs   Repetitions 15   Rating of Perceived Exertion 5         Peripheral muscle strengthening which included one set of 15-20 repetitions at a slow and controlled 10-13 second " per rep pace focused on strengthening supporting musculature in order to improve body mechanics and functional mobility. Patient and therapist focused on proper form during treatment to ensure optimal strengthening of each targeted muscle group.  Machines utilized included:Torso rotation, Leg Ext, Leg Curl, Chest Press, and RowingTriceps, Biceps, Hip Abd, Hip Add, and Leg Press      Scarlett participated in dynamic functional therapeutic activities to improve functional performance and simulate household and community activities for 0  minutes. The following activities were included:      Scarlett received manual therapy techniques for 0  minutes. The following activities were included:    Pt given cold pack for 0 minutes to lumbar spine in Z-lie position.    Patient Education and Home Exercises     Home exercises include:  EIL  PPT   Bridges w/PPT  90/90 TrA marchTagMii     Cardio program (V5): -  Lifting education (V11): -  Posture/Lumbar roll: not obtained as of 06/27/24  [facility] Discharge handout (date given): -  Equipment at home/gym membership: No    Education provided:   - PT role and POC  - HEP      Written Home Exercises Provided: Patient instructed to cont prior HEP.  Exercises were reviewed and Scarlett was able to demonstrate them prior to the end of the session.  Scarlett demonstrated good  understanding of the education provided.     See EMR under Patient Instructions for exercises provided prior visit.    Assessment     Patient presents today with reports of increased pain since last visit. Therex modified for pt comfort and she reported decreased tightness and soreness after completion. Instructed pt to hold off on previous PT exercises and to just complete stretches given from  for now, review next visit. Medx resistance decreased to 45 ft/lbs due to significant difficulty with medx last 2 visits and pt was able to complete 15 reps with 5/10 RPE. Attempt to increase reps next visit.       Patient  is making fair progress towards established goals.  Pt will continue to benefit from skilled outpatient physical therapy to address the deficits stated in the impairment chart, provide pt/family education and to maximize pt's level of independence in the home and community environment.     Anticipated Barriers for therapy: High fear avoidance, chronicity of condition     Pt's spiritual, cultural and educational needs considered and pt agreeable to plan of care and goals as stated below:     GOALS: Pt is in agreement with the following goals.     Short term goals:  6 weeks or 10 visits   - Pt will demonstrate increased lumbar MedX ROM by at least 6 degrees from the initial ROM value with improvements noted in functional ROM and ability to perform ADLs. Appropriate and Ongoing  - Pt will demonstrate increased MedX average isometric strength value by 10% from initial test resulting in improved ability to perform bending, lifting, and carrying activities safely, confidently. Appropriate and Ongoing  - Pt will report a reduction in worst pain score by 1-2 points for improved tolerance for community ambulation of at least 200 meters. Appropriate and Ongoing  - Pt able to perform HEP correctly with minimal cueing or supervision from therapist to encourage independent management of symptoms. Appropriate and Ongoing  -Pt able to perform at least 30 minutes of yoga 4 times per week with postural modifications in order to return to recreational fitness routine.     Long term goals: 10 weeks or 20 visits   - Pt will demonstrate increased lumbar MedX ROM by at least 9 degrees from initial ROM value, resulting in improved ability to perform functional forward bending while standing and sitting. Appropriate and Ongoing  - Pt will demonstrate increased MedX average isometric strength value by 25% from initial test resulting in improved ability to perform bending, lifting, and carrying activities safely and confidently. Appropriate  and Ongoing  - Pt to demonstrate ability to independently control and reduce their pain through posture positioning and mechanical movements throughout a typical day. Appropriate and Ongoing  - Pt will demonstrate reduced pain and improved functional outcomes as reported on the FOTO by reaching an intake score of >/= 57% functional ability in order to demonstrate subjective improvement in patient's condition. . Appropriate and Ongoing  - Pt will demonstrate independence with the HEP at discharge. Appropriate and Ongoing  - Patient able to demonstrate adequate transverse abdominus strength to perform at least 3 sets of forearm planks with no lumbar flexion in order to achieve adequate core stability to stand for at least 15 minutes. Appropriate and Ongoing    Plan     Continue with established Plan of Care towards established PT goals.     Therapist: Paco Parrish, PT  7/3/2024

## 2024-07-08 ENCOUNTER — CLINICAL SUPPORT (OUTPATIENT)
Dept: REHABILITATION | Facility: OTHER | Age: 34
End: 2024-07-08
Payer: MEDICAID

## 2024-07-08 DIAGNOSIS — M54.6 CHRONIC BILATERAL THORACIC BACK PAIN: ICD-10-CM

## 2024-07-08 DIAGNOSIS — M46.1 SACROILIITIS: Primary | ICD-10-CM

## 2024-07-08 DIAGNOSIS — G89.29 CHRONIC BILATERAL THORACIC BACK PAIN: ICD-10-CM

## 2024-07-08 DIAGNOSIS — M54.16 LUMBAR RADICULOPATHY: ICD-10-CM

## 2024-07-08 PROCEDURE — 97110 THERAPEUTIC EXERCISES: CPT | Mod: CQ

## 2024-07-08 NOTE — PROGRESS NOTES
"[facility]  Physical Therapy Treatment Note     Name: Scarlett Knox  Clinic Number: [id number]    Therapy Diagnosis:   No diagnosis found.      Physician: Lou Block P*    Visit Date: 7/8/2024    Physician Orders: PT Eval and Treat  Medical Diagnosis from Referral: Z98.1 (ICD-10-CM) - S/P lumbar fusion  Evaluation Date: 4/25/2024  Authorization Period Expiration: 3/26/2025  Plan of Care Expiration: 7/4/2024  Reassessment Due: 7/11/2024  Visit # / Visits authorized: 5/20  **Charge TherEx only**  MedX testing visit 2    PTA Visit #: 0/5     Time In: 4:30 pm  Time Out: 5:30 pm   Total Billable Time: 60 minutes  INSURANCE and OUTCOMES: Fee for Service with FOTO Outcomes 1/3    **Charge TherEx only**     Precautions: standard, anxiety, NO male therapist physical contact     Pattern of pain determined: Pattern 3    Subjective     Scarlett Knox reports increased pain since last visit, she is not sure if it is due to family stress or recently getting heel lift. Has been more sore in across low back, mostly L low back and outside of L hip       Patient reports tolerating previous visit well /c no c/o of excess discomfort    Patient reports their pain to be 7/10 on a 0-10 scale with 0 being no pain and 10 being the worst pain imaginable.  Pain Location: left hip and bilateral back, right LE to anterolateral shin     Occupation: Scribe for physicians  Leisure: Play video games, cross stitch, hillary   Pt goals: "I want to go to [place] to do a genetic counseling program, so I want to be able to walk at least 30 minutes and get back to doing yoga at 6 days a week".     Objective      Lumbar  Isometric Testing on Med X equipment: Testing administered by PT    Test Initial Baseline Midpoint Final   Date 5/2724     ROM 0-42 deg     Max Peak Torque 140       Min Peak Torque 14      Flex/Ext Ratio 10     % variance  normative data 52     % change from initial test N/A " "visit 1       Lumbar testing Visit 2     OUTCOMES SELECTION:   Intake Outcome Measure for FOTO Lumbar Survey     Therapist reviewed FOTO scores for Scarlett Knox on 4/25/2024.   FOTO documents entered into [employer] - see Media section.     Intake Score: 47% functional ability      Goal Score: 57% functional ability        06/11/24   LLD R: 76 cm  LLR L: 78 cm    Treatment     Scarlett received the treatments listed below:        Scarlett participated in therapeutic exercises to develop strength, endurance, ROM, flexibility, posture, and core stabilization for 60 minutes including:      TM 5 min - 1st use     LTR x 10   EIL x 10  BKFO GTB 10x3 ea  TrA isometric with ball x10  L QL stretch in hookyling 5x10"  PPT x 5" x 10 cue for dec BLE use   Bridges w/PPT x 10  Cat cow 10x3"  Ann pose rocking 10x5"    NP:   90/90 TrA marching x 10   TrA isometric with SLR x10      Medical MedX Treatment as follows:  Patient received strengthening 10 minutes via participation on the Medical MedX Machine. Therapist assisted patient in isolating and engaging spinal stabilization musculature in order to improve functional ability and postural control. Patient performed exercise with therapist guidance in order to accurately use pacer function, avoid valsalva, and optimally exert effort within a safe and effective range via the Lu Exertion Rating Scale. Patient instructed to perform at a midrange of exertion and to complete 15-20 repetitions within appropriate split time, with proper technique, and while maintaining safety.          7/3/2024     4:40 PM   [facility] - Short   Visit Number 5   VAS Pain Rating 7   Extension in Lying 10   Lumbar Weight 45 lbs   Repetitions 15   Rating of Perceived Exertion 5         Peripheral muscle strengthening which included one set of 15-20 repetitions at a slow and controlled 10-13 second per rep pace focused on strengthening supporting musculature in order to improve body mechanics and " functional mobility. Patient and therapist focused on proper form during treatment to ensure optimal strengthening of each targeted muscle group.  Machines utilized included:Torso rotation, Leg Ext, Leg Curl, Chest Press, and RowingTriceps, Biceps, Hip Abd, Hip Add, and Leg Press      Scarlett participated in dynamic functional therapeutic activities to improve functional performance and simulate household and community activities for 0  minutes. The following activities were included:      Scarlett received manual therapy techniques for 0  minutes. The following activities were included:    Pt given cold pack for 0 minutes to lumbar spine in Z-lie position.    Patient Education and Home Exercises     Home exercises include:  EIL  PPT   Bridges w/PPT  90/90 TrA [employer]     Cardio program (V5): -  Lifting education (V11): -  Posture/Lumbar roll: not obtained as of 06/27/24  [facility] Discharge handout (date given): -  Equipment at home/gym membership: No    Education provided:   - PT role and POC  - HEP      Written Home Exercises Provided: Patient instructed to cont prior HEP.  Exercises were reviewed and Scarlett was able to demonstrate them prior to the end of the session.  Scarlett demonstrated good  understanding of the education provided.     See EMR under Patient Instructions for exercises provided prior visit.    Assessment     Patient presents today with reports of increased pain since last visit. Therex modified for pt comfort and she reported decreased tightness and soreness after completion. Instructed pt to hold off on previous PT exercises and to just complete stretches given from  for now, review next visit. Medx resistance decreased to 45 ft/lbs due to significant difficulty with medx last 2 visits and pt was able to complete 15 reps with 5/10 RPE. Attempt to increase reps next visit.       Patient is making fair progress towards established goals.  Pt will continue to benefit from skilled  outpatient physical therapy to address the deficits stated in the impairment chart, provide pt/family education and to maximize pt's level of independence in the home and community environment.     Anticipated Barriers for therapy: High fear avoidance, chronicity of condition     Pt's spiritual, cultural and educational needs considered and pt agreeable to plan of care and goals as stated below:     GOALS: Pt is in agreement with the following goals.     Short term goals:  6 weeks or 10 visits   - Pt will demonstrate increased lumbar MedX ROM by at least 6 degrees from the initial ROM value with improvements noted in functional ROM and ability to perform ADLs. Appropriate and Ongoing  - Pt will demonstrate increased MedX average isometric strength value by 10% from initial test resulting in improved ability to perform bending, lifting, and carrying activities safely, confidently. Appropriate and Ongoing  - Pt will report a reduction in worst pain score by 1-2 points for improved tolerance for community ambulation of at least 200 meters. Appropriate and Ongoing  - Pt able to perform HEP correctly with minimal cueing or supervision from therapist to encourage independent management of symptoms. Appropriate and Ongoing  -Pt able to perform at least 30 minutes of yoga 4 times per week with postural modifications in order to return to recreational fitness routine.     Long term goals: 10 weeks or 20 visits   - Pt will demonstrate increased lumbar MedX ROM by at least 9 degrees from initial ROM value, resulting in improved ability to perform functional forward bending while standing and sitting. Appropriate and Ongoing  - Pt will demonstrate increased MedX average isometric strength value by 25% from initial test resulting in improved ability to perform bending, lifting, and carrying activities safely and confidently. Appropriate and Ongoing  - Pt to demonstrate ability to independently control and reduce their pain  through posture positioning and mechanical movements throughout a typical day. Appropriate and Ongoing  - Pt will demonstrate reduced pain and improved functional outcomes as reported on the FOTO by reaching an intake score of >/= 57% functional ability in order to demonstrate subjective improvement in patient's condition. . Appropriate and Ongoing  - Pt will demonstrate independence with the HEP at discharge. Appropriate and Ongoing  - Patient able to demonstrate adequate transverse abdominus strength to perform at least 3 sets of forearm planks with no lumbar flexion in order to achieve adequate core stability to stand for at least 15 minutes. Appropriate and Ongoing    Plan     Continue with established Plan of Care towards established PT goals.     Therapist: Adam Harden, PTA  7/8/2024

## 2024-07-08 NOTE — PROGRESS NOTES
"[facility]  Physical Therapy Treatment Note     Name: Scarlett Knox  Clinic Number: [id number]    Therapy Diagnosis:   No diagnosis found.      Physician: Lou Block P*    Visit Date: 7/8/2024    Physician Orders: PT Eval and Treat  Medical Diagnosis from Referral: Z98.1 (ICD-10-CM) - S/P lumbar fusion  Evaluation Date: 4/25/2024  Authorization Period Expiration: 3/26/2025  Plan of Care Expiration: 7/4/2024  Reassessment Due: 7/11/2024  Visit # / Visits authorized: 6/20  **Charge TherEx only**  MedX testing visit 2    PTA Visit #: 1/5     Time In: 4:30 pm  Time Out: 5:30 pm   Total Billable Time: 60 minutes  INSURANCE and OUTCOMES: Fee for Service with FOTO Outcomes 1/3    **Charge TherEx only**     Precautions: standard, anxiety, NO male therapist physical contact     Pattern of pain determined: Pattern 3    Subjective     Scarlett Knox reports less pain than LV but it still seems high. Asked for sitting warm up equipment.Pt states she was under a lot of stress last week and today she feels much better.  Pt states she walked an extra 3 min this morning total of 18 min.       Patient reports tolerating previous visit well /c no c/o of excess discomfort    Patient reports their pain to be 4/10 on a 0-10 scale with 0 being no pain and 10 being the worst pain imaginable.  Pain Location: left hip and bilateral back, right LE to anterolateral shin     Occupation: Scribe for physicians  Leisure: Play video games, cross stitch, hillary   Pt goals: "I want to go to [place] to do a genetic counseling program, so I want to be able to walk at least 30 minutes and get back to doing yoga at 6 days a week".     Objective      Lumbar  Isometric Testing on Med X equipment: Testing administered by PT    Test Initial Baseline Midpoint Final   Date 5/2724     ROM 0-42 deg     Max Peak Torque 140       Min Peak Torque 14      Flex/Ext Ratio 10     % variance  normative data " "52     % change from initial test N/A visit 1       Lumbar testing Visit 2     OUTCOMES SELECTION:   Intake Outcome Measure for FOTO Lumbar Survey     Therapist reviewed FOTO scores for Scarlett Knox on 4/25/2024.   FOTO documents entered into [employer] - see Media section.     Intake Score: 47% functional ability      Goal Score: 57% functional ability        06/11/24   LLD R: 76 cm  LLR L: 78 cm    Treatment     Scarlett received the treatments listed below:        Scarlett participated in therapeutic exercises to develop strength, endurance, ROM, flexibility, posture, and core stabilization for 60 minutes including:      TM   NuStep 5 min    LTR x 10   EIL x 10  90/90 TrA marching x 10  L QL stretch in hookyling 5x10"   PPT x 5" x 10 cue for dec BLE use   Bridges w/PPT x 10  Cat cow 10x3"  Ann pose rocking 10x5"    NP:   TrA isometric with ball x10  TrA isometric with SLR x10  BKFO GTB 10x3 ea    Medical MedX Treatment as follows:  Patient received strengthening 10 minutes via participation on the Medical MedX Machine. Therapist assisted patient in isolating and engaging spinal stabilization musculature in order to improve functional ability and postural control. Patient performed exercise with therapist guidance in order to accurately use pacer function, avoid valsalva, and optimally exert effort within a safe and effective range via the Lu Exertion Rating Scale. Patient instructed to perform at a midrange of exertion and to complete 15-20 repetitions within appropriate split time, with proper technique, and while maintaining safety.        7/8/2024     5:15 PM   [facility]   Visit Number 6   VAS Pain Rating 4   Time 5   Extension in Lying 10   Lumbar Weight 50 lbs   Repetitions 20   Rating of Perceived Exertion 4   Ice - Z Lie (in min.) 5           Peripheral muscle strengthening which included one set of 15-20 repetitions at a slow and controlled 10-13 second per rep pace focused on strengthening " supporting musculature in order to improve body mechanics and functional mobility. Patient and therapist focused on proper form during treatment to ensure optimal strengthening of each targeted muscle group.  Machines utilized included:Torso rotation, Leg Ext, Leg Curl, Chest Press, and RowingTriceps, Biceps, Hip Abd, Hip Add, and Leg Press      Scarlett participated in dynamic functional therapeutic activities to improve functional performance and simulate household and community activities for 0  minutes. The following activities were included:      Scarlett received manual therapy techniques for 0  minutes. The following activities were included:    Pt given cold pack for 0 minutes to lumbar spine in Z-lie position.    Patient Education and Home Exercises     Home exercises include:  EIL  PPT   Bridges w/PPT  90/90 TrA [facility]     Cardio program (V5): -  Lifting education (V11): -  Posture/Lumbar roll: not obtained as of 06/27/24  [facility] Discharge handout (date given): -  Equipment at home/gym membership: No    Education provided:   - PT role and POC  - HEP      Written Home Exercises Provided: Patient instructed to cont prior HEP.  Exercises were reviewed and Scarlett was able to demonstrate them prior to the end of the session.  Scarlett demonstrated good  understanding of the education provided.     See EMR under Patient Instructions for exercises provided prior visit.    Assessment     Patient returns to  slightly anxious but LB symptoms improved since last visit. Therex resumed without modifications and pt tolerated without increase of pain. Medx resistance resumed to 50 ft/lbs  pt was able to complete 20 reps with 4/10 RPE. Peripheral resistance machines performed without incidence.  Progress per HB protocol and pt's tolerance.     Patient is making fair progress towards established goals.  Pt will continue to benefit from skilled outpatient physical therapy to address the deficits stated in the  impairment chart, provide pt/family education and to maximize pt's level of independence in the home and community environment.     Anticipated Barriers for therapy: High fear avoidance, chronicity of condition     Pt's spiritual, cultural and educational needs considered and pt agreeable to plan of care and goals as stated below:     GOALS: Pt is in agreement with the following goals.     Short term goals:  6 weeks or 10 visits   - Pt will demonstrate increased lumbar MedX ROM by at least 6 degrees from the initial ROM value with improvements noted in functional ROM and ability to perform ADLs. Appropriate and Ongoing  - Pt will demonstrate increased MedX average isometric strength value by 10% from initial test resulting in improved ability to perform bending, lifting, and carrying activities safely, confidently. Appropriate and Ongoing  - Pt will report a reduction in worst pain score by 1-2 points for improved tolerance for community ambulation of at least 200 meters. Appropriate and Ongoing  - Pt able to perform HEP correctly with minimal cueing or supervision from therapist to encourage independent management of symptoms. Appropriate and Ongoing  -Pt able to perform at least 30 minutes of yoga 4 times per week with postural modifications in order to return to recreational fitness routine.     Long term goals: 10 weeks or 20 visits   - Pt will demonstrate increased lumbar MedX ROM by at least 9 degrees from initial ROM value, resulting in improved ability to perform functional forward bending while standing and sitting. Appropriate and Ongoing  - Pt will demonstrate increased MedX average isometric strength value by 25% from initial test resulting in improved ability to perform bending, lifting, and carrying activities safely and confidently. Appropriate and Ongoing  - Pt to demonstrate ability to independently control and reduce their pain through posture positioning and mechanical movements throughout a  typical day. Appropriate and Ongoing  - Pt will demonstrate reduced pain and improved functional outcomes as reported on the FOTO by reaching an intake score of >/= 57% functional ability in order to demonstrate subjective improvement in patient's condition. . Appropriate and Ongoing  - Pt will demonstrate independence with the HEP at discharge. Appropriate and Ongoing  - Patient able to demonstrate adequate transverse abdominus strength to perform at least 3 sets of forearm planks with no lumbar flexion in order to achieve adequate core stability to stand for at least 15 minutes. Appropriate and Ongoing    Plan     Continue with established Plan of Care towards established PT goals.     Therapist: Shana Hester, PTA  7/8/2024

## 2024-07-10 ENCOUNTER — CLINICAL SUPPORT (OUTPATIENT)
Dept: REHABILITATION | Facility: OTHER | Age: 34
End: 2024-07-10
Payer: MEDICAID

## 2024-07-10 DIAGNOSIS — M54.6 CHRONIC BILATERAL THORACIC BACK PAIN: ICD-10-CM

## 2024-07-10 DIAGNOSIS — G89.29 CHRONIC BILATERAL THORACIC BACK PAIN: ICD-10-CM

## 2024-07-10 DIAGNOSIS — M54.16 LUMBAR RADICULOPATHY: ICD-10-CM

## 2024-07-10 DIAGNOSIS — M46.1 SACROILIITIS: Primary | ICD-10-CM

## 2024-07-10 PROCEDURE — 97110 THERAPEUTIC EXERCISES: CPT

## 2024-07-10 NOTE — PROGRESS NOTES
"[facility]  Physical Therapy Treatment Note     Name: Scarlett Knox  Clinic Number: [id number]    Therapy Diagnosis:   Encounter Diagnoses   Name Primary?    Sacroiliitis Yes    Chronic bilateral thoracic back pain     Lumbar radiculopathy        Physician: Lou Block P*    Visit Date: 7/10/2024    Physician Orders: PT Eval and Treat  Medical Diagnosis from Referral: Z98.1 (ICD-10-CM) - S/P lumbar fusion  Evaluation Date: 4/25/2024  Authorization Period Expiration: 3/26/2025  Plan of Care Expiration: Updated to 9/10/24  Reassessment Due: 8/10/2024  Visit # / Visits authorized: 7/20  **Charge TherEx only**  MedX testing visit 2    PTA Visit #: 0/5     Time In: 8:00 am  Time Out: 9:00 am   Total Billable Time: 55 minutes  INSURANCE and OUTCOMES: Fee for Service with FOTO Outcomes 1/3    **Charge TherEx only**     Precautions: standard, anxiety, NO male therapist physical contact     Pattern of pain determined: Pattern 3    Subjective     Scarlett Knox reports she feels better after each visit and after doing stretches, but continues to have soreness and stiffness throughout the day across the back. In some pain this morning.       Patient reports tolerating previous visit well /c no c/o of excess discomfort    Patient reports their pain to be 4/10 on a 0-10 scale with 0 being no pain and 10 being the worst pain imaginable.  Pain Location: left hip and bilateral back, right LE to anterolateral shin     Occupation: Scribe for physicians  Leisure: Play video games, cross stitch, hillary   Pt goals: "I want to go to [place] to do a genetic counseling program, so I want to be able to walk at least 30 minutes and get back to doing yoga at 6 days a week".     Objective      Lumbar  Isometric Testing on Med X equipment: Testing administered by PT    Test Initial Baseline Midpoint Final   Date 5/2724     ROM 0-42 deg     Max Peak Torque 140       Min Peak Torque 14   " "   Flex/Ext Ratio 10     % variance  normative data 52     % change from initial test N/A visit 1       Lumbar testing Visit 2     OUTCOMES SELECTION:   Intake Outcome Measure for FOTO Lumbar Survey     Therapist reviewed FOTO scores for Scarlett Knox on 4/25/2024.   FOTO documents entered into [email or web address] - see Media section.     Intake Score: 47% functional ability      Goal Score: 57% functional ability        06/11/24   LLD R: 76 cm  LLR L: 78 cm    Treatment     Scarlett received the treatments listed below:        Scarlett participated in therapeutic exercises to develop strength, endurance, ROM, flexibility, posture, and core stabilization for 60 minutes including:      NuStep 5 min    LTR x 10   EIL x 10  90/90 TrA marching x 10  L QL stretch in hookyling 5x10"   PPT x 5" x 10 cue for dec BLE use   Bridges w/PPT x 10  Cat cow 10x3"  Ann pose rocking 10x5"    NP:   TrA isometric with ball x10  TrA isometric with SLR x10  BKFO GTB 10x3 ea    Medical MedX Treatment as follows:  Patient received strengthening 10 minutes via participation on the Medical MedX Machine. Therapist assisted patient in isolating and engaging spinal stabilization musculature in order to improve functional ability and postural control. Patient performed exercise with therapist guidance in order to accurately use pacer function, avoid valsalva, and optimally exert effort within a safe and effective range via the Lu Exertion Rating Scale. Patient instructed to perform at a midrange of exertion and to complete 15-20 repetitions within appropriate split time, with proper technique, and while maintaining safety.        7/10/2024     8:35 AM   [facility] - [facility]   Visit Number 7   VAS Pain Rating 6   Time 5   Lumbar Weight 55 lbs   Repetitions 20   Rating of Perceived Exertion 4             Peripheral muscle strengthening which included one set of 15-20 repetitions at a slow and controlled 10-13 second per rep pace focused on " strengthening supporting musculature in order to improve body mechanics and functional mobility. Patient and therapist focused on proper form during treatment to ensure optimal strengthening of each targeted muscle group.  Machines utilized included:Torso rotation, Leg Ext, Leg Curl, Chest Press, and RowingTriceps, Biceps, Hip Abd, Hip Add, and Leg Press      Scarlett participated in dynamic functional therapeutic activities to improve functional performance and simulate household and community activities for 0  minutes. The following activities were included:      Scarlett received manual therapy techniques for 0  minutes. The following activities were included:    Pt given cold pack for 0 minutes to lumbar spine in Z-lie position.    Patient Education and Home Exercises     Home exercises include:  EIL  PPT   Bridges w/PPT  90/90 TrA [employer]     Cardio program (V5): -  Lifting education (V11): -  Posture/Lumbar roll: not obtained as of 06/27/24  [facility] Discharge handout (date given): -  Equipment at home/gym membership: No    Education provided:   - PT role and POC  - HEP      Written Home Exercises Provided: Patient instructed to cont prior HEP.  Exercises were reviewed and Scarlett was able to demonstrate them prior to the end of the session.  Scarlett demonstrated good  understanding of the education provided.     See EMR under Patient Instructions for exercises provided prior visit.    Assessment     Patient presents today with continued complaints of pain. Pt demo improved lumbar ROM in all planes and reports of decreased tightness and soreness after completion of guided flexibility and strengthening exercises. Medx resistance progressed to 55 ft/lbs and pt was able to complete 20 reps with 4/10 RPE. Progress resistance another 10% next visit in accordance with HB protocol.     Patient is making fair progress towards established goals.  Pt will continue to benefit from skilled outpatient physical therapy  to address the deficits stated in the impairment chart, provide pt/family education and to maximize pt's level of independence in the home and community environment.     Anticipated Barriers for therapy: High fear avoidance, chronicity of condition     Pt's spiritual, cultural and educational needs considered and pt agreeable to plan of care and goals as stated below:     GOALS: Pt is in agreement with the following goals.     Short term goals:  6 weeks or 10 visits   - Pt will demonstrate increased lumbar MedX ROM by at least 6 degrees from the initial ROM value with improvements noted in functional ROM and ability to perform ADLs. Appropriate and Ongoing  - Pt will demonstrate increased MedX average isometric strength value by 10% from initial test resulting in improved ability to perform bending, lifting, and carrying activities safely, confidently. Appropriate and Ongoing  - Pt will report a reduction in worst pain score by 1-2 points for improved tolerance for community ambulation of at least 200 meters. Appropriate and Ongoing  - Pt able to perform HEP correctly with minimal cueing or supervision from therapist to encourage independent management of symptoms. Appropriate and Ongoing  -Pt able to perform at least 30 minutes of yoga 4 times per week with postural modifications in order to return to recreational fitness routine.     Long term goals: 10 weeks or 20 visits   - Pt will demonstrate increased lumbar MedX ROM by at least 9 degrees from initial ROM value, resulting in improved ability to perform functional forward bending while standing and sitting. Appropriate and Ongoing  - Pt will demonstrate increased MedX average isometric strength value by 25% from initial test resulting in improved ability to perform bending, lifting, and carrying activities safely and confidently. Appropriate and Ongoing  - Pt to demonstrate ability to independently control and reduce their pain through posture positioning and  mechanical movements throughout a typical day. Appropriate and Ongoing  - Pt will demonstrate reduced pain and improved functional outcomes as reported on the FOTO by reaching an intake score of >/= 57% functional ability in order to demonstrate subjective improvement in patient's condition. . Appropriate and Ongoing  - Pt will demonstrate independence with the HEP at discharge. Appropriate and Ongoing  - Patient able to demonstrate adequate transverse abdominus strength to perform at least 3 sets of forearm planks with no lumbar flexion in order to achieve adequate core stability to stand for at least 15 minutes. Appropriate and Ongoing    Plan     Continue with established Plan of Care towards established PT goals.     Therapist: Paco Parrish, PT  7/10/2024

## 2024-07-10 NOTE — PLAN OF CARE
"  Outpatient Therapy Updated Plan of Care     Visit Date: 7/10/2024    Name: Scarlett Knox  Clinic Number: [id number]    Therapy Diagnosis:   Encounter Diagnoses   Name Primary?    Sacroiliitis Yes    Chronic bilateral thoracic back pain     Lumbar radiculopathy      Physician: Lou Block P*    Physician Orders: PT Eval and Treat  Medical Diagnosis from Referral: Z98.1 (ICD-10-CM) - S/P lumbar fusion  Evaluation Date: 4/25/2024  Authorization Period Expiration: 3/26/2025  Plan of Care Expiration: Updated to 9/10/24  Reassessment Due: 8/10/2024  Visit # / Visits authorized: 7/20    Subjective     Update: Scarlett Knox reports she feels better after each visit and after doing stretches, but continues to have soreness and stiffness throughout the day across the back. In some pain this morning.         Patient reports tolerating previous visit well /c no c/o of excess discomfort    Patient reports their pain to be 4/10 on a 0-10 scale with 0 being no pain and 10 being the worst pain imaginable.  Pain Location: left hip and bilateral back, right LE to anterolateral shin      Occupation: Scribe for physicians  Leisure: Play video games, cross stitch, hillary   Pt goals: "I want to go to [place] to do a genetic counseling program, so I want to be able to walk at least 30 minutes and get back to doing yoga at 6 days a week".      Objective       Lumbar  Isometric Testing on Med X equipment: Testing administered by PT     Test Initial Baseline Midpoint Final   Date 5/2724       ROM 0-42 deg       Max Peak Torque 140         Min Peak Torque 14        Flex/Ext Ratio 10       % variance  normative data 52       % change from initial test N/A visit 1          Lumbar testing Visit 2     OUTCOMES SELECTION:   Intake Outcome Measure for FOTO Lumbar Survey     Therapist reviewed FOTO scores for Scarlett Knox on 4/25/2024.   FOTO documents entered into EPIC - see Media section.     Intake Score: 47% functional " ability        Goal Score: 57% functional ability         06/11/24   LLD R: 76 cm  LLR L: 78 cm    Assessment     Update: Patient presents today with continued complaints of pain. Pt demo improved lumbar ROM in all planes and reports of decreased tightness and soreness after completion of guided flexibility and strengthening exercises. Medx resistance progressed to 55 ft/lbs and pt was able to complete 20 reps with 4/10 RPE. Progress resistance another 10% next visit in accordance with HB protocol.      Patient is making fair progress towards established goals.  Pt will continue to benefit from skilled outpatient physical therapy to address the deficits stated in the impairment chart, provide pt/family education and to maximize pt's level of independence in the home and community environment.      Anticipated Barriers for therapy: High fear avoidance, chronicity of condition      Pt's spiritual, cultural and educational needs considered and pt agreeable to plan of care and goals as stated below:      GOALS: Pt is in agreement with the following goals.     Short term goals:  6 weeks or 10 visits   - Pt will demonstrate increased lumbar MedX ROM by at least 6 degrees from the initial ROM value with improvements noted in functional ROM and ability to perform ADLs. Appropriate and Ongoing  - Pt will demonstrate increased MedX average isometric strength value by 10% from initial test resulting in improved ability to perform bending, lifting, and carrying activities safely, confidently. Appropriate and Ongoing  - Pt will report a reduction in worst pain score by 1-2 points for improved tolerance for community ambulation of at least 200 meters. Appropriate and Ongoing  - Pt able to perform HEP correctly with minimal cueing or supervision from therapist to encourage independent management of symptoms. Appropriate and Ongoing  -Pt able to perform at least 30 minutes of yoga 4 times per week with postural modifications in  "order to return to recreational fitness routine.     Long term goals: 10 weeks or 20 visits   - Pt will demonstrate increased lumbar MedX ROM by at least 9 degrees from initial ROM value, resulting in improved ability to perform functional forward bending while standing and sitting. Appropriate and Ongoing  - Pt will demonstrate increased MedX average isometric strength value by 25% from initial test resulting in improved ability to perform bending, lifting, and carrying activities safely and confidently. Appropriate and Ongoing  - Pt to demonstrate ability to independently control and reduce their pain through posture positioning and mechanical movements throughout a typical day. Appropriate and Ongoing  - Pt will demonstrate reduced pain and improved functional outcomes as reported on the FOTO by reaching an intake score of >/= 57% functional ability in order to demonstrate subjective improvement in patient's condition. . Appropriate and Ongoing  - Pt will demonstrate independence with the HEP at discharge. Appropriate and Ongoing  - Patient able to demonstrate adequate transverse abdominus strength to perform at least 3 sets of forearm planks with no lumbar flexion in order to achieve adequate core stability to stand for at least 15 minutes. Appropriate and Ongoing     Reasons for Recertification of Therapy:   To allow patient to complete full allotment of visits so that they may achieve maximum therapeutic benefit      Plan     Updated Certification Period: 7/10/2024 to 9/10/24  Recommended Treatment Plan: 2 times per week for 8 weeks:     - Patient education  - Therapeutic exercise  - Manual therapy  - Performance testing   - Neuromuscular Re-education  - Therapeutic activity   - Modalities    Pt may be seen by PTA as part of the rehabilitation team.     Therapist: Paco Parrish, PT  7/10/2024    "I certify the need for these services furnished under this plan of treatment and while under my " "care."    ____________________________________  Physician/Referring Practitioner    _______________  Date of Signature    Paco Parrish, PT  7/10/2024      I CERTIFY THE NEED FOR THESE SERVICES FURNISHED UNDER THIS PLAN OF TREATMENT AND WHILE UNDER MY CARE    Physician's comments:        Physician's Signature: ___________________________________________________      "

## 2024-07-16 ENCOUNTER — CLINICAL SUPPORT (OUTPATIENT)
Dept: REHABILITATION | Facility: OTHER | Age: 34
End: 2024-07-16
Payer: MEDICAID

## 2024-07-16 DIAGNOSIS — M54.16 LUMBAR RADICULOPATHY: ICD-10-CM

## 2024-07-16 DIAGNOSIS — M46.1 SACROILIITIS: Primary | ICD-10-CM

## 2024-07-16 DIAGNOSIS — G89.29 CHRONIC BILATERAL THORACIC BACK PAIN: ICD-10-CM

## 2024-07-16 DIAGNOSIS — M54.6 CHRONIC BILATERAL THORACIC BACK PAIN: ICD-10-CM

## 2024-07-16 PROCEDURE — 97110 THERAPEUTIC EXERCISES: CPT | Mod: CQ

## 2024-07-16 NOTE — PROGRESS NOTES
"[facility]  Physical Therapy Treatment Note     Name: Scarlett Knox  Clinic Number: [id number]    Therapy Diagnosis:   Encounter Diagnoses   Name Primary?    Sacroiliitis Yes    Chronic bilateral thoracic back pain     Lumbar radiculopathy        Physician: Lou Block P*    Visit Date: 7/16/2024    Physician Orders: PT Eval and Treat  Medical Diagnosis from Referral: Z98.1 (ICD-10-CM) - S/P lumbar fusion  Evaluation Date: 4/25/2024  Authorization Period Expiration: 3/26/2025  Plan of Care Expiration: Updated to 9/10/24  Reassessment Due: 8/10/2024  Visit # / Visits authorized: 7/20  **Charge TherEx only**  MedX testing visit 2    PTA Visit #: 1/5     Time In: 4:30 pm  Time Out: 5:30 pm   Total Billable Time: 55 minutes  INSURANCE and OUTCOMES: Fee for Service with FOTO Outcomes 1/3    **Charge TherEx only**     Precautions: standard, anxiety, NO male therapist physical contact     Pattern of pain determined: Pattern 3    Subjective     Scarlett Knox reports some improvements with therapy but still c/o tightness, especially in both hips.       Patient reports tolerating previous visit well /c no c/o of excess discomfort    Patient reports their pain to be 4/10 lumbar and 3/10 R LE on a 0-10 scale with 0 being no pain and 10 being the worst pain imaginable.  Pain Location: left hip and bilateral back, right LE to anterolateral shin     Occupation: Scribe for physicians  Leisure: Play video games, cross stitch, hillary   Pt goals: "I want to go to [place] to do a genetic counseling program, so I want to be able to walk at least 30 minutes and get back to doing yoga at 6 days a week".     Objective      Lumbar  Isometric Testing on Med X equipment: Testing administered by PT    Test Initial Baseline Midpoint Final   Date 5/2724     ROM 0-42 deg     Max Peak Torque 140       Min Peak Torque 14      Flex/Ext Ratio 10     % variance  normative data 52     % " "change from initial test N/A visit 1       Lumbar testing Visit 2     OUTCOMES SELECTION:   Intake Outcome Measure for FOTO Lumbar Survey     Therapist reviewed FOTO scores for Scarlett Knox on 4/25/2024.   FOTO documents entered into EPIC - see Media section.     Intake Score: 47% functional ability      Goal Score: 57% functional ability        06/11/24   LLD R: 76 cm  LLR L: 78 cm    Treatment     Scarlett received the treatments listed below:        Scarlett participated in therapeutic exercises to develop strength, endurance, ROM, flexibility, posture, and core stabilization for 60 minutes including:      NuStep 5 min    LTR x 10   EIL x 10  90/90 TrA marching x 10-np  L QL stretch in hookyling 5x10"   PPT x 5" x 10 cue for dec BLE use   Bridges w/PPT x 10  Cat cow 10x3"  Ann pose rocking 10x5"  +Prone HF stretches 10" x 5    NP:   TrA isometric with ball x10  TrA isometric with SLR x10  BKFO GTB 10x3 ea    Medical MedX Treatment as follows:  Patient received strengthening 10 minutes via participation on the Medical MedX Machine. Therapist assisted patient in isolating and engaging spinal stabilization musculature in order to improve functional ability and postural control. Patient performed exercise with therapist guidance in order to accurately use pacer function, avoid valsalva, and optimally exert effort within a safe and effective range via the Lu Exertion Rating Scale. Patient instructed to perform at a midrange of exertion and to complete 15-20 repetitions within appropriate split time, with proper technique, and while maintaining safety.        7/16/2024     1:35 PM   [facility]   Visit Number 8   VAS Pain Rating 4   Time 5   Lumbar Weight 57 lbs   Repetitions 16   Rating of Perceived Exertion 3   Ice - Z Lie (in min.) 5               Peripheral muscle strengthening which included one set of 15-20 repetitions at a slow and controlled 10-13 second per rep pace focused on strengthening " supporting musculature in order to improve body mechanics and functional mobility. Patient and therapist focused on proper form during treatment to ensure optimal strengthening of each targeted muscle group.  Machines utilized included:Torso rotation, Leg Ext, Leg Curl, Chest Press, and RowingTriceps, Biceps, Hip Abd, Hip Add, and Leg Press      Scarlett participated in dynamic functional therapeutic activities to improve functional performance and simulate household and community activities for 0  minutes. The following activities were included:      Scarlett received manual therapy techniques for 0  minutes. The following activities were included:    Pt given cold pack for 0 minutes to lumbar spine in Z-lie position.    Patient Education and Home Exercises     Home exercises include:  EIL  PPT   Bridges w/PPT  90[facility]     Cardio program (V5): -  Lifting education (V11): -  Posture/Lumbar roll: not obtained as of 06/27/24  [facility] Discharge handout (date given): -  Equipment at home/gym membership: No    Education provided:   - PT role and POC  - HEP      Written Home Exercises Provided: Patient instructed to cont prior HEP.  Exercises were reviewed and Scarlett was able to demonstrate them prior to the end of the session.  Scarlett demonstrated good  understanding of the education provided.     See EMR under Patient Instructions for exercises provided prior visit.    Assessment     Patient presents today with continued complaints of pain, deomstarting decrease R hip flexion during swing phase. She was instructed lumbopelvis therex to increase strength, stability, and flexibility for pain reduction and improve functional mobility.  Good responds to introduced prone HF stretches despite tightness noted with each stretch. Lumbar MedX performed with resistance increased to 57 lbs /ft, completing 16 reps at an exertion rating of 3/10. Peripheral muscle strengthen completed w/o adverse effects.            Patient is making fair progress towards established goals.  Pt will continue to benefit from skilled outpatient physical therapy to address the deficits stated in the impairment chart, provide pt/family education and to maximize pt's level of independence in the home and community environment.     Anticipated Barriers for therapy: High fear avoidance, chronicity of condition     Pt's spiritual, cultural and educational needs considered and pt agreeable to plan of care and goals as stated below:     GOALS: Pt is in agreement with the following goals.     Short term goals:  6 weeks or 10 visits   - Pt will demonstrate increased lumbar MedX ROM by at least 6 degrees from the initial ROM value with improvements noted in functional ROM and ability to perform ADLs. Appropriate and Ongoing  - Pt will demonstrate increased MedX average isometric strength value by 10% from initial test resulting in improved ability to perform bending, lifting, and carrying activities safely, confidently. Appropriate and Ongoing  - Pt will report a reduction in worst pain score by 1-2 points for improved tolerance for community ambulation of at least 200 meters. Appropriate and Ongoing  - Pt able to perform HEP correctly with minimal cueing or supervision from therapist to encourage independent management of symptoms. Appropriate and Ongoing  -Pt able to perform at least 30 minutes of yoga 4 times per week with postural modifications in order to return to recreational fitness routine.     Long term goals: 10 weeks or 20 visits   - Pt will demonstrate increased lumbar MedX ROM by at least 9 degrees from initial ROM value, resulting in improved ability to perform functional forward bending while standing and sitting. Appropriate and Ongoing  - Pt will demonstrate increased MedX average isometric strength value by 25% from initial test resulting in improved ability to perform bending, lifting, and carrying activities safely and  confidently. Appropriate and Ongoing  - Pt to demonstrate ability to independently control and reduce their pain through posture positioning and mechanical movements throughout a typical day. Appropriate and Ongoing  - Pt will demonstrate reduced pain and improved functional outcomes as reported on the FOTO by reaching an intake score of >/= 57% functional ability in order to demonstrate subjective improvement in patient's condition. . Appropriate and Ongoing  - Pt will demonstrate independence with the HEP at discharge. Appropriate and Ongoing  - Patient able to demonstrate adequate transverse abdominus strength to perform at least 3 sets of forearm planks with no lumbar flexion in order to achieve adequate core stability to stand for at least 15 minutes. Appropriate and Ongoing    Plan     Continue with established Plan of Care towards established PT goals.     Therapist: Gita Pagan, JODY  7/16/2024

## 2024-07-24 ENCOUNTER — CLINICAL SUPPORT (OUTPATIENT)
Dept: REHABILITATION | Facility: OTHER | Age: 34
End: 2024-07-24
Payer: MEDICAID

## 2024-07-24 DIAGNOSIS — G89.29 CHRONIC BILATERAL THORACIC BACK PAIN: ICD-10-CM

## 2024-07-24 DIAGNOSIS — M46.1 SACROILIITIS: Primary | ICD-10-CM

## 2024-07-24 DIAGNOSIS — M54.16 LUMBAR RADICULOPATHY: ICD-10-CM

## 2024-07-24 DIAGNOSIS — M54.6 CHRONIC BILATERAL THORACIC BACK PAIN: ICD-10-CM

## 2024-07-24 PROCEDURE — 97110 THERAPEUTIC EXERCISES: CPT | Mod: CQ

## 2024-07-24 NOTE — PROGRESS NOTES
"[facility]  Physical Therapy Treatment Note     Name: Scarlett Knox  Clinic Number: [id number]    Therapy Diagnosis:   Encounter Diagnoses   Name Primary?    Sacroiliitis Yes    Chronic bilateral thoracic back pain     Lumbar radiculopathy        Physician: Lou Block P*    Visit Date: 7/24/2024    Physician Orders: PT Eval and Treat  Medical Diagnosis from Referral: Z98.1 (ICD-10-CM) - S/P lumbar fusion  Evaluation Date: 4/25/2024  Authorization Period Expiration: 3/26/2025  Plan of Care Expiration: Updated to 9/10/24  Reassessment Due: 8/10/2024  Visit # / Visits authorized: 7/20  **Charge TherEx only**  MedX testing visit 2    PTA Visit #: 2/5     Time In: 8:00 am  Time Out: 8:45 am   Total Billable Time: 30 minutes (1:1)  INSURANCE and OUTCOMES: Fee for Service with FOTO Outcomes 1/3    **Charge TherEx only**     Precautions: standard, anxiety, NO male therapist physical contact     Pattern of pain determined: Pattern 3    Subjective     Scarlett Knox c/o exacerbated R LE symptoms upon entry and denies performing any unusual activities to cause flare up. She also c/o excessive R foot pronation with ambulation.        Patient reports tolerating previous visit well /c no c/o of excess discomfort    Patient reports their pain to be 5/10 lumbar and 7/10 R LE on a 0-10 scale with 0 being no pain and 10 being the worst pain imaginable.  Pain Location: left hip and bilateral back, right LE to anterolateral shin     Occupation: Scribe for physicians  Leisure: Play video games, cross stitch, hillary   Pt goals: "I want to go to [place] to do a genetic counseling program, so I want to be able to walk at least 30 minutes and get back to doing yoga at 6 days a week".     Objective      Lumbar  Isometric Testing on Med X equipment: Testing administered by PT    Test Initial Baseline Midpoint Final   Date 5/2724     ROM 0-42 deg     Max Peak Torque 140       Min " "Peak Torque 14      Flex/Ext Ratio 10     % variance  normative data 52     % change from initial test N/A visit 1       Lumbar testing Visit 2     OUTCOMES SELECTION:   Intake Outcome Measure for FOTO Lumbar Survey     Therapist reviewed FOTO scores for Scarlett Knox on 4/25/2024.   FOTO documents entered into [employer] - see Media section.     Intake Score: 47% functional ability      Goal Score: 57% functional ability        06/11/24   LLD R: 76 cm  LLR L: 78 cm    Treatment     Scarlett received the treatments listed below:        Scarlett participated in therapeutic exercises to develop strength, endurance, ROM, flexibility, posture, and core stabilization for 60 minutes including:      NuStep 5 min    LTR x 10   Supine HSS 10"x5    Supine nerve glides 2x10   EIL x 10  90/90 TrA marching x 10-np  B  QL stretch in hookyling 5x10"   PPT x 5" x 10 cue for dec BLE use   Bridges w/PPT x 10  Cat cow 10x3"-NP  Ann pose rocking 10x5"-NP  +Prone HF stretches 10" x 5-NP    NP:   TrA isometric with ball x10  TrA isometric with SLR x10  BKFO GTB 10x3 ea    Medical MedX Treatment as follows:  Patient received strengthening 10 minutes via participation on the Medical MedX Machine. Therapist assisted patient in isolating and engaging spinal stabilization musculature in order to improve functional ability and postural control. Patient performed exercise with therapist guidance in order to accurately use pacer function, avoid valsalva, and optimally exert effort within a safe and effective range via the Lu Exertion Rating Scale. Patient instructed to perform at a midrange of exertion and to complete 15-20 repetitions within appropriate split time, with proper technique, and while maintaining safety.          7/24/2024     3:45 PM   [facility]   Visit Number 9   VAS Pain Rating 5   Time 5   Extension in Lying 10   Flexion in Lying 10   Lumbar Weight 57 lbs   Repetitions 16   Rating of Perceived Exertion 8     "       Peripheral muscle strengthening which included one set of 15-20 repetitions at a slow and controlled 10-13 second per rep pace focused on strengthening supporting musculature in order to improve body mechanics and functional mobility. Patient and therapist focused on proper form during treatment to ensure optimal strengthening of each targeted muscle group.  Machines utilized included:Torso rotation, Leg Ext, Leg Curl, Chest Press, and RowingTriceps, Biceps, Hip Abd, Hip Add, and Leg Press      Scarlett participated in dynamic functional therapeutic activities to improve functional performance and simulate household and community activities for 0  minutes. The following activities were included:      Scarlett received manual therapy techniques for 0  minutes. The following activities were included:    Pt given cold pack for 0 minutes to lumbar spine in Z-lie position.    Patient Education and Home Exercises     Home exercises include:  EIL  PPT   Bridges w/PPT  90/90 TrA [facility]     Cardio program (V5): -  Lifting education (V11): -  Posture/Lumbar roll: not obtained as of 06/27/24  [facility] Discharge handout (date given): -  Equipment at home/gym membership: No    Education provided:   - PT role and POC  - HEP      Written Home Exercises Provided: Patient instructed to cont prior HEP.  Exercises were reviewed and Scarlett was able to demonstrate them prior to the end of the session.  Scarlett demonstrated good  understanding of the education provided.     See EMR under Patient Instructions for exercises provided prior visit.    Assessment     Patient presents to therapy with reports of increase pain, gabriella in R LE pain. She demonstrates antalgic gait pattern with decrease WB on R during stance phase and significant LALA hip tightness limiting flexion with swing phase. Despite increase pain, she was able to perform therex within tolerance with good response to introduced supine HSS and nerve glides to help  reduce muscular tightness and improve neural tissue mobility for pain reduction. Lumbar  MedX performed with resistance maintained at 57 lbs /ft with completion of 16 reps at an exertion rating of  8/10 RPE. Torso rotation modified to ROM due to increase difficulty with resistance. Remaining peripheral muscle strengthening not performed due to R foot flare up. Plan to discuss with patient the benefits of an adjustable medial heel wedge to address correction of excessive pronation.             Patient is making fair progress towards established goals.  Pt will continue to benefit from skilled outpatient physical therapy to address the deficits stated in the impairment chart, provide pt/family education and to maximize pt's level of independence in the home and community environment.     Anticipated Barriers for therapy: High fear avoidance, chronicity of condition     Pt's spiritual, cultural and educational needs considered and pt agreeable to plan of care and goals as stated below:     GOALS: Pt is in agreement with the following goals.     Short term goals:  6 weeks or 10 visits   - Pt will demonstrate increased lumbar MedX ROM by at least 6 degrees from the initial ROM value with improvements noted in functional ROM and ability to perform ADLs. Appropriate and Ongoing  - Pt will demonstrate increased MedX average isometric strength value by 10% from initial test resulting in improved ability to perform bending, lifting, and carrying activities safely, confidently. Appropriate and Ongoing  - Pt will report a reduction in worst pain score by 1-2 points for improved tolerance for community ambulation of at least 200 meters. Appropriate and Ongoing  - Pt able to perform HEP correctly with minimal cueing or supervision from therapist to encourage independent management of symptoms. Appropriate and Ongoing  -Pt able to perform at least 30 minutes of yoga 4 times per week with postural modifications in order to return  to recreational fitness routine.     Long term goals: 10 weeks or 20 visits   - Pt will demonstrate increased lumbar MedX ROM by at least 9 degrees from initial ROM value, resulting in improved ability to perform functional forward bending while standing and sitting. Appropriate and Ongoing  - Pt will demonstrate increased MedX average isometric strength value by 25% from initial test resulting in improved ability to perform bending, lifting, and carrying activities safely and confidently. Appropriate and Ongoing  - Pt to demonstrate ability to independently control and reduce their pain through posture positioning and mechanical movements throughout a typical day. Appropriate and Ongoing  - Pt will demonstrate reduced pain and improved functional outcomes as reported on the FOTO by reaching an intake score of >/= 57% functional ability in order to demonstrate subjective improvement in patient's condition. . Appropriate and Ongoing  - Pt will demonstrate independence with the HEP at discharge. Appropriate and Ongoing  - Patient able to demonstrate adequate transverse abdominus strength to perform at least 3 sets of forearm planks with no lumbar flexion in order to achieve adequate core stability to stand for at least 15 minutes. Appropriate and Ongoing    Plan     Continue with established Plan of Care towards established PT goals.     Therapist: Gita Pagan, JODY  7/24/2024

## 2024-07-29 ENCOUNTER — CLINICAL SUPPORT (OUTPATIENT)
Dept: REHABILITATION | Facility: OTHER | Age: 34
End: 2024-07-29
Payer: MEDICAID

## 2024-07-29 DIAGNOSIS — G89.29 CHRONIC BILATERAL THORACIC BACK PAIN: ICD-10-CM

## 2024-07-29 DIAGNOSIS — M46.1 SACROILIITIS: Primary | ICD-10-CM

## 2024-07-29 DIAGNOSIS — M54.6 CHRONIC BILATERAL THORACIC BACK PAIN: ICD-10-CM

## 2024-07-29 DIAGNOSIS — M54.16 LUMBAR RADICULOPATHY: ICD-10-CM

## 2024-07-29 PROCEDURE — 97110 THERAPEUTIC EXERCISES: CPT

## 2024-07-29 NOTE — PROGRESS NOTES
[facility]  Physical Therapy Treatment Note     Name: Scarlett Knox  Clinic Number: [id number]    Therapy Diagnosis:   Encounter Diagnoses   Name Primary?    Sacroiliitis Yes    Chronic bilateral thoracic back pain     Lumbar radiculopathy        Physician: Lou Block P*    Visit Date: 7/29/2024    Physician Orders: PT Eval and Treat  Medical Diagnosis from Referral: Z98.1 (ICD-10-CM) - S/P lumbar fusion  Evaluation Date: 4/25/2024  Authorization Period Expiration: 3/26/2025  Plan of Care Expiration: Updated to 9/10/24  Reassessment Due: 8/10/2024  Visit # / Visits authorized: 10/20  **Charge TherEx only**  MedX testing visit 2    PTA Visit #: 2/5     Time In: 4:33 PM **Test visit 11  Time Out: 5:35 PM   Total Billable Time: 55 minutes   INSURANCE and OUTCOMES: Fee for Service with FOTO Outcomes 1/3    **Charge TherEx only**     Precautions: standard, anxiety, NO male therapist physical contact     Pattern of pain determined: Pattern 3    Subjective     Scarlett Knox c/o of consistent right lower extremity that is worse at night. She is only walking 15 minutes at time now, not 30 minutes which is discouraging. Continued with lumbopelvic stability in non-weight bearing with no lower extremity support. Patient shows significant core endurance deficits, but was able to maintain transverse abdominus contraction with lower extremity mobility showing improved neuromuscular control. Lumbar MedX weight maintained to 57 ft. Lbs. And patient was able to complete 18 repetitions with an RPE of 6/10. All peripheral circuit exercises completed without complications. Continue to progress patient as tolerated and perform MedX re-assessment at next visit.    Patient reports tolerating previous visit well /c no c/o of excess discomfort    Patient reports their pain to be 4/10 lumbar and 7/10 R LE on a 0-10 scale with 0 being no pain and 10 being the worst pain  "imaginable.  Pain Location: left hip and bilateral back, right LE to anterolateral shin     Occupation: Scribe for physicians  Leisure: Play video games, cross stitch, hillary   Pt goals: "I want to go to [place] to do a genetic counseling program, so I want to be able to walk at least 30 minutes and get back to doing yoga at 6 days a week".     Objective      Lumbar  Isometric Testing on Med X equipment: Testing administered by PT    Test Initial Baseline Midpoint Final   Date 5/2724     ROM 0-42 deg     Max Peak Torque 140       Min Peak Torque 14      Flex/Ext Ratio 10     % variance  normative data 52     % change from initial test N/A visit 1       Lumbar testing Visit 2     OUTCOMES SELECTION:   Intake Outcome Measure for FOTO Lumbar Survey     Therapist reviewed FOTO scores for Scarlett Knox on 4/25/2024.   FOTO documents entered into [employer] - see Media section.     Intake Score: 47% functional ability      Goal Score: 57% functional ability        06/11/24   LLD R: 76 cm  LLR L: 78 cm    Treatment     Scarlett received the treatments listed below:        Scarlett participated in therapeutic exercises to develop strength, endurance, ROM, flexibility, posture, and core stabilization for 55 minutes including:    NuStep 5 min    LTR x 10   Supine HSS 10"x5    Supine nerve glides 2x10   EIL x 10  90/90 TrA marching x 10-np  B  QL stretch in hookyling 5x10"   PPT x 5" x 10 cue for dec BLE use   Bridges w/PPT x 10  Cat cow 10x3"-NP  Ann pose rocking 10x5"  Prone HF stretches 10" x 5-NP  + Dead bugs w/swiss ball LE only x 10    NP:   TrA isometric with ball x10  TrA isometric with SLR x10  BKFO GTB 10x3 ea        7/29/2024     5:03 PM   [facility]   Visit Number 10   VAS Pain Rating 4   Time 5   Extension in Lying 10   Flexion in Lying 10   Lumbar Weight 57 lbs   Repetitions 18   Rating of Perceived Exertion 6   Ice - Z Lie (in min.) 5       Peripheral muscle strengthening which included one set of 15-20 " repetitions at a slow and controlled 10-13 second per rep pace focused on strengthening supporting musculature in order to improve body mechanics and functional mobility. Patient and therapist focused on proper form during treatment to ensure optimal strengthening of each targeted muscle group.  Machines utilized included:Torso rotation, Leg Ext, Leg Curl, Chest Press, and RowingTriceps, Biceps, Hip Abd, Hip Add, and Leg Press      Scarlett participated in dynamic functional therapeutic activities to improve functional performance and simulate household and community activities for 0  minutes. The following activities were included:      Scarlett received manual therapy techniques for 0  minutes. The following activities were included:    Pt given cold pack for 0 minutes to lumbar spine in Z-lie position.    Patient Education and Home Exercises     Home exercises include:  EIL  PPT   Bridges w/PPT  [facility]     Cardio program (V5): -  Lifting education (V11): -  Posture/Lumbar roll: not obtained as of 06/27/24  [employer] Discharge handout (date given): -  Equipment at home/gym membership: No    Education provided:   - PT role and POC  - HEP      Written Home Exercises Provided: Patient instructed to cont prior HEP.  Exercises were reviewed and Scarlett was able to demonstrate them prior to the end of the session.  Scarlett demonstrated good  understanding of the education provided.     See EMR under Patient Instructions for exercises provided prior visit.    Assessment     Patient presents to therapy with reports of increase pain, gabriella in R LE pain. She demonstrates antalgic gait pattern with decrease WB on R during stance phase and significant LALA hip tightness limiting flexion with swing phase. Despite increase pain, she was able to perform therex within tolerance with good response to introduced supine HSS and nerve glides to help reduce muscular tightness and improve neural tissue mobility for pain  reduction. Lumbar  MedX performed with resistance maintained at 57 lbs /ft with completion of 16 reps at an exertion rating of  8/10 RPE. Torso rotation modified to ROM due to increase difficulty with resistance. Remaining peripheral muscle strengthening not performed due to R foot flare up. Plan to discuss with patient the benefits of an adjustable medial heel wedge to address correction of excessive pronation.             Patient is making fair progress towards established goals.  Pt will continue to benefit from skilled outpatient physical therapy to address the deficits stated in the impairment chart, provide pt/family education and to maximize pt's level of independence in the home and community environment.     Anticipated Barriers for therapy: High fear avoidance, chronicity of condition     Pt's spiritual, cultural and educational needs considered and pt agreeable to plan of care and goals as stated below:     GOALS: Pt is in agreement with the following goals.     Short term goals:  6 weeks or 10 visits   - Pt will demonstrate increased lumbar MedX ROM by at least 6 degrees from the initial ROM value with improvements noted in functional ROM and ability to perform ADLs. Appropriate and Ongoing  - Pt will demonstrate increased MedX average isometric strength value by 10% from initial test resulting in improved ability to perform bending, lifting, and carrying activities safely, confidently. Appropriate and Ongoing  - Pt will report a reduction in worst pain score by 1-2 points for improved tolerance for community ambulation of at least 200 meters. Appropriate and Ongoing  - Pt able to perform HEP correctly with minimal cueing or supervision from therapist to encourage independent management of symptoms. Appropriate and Ongoing  -Pt able to perform at least 30 minutes of yoga 4 times per week with postural modifications in order to return to recreational fitness routine.     Long term goals: 10 weeks or 20  visits   - Pt will demonstrate increased lumbar MedX ROM by at least 9 degrees from initial ROM value, resulting in improved ability to perform functional forward bending while standing and sitting. Appropriate and Ongoing  - Pt will demonstrate increased MedX average isometric strength value by 25% from initial test resulting in improved ability to perform bending, lifting, and carrying activities safely and confidently. Appropriate and Ongoing  - Pt to demonstrate ability to independently control and reduce their pain through posture positioning and mechanical movements throughout a typical day. Appropriate and Ongoing  - Pt will demonstrate reduced pain and improved functional outcomes as reported on the FOTO by reaching an intake score of >/= 57% functional ability in order to demonstrate subjective improvement in patient's condition. . Appropriate and Ongoing  - Pt will demonstrate independence with the HEP at discharge. Appropriate and Ongoing  - Patient able to demonstrate adequate transverse abdominus strength to perform at least 3 sets of forearm planks with no lumbar flexion in order to achieve adequate core stability to stand for at least 15 minutes. Appropriate and Ongoing    Plan     Continue with established Plan of Care towards established PT goals.     Therapist: Stacey rBown, PT  7/29/2024

## 2024-07-31 ENCOUNTER — CLINICAL SUPPORT (OUTPATIENT)
Dept: REHABILITATION | Facility: OTHER | Age: 34
End: 2024-07-31
Payer: MEDICAID

## 2024-07-31 DIAGNOSIS — M54.16 LUMBAR RADICULOPATHY: ICD-10-CM

## 2024-07-31 DIAGNOSIS — M54.6 CHRONIC BILATERAL THORACIC BACK PAIN: ICD-10-CM

## 2024-07-31 DIAGNOSIS — G89.29 CHRONIC BILATERAL THORACIC BACK PAIN: ICD-10-CM

## 2024-07-31 DIAGNOSIS — M46.1 SACROILIITIS: Primary | ICD-10-CM

## 2024-07-31 PROCEDURE — 97110 THERAPEUTIC EXERCISES: CPT | Mod: CQ

## 2024-07-31 NOTE — PROGRESS NOTES
"[facility]  Physical Therapy Treatment Note     Name: Scarlett Knox  Clinic Number: [id number]    Therapy Diagnosis:   Encounter Diagnoses   Name Primary?    Sacroiliitis Yes    Chronic bilateral thoracic back pain     Lumbar radiculopathy        Physician: Lou Block P*    Visit Date: 7/31/2024    Physician Orders: PT Eval and Treat  Medical Diagnosis from Referral: Z98.1 (ICD-10-CM) - S/P lumbar fusion  Evaluation Date: 4/25/2024  Authorization Period Expiration: 3/26/2025  Plan of Care Expiration: Updated to 9/10/24  Reassessment Due: 8/10/2024  Visit # / Visits authorized: 10/20  **Charge TherEx only**  MedX testing visit 2    PTA Visit #: 2/5     Time In: 8:00 AM   Time Out: 9:00 AM   Total Billable Time: 55 minutes   INSURANCE and OUTCOMES: Fee for Service with FOTO Outcomes 1/3    **Charge TherEx only**     Precautions: standard, anxiety, NO male therapist physical contact     Pattern of pain determined: Pattern 3    Subjective     Scarlett Knox reports new "numbness and tingling" in L LE since earlier this week. She is  still cautious with walking exercise, which she reduced her time to 15 minutes vs 30 minutes to prevent exacerbated symptoms.     Patient reports tolerating previous visit well /c no c/o of excess discomfort    Patient reports their pain to be 4/10 lumbar and 0/10 R LE on a 0-10 scale with 0 being no pain and 10 being the worst pain imaginable.  Pain Location: left hip and bilateral back, right LE to anterolateral shin     Occupation: Scribe for physicians  Leisure: Play video games, cross stitch, hillary   Pt goals: "I want to go to [place] to do a genetic counseling program, so I want to be able to walk at least 30 minutes and get back to doing yoga at 6 days a week".     Objective      Lumbar  Isometric Testing on Med X equipment: Testing administered by PT    Test Initial Baseline Midpoint Final   Date 5/2724     ROM 0-42 deg   " "  Max Peak Torque 140       Min Peak Torque 14      Flex/Ext Ratio 10     % variance  normative data 52     % change from initial test N/A visit 1       Lumbar testing Visit 2     OUTCOMES SELECTION:   Intake Outcome Measure for FOTO Lumbar Survey     Therapist reviewed FOTO scores for Scarlett Knox on 4/25/2024.   FOTO documents entered into [employer] - see Media section.     Intake Score: 47% functional ability      Goal Score: 57% functional ability        06/11/24   LLD R: 76 cm  LLR L: 78 cm    Treatment     Scarlett received the treatments listed below:        Scarlett participated in therapeutic exercises to develop strength, endurance, ROM, flexibility, posture, and core stabilization for 55 minutes including:    NuStep 5 min/ TM 5'    LTR x 10   Supine HSS 10"x5 -NP  Supine nerve glides 2x10   EIL x 10  90/90 TrA marching x 10-np  B  QL stretch in hookyling 5x10"   PPT x 5" x 10 cue for dec BLE use   Bridges w/PPT x 10  Cat cow 10x3"-NP  Ann pose rocking 10x5"-NP  Prone HF stretches 10" x 5-NP  + Dead bugs w/swiss ball LE only x 10-np    NP:   TrA isometric with ball x10  TrA isometric with SLR x10  BKFO GTB 10x3 ea      7/31/2024     8:32 AM   [facility]   Visit Number 11   VAS Pain Rating 4   Treadmill Time (in min.) 5 min   Extension in Lying 10   Flexion in Lying 10   Lumbar Weight 57 lbs   Repetitions 20   Rating of Perceived Exertion 5   Ice - Z Lie (in min.) 5          Peripheral muscle strengthening which included one set of 15-20 repetitions at a slow and controlled 10-13 second per rep pace focused on strengthening supporting musculature in order to improve body mechanics and functional mobility. Patient and therapist focused on proper form during treatment to ensure optimal strengthening of each targeted muscle group.  Machines utilized included:Torso rotation, Leg Ext, Leg Curl, Chest Press, and RowingTriceps, Biceps, Hip Abd, Hip Add, and Leg Press      Scarlett participated in dynamic " functional therapeutic activities to improve functional performance and simulate household and community activities for 0  minutes. The following activities were included:      Scarlett received manual therapy techniques for 0  minutes. The following activities were included:    Pt given cold pack for 0 minutes to lumbar spine in Z-lie position.    Patient Education and Home Exercises     Home exercises include:  EIL  PPT   Bridges w/PPT  90/90 TrA marchQoof     Cardio program (V5): -  Lifting education (V11): -  Posture/Lumbar roll: not obtained as of 06/27/24  [facility] Discharge handout (date given): -  Equipment at home/gym membership: No    Education provided:   - PT role and POC  - HEP      Written Home Exercises Provided: Patient instructed to cont prior HEP.  Exercises were reviewed and Scarlett was able to demonstrate them prior to the end of the session.  Scarlett demonstrated good  understanding of the education provided.     See EMR under Patient Instructions for exercises provided prior visit.    Assessment     Patient presents to therapy with reports of new L LE radicular symptoms. She demonstrates improved gait pattern despite increase pain with occasional episodes of R toe strike that improves with cues. She tolerated session well with good response to lumbopelvis strengthening, stretches, and neural tissue mobility for pain reduction. Lumbar MedX performed with resistance maintained at 57 lbs /ft with completion of 20 reps at an exertion rating of  5/10 RPE.  Torso rotation modified to ROM due to increase difficulty with resistance. Remaining peripheral muscle strengthening performed within tolerance. Discussed with patient the benefits of an adjustable medial heel wedge to address correction of excessive pronation.             Patient is making fair progress towards established goals.  Pt will continue to benefit from skilled outpatient physical therapy to address the deficits stated in the  impairment chart, provide pt/family education and to maximize pt's level of independence in the home and community environment.     Anticipated Barriers for therapy: High fear avoidance, chronicity of condition     Pt's spiritual, cultural and educational needs considered and pt agreeable to plan of care and goals as stated below:     GOALS: Pt is in agreement with the following goals.     Short term goals:  6 weeks or 10 visits   - Pt will demonstrate increased lumbar MedX ROM by at least 6 degrees from the initial ROM value with improvements noted in functional ROM and ability to perform ADLs. Appropriate and Ongoing  - Pt will demonstrate increased MedX average isometric strength value by 10% from initial test resulting in improved ability to perform bending, lifting, and carrying activities safely, confidently. Appropriate and Ongoing  - Pt will report a reduction in worst pain score by 1-2 points for improved tolerance for community ambulation of at least 200 meters. Appropriate and Ongoing  - Pt able to perform HEP correctly with minimal cueing or supervision from therapist to encourage independent management of symptoms. Appropriate and Ongoing  -Pt able to perform at least 30 minutes of yoga 4 times per week with postural modifications in order to return to recreational fitness routine.     Long term goals: 10 weeks or 20 visits   - Pt will demonstrate increased lumbar MedX ROM by at least 9 degrees from initial ROM value, resulting in improved ability to perform functional forward bending while standing and sitting. Appropriate and Ongoing  - Pt will demonstrate increased MedX average isometric strength value by 25% from initial test resulting in improved ability to perform bending, lifting, and carrying activities safely and confidently. Appropriate and Ongoing  - Pt to demonstrate ability to independently control and reduce their pain through posture positioning and mechanical movements throughout a  typical day. Appropriate and Ongoing  - Pt will demonstrate reduced pain and improved functional outcomes as reported on the FOTO by reaching an intake score of >/= 57% functional ability in order to demonstrate subjective improvement in patient's condition. . Appropriate and Ongoing  - Pt will demonstrate independence with the HEP at discharge. Appropriate and Ongoing  - Patient able to demonstrate adequate transverse abdominus strength to perform at least 3 sets of forearm planks with no lumbar flexion in order to achieve adequate core stability to stand for at least 15 minutes. Appropriate and Ongoing    Plan     Continue with established Plan of Care towards established PT goals.     Therapist: Gita Pagan, JODY  7/31/2024

## 2024-08-01 ENCOUNTER — PATIENT MESSAGE (OUTPATIENT)
Dept: REHABILITATION | Facility: OTHER | Age: 34
End: 2024-08-01
Payer: MEDICAID

## 2024-08-06 ENCOUNTER — CLINICAL SUPPORT (OUTPATIENT)
Dept: REHABILITATION | Facility: OTHER | Age: 34
End: 2024-08-06
Payer: MEDICAID

## 2024-08-06 DIAGNOSIS — M54.6 CHRONIC BILATERAL THORACIC BACK PAIN: ICD-10-CM

## 2024-08-06 DIAGNOSIS — M46.1 SACROILIITIS: Primary | ICD-10-CM

## 2024-08-06 DIAGNOSIS — G89.29 CHRONIC BILATERAL THORACIC BACK PAIN: ICD-10-CM

## 2024-08-06 DIAGNOSIS — M54.16 LUMBAR RADICULOPATHY: ICD-10-CM

## 2024-08-06 PROCEDURE — 97110 THERAPEUTIC EXERCISES: CPT | Mod: CQ

## 2024-08-27 ENCOUNTER — PATIENT MESSAGE (OUTPATIENT)
Dept: REHABILITATION | Facility: OTHER | Age: 34
End: 2024-08-27

## 2024-08-30 ENCOUNTER — PATIENT MESSAGE (OUTPATIENT)
Dept: ORTHOPEDICS | Facility: CLINIC | Age: 34
End: 2024-08-30
Payer: MEDICAID

## 2024-08-30 DIAGNOSIS — Z98.1 S/P LUMBAR FUSION: Primary | ICD-10-CM

## 2024-09-05 ENCOUNTER — HOSPITAL ENCOUNTER (OUTPATIENT)
Dept: RADIOLOGY | Facility: HOSPITAL | Age: 34
Discharge: HOME | End: 2024-09-05
Attending: PHYSICIAN ASSISTANT
Payer: MEDICAID

## 2024-09-05 DIAGNOSIS — Z98.1 S/P LUMBAR FUSION: ICD-10-CM

## 2024-09-05 PROCEDURE — 72100 X-RAY EXAM L-S SPINE 2/3 VWS: CPT | Mod: 26,,, | Performed by: RADIOLOGY

## 2024-09-05 PROCEDURE — 72100 X-RAY EXAM L-S SPINE 2/3 VWS: CPT | Mod: TC

## 2024-09-10 ENCOUNTER — CLINICAL SUPPORT (OUTPATIENT)
Dept: REHABILITATION | Facility: OTHER | Age: 34
End: 2024-09-10
Payer: MEDICAID

## 2024-09-10 DIAGNOSIS — M46.1 SACROILIITIS: Primary | ICD-10-CM

## 2024-09-10 DIAGNOSIS — G89.29 CHRONIC BILATERAL THORACIC BACK PAIN: ICD-10-CM

## 2024-09-10 DIAGNOSIS — M54.16 LUMBAR RADICULOPATHY: ICD-10-CM

## 2024-09-10 DIAGNOSIS — M54.6 CHRONIC BILATERAL THORACIC BACK PAIN: ICD-10-CM

## 2024-09-10 PROCEDURE — 97110 THERAPEUTIC EXERCISES: CPT | Mod: CQ

## 2024-09-10 NOTE — PROGRESS NOTES
"[facility]  Physical Therapy Treatment Note     Name: Scarlett Knox  Clinic Number: [id number]    Therapy Diagnosis:   Encounter Diagnoses   Name Primary?    Sacroiliitis Yes    Chronic bilateral thoracic back pain     Lumbar radiculopathy          Physician: Lou Block P*    Visit Date: 9/10/2024    Physician Orders: PT Eval and Treat  Medical Diagnosis from Referral: Z98.1 (ICD-10-CM) - S/P lumbar fusion  Evaluation Date: 4/25/2024  Authorization Period Expiration: 3/26/2025  Plan of Care Expiration: Updated to 9/10/24  Reassessment Due: 8/10/2024  Visit # / Visits authorized: 10/20  **Charge TherEx only**  MedX testing visit 2    PTA Visit #: 4/5     Time In: 1:00 PM   Time Out: 2:00 PM   Total Billable Time: 55 minutes   INSURANCE and OUTCOMES: Fee for Service with FOTO Outcomes 1/3    **Charge TherEx only**     Precautions: standard, anxiety, NO male therapist physical contact     Pattern of pain determined: Pattern 3    Subjective     Scarlett Knox reports tripping on her feet on the way up here, not falling though. Pt has been on aden and didn't call back to reschedule. Pt expresses concern about her chronic symptoms, "they're not going away".    Patient reports tolerating previous visit well /c no c/o of excess discomfort    Patient reports their pain to be 5/10, on 0-10 scale with 0 being no pain and 10 being the worst pain imaginable.  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     Objective      Lumbar  Isometric Testing on Med X equipment: Testing administered by PT    Test Initial Baseline Midpoint Final   Date 5/2724     ROM 0-42 deg   " "  Max Peak Torque 140       Min Peak Torque 14      Flex/Ext Ratio 10     % variance  normative data 52     % change from initial test N/A visit 1       Lumbar testing Visit 2     OUTCOMES SELECTION:   Intake Outcome Measure for FOTO Lumbar Survey     Therapist reviewed FOTO scores for Scarlett Knox on 2024.   FOTO documents entered into [employer] - see Media section.     Intake Score: 47% functional ability      Goal Score: 57% functional ability        24   LLD R: 76 cm  LLR L: 78 cm    Treatment     Scarlett received the treatments listed below:        Scarlett participated in therapeutic exercises to develop strength, endurance, ROM, flexibility, posture, and core stabilization for 55 minutes includin/10/2024     1:45 PM   [facility]   Visit Number 13   VAS Pain Rating 6   Treadmill Time (in min.) 5 min   Extension in Lying 10   Lumbar Weight 60 lbs   Repetitions 15   Rating of Perceived Exertion 4   Ice - Z Lie (in min.) 5       NuStep 5 min/ TM 5'    LTR x 10 (pt calls them "the wig wag")  Supine HSS 10"x5 -NP  Supine nerve glides 2x10-NP  EIL 2x 5  90/90 TrA marching x 10-np  B  QL stretch in hookyling 5x10"   PPT with ball x 5" x 10 cue for dec BLE use   Bridges w/PPT x 10  Cat cow 10x3"  Ann pose rocking 5x5" in between EIL  Prone HF stretches 10" x 5-NP  Dead bugs w/swiss ball LE only x 10-np  +Sit<>stand x10 - promote weight thru heels  +Diaphragmatic breathing to activate the PNS    NP:   TrA isometric with ball x10  TrA isometric with SLR x10  BKFO GTB 10x3 ea         Peripheral muscle strengthening which included one set of 15-20 repetitions at a slow and controlled 10-13 second per rep pace focused on strengthening supporting musculature in order to improve body mechanics and functional mobility. Patient and therapist focused on proper form during treatment to ensure optimal strengthening of each targeted muscle group.  Machines utilized included:Torso rotation, Leg Ext, " Leg Curl, Chest Press, and RowingTriceps, Biceps, Hip Abd, Hip Add, and Leg Press      Scarlett participated in dynamic functional therapeutic activities to improve functional performance and simulate household and community activities for 0  minutes. The following activities were included:      Scarlett received manual therapy techniques for 0  minutes. The following activities were included:    Pt given cold pack for 0 minutes to lumbar spine in Z-lie position.    Patient Education and Home Exercises     Home exercises include:  EIL  PPT   Bridges w/PPT  90/90 TrA marching     Cardio program (V5): -  Lifting education (V11): -  Posture/Lumbar roll: not obtained as of 06/27/24  [facility] Discharge handout (date given): -  Equipment at home/gym membership: No    Education provided:   - PT role and POC  - HEP      Written Home Exercises Provided: Patient instructed to cont prior HEP.  Exercises were reviewed and Scarlett was able to demonstrate them prior to the end of the session.  Scarlett demonstrated good  understanding of the education provided.     See EMR under Patient Instructions for exercises provided prior visit.    Assessment     Scarlett return to  after 28 days miss appt. with high painful symptoms, anxiety and dysfunctional gait patterns. Pt tolerated session well with focus on stretches and getting back into a routine of exercise.Pt needs increased cuing to decrease breathe holding. Lumbar MedX resistance remained at 60 ft/lbs with completion of 20 reps at an exertion rating of  3/10 RPE. Peripheral muscle strengthening performed without issues. Continue program per pt tolerance.  Pt given and reviewed Lifting handout. Pt also introduced to FRP, handout with phone number. Pt very interested, seeing MD on 9/16, will ask for referral .    Patient is making fair progress towards established goals.  Pt will continue to benefit from skilled outpatient physical therapy to address the deficits stated in the  impairment chart, provide pt/family education and to maximize pt's level of independence in the home and community environment.     Anticipated Barriers for therapy: High fear avoidance, chronicity of condition     Pt's spiritual, cultural and educational needs considered and pt agreeable to plan of care and goals as stated below:     GOALS: Pt is in agreement with the following goals.     Short term goals:  6 weeks or 10 visits   - Pt will demonstrate increased lumbar MedX ROM by at least 6 degrees from the initial ROM value with improvements noted in functional ROM and ability to perform ADLs. Appropriate and Ongoing  - Pt will demonstrate increased MedX average isometric strength value by 10% from initial test resulting in improved ability to perform bending, lifting, and carrying activities safely, confidently. Appropriate and Ongoing  - Pt will report a reduction in worst pain score by 1-2 points for improved tolerance for community ambulation of at least 200 meters. Appropriate and Ongoing  - Pt able to perform HEP correctly with minimal cueing or supervision from therapist to encourage independent management of symptoms. Appropriate and Ongoing  -Pt able to perform at least 30 minutes of yoga 4 times per week with postural modifications in order to return to recreational fitness routine.     Long term goals: 10 weeks or 20 visits   - Pt will demonstrate increased lumbar MedX ROM by at least 9 degrees from initial ROM value, resulting in improved ability to perform functional forward bending while standing and sitting. Appropriate and Ongoing  - Pt will demonstrate increased MedX average isometric strength value by 25% from initial test resulting in improved ability to perform bending, lifting, and carrying activities safely and confidently. Appropriate and Ongoing  - Pt to demonstrate ability to independently control and reduce their pain through posture positioning and mechanical movements throughout a  typical day. Appropriate and Ongoing  - Pt will demonstrate reduced pain and improved functional outcomes as reported on the FOTO by reaching an intake score of >/= 57% functional ability in order to demonstrate subjective improvement in patient's condition. . Appropriate and Ongoing  - Pt will demonstrate independence with the HEP at discharge. Appropriate and Ongoing  - Patient able to demonstrate adequate transverse abdominus strength to perform at least 3 sets of forearm planks with no lumbar flexion in order to achieve adequate core stability to stand for at least 15 minutes. Appropriate and Ongoing    Plan     Continue with established Plan of Care towards established PT goals.     Therapist: Shana Hester, PTA  9/10/2024

## 2024-09-13 ENCOUNTER — CLINICAL SUPPORT (OUTPATIENT)
Dept: REHABILITATION | Facility: OTHER | Age: 34
End: 2024-09-13
Payer: MEDICAID

## 2024-09-13 DIAGNOSIS — M46.1 SACROILIITIS: Primary | ICD-10-CM

## 2024-09-13 DIAGNOSIS — M54.16 LUMBAR RADICULOPATHY: ICD-10-CM

## 2024-09-13 DIAGNOSIS — G89.29 CHRONIC BILATERAL THORACIC BACK PAIN: ICD-10-CM

## 2024-09-13 DIAGNOSIS — M54.6 CHRONIC BILATERAL THORACIC BACK PAIN: ICD-10-CM

## 2024-09-13 PROCEDURE — 97110 THERAPEUTIC EXERCISES: CPT | Mod: CQ

## 2024-09-13 NOTE — PROGRESS NOTES
"[facility]  Physical Therapy Treatment Note     Name: Scarlett Knox  Clinic Number: [id number]    Therapy Diagnosis:   Encounter Diagnoses   Name Primary?    Sacroiliitis Yes    Chronic bilateral thoracic back pain     Lumbar radiculopathy            Physician: Lou Block P*    Visit Date: 9/13/2024    Physician Orders: PT Eval and Treat  Medical Diagnosis from Referral: Z98.1 (ICD-10-CM) - S/P lumbar fusion  Evaluation Date: 4/25/2024  Authorization Period Expiration: 3/26/2025  Plan of Care Expiration: Updated to 9/10/24  Reassessment Due: 8/10/2024  Visit # / Visits authorized: 10/20  **Charge TherEx only**  MedX testing visit 2    PTA Visit #: 4/5     Time In: 1:00 PM   Time Out: 2:00 PM   Total Billable Time: 55 minutes   INSURANCE and OUTCOMES: Fee for Service with FOTO Outcomes 1/3    **Charge TherEx only**     Precautions: standard, anxiety, NO male therapist physical contact     Pattern of pain determined: Pattern 3    Subjective     Scarlett Knox reports continue chronic symptoms hindering certain functional and leisure activity. She also expressed her interest in FRP after discussion with Abby.     Patient reports tolerating previous visit well /c no c/o of excess discomfort    Patient reports their pain to be "4 or 5" 10, on 0-10 scale with 0 being no pain and 10 being the worst pain imaginable.  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     Objective      Lumbar  Isometric Testing on Med X equipment: Testing administered by PT    Test Initial Baseline Midpoint Final   Date 5/2724     ROM 0-42 deg     Max Peak Torque 140       Min Peak " "Torque 14      Flex/Ext Ratio 10     % variance  normative data 52     % change from initial test N/A visit 1       Lumbar testing Visit 2     OUTCOMES SELECTION:   Intake Outcome Measure for FOTO Lumbar Survey     Therapist reviewed FOTO scores for Scarlett Knox on 2024.   FOTO documents entered into [employer] - see Media section.     Intake Score: 47% functional ability      Goal Score: 57% functional ability        24   LLD R: 76 cm  LLR L: 78 cm    Treatment     Scarlett received the treatments listed below:        Scarlett participated in therapeutic exercises to develop strength, endurance, ROM, flexibility, posture, and core stabilization for 55 minutes includin/13/2024     1:34 PM   [facility]   Visit Number 14   VAS Pain Rating 4   Recumbent Bike Seat Pos. 5   Extension in Lying 10   Lumbar Weight 60 lbs   Repetitions 20   Rating of Perceived Exertion 5   Ice - Z Lie (in min.) 5          NuStep 5 min/ TM 5'    LTR x 10 (pt calls them "the wig wag")  Supine HSS 10"x5 -NP  Supine nerve glides 2x10-NP  EIL 2x 5  90/90 TrA marching x 10-np  B  QL stretch in hookyling 5x10"   PPT with ball x 5" x 10 cue for dec BLE use   Bridges w/PPT x 10  Cat cow 10x3"  Ann pose rocking 5x5" in between EIL  Prone HF stretches 10" x 5-NP  Dead bugs w/swiss ball LE only x 10-np  +Sit<>stand x10 - promote weight thru heels  +Diaphragmatic breathing to activate the PNS    NP:   TrA isometric with ball x10  TrA isometric with SLR x10  BKFO GTB 10x3 ea         Peripheral muscle strengthening which included one set of 15-20 repetitions at a slow and controlled 10-13 second per rep pace focused on strengthening supporting musculature in order to improve body mechanics and functional mobility. Patient and therapist focused on proper form during treatment to ensure optimal strengthening of each targeted muscle group.  Machines utilized included:Torso rotation, Leg Ext, Leg Curl, Chest Press, and " RowingTriceps, Biceps, Hip Abd, Hip Add, and Leg Press      Scarlett participated in dynamic functional therapeutic activities to improve functional performance and simulate household and community activities for 0  minutes. The following activities were included:      Scarlett received manual therapy techniques for 0  minutes. The following activities were included:    Pt given cold pack for 0 minutes to lumbar spine in Z-lie position.    Patient Education and Home Exercises     Home exercises include:  EIL  PPT   Bridges w/PPT  90/90 TrA marching     Cardio program (V5): -  Lifting education (V11): -  Posture/Lumbar roll: not obtained as of 06/27/24  [facility] Discharge handout (date given): -  Equipment at home/gym membership: No    Education provided:   - PT role and POC  - HEP      Written Home Exercises Provided: Patient instructed to cont prior HEP.  Exercises were reviewed and Scarlett was able to demonstrate them prior to the end of the session.  Scarlett demonstrated good  understanding of the education provided.     See EMR under Patient Instructions for exercises provided prior visit.    Assessment     Scarlett returns to HB visit with reports moderate pain level, demonstrating persistent dysfunctional gait patterns. Pt tolerated session well with focus on stretches to promote increase flexibility. Educated pt on the importance of heel strike gait pattern with step length to promote foot clearance and increase hip flexion during swing phase. Lumbar MedX resistance remained at 60 ft/lbs with completion of 20 reps at an exertion rating of  5/10 RPE. Peripheral muscle strengthening performed without issues. Continue program per pt tolerance. MD appt on 9/16, will ask for referral .    Patient is making fair progress towards established goals.  Pt will continue to benefit from skilled outpatient physical therapy to address the deficits stated in the impairment chart, provide pt/family education and to  maximize pt's level of independence in the home and community environment.     Anticipated Barriers for therapy: High fear avoidance, chronicity of condition     Pt's spiritual, cultural and educational needs considered and pt agreeable to plan of care and goals as stated below:     GOALS: Pt is in agreement with the following goals.     Short term goals:  6 weeks or 10 visits   - Pt will demonstrate increased lumbar MedX ROM by at least 6 degrees from the initial ROM value with improvements noted in functional ROM and ability to perform ADLs. Appropriate and Ongoing  - Pt will demonstrate increased MedX average isometric strength value by 10% from initial test resulting in improved ability to perform bending, lifting, and carrying activities safely, confidently. Appropriate and Ongoing  - Pt will report a reduction in worst pain score by 1-2 points for improved tolerance for community ambulation of at least 200 meters. Appropriate and Ongoing  - Pt able to perform HEP correctly with minimal cueing or supervision from therapist to encourage independent management of symptoms. Appropriate and Ongoing  -Pt able to perform at least 30 minutes of yoga 4 times per week with postural modifications in order to return to recreational fitness routine.     Long term goals: 10 weeks or 20 visits   - Pt will demonstrate increased lumbar MedX ROM by at least 9 degrees from initial ROM value, resulting in improved ability to perform functional forward bending while standing and sitting. Appropriate and Ongoing  - Pt will demonstrate increased MedX average isometric strength value by 25% from initial test resulting in improved ability to perform bending, lifting, and carrying activities safely and confidently. Appropriate and Ongoing  - Pt to demonstrate ability to independently control and reduce their pain through posture positioning and mechanical movements throughout a typical day. Appropriate and Ongoing  - Pt will  demonstrate reduced pain and improved functional outcomes as reported on the FOTO by reaching an intake score of >/= 57% functional ability in order to demonstrate subjective improvement in patient's condition. . Appropriate and Ongoing  - Pt will demonstrate independence with the HEP at discharge. Appropriate and Ongoing  - Patient able to demonstrate adequate transverse abdominus strength to perform at least 3 sets of forearm planks with no lumbar flexion in order to achieve adequate core stability to stand for at least 15 minutes. Appropriate and Ongoing    Plan     Continue with established Plan of Care towards established PT goals.     Therapist: Gita Pagan, PTA  9/13/2024

## 2024-09-16 ENCOUNTER — OFFICE VISIT (OUTPATIENT)
Dept: ORTHOPEDICS | Facility: CLINIC | Age: 34
End: 2024-09-16
Payer: MEDICAID

## 2024-09-16 VITALS — WEIGHT: 177.56 LBS | HEIGHT: 61 IN | BODY MASS INDEX: 33.52 KG/M2

## 2024-09-16 DIAGNOSIS — M54.16 LUMBAR RADICULOPATHY, CHRONIC: ICD-10-CM

## 2024-09-16 DIAGNOSIS — Z98.1 S/P LUMBAR FUSION: Primary | ICD-10-CM

## 2024-09-16 PROCEDURE — 1159F MED LIST DOCD IN RCRD: CPT | Mod: CPTII,,, | Performed by: PHYSICIAN ASSISTANT

## 2024-09-16 PROCEDURE — 99024 POSTOP FOLLOW-UP VISIT: CPT | Mod: ,,, | Performed by: PHYSICIAN ASSISTANT

## 2024-09-16 PROCEDURE — 99999 PR PBB SHADOW E&M-EST. PATIENT-LVL III: CPT | Mod: PBBFAC,,, | Performed by: PHYSICIAN ASSISTANT

## 2024-09-16 PROCEDURE — 99213 OFFICE O/P EST LOW 20 MIN: CPT | Mod: PBBFAC | Performed by: PHYSICIAN ASSISTANT

## 2024-09-16 RX ORDER — GABAPENTIN 100 MG/1
100 CAPSULE ORAL 2 TIMES DAILY
COMMUNITY

## 2024-09-16 NOTE — PROGRESS NOTES
Date: 09/16/2024    Supervising Physician: Dominic Mike M.D.    Date of Surgery: 8/17/2023    Procedure: L4/5 TLIF    History: Scarlett Knox is seen today for follow-up following the above listed procedure. Overall the patient is doing well but today notes she continues to have right leg pain.  Overall she is improved compared to before surgery but the pain in her right leg is limiting her activities.  She has been going to PT regularly.  She got a new shoe lift as well.  She takes gabapentin for pain.  She also reports numbness in her left foot.       Exam:  Incision is healing well. There is no sign of infection. Neuro exam is stable. No signs of DVT.    Radiographs: imaging from 9/5 shows hardware in place, no evidence of failure.     Assessment/Plan: 1 year post op.    I will get a CT scan and MRI to evaluate bony fusion as well as adjacent segment pathology.  I will call with results.  Referral for FRP placed today as well.       Thank you for the opportunity to participate in this patient's care. Please give me a call if there are any concerns or questions.

## 2024-09-17 NOTE — PROGRESS NOTES
"[facility]  Physical Therapy Treatment Note     Name: Scarlett Knox  Clinic Number: [id number]    Therapy Diagnosis:   Encounter Diagnoses   Name Primary?    Sacroiliitis Yes    Chronic bilateral thoracic back pain     Lumbar radiculopathy              Physician: Lou Block P*    Visit Date: 9/24/2024    Physician Orders: PT Eval and Treat  Medical Diagnosis from Referral: Z98.1 (ICD-10-CM) - S/P lumbar fusion  Evaluation Date: 4/25/2024  Authorization Period Expiration: 3/26/2025  Plan of Care Expiration: Updated to 9/10/24  Reassessment Due: 8/10/2024  Visit # / Visits authorized: 16/20  **Charge TherEx only**  MedX testing visit 2    PTA Visit #: 6/5     Time In: 1:00 PM   Time Out: 2:00 PM   Total Billable Time: 55 minutes   INSURANCE and OUTCOMES: Fee for Service with FOTO Outcomes 1/3    **Charge TherEx only**     Precautions: standard, anxiety, NO male therapist physical contact     Pattern of pain determined: Pattern 3    Subjective     Scarlett Knox reports continue chronic symptoms hindering certain functional and leisure activity. She also expressed her interest in FRP after discussion with Abby.     Patient reports tolerating previous visit well /c no c/o of excess discomfort    Patient reports their pain to be "4 or 5" 10, on 0-10 scale with 0 being no pain and 10 being the worst pain imaginable.  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     Objective      Lumbar  Isometric Testing on Med X equipment: Testing administered by PT    Test Initial Baseline Midpoint Final   Date 5/2724     ROM 0-42 deg     Max Peak Torque 140       Min Peak " "Torque 14      Flex/Ext Ratio 10     % variance  normative data 52     % change from initial test N/A visit 1            OUTCOMES SELECTION:   Intake Outcome Measure for FOTO Lumbar Survey     Therapist reviewed FOTO scores for Scarlett Knox on 2024.   FOTO documents entered into [employer] - see Media section.     Intake Score: 47% functional ability      Goal Score: 57% functional ability        24   LLD R: 76 cm  LLR L: 78 cm    Treatment     Scarlett received the treatments listed below:        Scarlett participated in therapeutic exercises to develop strength, endurance, ROM, flexibility, posture, and core stabilization for 55 minutes includin/24/2024     2:41 PM   [facility]   Visit Number 16   VAS Pain Rating 3   Treadmill Time (in min.) 5 min   Extension in Lying 10   Lumbar Weight 63 lbs   Repetitions 20   Rating of Perceived Exertion 4   Ice - Z Lie (in min.) 4     Ex performed in bold:  NuStep 5 min/ TM 5'    LTR x 10 (pt calls them "the wig wag")  Supine HSS 10"x5 -NP  Supine nerve glides 2x10-NP  EIL x 10  90/90 TrA marching x 10  B  QL stretch in hookyling 5x10"   PPT with ball x 5" x 10 cue for dec BLE use   Bridges w/PPT x 10  Cat cow 10x3"  Ann pose rocking 5x5" in between EIL  Prone HF stretches 10" x 5-NP  Dead bugs w/swiss ball LE only x 10-np  Sit<>stand x10 - promote weight thru heels  Diaphragmatic breathing to activate the PNS x10 breahtes  TrA isometric with ball x10  TrA isometric with SLR x10  BKFO BLUE TB x10  +Paloff press GTB x10         Peripheral muscle strengthening which included one set of 15-20 repetitions at a slow and controlled 10-13 second per rep pace focused on strengthening supporting musculature in order to improve body mechanics and functional mobility. Patient and therapist focused on proper form during treatment to ensure optimal strengthening of each targeted muscle group.  Machines utilized included:Torso rotation, Leg Ext, Leg Curl, " Chest Press, and RowingTriceps, Biceps, Hip Abd, Hip Add, and Leg Press      Scarlett participated in dynamic functional therapeutic activities to improve functional performance and simulate household and community activities for 0  minutes. The following activities were included:      Scarlett received manual therapy techniques for 0  minutes. The following activities were included:    Pt given cold pack for 0 minutes to lumbar spine in Z-lie position.    Patient Education and Home Exercises     Home exercises include:  EIL  PPT   Bridges w/PPT  90/90 TrA marching     Cardio program (V5): -  Lifting education (V11): -  Posture/Lumbar roll: not obtained as of 06/27/24  [facility] Discharge handout (date given): -  Equipment at home/gym membership: No    Education provided:   - PT role and POC  - HEP      Written Home Exercises Provided: Patient instructed to cont prior HEP.  Exercises were reviewed and Scarlett was able to demonstrate them prior to the end of the session.  Scarlett demonstrated good  understanding of the education provided.     See EMR under Patient Instructions for exercises provided prior visit.    Assessment     Scarlett returns to HB visit with reports moderate pain level, demonstrating persistent dysfunctional gait patterns. Treatment continued with lumbopelvic/core flexibility and strengthening ex's which she was able to perform without c/o.  Lumbar MedX resistance remained at 63 ft/lbs with completion of 20 reps at an exertion rating of  4/10 RPE. Peripheral muscle strengthening performed without issues. Continue program per pt tolerance.     Patient is making fair progress towards established goals.  Pt will continue to benefit from skilled outpatient physical therapy to address the deficits stated in the impairment chart, provide pt/family education and to maximize pt's level of independence in the home and community environment.     Anticipated Barriers for therapy: High fear avoidance,  chronicity of condition     Pt's spiritual, cultural and educational needs considered and pt agreeable to plan of care and goals as stated below:     GOALS: Pt is in agreement with the following goals.     Short term goals:  6 weeks or 10 visits   - Pt will demonstrate increased lumbar MedX ROM by at least 6 degrees from the initial ROM value with improvements noted in functional ROM and ability to perform ADLs. Appropriate and Ongoing  - Pt will demonstrate increased MedX average isometric strength value by 10% from initial test resulting in improved ability to perform bending, lifting, and carrying activities safely, confidently. Appropriate and Ongoing  - Pt will report a reduction in worst pain score by 1-2 points for improved tolerance for community ambulation of at least 200 meters. Appropriate and Ongoing  - Pt able to perform HEP correctly with minimal cueing or supervision from therapist to encourage independent management of symptoms. Appropriate and Ongoing  -Pt able to perform at least 30 minutes of yoga 4 times per week with postural modifications in order to return to recreational fitness routine.     Long term goals: 10 weeks or 20 visits   - Pt will demonstrate increased lumbar MedX ROM by at least 9 degrees from initial ROM value, resulting in improved ability to perform functional forward bending while standing and sitting. Appropriate and Ongoing  - Pt will demonstrate increased MedX average isometric strength value by 25% from initial test resulting in improved ability to perform bending, lifting, and carrying activities safely and confidently. Appropriate and Ongoing  - Pt to demonstrate ability to independently control and reduce their pain through posture positioning and mechanical movements throughout a typical day. Appropriate and Ongoing  - Pt will demonstrate reduced pain and improved functional outcomes as reported on the FOTO by reaching an intake score of >/= 57% functional ability in  order to demonstrate subjective improvement in patient's condition. . Appropriate and Ongoing  - Pt will demonstrate independence with the HEP at discharge. Appropriate and Ongoing  - Patient able to demonstrate adequate transverse abdominus strength to perform at least 3 sets of forearm planks with no lumbar flexion in order to achieve adequate core stability to stand for at least 15 minutes. Appropriate and Ongoing    Plan     Continue with established Plan of Care towards established PT goals.     Therapist: Shana Hester, PTA  9/24/2024

## 2024-09-19 NOTE — PROGRESS NOTES
"[facility]  Physical Therapy Treatment Note     Name: Scarlett Knox  Clinic Number: [id number]    Therapy Diagnosis:   Encounter Diagnoses   Name Primary?    Sacroiliitis Yes    Chronic bilateral thoracic back pain     Lumbar radiculopathy        Physician: Lou Block P*    Visit Date: 9/20/2024    Physician Orders: PT Eval and Treat  Medical Diagnosis from Referral: Z98.1 (ICD-10-CM) - S/P lumbar fusion  Evaluation Date: 4/25/2024  Authorization Period Expiration: 3/26/2025  Plan of Care Expiration: Updated to 9/10/24  Reassessment Due: 8/10/2024  Visit # / Visits authorized: 15/20  **Charge TherEx only**  MedX testing visit 2    PTA Visit #: 4/5     Time In: 935 am   Time Out: 1016 am   Total Billable Time: 41 minutes  (with PT tech assist)  INSURANCE and OUTCOMES: Fee for Service with FOTO Outcomes 1/3    **Charge TherEx only**     Precautions: standard, anxiety, NO male therapist physical contact     Pattern of pain determined: Pattern 3    Subjective     Scarlett Knox reports she walks funny when she is hurting. She has been watching a friends dog and she is missing her favorite chair which is increasing her pain. She is currently at a 5 and she usually is not a 5 until the end of the day. She did see her MD and they sent a referral for FRP.    Patient reports tolerating previous visit well /c no c/o of excess discomfort    Patient reports their pain to be 5/ 10, on 0-10 scale with 0 being no pain and 10 being the worst pain imaginable.  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     Objective      Lumbar  Isometric Testing on Med X equipment: " "Testing administered by PT    Test Initial Baseline Midpoint Final   Date 2724     ROM 0-42 deg     Max Peak Torque 140       Min Peak Torque 14      Flex/Ext Ratio 10     % variance  normative data 52     % change from initial test N/A visit 1       Lumbar testing Visit 2     OUTCOMES SELECTION:   Intake Outcome Measure for FOTO Lumbar Survey     Therapist reviewed FOTO scores for Scarlett Knox on 2024.   FOTO documents entered into [employer] - see Media section.     Intake Score: 47% functional ability      Goal Score: 57% functional ability        24   LLD R: 76 cm  LLR L: 78 cm    Treatment     Scarlett received the treatments listed below:        Scarlett participated in therapeutic exercises to develop strength, endurance, ROM, flexibility, posture, and core stabilization for 41 minutes includin/20/2024     9:37 AM   [facility]   Visit Number 15   VAS Pain Rating 5   Treadmill Time (in min.) 5 min   Extension in Lying 10   Lumbar Weight 63 lbs   Repetitions 15   Rating of Perceived Exertion 5   Ice - Z Lie (in min.) 5              NuStep 5 min/ TM 5'    LTR x 10 (pt calls them "the wig wag")  EIL 2x 5  PPT with ball x 5" x 10 cue for dec BLE use   Bridges w/PPT x 10  Cat cow 10x3"  Ann pose rocking 5x5" in between EIL  Sit<>stand x10 - promote weight thru heels  Diaphragmatic breathing to activate the PNS           Peripheral muscle strengthening which included one set of 15-20 repetitions at a slow and controlled 10-13 second per rep pace focused on strengthening supporting musculature in order to improve body mechanics and functional mobility. Patient and therapist focused on proper form during treatment to ensure optimal strengthening of each targeted muscle group.  Machines utilized included:Torso rotation, Leg Ext, Leg Curl, Chest Press, and RowingTriceps, Biceps, Hip Abd, Hip Add, and Leg Press      Scarlett participated in dynamic functional therapeutic activities to " improve functional performance and simulate household and community activities for 0  minutes. The following activities were included:      Scarlett received manual therapy techniques for 0  minutes. The following activities were included:    Pt given cold pack for 0 minutes to lumbar spine in Z-lie position.    Patient Education and Home Exercises     Home exercises include:  EIL  PPT   Bridges w/PPT  90/90 TrA marching     Cardio program (V5): -  Lifting education (V11): -  Posture/Lumbar roll: not obtained as of 06/27/24  [facility] given[facility] -  Equipment at home/gym membership: No    Education provided:   - PT role and POC  - HEP      Written Home Exercises Provided: Patient instructed to cont prior HEP.  Exercises were reviewed and Scarlett was able to demonstrate them prior to the end of the session.  Scarlett demonstrated good  understanding of the education provided.     See EMR under Patient Instructions for exercises provided prior visit.    Assessment     Scarlett returns to HB visit with reports moderate pain level, demonstrating persistent dysfunctional gait patterns. Continued previously prescribed without issue. MedX was performed at 63ft lbs with 15 reps performed at an exertion rating of 5/10. No issues with peripherals.       Patient is making fair progress towards established goals.  Pt will continue to benefit from skilled outpatient physical therapy to address the deficits stated in the impairment chart, provide pt/family education and to maximize pt's level of independence in the home and community environment.     Anticipated Barriers for therapy: High fear avoidance, chronicity of condition     Pt's spiritual, cultural and educational needs considered and pt agreeable to plan of care and goals as stated below:     GOALS: Pt is in agreement with the following goals.     Short term goals:  6 weeks or 10 visits   - Pt will demonstrate increased lumbar MedX ROM by at least 6  degrees from the initial ROM value with improvements noted in functional ROM and ability to perform ADLs. Appropriate and Ongoing  - Pt will demonstrate increased MedX average isometric strength value by 10% from initial test resulting in improved ability to perform bending, lifting, and carrying activities safely, confidently. Appropriate and Ongoing  - Pt will report a reduction in worst pain score by 1-2 points for improved tolerance for community ambulation of at least 200 meters. Appropriate and Ongoing  - Pt able to perform HEP correctly with minimal cueing or supervision from therapist to encourage independent management of symptoms. Appropriate and Ongoing  -Pt able to perform at least 30 minutes of yoga 4 times per week with postural modifications in order to return to recreational fitness routine.     Long term goals: 10 weeks or 20 visits   - Pt will demonstrate increased lumbar MedX ROM by at least 9 degrees from initial ROM value, resulting in improved ability to perform functional forward bending while standing and sitting. Appropriate and Ongoing  - Pt will demonstrate increased MedX average isometric strength value by 25% from initial test resulting in improved ability to perform bending, lifting, and carrying activities safely and confidently. Appropriate and Ongoing  - Pt to demonstrate ability to independently control and reduce their pain through posture positioning and mechanical movements throughout a typical day. Appropriate and Ongoing  - Pt will demonstrate reduced pain and improved functional outcomes as reported on the FOTO by reaching an intake score of >/= 57% functional ability in order to demonstrate subjective improvement in patient's condition. . Appropriate and Ongoing  - Pt will demonstrate independence with the HEP at discharge. Appropriate and Ongoing  - Patient able to demonstrate adequate transverse abdominus strength to perform at least 3 sets of forearm planks with no lumbar  flexion in order to achieve adequate core stability to stand for at least 15 minutes. Appropriate and Ongoing    Plan     Continue with established Plan of Care towards established PT goals.     Therapist: Adam Harden, PTA  9/20/2024

## 2024-09-20 ENCOUNTER — CLINICAL SUPPORT (OUTPATIENT)
Dept: REHABILITATION | Facility: OTHER | Age: 34
End: 2024-09-20
Payer: MEDICAID

## 2024-09-20 DIAGNOSIS — M54.6 CHRONIC BILATERAL THORACIC BACK PAIN: ICD-10-CM

## 2024-09-20 DIAGNOSIS — M54.16 LUMBAR RADICULOPATHY: ICD-10-CM

## 2024-09-20 DIAGNOSIS — M46.1 SACROILIITIS: Primary | ICD-10-CM

## 2024-09-20 DIAGNOSIS — G89.29 CHRONIC BILATERAL THORACIC BACK PAIN: ICD-10-CM

## 2024-09-20 PROCEDURE — 97110 THERAPEUTIC EXERCISES: CPT | Mod: CQ

## 2024-09-23 DIAGNOSIS — Z98.1 S/P LUMBAR FUSION: Primary | ICD-10-CM

## 2024-09-24 ENCOUNTER — CLINICAL SUPPORT (OUTPATIENT)
Dept: REHABILITATION | Facility: OTHER | Age: 34
End: 2024-09-24
Payer: MEDICAID

## 2024-09-24 DIAGNOSIS — M54.16 LUMBAR RADICULOPATHY: ICD-10-CM

## 2024-09-24 DIAGNOSIS — M46.1 SACROILIITIS: Primary | ICD-10-CM

## 2024-09-24 DIAGNOSIS — G89.29 CHRONIC BILATERAL THORACIC BACK PAIN: ICD-10-CM

## 2024-09-24 DIAGNOSIS — M54.6 CHRONIC BILATERAL THORACIC BACK PAIN: ICD-10-CM

## 2024-09-24 PROCEDURE — 97110 THERAPEUTIC EXERCISES: CPT | Mod: CQ

## 2024-09-26 ENCOUNTER — CLINICAL SUPPORT (OUTPATIENT)
Dept: REHABILITATION | Facility: OTHER | Age: 34
End: 2024-09-26
Payer: MEDICAID

## 2024-09-26 DIAGNOSIS — M54.16 LUMBAR RADICULOPATHY: ICD-10-CM

## 2024-09-26 DIAGNOSIS — M46.1 SACROILIITIS: Primary | ICD-10-CM

## 2024-09-26 DIAGNOSIS — G89.29 CHRONIC BILATERAL THORACIC BACK PAIN: ICD-10-CM

## 2024-09-26 DIAGNOSIS — M54.6 CHRONIC BILATERAL THORACIC BACK PAIN: ICD-10-CM

## 2024-09-26 PROCEDURE — 97110 THERAPEUTIC EXERCISES: CPT | Mod: CQ

## 2024-09-26 NOTE — PROGRESS NOTES
"[facility]  Physical Therapy Treatment Note     Name: Scarlett Knox  Clinic Number: [id number]    Therapy Diagnosis:   Encounter Diagnoses   Name Primary?    Sacroiliitis Yes    Chronic bilateral thoracic back pain     Lumbar radiculopathy        Physician: Lou Block P*    Visit Date: 9/26/2024    Physician Orders: PT Eval and Treat  Medical Diagnosis from Referral: Z98.1 (ICD-10-CM) - S/P lumbar fusion  Evaluation Date: 4/25/2024  Authorization Period Expiration: 3/26/2025  Plan of Care Expiration: Updated to 9/10/24  Reassessment Due: 8/10/2024  Visit # / Visits authorized: 16/20  **Charge TherEx only**  MedX testing visit 2    PTA Visit #: 6/5     Time In: 1:00 PM   Time Out: 2:00 PM   Total Billable Time: 55 minutes   INSURANCE and OUTCOMES: Fee for Service with FOTO Outcomes 1/3    **Charge TherEx only**     Precautions: standard, anxiety, NO male therapist physical contact     Pattern of pain determined: Pattern 3    Subjective     Scarlett Knox reports continue chronic symptoms hindering certain functional and leisure activity. She also expressed her interest in FRP after discussion with Abby.     Patient reports tolerating previous visit well /c no c/o of excess discomfort    Patient reports their pain to be "4 or 5" 10, on 0-10 scale with 0 being no pain and 10 being the worst pain imaginable.  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     Objective      Lumbar  Isometric Testing on Med X equipment: Testing administered by PT    Test Initial Baseline Midpoint Final   Date 5/2724     ROM 0-42 deg     Max Peak Torque 140       Min Peak Torque " "14      Flex/Ext Ratio 10     % variance  normative data 52     % change from initial test N/A visit 1            OUTCOMES SELECTION:   Intake Outcome Measure for FOTO Lumbar Survey     Therapist reviewed FOTO scores for Scarlett Knox on 4/25/2024.   FOTO documents entered into [employer] - see Media section.     Intake Score: 47% functional ability      Goal Score: 57% functional ability        06/11/24   LLD R: 76 cm  LLR L: 78 cm    Treatment     Scarlett received the treatments listed below:        Scarlett participated in therapeutic exercises to develop strength, endurance, ROM, flexibility, posture, and core stabilization for 55 minutes including:      Ex performed in bold:  NuStep 5 min/ TM 5'    LTR x 10 (pt calls them "the wig wag")  Supine HSS 10"x5 -NP  Supine nerve glides 2x10-NP  EIL x 10  90/90 TrA marching x 10  B  QL stretch in [place]yling 5x10"   PPT 5" x 10 cue for dec BLE use   Bridges w/PPT x 10  Cat cow 10x3"  Ann pose rocking 5x5" in between EIL  Prone HF stretches 10" x 5-NP  Dead bugs w/swiss ball LE only x 10-np  Sit<>stand x10 - promote weight thru heels  Diaphragmatic breathing to activate the PNS x10 breahtes  TrA isometric with ball x10  TrA isometric with SLR x10  BKFO BLUE TB x10  Paloff press GTB x10           9/26/2024     2:05 PM   [facility]   Visit Number 17   VAS Pain Rating 4   Recumbent Bike Seat Pos. 3   Extension in Lying 10   Lumbar Weight 66 lbs   Repetitions 15   Rating of Perceived Exertion 3   Ice - Z Lie (in min.) 4       Peripheral muscle strengthening which included one set of 15-20 repetitions at a slow and controlled 10-13 second per rep pace focused on strengthening supporting musculature in order to improve body mechanics and functional mobility. Patient and therapist focused on proper form during treatment to ensure optimal strengthening of each targeted muscle group.  Machines utilized included:Torso rotation, Leg Ext, Leg Curl, Chest Press, and " RowingTriceps, Biceps, Hip Abd, Hip Add, and Leg Press      Scarlett participated in dynamic functional therapeutic activities to improve functional performance and simulate household and community activities for 0  minutes. The following activities were included:      Scarlett received manual therapy techniques for 05 minutes. The following activities were included: soft tissue mobilization R side lumbar paraspinals to decrease muscle tension and increase joint mobility and function    Pt given cold pack for 0 minutes to lumbar spine in Z-lie position.    Patient Education and Home Exercises     Home exercises include:  EIL  PPT   Bridges w/PPT  90[facility]A [facility]     Cardio program (V5): -  Lifting education (V11): -  Posture/Lumbar roll: not obtained as of 06/27/24  [facility] Discharge handout (date given): -  Equipment at home/gym membership: No    Education provided:   - PT role and POC  - HEP      Written Home Exercises Provided: Patient instructed to cont prior HEP.  Exercises were reviewed and Scarlett was able to demonstrate them prior to the end of the session.  Scarlett demonstrated good  understanding of the education provided.     See EMR under Patient Instructions for exercises provided prior visit.    Assessment     Scarlett returns to HB visit with reports moderate pain level, demonstrating persistent dysfunctional gait patterns. Treatment continued with lumbopelvic/core flexibility and strengthening ex's which she was able to perform without c/o.  Lumbar MedX resistance remained at 66 ft/lbs with completion of 15 reps at an exertion rating of  3/10 RPE. Peripheral muscle strengthening performed without issues. Continue program per pt tolerance.     Patient is making fair progress towards established goals.  Pt will continue to benefit from skilled outpatient physical therapy to address the deficits stated in the impairment chart, provide pt/family education and to maximize pt's level of independence  in the home and community environment.     Anticipated Barriers for therapy: High fear avoidance, chronicity of condition     Pt's spiritual, cultural and educational needs considered and pt agreeable to plan of care and goals as stated below:     GOALS: Pt is in agreement with the following goals.     Short term goals:  6 weeks or 10 visits   - Pt will demonstrate increased lumbar MedX ROM by at least 6 degrees from the initial ROM value with improvements noted in functional ROM and ability to perform ADLs. Appropriate and Ongoing  - Pt will demonstrate increased MedX average isometric strength value by 10% from initial test resulting in improved ability to perform bending, lifting, and carrying activities safely, confidently. Appropriate and Ongoing  - Pt will report a reduction in worst pain score by 1-2 points for improved tolerance for community ambulation of at least 200 meters. Appropriate and Ongoing  - Pt able to perform HEP correctly with minimal cueing or supervision from therapist to encourage independent management of symptoms. Appropriate and Ongoing  -Pt able to perform at least 30 minutes of yoga 4 times per week with postural modifications in order to return to recreational fitness routine.     Long term goals: 10 weeks or 20 visits   - Pt will demonstrate increased lumbar MedX ROM by at least 9 degrees from initial ROM value, resulting in improved ability to perform functional forward bending while standing and sitting. Appropriate and Ongoing  - Pt will demonstrate increased MedX average isometric strength value by 25% from initial test resulting in improved ability to perform bending, lifting, and carrying activities safely and confidently. Appropriate and Ongoing  - Pt to demonstrate ability to independently control and reduce their pain through posture positioning and mechanical movements throughout a typical day. Appropriate and Ongoing  - Pt will demonstrate reduced pain and improved  functional outcomes as reported on the FOTO by reaching an intake score of >/= 57% functional ability in order to demonstrate subjective improvement in patient's condition. . Appropriate and Ongoing  - Pt will demonstrate independence with the HEP at discharge. Appropriate and Ongoing  - Patient able to demonstrate adequate transverse abdominus strength to perform at least 3 sets of forearm planks with no lumbar flexion in order to achieve adequate core stability to stand for at least 15 minutes. Appropriate and Ongoing    Plan     Continue with established Plan of Care towards established PT goals.     Therapist: Shana Hester, PTA  9/26/2024

## 2024-09-30 ENCOUNTER — PATIENT MESSAGE (OUTPATIENT)
Dept: ORTHOPEDICS | Facility: CLINIC | Age: 34
End: 2024-09-30
Payer: MEDICAID

## 2024-10-01 ENCOUNTER — CLINICAL SUPPORT (OUTPATIENT)
Dept: REHABILITATION | Facility: OTHER | Age: 34
End: 2024-10-01
Payer: MEDICAID

## 2024-10-01 DIAGNOSIS — M46.1 SACROILIITIS: Primary | ICD-10-CM

## 2024-10-01 DIAGNOSIS — M54.6 CHRONIC BILATERAL THORACIC BACK PAIN: ICD-10-CM

## 2024-10-01 DIAGNOSIS — M54.16 LUMBAR RADICULOPATHY: ICD-10-CM

## 2024-10-01 DIAGNOSIS — G89.29 CHRONIC BILATERAL THORACIC BACK PAIN: ICD-10-CM

## 2024-10-01 PROCEDURE — 97110 THERAPEUTIC EXERCISES: CPT | Mod: CQ

## 2024-10-01 NOTE — PROGRESS NOTES
"[facility]  Physical Therapy Treatment Note     Name: Scarlett Knox  Clinic Number: [id number]    Therapy Diagnosis:   Encounter Diagnoses   Name Primary?    Sacroiliitis Yes    Chronic bilateral thoracic back pain     Lumbar radiculopathy        Physician: Lou Block P*    Visit Date: 10/1/2024    Physician Orders: PT Eval and Treat  Medical Diagnosis from Referral: Z98.1 (ICD-10-CM) - S/P lumbar fusion  Evaluation Date: 4/25/2024  Authorization Period Expiration: 3/26/2025  Plan of Care Expiration: Updated to 9/10/24  Reassessment Due: 8/10/2024  Visit # / Visits authorized: 18/20  **Charge TherEx only**  MedX testing visit 2    PTA Visit #: 6/5     Time In: 1:00 PM   Time Out: 2:00 PM   Total Billable Time: 55 minutes   INSURANCE and OUTCOMES: Fee for Service with FOTO Outcomes 1/3    **Charge TherEx only**     Precautions: standard, anxiety, NO male therapist physical contact     Pattern of pain determined: Pattern 3    Subjective     Scarlett Knox reports "in a lot of pain today". Pt reports increased LBP due to not being in her home the last 10 day. "Not my chair, not my bed". Pt stated she did not bring things from home such as lumbar roll or ice pack to decrease flare symptoms. Pt also, states she is not consistent with HEP or stretches. Pt performs them when her pain is high.     Patient reports tolerating previous visit well /c no c/o of excess discomfort    Patient reports their pain to be "6 or 7" 10, on 0-10 scale with 0 being no pain and 10 being the worst pain imaginable.  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     Objective  " "    Lumbar  Isometric Testing on Med X equipment: Testing administered by PT    Test Initial Baseline Midpoint Final   Date 5/2724     ROM 0-42 deg     Max Peak Torque 140       Min Peak Torque 14      Flex/Ext Ratio 10     % variance  normative data 52     % change from initial test N/A visit 1            OUTCOMES SELECTION:   Intake Outcome Measure for FOTO Lumbar Survey     Therapist reviewed FOTO scores for Scarlett Knox on 4/25/2024.   FOTO documents entered into [employer] - see Media section.     Intake Score: 47% functional ability      Goal Score: 57% functional ability        06/11/24   LLD R: 76 cm  LLR L: 78 cm    Treatment     Scarlett received the treatments listed below:        Scarlett participated in therapeutic exercises to develop strength, endurance, ROM, flexibility, posture, and core stabilization for 55 minutes including:      Ex performed in bold:  NuStep 5 min/ TM 5'    LTR x 10 (pt calls them "the wig wag")  Supine HSS 10"x5 -NP  Supine nerve glides 2x10-NP  EIL x 10  EIS x10  90/90 TrA marching x 10  B  QL stretch in hookyling 5x10"   PPT 5" x 10 cue for dec BLE use   Bridges w/PPT x 10  Cat cow 10x3"  Ann pose rocking 5x5" in between EIL  Prone HF stretches 10" x 5-NP  Dead bugs w/swiss ball LE only x 10-np  Sit<>stand x10 - promote weight thru heels  Diaphragmatic breathing to activate the PNS x10 breahtes  TrA isometric with ball x10  TrA isometric with SLR x10  BKFO BLUE TB x10  Paloff press GTB x10        10/1/2024     1:46 PM   [facility]   Visit Number 18   VAS Pain Rating 6   Treadmill Time (in min.) 5 min   Extension in Standing 10   Lumbar Weight 66 lbs   Repetitions 20   Rating of Perceived Exertion 4   Ice - Z Lie (in min.) 5         Peripheral muscle strengthening which included one set of 15-20 repetitions at a slow and controlled 10-13 second per rep pace focused on strengthening supporting musculature in order to improve body mechanics and functional mobility. " Patient and therapist focused on proper form during treatment to ensure optimal strengthening of each targeted muscle group.  Machines utilized included:Torso rotation, Leg Ext, Leg Curl, Chest Press, and RowingTriceps, Biceps, Hip Abd, Hip Add, and Leg Press      Scarlett participated in dynamic functional therapeutic activities to improve functional performance and simulate household and community activities for 0  minutes. The following activities were included:      Scarlett received manual therapy techniques for 05 minutes. The following activities were included: soft tissue mobilization R side lumbar paraspinals to decrease muscle tension and increase joint mobility and function    Pt given cold pack for 0 minutes to lumbar spine in Z-lie position.    Patient Education and Home Exercises     Home exercises include:  EIL  PPT   Bridges w/PPT  90/90 TrA marching     Cardio program (V5): -  Lifting education (V11): -  Posture/Lumbar roll: not obtained as of 06/27/24  [facility] Discharge handout (date given): -  Equipment at home/gym membership: No    Education provided:   - PT role and POC  - HEP      Written Home Exercises Provided: Patient instructed to cont prior HEP.  Exercises were reviewed and Scarlett was able to demonstrate them prior to the end of the session.  Scarlett demonstrated good  understanding of the education provided.     See EMR under Patient Instructions for exercises provided prior visit.    Assessment     Scarlett returns to HB visit with reports high pain level, demonstrating persistent dysfunctional gait and stance patterns. Treatment continued with lumbopelvic/core flexibility and strengthening ex's which she was able to perform without c/o.  Lumbar MedX resistance remained at 66 ft/lbs with completion of 20 reps at an exertion rating of  4/10 RPE. Peripheral muscle strengthening performed without issues. Continue program per pt tolerance.   Discussion of workouts post HB, pt will not be  attending gym for workouts, administered Handout for home program. Pt is interested in FRP after HB.    Patient is making fair progress towards established goals.  Pt will continue to benefit from skilled outpatient physical therapy to address the deficits stated in the impairment chart, provide pt/family education and to maximize pt's level of independence in the home and community environment.     Anticipated Barriers for therapy: High fear avoidance, chronicity of condition     Pt's spiritual, cultural and educational needs considered and pt agreeable to plan of care and goals as stated below:     GOALS: Pt is in agreement with the following goals.     Short term goals:  6 weeks or 10 visits   - Pt will demonstrate increased lumbar MedX ROM by at least 6 degrees from the initial ROM value with improvements noted in functional ROM and ability to perform ADLs. Appropriate and Ongoing  - Pt will demonstrate increased MedX average isometric strength value by 10% from initial test resulting in improved ability to perform bending, lifting, and carrying activities safely, confidently. Appropriate and Ongoing  - Pt will report a reduction in worst pain score by 1-2 points for improved tolerance for community ambulation of at least 200 meters. Appropriate and Ongoing  - Pt able to perform HEP correctly with minimal cueing or supervision from therapist to encourage independent management of symptoms. Appropriate and Ongoing  -Pt able to perform at least 30 minutes of yoga 4 times per week with postural modifications in order to return to recreational fitness routine.     Long term goals: 10 weeks or 20 visits   - Pt will demonstrate increased lumbar MedX ROM by at least 9 degrees from initial ROM value, resulting in improved ability to perform functional forward bending while standing and sitting. Appropriate and Ongoing  - Pt will demonstrate increased MedX average isometric strength value by 25% from initial test  resulting in improved ability to perform bending, lifting, and carrying activities safely and confidently. Appropriate and Ongoing  - Pt to demonstrate ability to independently control and reduce their pain through posture positioning and mechanical movements throughout a typical day. Appropriate and Ongoing  - Pt will demonstrate reduced pain and improved functional outcomes as reported on the FOTO by reaching an intake score of >/= 57% functional ability in order to demonstrate subjective improvement in patient's condition. . Appropriate and Ongoing  - Pt will demonstrate independence with the HEP at discharge. Appropriate and Ongoing  - Patient able to demonstrate adequate transverse abdominus strength to perform at least 3 sets of forearm planks with no lumbar flexion in order to achieve adequate core stability to stand for at least 15 minutes. Appropriate and Ongoing    Plan     Continue with established Plan of Care towards established PT goals.     Therapist: Shana Hester, PTA  10/1/2024

## 2024-10-02 ENCOUNTER — HOSPITAL ENCOUNTER (OUTPATIENT)
Dept: RADIOLOGY | Facility: HOSPITAL | Age: 34
Discharge: HOME | End: 2024-10-02
Attending: PHYSICIAN ASSISTANT
Payer: MEDICAID

## 2024-10-02 DIAGNOSIS — M54.16 LUMBAR RADICULOPATHY, CHRONIC: ICD-10-CM

## 2024-10-02 PROCEDURE — 72158 MRI LUMBAR SPINE W/O & W/DYE: CPT | Mod: TC

## 2024-10-02 PROCEDURE — 72131 CT LUMBAR SPINE W/O DYE: CPT | Mod: TC

## 2024-10-02 PROCEDURE — A9585 GADOBUTROL INJECTION: HCPCS | Performed by: PHYSICIAN ASSISTANT

## 2024-10-02 PROCEDURE — 72158 MRI LUMBAR SPINE W/O & W/DYE: CPT | Mod: 26,,, | Performed by: RADIOLOGY

## 2024-10-02 PROCEDURE — 25500020 PHARM REV CODE 255: Performed by: PHYSICIAN ASSISTANT

## 2024-10-02 PROCEDURE — 72131 CT LUMBAR SPINE W/O DYE: CPT | Mod: 26,,, | Performed by: RADIOLOGY

## 2024-10-02 RX ORDER — GADOBUTROL 604.72 MG/ML
9 INJECTION INTRAVENOUS
Status: COMPLETED | OUTPATIENT
Start: 2024-10-02 | End: 2024-10-02

## 2024-10-02 RX ADMIN — GADOBUTROL 9 ML: 604.72 INJECTION INTRAVENOUS at 06:10

## 2024-10-02 NOTE — PROGRESS NOTES
"[facility]  Physical Therapy Treatment Note     Name: Scarlett Knox  Clinic Number: [id number]    Therapy Diagnosis:   Encounter Diagnoses   Name Primary?    Sacroiliitis Yes    Chronic bilateral thoracic back pain     Lumbar radiculopathy          Physician: Lou Block P*    Visit Date: 10/3/2024    Physician Orders: PT Eval and Treat  Medical Diagnosis from Referral: Z98.1 (ICD-10-CM) - S/P lumbar fusion  Evaluation Date: 4/25/2024  Authorization Period Expiration: 3/26/2025  Plan of Care Expiration: Updated to 9/10/24  Reassessment Due: 8/10/2024  Visit # / Visits authorized: 19/20  **Charge TherEx only**  MedX testing visit 2    PTA Visit #: 0/5     Time In: 1:30 PM   Time Out: 2:25 PM   Total Billable Time: 55 minutes   INSURANCE and OUTCOMES: Fee for Service with FOTO Outcomes 1/3    **Charge TherEx only**     Precautions: standard, anxiety, NO male therapist physical contact     Pattern of pain determined: Pattern 3    Subjective     Scarlett Knox reports feeling better this session compared to last with moderate pain rating. Patient states she has sharp pain when she bends down in seated or standing position to take off her shoes after walking 15 minutes without stretching before. She can only walk 15 minutes without rest breaks before her back starts to feel stiff.     Patient reports tolerating previous visit well /c no c/o of excess discomfort    Patient reports their pain to be 6/10, on 0-10 scale with 0 being no pain and 10 being the worst pain imaginable.  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     Objective      Lumbar  Isometric " "Testing on Med X equipment: Testing administered by PT    Test Initial Baseline Midpoint Final   Date 5/2724     ROM 0-42 deg     Max Peak Torque 140       Min Peak Torque 14      Flex/Ext Ratio 10     % variance  normative data 52     % change from initial test N/A visit 1            OUTCOMES SELECTION:   Intake Outcome Measure for FOTO Lumbar Survey     Therapist reviewed FOTO scores for Scarlett Knox on 4/25/2024.   FOTO documents entered into [employer] - see Media section.     Intake Score: 47% functional ability      Goal Score: 57% functional ability        06/11/24   LLD R: 76 cm  LLR L: 78 cm    Treatment     Scarlett received the treatments listed below:        Scarlett participated in therapeutic exercises to develop strength, endurance, ROM, flexibility, posture, and core stabilization for 55 minutes including:      Ex performed in bold:  NuStep 5 min/ TM 5'    LTR x 10 (pt calls them "the wig wag")  Supine HSS 10"x5 -NP  Supine nerve glides 2x10-NP  EIL x 10  EIS x10  90/90 TrA marching x 10  B  QL stretch in hookyling 5x10"   PPT 5" x 10 cue for dec BLE use   Bridges w/PPT x 10  Cat cow 10x3"  Ann pose rocking 10x5" in between EIL  Prone HF stretches 10" x 5-NP  Dead bugs w/swiss ball LE only x 10-np  Sit<>stand x10 - promote weight thru heels  Diaphragmatic breathing to activate the PNS x10 breahtes  TrA isometric with ball x10  TrA isometric with SLR x10  BKFO BLUE TB x10  Paloff press GTB x10        10/3/2024     1:56 PM   [facility]   Visit Number 19   VAS Pain Rating 6   Time 5   Extension in Standing 10   Lumbar Weight 73 lbs   Repetitions 15   Rating of Perceived Exertion 5   Ice - Z Lie (in min.) 5         Peripheral muscle strengthening which included one set of 15-20 repetitions at a slow and controlled 10-13 second per rep pace focused on strengthening supporting musculature in order to improve body mechanics and functional mobility. Patient and therapist focused on proper form " during treatment to ensure optimal strengthening of each targeted muscle group.  Machines utilized included:Torso rotation, Leg Ext, Leg Curl, Chest Press, and RowingTriceps, Biceps, Hip Abd, Hip Add, and Leg Press      Scarlett participated in dynamic functional therapeutic activities to improve functional performance and simulate household and community activities for 0  minutes. The following activities were included:      Scarlett received manual therapy techniques for 05 minutes. The following activities were included: soft tissue mobilization R side lumbar paraspinals to decrease muscle tension and increase joint mobility and function    Pt given cold pack for 0 minutes to lumbar spine in Z-lie position.    Patient Education and Home Exercises     Home exercises include:  EIL  PPT   Bridges w/PPT  90/90 TrA [employer]     Cardio program (V5): -  Lifting education (V11): -  Posture/Lumbar roll: not obtained as of 06/27/24  [facility] Discharge handout (date given): -   Equipment at home/gym membership: No    Education provided:   - PT role and POC  - HEP      Written Home Exercises Provided: Patient instructed to cont prior HEP.  Exercises were reviewed and Scarlett was able to demonstrate them prior to the end of the session.  Scarlett demonstrated good  understanding of the education provided.     See EMR under Patient Instructions for exercises provided prior visit.    Assessment     Scarlett returns to HB visit with reports high pain level, demonstrating persistent dysfunctional gait and stance patterns. Treatment continued with lumbopelvic/core flexibility and strengthening ex's which she was able to perform with slight increase in discomfort. She required frequent brief rest breaks throughout session to allow symptoms to subside indicating impaired muscular endurance. Educated patient on importance of HEP stretches to reduce muscle tension and energy conservation techniques to avoid exacerbation of symptoms.   Lumbar MedX resistance increased to 73 ft/lbs with completion of 15 reps at an exertion rating of  5/10 RPE. Peripheral muscle strengthening performed without issues. Continue program per pt tolerance.       Patient is making fair progress towards established goals.  Pt will continue to benefit from skilled outpatient physical therapy to address the deficits stated in the impairment chart, provide pt/family education and to maximize pt's level of independence in the home and community environment.     Anticipated Barriers for therapy: High fear avoidance, chronicity of condition     Pt's spiritual, cultural and educational needs considered and pt agreeable to plan of care and goals as stated below:     GOALS: Pt is in agreement with the following goals.     Short term goals:  6 weeks or 10 visits   - Pt will demonstrate increased lumbar MedX ROM by at least 6 degrees from the initial ROM value with improvements noted in functional ROM and ability to perform ADLs. Appropriate and Ongoing  - Pt will demonstrate increased MedX average isometric strength value by 10% from initial test resulting in improved ability to perform bending, lifting, and carrying activities safely, confidently. Appropriate and Ongoing  - Pt will report a reduction in worst pain score by 1-2 points for improved tolerance for community ambulation of at least 200 meters. Appropriate and Ongoing  - Pt able to perform HEP correctly with minimal cueing or supervision from therapist to encourage independent management of symptoms. Appropriate and Ongoing  -Pt able to perform at least 30 minutes of yoga 4 times per week with postural modifications in order to return to recreational fitness routine.     Long term goals: 10 weeks or 20 visits   - Pt will demonstrate increased lumbar MedX ROM by at least 9 degrees from initial ROM value, resulting in improved ability to perform functional forward bending while standing and sitting. Appropriate and  Ongoing  - Pt will demonstrate increased MedX average isometric strength value by 25% from initial test resulting in improved ability to perform bending, lifting, and carrying activities safely and confidently. Appropriate and Ongoing  - Pt to demonstrate ability to independently control and reduce their pain through posture positioning and mechanical movements throughout a typical day. Appropriate and Ongoing  - Pt will demonstrate reduced pain and improved functional outcomes as reported on the FOTO by reaching an intake score of >/= 57% functional ability in order to demonstrate subjective improvement in patient's condition. . Appropriate and Ongoing  - Pt will demonstrate independence with the HEP at discharge. Appropriate and Ongoing  - Patient able to demonstrate adequate transverse abdominus strength to perform at least 3 sets of forearm planks with no lumbar flexion in order to achieve adequate core stability to stand for at least 15 minutes. Appropriate and Ongoing    Plan     Continue with established Plan of Care towards established PT goals.     Therapist: Francine Leach, PT  10/3/2024

## 2024-10-03 ENCOUNTER — CLINICAL SUPPORT (OUTPATIENT)
Dept: REHABILITATION | Facility: OTHER | Age: 34
End: 2024-10-03
Payer: MEDICAID

## 2024-10-03 ENCOUNTER — OFFICE VISIT (OUTPATIENT)
Dept: ORTHOPEDICS | Facility: CLINIC | Age: 34
End: 2024-10-03
Payer: MEDICAID

## 2024-10-03 DIAGNOSIS — M54.6 CHRONIC BILATERAL THORACIC BACK PAIN: ICD-10-CM

## 2024-10-03 DIAGNOSIS — M46.1 SACROILIITIS: Primary | ICD-10-CM

## 2024-10-03 DIAGNOSIS — Z98.1 S/P LUMBAR FUSION: Primary | ICD-10-CM

## 2024-10-03 DIAGNOSIS — M54.16 LUMBAR RADICULOPATHY: ICD-10-CM

## 2024-10-03 DIAGNOSIS — G89.29 CHRONIC BILATERAL THORACIC BACK PAIN: ICD-10-CM

## 2024-10-03 PROCEDURE — 97110 THERAPEUTIC EXERCISES: CPT

## 2024-10-03 PROCEDURE — 99499 UNLISTED E&M SERVICE: CPT | Mod: 95,,, | Performed by: PHYSICIAN ASSISTANT

## 2024-10-03 NOTE — PROGRESS NOTES
Established Patient - Audio Only Telehealth Visit     The patient location is: [place]  The chief complaint leading to consultation is: MRI and CT results  Visit type: Virtual visit with audio only (telephone)  Total time spent with patient: 0 minutes       The reason for the audio only service rather than synchronous audio and video virtual visit was related to technical difficulties or patient preference/necessity.     Each patient to whom I provide medical services by telemedicine is:  (1) informed of the relationship between the physician and patient and the respective role of any other health care provider with respect to management of the patient; and (2) notified that they may decline to receive medical services by telemedicine and may withdraw from such care at any time. Patient verbally consented to receive this service via voice-only telephone call.       HPI: the patient presents for MRI and CT results.  She is s/p L4/5 TLIF in August 2023 by Dr. Mike with residual right leg pain and left foot paresthesias.    MRI and CT were reviewed today.  There is good decompression with no evidence of adjacent segment pathology.  Hardware intact, no evidence of pseudoarthrosis or loosening.     Assessment and plan:  called and left a voicemail for patient.  Also sent her a [facility] message. No charge for this visit.                         This service was not originating from a related E/M service provided within the previous 7 days nor will  to an E/M service or procedure within the next 24 hours or my soonest available appointment.  Prevailing standard of care was able to be met in this audio-only visit.

## 2024-10-04 ENCOUNTER — OFFICE VISIT (OUTPATIENT)
Dept: OBSTETRICS AND GYNECOLOGY | Facility: CLINIC | Age: 34
End: 2024-10-04
Payer: MEDICAID

## 2024-10-04 VITALS
SYSTOLIC BLOOD PRESSURE: 110 MMHG | WEIGHT: 174.19 LBS | DIASTOLIC BLOOD PRESSURE: 68 MMHG | HEIGHT: 61 IN | BODY MASS INDEX: 32.89 KG/M2

## 2024-10-04 DIAGNOSIS — Z01.419 WELL FEMALE EXAM WITH ROUTINE GYNECOLOGICAL EXAM: Primary | ICD-10-CM

## 2024-10-04 PROCEDURE — 99213 OFFICE O/P EST LOW 20 MIN: CPT | Mod: PBBFAC,PN | Performed by: OBSTETRICS & GYNECOLOGY

## 2024-10-04 PROCEDURE — 99999 PR PBB SHADOW E&M-EST. PATIENT-LVL III: CPT | Mod: PBBFAC,,, | Performed by: OBSTETRICS & GYNECOLOGY

## 2024-10-04 RX ORDER — TRAMADOL HYDROCHLORIDE 100 MG/1
100 TABLET, EXTENDED RELEASE ORAL DAILY
COMMUNITY

## 2024-10-04 NOTE — PROGRESS NOTES
SUBJECTIVE:   34 y.o. female   for routine gyn exam. Patient's last menstrual period was 09/10/2024..  She has no unusual complaints          Past Medical History:   Diagnosis Date    ADHD     Anxiety     Back pain     Depression      Past Surgical History:   Procedure Laterality Date    FUSION, SPINE, LUMBAR, TLIF, POSTERIOR APPROACH, USING PEDICLE SCREW N/A 2023    Procedure: FUSION, SPINE, LUMBAR, TLIF, POSTERIOR APPROACH, USING PEDICLE SCREW L4/5 ;  Surgeon: Dominic Mike MD;  Location: [facility] OR [address] [place] [address];  Service: Neurosurgery;  Laterality: N/A;    KNEE CARTILAGE SURGERY Left     LUMBAR LAMINECTOMY WITH DISCECTOMY N/A 2020    Procedure: LAMINECTOMY, SPINE, LUMBAR, WITH DISCECTOMY L4/5 ;  Surgeon: Dominic Mike MD;  Location: [facility] OR [address] [place] [address];  Service: Neurosurgery;  Laterality: N/A;     Social History     Socioeconomic History    Marital status:    Tobacco Use    Smoking status: Every Day     Types: Vaping with nicotine, Cigarettes     Passive exposure: Current    Smokeless tobacco: Never   Substance and Sexual Activity    Alcohol use: Yes     Comment: social    Drug use: Yes     Types: Marijuana    Sexual activity: Not Currently     Birth control/protection: I.U.D.     Comment: Paragard-  Keitha     Social Drivers of Health     Financial Resource Strain: High Risk (2024)    Overall Financial Resource Strain (CARDIA)     Difficulty of Paying Living Expenses: Very hard   Food Insecurity: Patient Declined (2024)    Hunger Vital Sign     Worried About Running Out of Food in the Last Year: Patient declined     Ran Out of Food in the Last Year: Patient declined   Transportation Needs: Patient Declined (2024)    Received from [facility]     Lack of Transportation (Medical): Patient declined     Lack of Transportation (Non-Medical): Patient declined   Physical Activity: Insufficiently Active (2024)    Exercise Vital Sign     Days of  Exercise per Week: 5 days     Minutes of Exercise per Session: 10 min   Stress: Stress Concern Present (2024)    [employer] [place] [employer] - Occupational Stress Questionnaire     Feeling of Stress : To some extent   Housing Stability: High Risk (2024)    Housing Stability Vital Sign     Unable to Pay for Housing in the Last Year: Yes     Family History   Problem Relation Name Age of Onset    Breast cancer Maternal Grandmother       OB History    Para Term  AB Living   0 0 0 0 0 0   SAB IAB Ectopic Multiple Live Births   0 0 0 0 0         Current Outpatient Medications   Medication Sig Dispense Refill    escitalopram oxalate (LEXAPRO) 20 MG tablet Take 20 mg by mouth once daily.      gabapentin (NEURONTIN) 100 MG capsule Take 300 mg by mouth 3 (three) times daily.      lithium (LITHOBID) 300 MG CR tablet Take 300 mg by mouth once daily.      ondansetron (ZOFRAN-ODT) 4 MG TbDL Take 1 tablet (4 mg total) by mouth every 8 (eight) hours as needed. 60 tablet 0    traMADoL (ULTRAM-ER) 100 MG Tb24 Take 100 mg by mouth once daily.       No current facility-administered medications for this visit.     Allergies: Patient has no known allergies.     ROS:  Constitutional: no weight loss, weight gain, fever, fatigue  Eyes:  No vision changes, glasses/contacts  ENT/Mouth: No ulcers, sinus problems, ears ringing, headache  Cardiovascular: No inability to lie flat, chest pain, exercise intolerance, swelling, heart palpitations  Respiratory: No wheezing, coughing blood, shortness of breath, or cough  Gastrointestinal: No diarrhea, bloody stool, nausea/vomiting, constipation, gas, hemorrhoids  Genitourinary: No blood in urine, painful urination, urgency of urination, frequency of urination, incomplete emptying, incontinence, abnormal bleeding, painful periods, heavy periods, vaginal discharge, vaginal odor, painful intercourse, sexual problems, bleeding after intercourse.  Musculoskeletal: No  "muscle weakness  Skin/Breast: No painful breasts, nipple discharge, masses, rash, ulcers  Neurological: No passing out, seizures, numbness, headache  Endocrine: No diabetes, hypothyroid, hyperthyroid, hot flashes, hair loss, abnormal hair growth, acne  Psychiatric: No depression, crying  Hematologic: No bruises, bleeding, swollen lymph nodes, anemia.      OBJECTIVE:   The patient appears well, alert, oriented x 3, in no distress.  /68 (BP Location: Right arm, Patient Position: Sitting)   Ht 5' 1" (1.549 m)   Wt 79 kg (174 lb 2.6 oz)   LMP 09/10/2024   BMI 32.91 kg/m²   NECK: no thyromegaly, trachea midline  SKIN: no acne, striae, hirsutism  CHEST: CTAB  CV: RRR  BREAST EXAM: breasts appear normal, no suspicious masses, no skin or nipple changes or axillary nodes  ABDOMEN: no hernias, masses, or hepatosplenomegaly  GENITALIA: normal external genitalia, no erythema, no discharge  URETHRA: normal urethra, normal urethral meatus  VAGINA: Normal  CERVIX: no lesions or cervical motion tenderness. IUD strings seen  UTERUS: normal size, contour, position, consistency, mobility, non-tender  ADNEXA: no mass, fullness, tenderness      ASSESSMENT:   1. Well female exam with routine gynecological exam  Liquid-Based Pap Smear, Screening          PLAN:   Orders Placed This Encounter    Liquid-Based Pap Smear, Screening     Discussed Paragard effective for 10 years  Discussed healthy lifestyle including regular exercise, healthy eating, etc.  Return to clinic in 1 year    "

## 2024-10-07 NOTE — PROGRESS NOTES
"[facility]  Physical Therapy Discharge Note     Name: Scarlett Knox  Clinic Number: [id number]    Therapy Diagnosis:   Encounter Diagnoses   Name Primary?    Sacroiliitis Yes    Chronic bilateral thoracic back pain     Lumbar radiculopathy            Physician: Lou Block P*    Visit Date: 10/8/2024    Physician Orders: PT Eval and Treat  Medical Diagnosis from Referral: Z98.1 (ICD-10-CM) - S/P lumbar fusion  Evaluation Date: 4/25/2024  Authorization Period Expiration: 3/26/2025  Plan of Care Expiration: Updated to 9/10/24  Reassessment Due: 8/10/2024  Visit # / Visits authorized: 20/20   **Charge TherEx only**  MedX testing visit 2    PTA Visit #: 0/5     Time In: 2:00 PM   Time Out: 2:55 PM   Total Billable Time: 55 minutes   INSURANCE and OUTCOMES: Fee for Service with FOTO Outcomes 1/3    **Charge TherEx only**     Precautions: standard, anxiety, NO male therapist physical contact     Pattern of pain determined: Pattern 3    Subjective     Scarlett Knox reports moderate low back pain. She was able to walk 0.7 miles with rest breaks and no pain afterwards.     Patient reports tolerating previous visit well /c no c/o of excess discomfort    Patient reports their pain to be 6/10, on 0-10 scale with 0 being no pain and 10 being the worst pain imaginable.  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     Objective      Lumbar  Isometric Testing on Med X equipment: Testing administered by PT    Test Initial Baseline Midpoint Final   Date 5/2724  10/8/24   ROM 0-42 deg  0-51 deg   Max Peak Torque 140    55    Min Peak Torque 14   10    Flex/Ext Ratio 10  " "5.5:1    % variance  normative data 52  -58%    % change from initial test N/A visit 1             OUTCOMES SELECTION:   Intake Outcome Measure for FOTO Lumbar Survey     Therapist reviewed FOTO scores for Scarlett Knox on 4/25/2024.   FOTO documents entered into [facility] - see Media section.     Intake Score: 47% functional ability  Discharge Score: 48% functional ability     Goal Score: 57% functional ability        06/11/24   LLD R: 76 cm  LLR L: 78 cm    Treatment     Scarlett received the treatments listed below:        Scarlett participated in therapeutic exercises to develop strength, endurance, ROM, flexibility, posture, and core stabilization for 55 minutes including:      Ex performed in bold:  NuStep 5 min/ TM 5'    LTR x 10 (pt calls them "the wig wag")  Supine HSS 10"x5 -NP  Supine nerve glides 2x10-NP  EIL x 10  EIS x10  90/90 TrA marching x 10  B  QL stretch in hookyling 5x10"   PPT 5" x 10 cue for dec BLE use   Bridges w/PPT x 10  Cat cow 10x3"  Ann pose rocking 10x5" in between EIL  Prone HF stretches 10" x 5-NP  Dead bugs w/swiss ball LE only x 10-np  Sit<>stand x10 - promote weight thru heels  Diaphragmatic breathing to activate the PNS x10 breahtes  TrA isometric with ball x10  TrA isometric with SLR x10  BKFO BLUE TB x10  Paloff press GTB x10      Peripheral muscle strengthening which included one set of 15-20 repetitions at a slow and controlled 10-13 second per rep pace focused on strengthening supporting musculature in order to improve body mechanics and functional mobility. Patient and therapist focused on proper form during treatment to ensure optimal strengthening of each targeted muscle group.  Machines utilized included:Torso rotation, Leg Ext, Leg Curl, Chest Press, and RowingTriceps, Biceps, Hip Abd, Hip Add, and Leg Press        10/8/2024     3:38 PM   [facility]   Visit Number 20   VAS Pain Rating 6   Time 5   Extension in Standing 10   Lumbar Flexion 51   Lumbar Extension " 0   Lumbar Peak Torque 55 ft. lbs.   Min Torque 10   Test Percent Below Normative Data 58 %   Ice - Z Lie (in min.) 5         Scarlett participated in dynamic functional therapeutic activities to improve functional performance and simulate household and community activities for 0  minutes. The following activities were included:      Scarlett received manual therapy techniques for 05 minutes. The following activities were included: soft tissue mobilization R side lumbar paraspinals to decrease muscle tension and increase joint mobility and function    Pt given cold pack for 0 minutes to lumbar spine in Z-lie position.    Patient Education and Home Exercises     Home exercises include:  EIL  PPT   Bridges w/PPT  90/90 TrA marching     Cardio program (V5): -  Lifting education (V11): -  Posture/Lumbar roll: not obtained as of 06/27/24  [employer] Discharge handout (date given): -   Equipment at home/gym membership: No    Education provided:   - PT role and POC  - HEP      Written Home Exercises Provided: Patient instructed to cont prior HEP.  Exercises were reviewed and Scarlett was able to demonstrate them prior to the end of the session.  Scarlett demonstrated good  understanding of the education provided.     See EMR under Patient Instructions for exercises provided prior visit.    Assessment     Scarlett returns to HB visit with reports moderate pain level, demonstrating persistent dysfunctional gait and stance patterns. Noted improvement with ambulation as patient exhibits more upright posture. Noted reduction in Lumbar MedX objective findings as patient exhibited reduction in isometric testing. However, it appears patient possibly compensated with BLE on IE. Midpoint testing was not performed. Discharge measurements reflect improved motor control despite reduction in strength. Patient discharged d/t completion of 20 visit HB program. She expresses understanding of importance with HEP compliance. Patient expresses  interest in Wellness Program.       Patient is making fair progress towards established goals.  Pt will continue to benefit from skilled outpatient physical therapy to address the deficits stated in the impairment chart, provide pt/family education and to maximize pt's level of independence in the home and community environment.     Anticipated Barriers for therapy: High fear avoidance, chronicity of condition     Pt's spiritual, cultural and educational needs considered and pt agreeable to plan of care and goals as stated below:     GOALS: Pt is in agreement with the following goals.     Short term goals:  6 weeks or 10 visits   - Pt will demonstrate increased lumbar MedX ROM by at least 6 degrees from the initial ROM value with improvements noted in functional ROM and ability to perform ADLs. Met 10/8/24  - Pt will demonstrate increased MedX average isometric strength value by 10% from initial test resulting in improved ability to perform bending, lifting, and carrying activities safely, confidently. Not Met 10/8/24  - Pt will report a reduction in worst pain score by 1-2 points for improved tolerance for community ambulation of at least 200 meters. Met 10/8/24  - Pt able to perform HEP correctly with minimal cueing or supervision from therapist to encourage independent management of symptoms. Met 10/8/24  -Pt able to perform at least 30 minutes of yoga 4 times per week with postural modifications in order to return to recreational fitness routine.Not Met 10/8/24      Long term goals: 10 weeks or 20 visits   - Pt will demonstrate increased lumbar MedX ROM by at least 9 degrees from initial ROM value, resulting in improved ability to perform functional forward bending while standing and sitting. Met 10/8/24  - Pt will demonstrate increased MedX average isometric strength value by 25% from initial test resulting in improved ability to perform bending, lifting, and carrying activities safely and confidently. Not Met  10/8/24  - Pt to demonstrate ability to independently control and reduce their pain through posture positioning and mechanical movements throughout a typical day. Met 10/8/24  - Pt will demonstrate reduced pain and improved functional outcomes as reported on the FOTO by reaching an intake score of >/= 57% functional ability in order to demonstrate subjective improvement in patient's condition. . Nearly Met 10/8/24  - Pt will demonstrate independence with the HEP at discharge. Met 10/8/24  - Patient able to demonstrate adequate transverse abdominus strength to perform at least 3 sets of forearm planks with no lumbar flexion in order to achieve adequate core stability to stand for at least 15 minutes. Not Met 10/8/24  Plan     Continue with established Plan of Care towards established PT goals.     Therapist: Francine Leach, PT  10/8/2024

## 2024-10-08 ENCOUNTER — CLINICAL SUPPORT (OUTPATIENT)
Dept: REHABILITATION | Facility: OTHER | Age: 34
End: 2024-10-08
Payer: MEDICAID

## 2024-10-08 DIAGNOSIS — M46.1 SACROILIITIS: Primary | ICD-10-CM

## 2024-10-08 DIAGNOSIS — M54.16 LUMBAR RADICULOPATHY: ICD-10-CM

## 2024-10-08 DIAGNOSIS — M54.6 CHRONIC BILATERAL THORACIC BACK PAIN: ICD-10-CM

## 2024-10-08 DIAGNOSIS — G89.29 CHRONIC BILATERAL THORACIC BACK PAIN: ICD-10-CM

## 2024-10-08 PROCEDURE — 97110 THERAPEUTIC EXERCISES: CPT

## 2024-10-14 ENCOUNTER — CLINICAL SUPPORT (OUTPATIENT)
Dept: REHABILITATION | Facility: OTHER | Age: 34
End: 2024-10-14
Payer: MEDICAID

## 2024-10-14 DIAGNOSIS — Z98.1 S/P LUMBAR FUSION: ICD-10-CM

## 2024-10-14 DIAGNOSIS — R26.89 IMPAIRED GAIT AND MOBILITY: Primary | ICD-10-CM

## 2024-10-14 DIAGNOSIS — M53.86 DECREASED RANGE OF MOTION OF LUMBAR SPINE: ICD-10-CM

## 2024-10-14 PROCEDURE — 97110 THERAPEUTIC EXERCISES: CPT

## 2024-10-14 PROCEDURE — 97162 PT EVAL MOD COMPLEX 30 MIN: CPT

## 2024-10-14 NOTE — PROGRESS NOTES
NOTE: This is a duplicate copy utilized for reassessment purposes & continuity of care.  See pt's plan of care for co-signed copy.    [facility]  Functional Restoration Program  Physical Therapy Initial Evaluation    Date: 10/14/2024   Name: Scarlett Knox  Clinic Number: [id number]    Therapy Diagnosis:   Encounter Diagnoses   Name Primary?    S/P lumbar fusion     Impaired gait and mobility Yes    Decreased range of motion of lumbar spine      Physician: Lou Block P*    Physician Orders: PT Eval and Treat   Medical Diagnosis from Referral:   Diagnosis   Z98.1 (ICD-10-CM) - S/P lumbar fusion     Evaluation Date: 10/14/2024  Authorization Period Expiration: 9/23/25  Plan of Care Expiration: 1/14/2025  Visit # / Visits authorized: 1/ 1  FOTO: 1/ 3     Precautions: Standard    Time In: 2:30pm  Time Out: 3:40pm  Total Appointment Time (timed & untimed codes): 70 minutes       Subjective     Date of onset: 2020 onset of back pain and leg pain    Patient reported history of current condition:     From referring provider's note: HPI: the patient presents for MRI and CT results.  She is s/p L4/5 TLIF in August 2023 by Dr. Mike with residual right leg pain and left foot paresthesias.     MRI and CT were reviewed today.  There is good decompression with no evidence of adjacent segment pathology.  Hardware intact, no evidence of pseudoarthrosis or loosening.    History: Scarlett Knox is seen today for follow-up following the above listed procedure. Overall the patient is doing well but today notes she continues to have right leg pain.  Overall she is improved compared to before surgery but the pain in her right leg is limiting her activities.  She has been going to PT regularly.  She got a new shoe lift as well.  She takes gabapentin for pain.  She also reports numbness in her left foot.     Scarlett reports: the more she uses her back, the more leg flares. Did Healthy Back  program, finished but came here rather than Wellness. Gabapentin helps. Abby helped her realize she has a lot of fear around her back and movement.    Imaging, see imaging section    Prior Therapy: [facility] Program  Social History: lives with their partner  Occupation: medical scribe, not currently working  Prior Level of Function: yoga 6x per week  Current Level of Function: walking < 1 mile with 2 breaks, difficulty running errands    Pain:      Current 5/10, worst 7/10, best 3/10   Location: Low back, right leg  Description: Aching and Burning  Aggravating Factors: Sitting, Standing, Walking, and Lifting  Easing Factors:  Gabapentin    Patient's FRP goals:   Be able to attend academic program in person  Get back to 5 days per week of yoga  Get 30 mins of cardio exercise for health per day  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    Medications:   Scarlett has a current medication list which includes the following prescription(s): escitalopram oxalate, gabapentin, lithium, ondansetron, and tramadol.    Allergies:   Review of patient's allergies indicates:  No Known Allergies     Objective     Postural examination:  Seated: mild shoulder rounding  Standing: internal rotation at hips bilaterally    Range of Motion - Cervical   AROM AROM AROM    10/14/2024 Reassessment  Reassessment   Flexion  ° °   Extension  ° °   Side bending Right  ° °   Side bending Left  ° °   Rotation Right  ° °   Rotation Left  ° °     Range of Motion - Lumbar   10/14/2024      ROM Loss ROM Loss ROM Loss   Flexion within functional limits     Extension major loss p!     Side  bending Right within functional limits     Side bending Left within functional limits     Rotation Right minimal loss     Rotation Left minimal loss       Strength Testing   10/14/2024 Reassessment Reassessment   Motion Tested         Lower Extremity R L R L R L   Hip Flexion 4/5 4/5       Hip Extension 4/5 4/5       Hip Abduction 4/5 4/5       Hip Adduction 4/5 4/5       Knee Extension 5/5 5/5       Knee Flexion 5/5 5/5       Ankle Dorsiflexion 5/5 5/5         Functional Testing     10/14/2024 Reassessment  Reassessment   Timed Up and Go 14.86 sec sec sec   Single Limb Stance R LE >30 sec sec sec   Single Limb Stance L LE >30 sec sec sec   2 Minute Walk Test meters feet feet   6 Minute Walk Test 243.9 meters w/o AD w/ 2 breaks feet feet   Self Selected Walking Speed 0.68 m/sec m/sec m/sec     GAIT:  Assistive Device used: none  Level of Assistance: independent  Patient displays the following gait deviations:  decreased weight shift, antalgic gait, and toe walking .     Minimum standards/norms:    TUG:  < 13.5 seconds/ Males 7.3 sec, Females 8.1 sec  SLS:  26-29 sec  Ages 20-69  Self Selected Walking Speed:  .4-.8m/sec  Limited community ambulator                                                   .8- 1.2  Community ambulator                                                    1.2 m/sec and above  Able to safely cross streets        Limitation/Restriction for FOTO Lumbar Survey    Therapist reviewed FOTO scores for Scarlett Knox on 10/14/2024.   FOTO documents entered into [employer] - see Media section.    Limitation Score 10/14/2024: 43%    Predicted Score: 51%         TREATMENT     Total Treatment time (time-based codes) separate from Evaluation: 20 minutes     Scarlett received the treatments listed below:        Scarlett received therapeutic exercise to improve functional performance for 20 minutes, including:  Cervical AROM (flexion, extension, SB, rotation x5 ea) and lumbar AROM (flexion, extension, sidebending,  rotation x5 ea) while providing patient education  PT education:  Physical & psychological viscous pain cycles (see patient instructions 10/14/2024)  Role of consistent activity (graded exposure) to break the physical cycle  Importance of education & behavioral changes that promote intrinsic patient motivation to help break the psychological cycle  Impact on pt's functional goals when breaking each one of these cycles.    Impact central sensitization has on the sensory system in the chronic pain population  Pain Neuroscience Education  Explained sensitization of the nervous system using the Why You Hurt education system by Brittany Bridges PT, PhD  Discussed alarm metaphor, safety vs. Danger, and graded exposure within context of sensitized nervous system  Encouraged patient questions and elicited patient's ideas and beliefs around their pain history      PATIENT EDUCATION AND HOME EXERCISES     Education provided:   - Pain Neuroscience Education  - FRP Non-Cohort Programming    Written Home Exercises Provided: Patient instructed to cont prior HEP. Exercises were reviewed and Scarlett was able to demonstrate them prior to the end of the session.  Scarlett demonstrated good  understanding of the education provided. See EMR under Patient Instructions for information provided during therapy sessions.    ASSESSMENT   Scarlett is a 34 y.o. female referred to outpatient Physical Therapy with a medical diagnosis of status post-lumbar fusion. Jim presents to multidisciplinary pain clinic with deficits in gait speed and tolerance, reduced lumbar range of motion, decreased strength of hip musculature R > L, and pain impacting her functional capacity for extended sitting > 20 minutes, standing > 10 minutes, and walking > 1 mile. Jim is currently unable to return to working 2/2 previously state functional deficits, and has a low FOTO score indicating high impact of her pain on her quality of life.     Based on the above history and  physical examination an active physical therapy program within a multi-disciplinary setting is recommended.  Pt will benefit from skilled outpatient physical therapy to address the deficits listed below in the chart, provide pt/family education and to maximize pt's level of independence in the home and community environment.     Plan of care discussed with patient: Yes  Pt's spiritual, cultural and educational needs considered and patient is agreeable to the plan of care and goals as stated below:     Pt prognosis is Good.   Anticipated Barriers for therapy: chronic nature of issues, psychosocial factors, central sensitization    Medical Necessity is demonstrated by the following  History  Co-morbidities and personal factors that may impact the plan of care Co-morbidities:   See PMHx above    Personal Factors:   coping style     high   Examination  Body Structures and Functions, activity limitations and participation restrictions that may impact the plan of care Body Regions:   back  lower extremities    Body Systems:    ROM  strength  gait  transfers  motor control    Participation Restrictions:   None    Activity limitations:   Learning and applying knowledge  no deficits    General Tasks and Commands  no deficits    Communication  no deficits    Mobility  walking    Self care  no deficits    Domestic Life  shopping  doing house work (cleaning house, washing dishes, laundry)  assisting others    Interactions/Relationships  no deficits    Life Areas  no deficits    Community and Social Life  no deficits         high   Clinical Presentation evolving clinical presentation with changing clinical characteristics moderate   Decision Making/ Complexity Score: moderate       GOALS: Pt is in agreement with the following goals:  Goal Progress Towards Goal   SHORT Term: In 3 weeks, pt will:    Verbalize & demonstrate good understanding of resisted training with 3-1-3 second rep for [id number] contraction  "to encourage full muscle recruitment for strength & endurance. Initiated 10/14/2024   Verbalize & demonstrate good understanding of home stretch routine to improve AROM, help decrease pain & improve mobility. Initiated 10/14/2024   Demonstrate proper supine or seated diaphragmatic breathing with decreased chest excursion & emphasis on full abdominal excursion & increased expiration time to promote muscle relaxation & increased parasympathetic activity to improve patient tolerance to activities. Initiated 10/14/2024   Verbalize good understanding of importance of proper posture in resisted exercise, functional activities & ADL's in order to help reduce postural strain, promote proper breathing. Initiated 10/14/2024   Demonstrate good understanding of full body resisted exercises w/ use of pictures &/or verbal cues to promote independence w/ exercise at the end of the program. Initiated 10/14/2024   Verbalize plan for continuing resisted exercises w/ specific location (i.e. commercial gym, home, community fitness center)  to incorporate all major muscle groups to maintain & progress therapeutic functional improvements. Initiated 10/14/2024   Verbalize difference between  "hurt vs harm"  to demonstrate understanding of fear avoidance in pain cycle.  Initiated 10/14/2024       Midterm: In 8 weeks, pt will:    Verbalize good understanding of activities planning/scheduling (stretching, mindfulness, resisted training, & cardio) prescribed by PT/OT following the end of the program to sustain & progress functional improvements. Initiated 10/14/2024   Perform selected resisted training w/ minimal to no cuing in therapy session to be independent w/ resisted strengthening. Initiated 10/14/2024   Demonstrate improved symptom self-management w/ posture/positioning and mechanical movements and/or implementation of appropriate modalities throughout a typical day.  Initiated 10/14/2024   Demonstrate independence w/ home stretch " routine to improve AROM, help decrease pain & improve mobility. Initiated 10/14/2024       Long Term: In 12 weeks, pt will:    Perform selected cardio & resisted training independently to continue safe regular performance of daily exercise. Initiated 10/14/2024   Improve 6 MWT to 300 meters or greater to improve gait speed and mobility. Initiated 10/14/2024   Demonstrate Timed Up & Go (with appropriate assistive device, if necessary) of 8 seconds or less to decrease fall risk and improve functional mobility. Initiated 10/14/2024   Score 51 % or greater on Lumbar FOTO to indicate significant functional improvements in impaired functions secondary to pain. Initiated 10/14/2024   Pt will perform yoga practice >/= 3 times per week for exercise and recreation of enjoyment (patient goal) Initiated 10/14/2024   Pt will sit >/= 1 hour and stand >/= 30 mins to demonstrate capacity for return to work part-time. Initiated 10/14/2024       PLAN   Plan of care Certification: 10/14/2024 to 1/14/2024.    Outpatient Physical Therapy 3 times weekly for 12 weeks to include the following interventions: Gait Training, Manual Therapy, Moist Heat/ Ice, Neuromuscular Re-ed, Patient Education, Therapeutic Activities, and Therapeutic Exercise.     Justin Tiwari, PT

## 2024-10-14 NOTE — PLAN OF CARE
[facility]  Functional Restoration Program  Physical Therapy Initial Evaluation    Date: 10/14/2024   Name: Scarlett Knox  Clinic Number: [id number]    Therapy Diagnosis:   Encounter Diagnoses   Name Primary?    S/P lumbar fusion     Impaired gait and mobility Yes    Decreased range of motion of lumbar spine      Physician: Lou Block P*    Physician Orders: PT Eval and Treat   Medical Diagnosis from Referral:   Diagnosis   Z98.1 (ICD-10-CM) - S/P lumbar fusion     Evaluation Date: 10/14/2024  Authorization Period Expiration: 9/23/25  Plan of Care Expiration: 1/14/2025  Visit # / Visits authorized: 1/ 1  FOTO: 1/ 3     Precautions: Standard    Time In: 2:30pm  Time Out: 3:40pm  Total Appointment Time (timed & untimed codes): 70 minutes       Subjective     Date of onset: 2020 onset of back pain and leg pain    Patient reported history of current condition:     From referring provider's note: HPI: the patient presents for MRI and CT results.  She is s/p L4/5 TLIF in August 2023 by Dr. Mike with residual right leg pain and left foot paresthesias.     MRI and CT were reviewed today.  There is good decompression with no evidence of adjacent segment pathology.  Hardware intact, no evidence of pseudoarthrosis or loosening.    History: Scarlett Knox is seen today for follow-up following the above listed procedure. Overall the patient is doing well but today notes she continues to have right leg pain.  Overall she is improved compared to before surgery but the pain in her right leg is limiting her activities.  She has been going to PT regularly.  She got a new shoe lift as well.  She takes gabapentin for pain.  She also reports numbness in her left foot.     Scarlett reports: the more she uses her back, the more leg flares. Did Healthy [facility] program, finished but came here rather than [facility]. Gabapentin helps. Abby helped her realize she has a lot of fear around her back  and movement.    Imaging, see imaging section    Prior Therapy: [facility]  Social History: lives with their partner  Occupation: medical scribe, not currently working  Prior Level of Function: yoga 6x per week  Current Level of Function: walking < 1 mile with 2 breaks, difficulty running errands    Pain:      Current 5/10, worst 7/10, best 3/10   Location: Low back, right leg  Description: Aching and Burning  Aggravating Factors: Sitting, Standing, Walking, and Lifting  Easing Factors: Gabapentin    Patient's FRP goals:   Be able to attend academic program in person  Get back to 5 days per week of yoga  Get 30 mins of cardio exercise for health per day  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    Medications:   Scarlett has a current medication list which includes the following prescription(s): escitalopram oxalate, gabapentin, lithium, ondansetron, and tramadol.    Allergies:   Review of patient's allergies indicates:  No Known Allergies     Objective     Postural examination:  Seated: mild shoulder rounding  Standing: internal rotation at hips bilaterally    Range of Motion - Cervical   AROM AROM AROM    10/14/2024 Reassessment  Reassessment   Flexion  ° °   Extension  ° °   Side bending Right  ° °   Side bending Left  ° °   Rotation Right  ° °   Rotation Left  ° °     Range of Motion - Lumbar   10/14/2024      ROM Loss ROM Loss ROM Loss   Flexion within functional limits     Extension major loss p!     Side bending Right within functional limits     Side bending Left within functional limits     Rotation Right minimal loss     Rotation Left  minimal loss       Strength Testing   10/14/2024 Reassessment Reassessment   Motion Tested         Lower Extremity R L R L R L   Hip Flexion 4/5 4/5       Hip Extension 4/5 4/5       Hip Abduction 4/5 4/5       Hip Adduction 4/5 4/5       Knee Extension 5/5 5/5       Knee Flexion 5/5 5/5       Ankle Dorsiflexion 5/5 5/5         Functional Testing     10/14/2024 Reassessment  Reassessment   Timed Up and Go 14.86 sec sec sec   Single Limb Stance R LE >30 sec sec sec   Single Limb Stance L LE >30 sec sec sec   2 Minute Walk Test meters feet feet   6 Minute Walk Test 243.9 meters w/o AD w/ 2 breaks feet feet   Self Selected Walking Speed 0.68 m/sec m/sec m/sec     GAIT:  Assistive Device used: none  Level of Assistance: independent  Patient displays the following gait deviations:  decreased weight shift, antalgic gait, and toe walking.     Minimum standards/norms:    TUG:  < 13.5 seconds/ Males 7.3 sec, Females 8.1 sec  SLS:  26-29 sec  Ages 20-69  Self Selected Walking Speed:  .4-.8m/sec  Limited community ambulator                                                   .8- 1.2  Community ambulator                                                    1.2 m/sec and above  Able to safely cross streets        Limitation/Restriction for FOTO Lumbar Survey    Therapist reviewed FOTO scores for Scarlett Knox on 10/14/2024.   FOTO documents entered into [employer] - see Media section.    Limitation Score 10/14/2024: 43%    Predicted Score: 51%         TREATMENT     Total Treatment time (time-based codes) separate from Evaluation: 20 minutes     Scarlett received the treatments listed below:        Scarlett received therapeutic exercise to improve functional performance for 20 minutes, including:  Cervical AROM (flexion, extension, SB, rotation x5 ea) and lumbar AROM (flexion, extension, sidebending, rotation x5 ea) while providing patient education  PT education:  Physical & psychological viscous pain cycles (see patient instructions  10/14/2024)  Role of consistent activity (graded exposure) to break the physical cycle  Importance of education & behavioral changes that promote intrinsic patient motivation to help break the psychological cycle  Impact on pt's functional goals when breaking each one of these cycles.    Impact central sensitization has on the sensory system in the chronic pain population  Pain Neuroscience Education  Explained sensitization of the nervous system using the Why You Hurt education system by Brittany Bridges PT, PhD  Discussed alarm metaphor, safety vs. Danger, and graded exposure within context of sensitized nervous system  Encouraged patient questions and elicited patient's ideas and beliefs around their pain history    PATIENT EDUCATION AND HOME EXERCISES     Education provided:   - Pain Neuroscience Education  - FRP Non-Cohort Programming    Written Home Exercises Provided: Patient instructed to cont prior HEP. Exercises were reviewed and Scarlett was able to demonstrate them prior to the end of the session.  Scarlett demonstrated good  understanding of the education provided. See EMR under Patient Instructions for information provided during therapy sessions.    ASSESSMENT   Scarlett is a 34 y.o. female referred to outpatient Physical Therapy with a medical diagnosis of status post-lumbar fusion. Pt presents to multidisciplinary pain clinic with deficits in gait speed and tolerance, reduced lumbar range of motion, decreased strength of hip musculature R > L, and pain impacting her functional capacity for extended sitting > 20 minutes, standing > 10 minutes, and walking > 1 mile. Pt is currently unable to return to working 2/2 previously state functional deficits, and has a low FOTO score indicating high impact of her pain on her quality of life.     Based on the above history and physical examination an active physical therapy program within a multi-disciplinary setting is recommended.  Pt will benefit from skilled  outpatient physical therapy to address the deficits listed below in the chart, provide pt/family education and to maximize pt's level of independence in the home and community environment.     Plan of care discussed with patient: Yes  Pt's spiritual, cultural and educational needs considered and patient is agreeable to the plan of care and goals as stated below:     Pt prognosis is Good.   Anticipated Barriers for therapy: chronic nature of issues, psychosocial factors, central sensitization    Medical Necessity is demonstrated by the following  History  Co-morbidities and personal factors that may impact the plan of care Co-morbidities:   See PMHx above    Personal Factors:   coping style     high   Examination  Body Structures and Functions, activity limitations and participation restrictions that may impact the plan of care Body Regions:   back  lower extremities    Body Systems:    ROM  strength  gait  transfers  motor control    Participation Restrictions:   None    Activity limitations:   Learning and applying knowledge  no deficits    General Tasks and Commands  no deficits    Communication  no deficits    Mobility  walking    Self care  no deficits    Domestic Life  shopping  doing house work (cleaning house, washing dishes, laundry)  assisting others    Interactions/Relationships  no deficits    Life Areas  no deficits    Community and Social Life  no deficits         high   Clinical Presentation evolving clinical presentation with changing clinical characteristics moderate   Decision Making/ Complexity Score: moderate       GOALS: Pt is in agreement with the following goals:  Goal Progress Towards Goal   SHORT Term: In 3 weeks, pt will:    Verbalize & demonstrate good understanding of resisted training with 3-1-3 second rep for baoalrzqds-vnyrejfwj-uglisgupi contraction to encourage full muscle recruitment for strength & endurance. Initiated 10/14/2024   Verbalize & demonstrate good understanding of home  "stretch routine to improve AROM, help decrease pain & improve mobility. Initiated 10/14/2024   Demonstrate proper supine or seated diaphragmatic breathing with decreased chest excursion & emphasis on full abdominal excursion & increased expiration time to promote muscle relaxation & increased parasympathetic activity to improve patient tolerance to activities. Initiated 10/14/2024   Verbalize good understanding of importance of proper posture in resisted exercise, functional activities & ADL's in order to help reduce postural strain, promote proper breathing. Initiated 10/14/2024   Demonstrate good understanding of full body resisted exercises w/ use of pictures &/or verbal cues to promote independence w/ exercise at the end of the program. Initiated 10/14/2024   Verbalize plan for continuing resisted exercises w/ specific location (i.e. commercial gym, home, community fitness center)  to incorporate all major muscle groups to maintain & progress therapeutic functional improvements. Initiated 10/14/2024   Verbalize difference between  "hurt vs harm"  to demonstrate understanding of fear avoidance in pain cycle.  Initiated 10/14/2024       Midterm: In 8 weeks, pt will:    Verbalize good understanding of activities planning/scheduling (stretching, mindfulness, resisted training, & cardio) prescribed by PT/OT following the end of the program to sustain & progress functional improvements. Initiated 10/14/2024   Perform selected resisted training w/ minimal to no cuing in therapy session to be independent w/ resisted strengthening. Initiated 10/14/2024   Demonstrate improved symptom self-management w/ posture/positioning and mechanical movements and/or implementation of appropriate modalities throughout a typical day.  Initiated 10/14/2024   Demonstrate independence w/ home stretch routine to improve AROM, help decrease pain & improve mobility. Initiated 10/14/2024       Long Term: In 12 weeks, pt will:    Perform " selected cardio & resisted training independently to continue safe regular performance of daily exercise. Initiated 10/14/2024   Improve 6 MWT to 300 meters or greater to improve gait speed and mobility. Initiated 10/14/2024   Demonstrate Timed Up & Go (with appropriate assistive device, if necessary) of 8 seconds or less to decrease fall risk and improve functional mobility. Initiated 10/14/2024   Score 51 % or greater on Lumbar FOTO to indicate significant functional improvements in impaired functions secondary to pain. Initiated 10/14/2024   Pt will perform yoga practice >/= 3 times per week for exercise and recreation of enjoyment (patient goal) Initiated 10/14/2024   Pt will sit >/= 1 hour and stand >/= 30 mins to demonstrate capacity for return to work part-time. Initiated 10/14/2024       PLAN   Plan of care Certification: 10/14/2024 to 1/14/2024.    Outpatient Physical Therapy 3 times weekly for 12 weeks to include the following interventions: Gait Training, Manual Therapy, Moist Heat/ Ice, Neuromuscular Re-ed, Patient Education, Therapeutic Activities, and Therapeutic Exercise.     Justin Tiwari, PT

## 2024-10-15 NOTE — PROGRESS NOTES
[facility]    Functional Restoration Program  Physical Therapy Treatment Note  FRP Non-Cohort    Name: Scarlett Knox  MRN:[id number]      Therapy Diagnosis:   Encounter Diagnoses   Name Primary?    Decreased range of motion of lumbar spine Yes    Impaired gait and mobility      Physician: No ref. provider found    Date: 10/16/2024    Physician Orders: PT Eval and Treat   Medical Diagnosis from Referral:   Diagnosis   Z98.1 (ICD-10-CM) - S/P lumbar fusion      Evaluation Date: 10/14/2024  Authorization Period Expiration: 9/23/25  Plan of Care Expiration: 1/14/2025  Visit # / Visits authorized: 2/20  FOTO: 1/ 3      Precautions: Standard    Time In: 2:30pm  Time Out: 3:30pm  Total Billable Time: 60 minutes    SUBJECTIVE       Scarlett reports increased pain and stiffness today, and concern that she may get a pain flare similar to one she experienced in [facility] that felt like a huge step backward after significant progress in cardio and exercise tolerance.     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       Limitation/Restriction for FOTO Lumbar Survey     Therapist reviewed FOTO scores for Scarlett Knox on 10/14/2024.   FOTO documents entered into [employer] - see Media section.     Limitation Score 10/14/2024: 43%     Predicted Score: 51%           Treatment       Scarlett received the Individualized Treatments listed below:     Scarlett participated in dynamic functional therapeutic exercises to improve strength, range of motion, and functional performance for 60 minutes, including:    Full body functional mobility w/ 3-10 sec hold technique &/or 3-5 repetition technique to improve  functional performance. In order to:  Improve driving safety, visual & spatial awareness:  Cervical flx/ext  Cervical side bending  Cervical rotation  Cervical protraction/retraction  Improve lifting mechanics, showering/bathing, dressing, cooking, reaching, pushing & fine motor skills  Shld rolls  Shld depression/elevation  Scapular retraction/protraction/scaption  Posterior shld stretch (cross arm)  Shld ER stretch (chicken wing)  Elbow flx/ext  Forearm supination/pronation  Wrist flx/ext  Open/close hands/fingers  Improve bed mobility & rolling, bending over, cooking & cleaning:  Seated trunk rotations  Seated trunk/thoracic ext/flx  Improve functional gait, squatting, sitting tolerance, functional balance:   Seated marches & knee to chest  Seated knee flx/ext  Seated hip ER/piriformis stretch  Seated hamstring stretch  Seated toe raise to heel raise   Seated ankle circles each way  Improve overhead reaching/activities, standing tolerance:  Standing lumbar extensions  Standing lateral leaning stretch  Standing quad stretch (UE support for balance)    Pain Neuroscience Education  Explained sensitization of the nervous system using the Why You Hurt education system by Brittany Bridges PT, PhD  Discussed alarm metaphor, safety vs. Danger, and graded exposure within context of sensitized nervous system  Encouraged patient questions and elicited patient's ideas and beliefs around their pain history    Diaphragmatic breathing:   Verbal cues for unlabored, long & relaxed breathing w/ increased time for exhalation  Awareness of pulling breath down away from mouth to hips  Cues for for proper abdominal excursion & decreased use of accessory muscles of breathing (hand on stomach & on chest; increased stomach motion, decreased chest motion)  Education on inhalation activating sympathetic nervous system   Education on exhalation activating parasympathetic nervous system  Performed supine & seated      Review of Current HEP:  -  Child's pose  - Cat/cow  - Abdominal stretch/push up  - Wig wags  - Bridge  - Alt toe taps  - Belly breathing    Peripheral muscle strengthening which included 1-2 sets of 10-20 repetitions at a slow, controlled 5-7 second per rep pace focused on strengthening supporting musculature for improved body mechanics & functional mobility.  Patient & therapist focused on proper form & speed during treatment to ensure optimal strengthening of each targeted muscle group & neuromuscular re-education. Patients are instructed to keep sets/reps with proper load to stay at 3-5 out of 10 on the provided modified Lu exertion scale.    BATCA Machine Exercises Weight (lbs) Sets Repetitions Lu Exertion Scale Rating   Leg Extension        Hamstring Curls       Chest Press       Pec Fly       Lat Pull Down       Mid Row       Bicep Bar Curls       Standing Tricep Extension       Single Leg Press           Patient Education and Home Exercises     Home Exercises Provided and Patient Education Provided     Education provided:   - FRP Educational Topics: Modified Lu Exertion Scale, Diaphragmatic Breathing, Activity Pacing for Pain & Debility , Central Sensitization , and Posture & Body Mechanics    Written Home Exercises Provided: yes. Exercises were reviewed and Scarlett was able to demonstrate them prior to the end of the session.  Scarlett demonstrated good  understanding of the education provided. See EMR under Patient Instructions for information provided during therapy sessions    HEP from [facility]:  - Child's pose  - Cat/cow  - Abdominal stretch/push up  - Wig wags  - Bridge  - Alt toe taps  - Belly breathing    ASSESSMENT     Scarlett tolerated treatment very well today. Spent significant time reviewing pain science education, and all inputs to pain experience that can amplify it including difficult emotional experiences and stress. Pt very receptive, and open to the idea that she needs to develop greater proprioception and  awareness as a part of rehab process to be aware of when her body is giving her feedback to slow down before she has a flare. Initiated stretch routine today to very good effect, with mod cues for shoulder depression and reducing range of motion to avoid hyperextension of joints.    Scarlett Is progressing well towards her goals.   Pt prognosis is Good.     Pt will continue to benefit from skilled outpatient physical therapy to address the deficits listed in the problem list box on initial evaluation, provide pt/family education and to maximize pt's level of independence in the home and community environment.     Pt's spiritual, cultural and educational needs considered and pt agreeable to plan of care and goals.     Anticipated Barriers for therapy: chronic nature of issues, psychosocial factors, central sensitization    GOALS: Pt is in agreement with the following goals:  Goal Progress Towards Goal   SHORT Term: In 3 weeks, pt will:     Verbalize & demonstrate good understanding of resisted training with 3-1-3 second rep for khhmrifkze-gjevnomip-ptnkpbvcy contraction to encourage full muscle recruitment for strength & endurance. Initiated 10/14/2024   Verbalize & demonstrate good understanding of home stretch routine to improve AROM, help decrease pain & improve mobility. Initiated 10/14/2024   Demonstrate proper supine or seated diaphragmatic breathing with decreased chest excursion & emphasis on full abdominal excursion & increased expiration time to promote muscle relaxation & increased parasympathetic activity to improve patient tolerance to activities. Initiated 10/14/2024   Verbalize good understanding of importance of proper posture in resisted exercise, functional activities & ADL's in order to help reduce postural strain, promote proper breathing. Initiated 10/14/2024   Demonstrate good understanding of full body resisted exercises w/ use of pictures &/or verbal cues to promote independence w/ exercise at  "the end of the program. Initiated 10/14/2024   Verbalize plan for continuing resisted exercises w/ specific location (i.e. commercial gym, home, community fitness center)  to incorporate all major muscle groups to maintain & progress therapeutic functional improvements. Initiated 10/14/2024   Verbalize difference between  "hurt vs harm"  to demonstrate understanding of fear avoidance in pain cycle.  Initiated 10/14/2024         Midterm: In 8 weeks, pt will:     Verbalize good understanding of activities planning/scheduling (stretching, mindfulness, resisted training, & cardio) prescribed by PT/OT following the end of the program to sustain & progress functional improvements. Initiated 10/14/2024   Perform selected resisted training w/ minimal to no cuing in therapy session to be independent w/ resisted strengthening. Initiated 10/14/2024   Demonstrate improved symptom self-management w/ posture/positioning and mechanical movements and/or implementation of appropriate modalities throughout a typical day.  Initiated 10/14/2024   Demonstrate independence w/ home stretch routine to improve AROM, help decrease pain & improve mobility. Initiated 10/14/2024         Long Term: In 12 weeks, pt will:     Perform selected cardio & resisted training independently to continue safe regular performance of daily exercise. Initiated 10/14/2024   Improve 6 MWT to 300 meters or greater to improve gait speed and mobility. Initiated 10/14/2024   Demonstrate Timed Up & Go (with appropriate assistive device, if necessary) of 8 seconds or less to decrease fall risk and improve functional mobility. Initiated 10/14/2024   Score 51 % or greater on Lumbar FOTO to indicate significant functional improvements in impaired functions secondary to pain. Initiated 10/14/2024   Pt will perform yoga practice >/= 3 times per week for exercise and recreation of enjoyment (patient goal) Initiated 10/14/2024   Pt will sit >/= 1 hour and stand >/= 30 mins " to demonstrate capacity for return to work part-time. Initiated 10/14/2024        PLAN     Continue PT per POC     Justin Tiwari, PT, DPT

## 2024-10-16 ENCOUNTER — CLINICAL SUPPORT (OUTPATIENT)
Dept: REHABILITATION | Facility: OTHER | Age: 34
End: 2024-10-16
Payer: MEDICAID

## 2024-10-16 DIAGNOSIS — M53.86 DECREASED RANGE OF MOTION OF LUMBAR SPINE: Primary | ICD-10-CM

## 2024-10-16 DIAGNOSIS — R26.89 IMPAIRED GAIT AND MOBILITY: ICD-10-CM

## 2024-10-16 PROCEDURE — 97530 THERAPEUTIC ACTIVITIES: CPT

## 2024-10-21 ENCOUNTER — DOCUMENTATION ONLY (OUTPATIENT)
Dept: REHABILITATION | Facility: OTHER | Age: 34
End: 2024-10-21
Payer: MEDICAID

## 2024-10-21 NOTE — PROGRESS NOTES
Scarlett didn't show for scheduled OT evaluation today. Has additional appointments for OT already on the books, however, POC needs to be established still.    Voicemail left with request to call us back to confirm attendance for OT, as well as PT.    FRANDY Henriquez, OTR/L, CEAS-I  10/21/2024

## 2024-10-28 ENCOUNTER — CLINICAL SUPPORT (OUTPATIENT)
Dept: REHABILITATION | Facility: OTHER | Age: 34
End: 2024-10-28
Payer: MEDICAID

## 2024-10-28 DIAGNOSIS — M53.86 DECREASED RANGE OF MOTION OF LUMBAR SPINE: Primary | ICD-10-CM

## 2024-10-28 DIAGNOSIS — R26.89 IMPAIRED GAIT AND MOBILITY: ICD-10-CM

## 2024-10-28 DIAGNOSIS — R52 PAIN AGGRAVATED BY ACTIVITIES OF DAILY LIVING: Primary | ICD-10-CM

## 2024-10-28 PROCEDURE — 97530 THERAPEUTIC ACTIVITIES: CPT

## 2024-10-28 PROCEDURE — 97110 THERAPEUTIC EXERCISES: CPT | Mod: CQ

## 2024-10-28 PROCEDURE — 97165 OT EVAL LOW COMPLEX 30 MIN: CPT

## 2024-10-29 PROBLEM — R52 PAIN AGGRAVATED BY ACTIVITIES OF DAILY LIVING: Status: ACTIVE | Noted: 2024-10-29

## 2024-10-30 ENCOUNTER — CLINICAL SUPPORT (OUTPATIENT)
Dept: REHABILITATION | Facility: OTHER | Age: 34
End: 2024-10-30
Payer: MEDICAID

## 2024-10-30 DIAGNOSIS — R52 PAIN AGGRAVATED BY ACTIVITIES OF DAILY LIVING: Primary | ICD-10-CM

## 2024-10-30 DIAGNOSIS — M53.86 DECREASED RANGE OF MOTION OF LUMBAR SPINE: Primary | ICD-10-CM

## 2024-10-30 DIAGNOSIS — R26.89 IMPAIRED GAIT AND MOBILITY: ICD-10-CM

## 2024-10-30 PROCEDURE — 97530 THERAPEUTIC ACTIVITIES: CPT

## 2024-10-30 PROCEDURE — 97110 THERAPEUTIC EXERCISES: CPT

## 2024-11-06 ENCOUNTER — CLINICAL SUPPORT (OUTPATIENT)
Dept: REHABILITATION | Facility: OTHER | Age: 34
End: 2024-11-06
Payer: MEDICAID

## 2024-11-06 DIAGNOSIS — M53.86 DECREASED RANGE OF MOTION OF LUMBAR SPINE: Primary | ICD-10-CM

## 2024-11-06 DIAGNOSIS — R26.89 IMPAIRED GAIT AND MOBILITY: ICD-10-CM

## 2024-11-06 DIAGNOSIS — R52 PAIN AGGRAVATED BY ACTIVITIES OF DAILY LIVING: Primary | ICD-10-CM

## 2024-11-06 PROCEDURE — 97530 THERAPEUTIC ACTIVITIES: CPT

## 2024-11-06 PROCEDURE — 97110 THERAPEUTIC EXERCISES: CPT | Mod: CQ

## 2024-11-06 NOTE — PATIENT INSTRUCTIONS
Ari Mancera; the easyway to stop smoking.    Please have a look on YouTube to watch this video: Understanding Pain in less than 5 minutes, and what to do about it!

## 2024-11-06 NOTE — PROGRESS NOTES
"[facility]  [facility]  Occupational Therapy Treatment Note      Name: Scarlett Knox  MRN:[id number]  Encounter Date: 2024    Diagnosis:   Encounter Diagnosis   Name Primary?    Pain aggravated by activities of daily living Yes     Referring provider: No ref. provider found    Physician Orders: eval and treat; FRP  Medical Diagnosis: Z98.1 (ICD-10-CM) - S/P lumbar fusion  Evaluation Date: 10/28/2024  Insurance Authorization Period Expiration: 24  Plan of Care Certification Period: 2025  Visit # / Visits authorized:  (plus eval)  FOTO completed: 1/3     Precautions:  Standard and Fall    Time In: 10:00  Time Out: 11:04  Total Billable Time: 56 minutes    SUBJECTIVE     She reports: "I have been doing my stretches." "Today is a bad day because of the news".    Scarlett was compliant with parts of home exercise program given previously.     Response to previous treatment: Ms. Knox noted: no concern reported    Functional change: "I've been singing out loud a lot".    Pain:       Currently rating pain as 3/10.  Location: Low back, right leg  Description: Aching and Burning. Numbness in left leg.     Patient Specific Activities based on Personal goals:  Activity  2024    Performance (P) Satisfaction (S)   Sitting tolerance (for studies)  5 4   2.  Chores (house cleaning) 5 3   3.  Carrying groceries up the stairs into the house 3 3   4.  Intimacy 4 4   5.  Driving long distance (>4 hrs) 4 3           Total Score 4.2 3.4   Ratin-10; unable/unsatisfied - Able/Satisfied    Objective     Objective Measures updated at progress report unless specified.    COGNITIVE Exam  Behaviors: normal, sharing frustration.  Memory: not tested; able to follow 2 step commands during session  Safety awareness/insight to disability: impaired judgment and impaired insight; admitting to tendency to boom/bust.  Coping skills/emotional control: passive coping strategies engaging " in hobbies: tracy; during session Appropriate to situation, with danger in me messaging expressed.     Dominant hand: right     Active Range of Motion  Upper Extremity 10/28/2024    RUE LUE   Hands WFL WFL   Wrist WFL WFL   Elbows WFL WFL   Shoulders WFL WFL      Upper Extremity Strength - Manual Muscle Testing  Motion Tested 10/28/2024 10/30/2024    Right Left  Right  Left comment   Shoulder Flexion NT NT 5/5 5/5    Shoulder Extension NT NT 5/5 5/5    Shoulder Abduction NT NT 5/5 5/5 Slight shoulder elevation (B)ly   Shoulder IR NT NT 5/5 5/5    Shoulder ER NT NT 5/5 5/5    Elbow Flexion NT NT 5/5 5/5    Elbow Extension NT NT 5/5 5/5        Strength (in pounds, rung II, average of three trials)    10/28/2024   Right hand  60.67 lbs   Left hand  56 lbs   [norms for women aged 30-34: R=78.7 +/-19.2; L=68.0 +/-17.7 (Jennifer et al, 1985)]      Functional Strength  Pile Testing: Lifting    10/28/2024 (lbs/HR/DAYTON) 11/6/2024  (lbs/HR/DAYTON)    Repetitive Floor to Waist      8/107/4 NT   Repetitive Waist to Shoulder    NT 13/129/5   1- Time Maximum   NT NT   Carry-2 Handed (40ft)   NT NT   Work demand Level   Sedentary (#10 max, 1.5 METS)     Reason for stop point Increased time required for completing repetitions, Inability to maintain proper body mechanics. Inability to maintain proper body mechanics.   comment 1 rep only. Due to time PILE resting not completed. Tends to rush. Rushing   The work demand level listed above is based on the physical performance screening test performed. More comprehensive physical testing integrated with clinical findings would be required to derived an accurate work capacity.      Balance  5 times sit-stand (adults 18-65 y/o) 10/28/2024   >12 sec= fall risk for general elderly  >16 sec= fall risk for Parkinson's disease  >10 sec= balance/vestibular dysfunction (<59 y/o)  >14.2 sec= balance/vestibular dysfunction (>59 y/o)  >12 sec= fall risk for CVA 15.71 seconds     (without UE  used to push up from chair)      Endurance Testing: Modified Ramp Treadmill Test    10/28/2024   Minutes Completed  3 minutes   % Grades  10 %   Speed (mph)  1.7 mph   METS 4    bpm   Lu Rating Perceived Exertion Scale   4   Reason for stop point Inability to maintain safe speed, Increased discomfort, Fear of increased pain   Comments Gait patterns with use of spinal rotations with circumduction      During physical testing, participation level was consistent.      Limitation/Restriction for FOTO NOC Survey     Therapist reviewed FOTO scores for Scarlett Knox on 10/28/2024.   FOTO documents entered into [employer] - see Media section.     Limitation Score: 60%                    Treatment     Scarlett received the treatments listed below:     Therapeutic activities and functional lifting activities in order to increase participation in, endurance for, and performance with vocational, selfcare ADLs, and leisure activities in order to improve quality of life, for 56 minutes, including:    Full body stretch w/ 3-10 sec hold technique &/or 3-5 repetition technique on all of the following:  Cervical flx/ext  Cervical sidebending  Cervical rotation  Cervical protraction/retraction  Shld rolls  Shld depression/elevation  Scapular retraction/protraction/scaption  Posterior shld stretch (cross arm)  Shld ER stretch (chicken wing)  Elbow flx/ext  Forearm supination/pronation  Wrist flx/ext  Open/close hands/fingers  Seated trunk rotations  Seated trunk/thoracic ext/flx  Seated marches & knee to chest  Seated knee flx/ext  Seated hip ER/piriformis stretch  Seated hamstring stretch  Seated toe raise to heel raise   Seated ankle circles each way  Standing lumbar extensions  Standing lateral trunk stretch  Standing quad stretch    While standing, with seated break as appropriate, review of hand out regarding body mechanics and tips to prevent back pain, including review of recommended daily water intake based on body weight.  RAHEL check list given regarding home work and tracy set up. Review of using elevated wash basin when doing dishes, to decrease static forward bending with task performance.    Discussed established goals and activities regarding personal goals; reviewed and revised goals based on Scarlett's feedback. Scarlett rated performance of activities related to personal goals, as well as satisfaction of said performance. (Refer to subjective section for details).    Scarlett educated on proper mechanics to get on and off floor using chair for support, including education on steps to take to ensure safety if she were to have a fall: roll to back, complete body scan to see if all body parts are okay, roll to side, hands/knees, crawl to chair and then use steps to get on/off floor. Scarlett demonstrated understanding x 3 reps, and reported increased self-efficacy after completion of activity. Handouts given on proper mechanics. Pt rated as Hard (5/10) exertion.  Ball walk outs for forward fold stretch used afterwards.    Scarlett completed functional lifting, floor to waist, 3 reps x 5 lbs with exertion rated Hard (5/10); focused education on sequence of technique. Pt plans to use this lift related to cleaning. See objective section for waist to shoulder.    No environmental, cultural, spiritual, developmental or education needs expressed or noted.    Patient Education and Home Exercises   Education provided:   - Progress towards goals   - importance of completion of exercises and activities outside of session to attain goals.   - proper posture and body mechanics.  - anticipated muscular soreness following lift testing and strategies for management including stretching, using ice, scheduled rest.  - Functional pain scale  - DAYTON rate of perceived exertion scale.   - pain cycle  - pursed lip and diaphragmatic breathing techniques  - smoking cessation.  - acknowledging negative self talk or criticism, then shift focus (breath, task,  HR for instance)  - daily stretch routine  - Tips to manage pain  - importance to acknowledge when noting self to rush, being judgmental, for instance.  - On/off the floor.  - Educational video: Understanding Pain in less than 5 minutes, and what to do about it!     Written Home Exercises Provided: yes, and recommended to continue with daily stretch routine, and PNS activation outside of stretching and meditation.   Exercises were reviewed and Scarlett was able to demonstrate them prior to the end of the session. Scarlett demonstrated good  understanding of the education provided.     See EMR under Patient Instructions for exercises provided during therapy sessions. Patient education also located in FRP binder provided on day 1 of the cohort.     Assessment     Ms. Knox with great engagement during session. Critical with own performance, but open to education about importance to acknowledging performance, and shifting method of grading task not by outcomes but using DAYTON scale. Willing to continue to acknowledge negative self talk, to allow opportunity to shift focus.     Activities related to personal goals not rated today; education given brain/body connection, with link to video explaining chronic pain included in this note and patient instructions. Also given RAHEL, for assessment of computer desk at home.    Scarlett is progressing towards her goals and there are no updates to goals at this time.. Scarlett's prognosis is Good due to motivation.    Scarlett would continue to benefit from skilled outpatient occupational therapy to address the deficits listed in the problem list on initial evaluation, provide patient education, and to maximize patient's level of independence in the home and community environment.     Anticipated barriers to occupational therapy: fear    Goals:     SHORT Term goals: In 3 weeks, patient will:  Status of goal:   Implements correct use of breathing techniques during functional  lifting tasks, with minimal cues needed. Initiated   Utilizes DAYTON rate of exertion scale to pace performance with functional lifting tasks, and implements self-care strategies during activity participation to manage pain. Initiated   Verbalize understanding of energy conservation and pacing strategies during daily habits, roles, and routines in order to improve tolerance for work and home demands.  Initiated   Completes 5x sit to stand test in 12 seconds or less to improve ability to participate in community mobility.   Initiated   Demonstrate increased positional tolerance to the level needed for work, home, and community activities (standing in cue at social event, managing supplies for outing, streneous IADL), as evidenced by having a METS level of 5. Initiated   Demonstrate proper body mechanics with functional lifting tasks (floor to waist, waist to shoulder, maximum lift, and box carry), via teach back method with minimal cues needed. Initiated   Demonstrate increased functional strength by lifting 13 lbs waist to shoulder for management of (I)ADL, including dressing and grooming, placing items in kitchen cabinets, folding towels, and/or managing suitcase when traveling.   Initiated   Demonstrate increased functional strength by lifting 18 lbs floor to waist to meet demand of work/home routine, including lifting groceries, managing laundry, and/or managing suitcase when traveling.   Initiated   Tolerate Home Activity Plan (HAP)/ Home Exercise Program (HEP) to promote safety and independence with ADL, with report of completion of at least 2 out of 4 days outside of program participation.  Initiated   Show improved perception of own abilities as evidenced by increase of FOTO scores to established MDC value and perception of performance ratings regarding personal long term goals.  Initiated         Long Term goals: In 9 weeks, patient will: Status of goal:   Report implementation of application of mindfulness,  stretching, and other coping strategies for improved participation in daily routine. Initiated   Verbalize functional application of lifting techniques in daily life (golfers lift, floor to waist, waist to shoulder). Initiated   Completes 5x sit to stand test in 10 seconds or less to improve ability to participate in community mobility.  Initiated   Applies functional application of lifting techniques (golfer's lift, floor to waist, waist to shoulder) in activities of daily life, per patient report.  Initiated   Demonstrate physical capacities consistent with a Light-Medium (#35 max, frequent lifting/carrying #20, 3 METS)  demand level, as needed to perform activities to be successful in roles of life, regarding Household Management and community mobility and possible return to work setting. Initiated   Have an improvement of 3 points in 2/5 personal goals in rating of  performance.  Initiated   Show improved perception of own abilities as evidenced by increase of FOTO scores to established MC II value and perception of performance ratings regarding personal long term goals.  Initiated      Patient Specific Goals; to be met after 2 month of (I) application of tools/techniques learned.  Vocational: attending academic program in person   Recreational: standing in line at events   Daily Living Activities: chores   Measured by patient's self-reported performance and satisfaction of personal FRP goals, listed above in subjective section.   Total Score = sum of the activity performance scores / number of activities  Minimum detectable change (90%CI) for average score = 2 points  Minimum detectable change (90%CI) for single activity score  = 3 points     Plan     Continue per plan of care.     Updates/Grading for next session: view chronic pain video, discuss bike fit, progress with lifting sequence, RAHEL (work set up)    FRANDY Henriquez, OTR/L, CEAS-I  11/6/2024

## 2024-11-06 NOTE — PROGRESS NOTES
[facility]    Functional [facility]oration Program  Physical Therapy Treatment Note  FRP Non-Cohort    Name: Scarlett Knox  MRN:[id number]      Therapy Diagnosis:   Encounter Diagnoses   Name Primary?    Decreased range of motion of lumbar spine Yes    Impaired gait and mobility      Physician: Lou Block P*       Date: 11/6/2024    Physician Orders: PT Eval and Treat   Medical Diagnosis from Referral:   Diagnosis   Z98.1 (ICD-10-CM) - S/P lumbar fusion      Evaluation Date: 10/14/2024  Authorization Period Expiration: 9/23/25  Plan of Care Expiration: 1/14/2025  Visit # / Visits authorized: 5/20  FOTO: 1/ 3      Precautions: Standard    Time In: 11:00 am   Time Out: 12:00 pm   Total Billable Time: 60      SUBJECTIVE     Scarlett reports feeling improved symptoms since LV. Pt reports performing FRP stretch routine, HB exercises and walking daily. Pt has questions about correct position for some of her ex.     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       Limitation/Restriction for FOTO Lumbar Survey     Therapist reviewed FOTO scores for Scarlett Knox on 10/14/2024.   FOTO documents entered into [employer] - see Media section.     INTAKE Score 10/14/2024: 43%     Predicted Score: 51%           Treatment       Scarlett received the Individualized Treatments listed below:     Scarlett participated in dynamic functional therapeutic exercises to improve strength, range of motion, and functional performance for 60 minutes, including:    Mindfulness Moment:  Actively lowering HR  Mindfulness-based activity involving deep breathing, mindful awareness of the body   Used to activate  parasympathetic nervous system to decrease autonomic threat perception and thus reduce central sensitivity to pain  Patient demonstrate safe performance    PT education:  Physical & psychological viscous pain cycles (see patient instructions 10/30/2024)  Role of consistent activity (graded exposure) to break the physical cycle  Importance of education & behavioral changes that promote intrinsic patient motivation to help break the psychological cycle  Impact on pt's functional goals when breaking each one of these cycles.    Impact central sensitization has on the sensory system in the chronic pain population      FUNCTIONAL ENDURANCE   DATE EQUIPMENT VARIABILITIES QUALITY    10/30/24 TM 1 speed  5 min  4/10 RPE                           Fitter board - mid setting, 20 tilt bouts, 1 seated ( pose), 1 standing rest break /c fwd lean NP   Fwd/Bwd    Trial 1-  2 hands fade to 1 hand     Trial 2 - 1 hand fade to attempts at no hand    Lat    Trial 1 - 1 hand     Trial 2 - 1 hand fade to no hand       PT education:  Physical & psychological viscous pain cycles (see patient instructions 10/30/2024)  Role of consistent activity (graded exposure) to break the physical cycle  Importance of education & behavioral changes that promote intrinsic patient motivation to help break the psychological cycle  Impact on pt's functional goals when breaking each one of these cycles.    Impact central sensitization has on the sensory system in the chronic pain population    Peripheral muscle strengthening which included 1-2 sets of 10-20 repetitions at a slow, controlled 5-7 second per rep pace focused on strengthening supporting musculature for improved body mechanics & functional mobility.  Patient & therapist focused on proper form & speed during treatment to ensure optimal strengthening of each targeted muscle group & neuromuscular re-education. Patients are instructed to keep sets/reps with proper load to stay at 3-5 out of 10 on  the provided modified Lu exertion scale.    [employer] Machine Exercises Weight (lbs) Sets Repetitions Lu Exertion Scale Rating   Leg Extension        Hamstring Curls       Chest Press       Pec Fly       Lat Pull Down       Mid Row       Bicep Bar Curls       Standing Tricep Extension       Single Leg Press 25  20 3     Floor transfer    Pain Neuroscience Education  Explained sensitization of the nervous system using the Why You Hurt education system by Brittany Bridges PT, PhD  Discussed alarm metaphor, safety vs. Danger, and graded exposure within context of sensitized nervous system  Encouraged patient questions and elicited patient's ideas and beliefs around their pain history    Spoon theory to assist with managing energy with chronic pain    Diaphragmatic breathing:   Verbal cues for unlabored, long & relaxed breathing w/ increased time for exhalation  Awareness of pulling breath down away from mouth to hips  Cues for for proper abdominal excursion & decreased use of accessory muscles of breathing (hand on stomach & on chest; increased stomach motion, decreased chest motion)  Education on inhalation activating sympathetic nervous system   Education on exhalation activating parasympathetic nervous system  Performed supine in Z lying    Restorative Yoga poses:  Child's pose c/ pillow  Legs up the wall      Patient Education and Home Exercises     Home Exercises Provided and Patient Education Provided     Education provided:   - FRP Educational Topics: Modified Lu Exertion Scale, Physical & Psychological Pain Cycles, Activity Pacing for Pain & Debility , Central Sensitization , Mindfulness Resources, and Posture & Body Mechanics    Written Home Exercises Provided: yes. Exercises were reviewed and Scarlett was able to demonstrate them prior to the end of the session.  Scarlett demonstrated good  understanding of the education provided. See EMR under Patient Instructions for information provided during therapy  sessions    HEP from [employer]:  - Child's pose  - Cat/cow  - Abdominal stretch/push up  - Wig wags  - Bridge  - Alt toe taps  - Belly breathing    ASSESSMENT     Scarlett /c good participation in active tx today. Patient demonstrate good initial core stability and movement control floor transfer.  Patient demonstrate sx self management technique /c flexion directional preference.  Returned to resistance training /c attention to exertion level for inc safety and Ind at future workouts /p FRP.      Scarlett Is progressing well towards her goals.   Pt prognosis is Good.     Pt will continue to benefit from skilled outpatient physical therapy to address the deficits listed in the problem list box on initial evaluation, provide pt/family education and to maximize pt's level of independence in the home and community environment.     Pt's spiritual, cultural and educational needs considered and pt agreeable to plan of care and goals.     Anticipated Barriers for therapy: chronic nature of issues, psychosocial factors, central sensitization    GOALS: Pt is in agreement with the following goals:  Goal Progress Towards Goal   SHORT Term: In 3 weeks, pt will:     Verbalize & demonstrate good understanding of resisted training with 3-1-3 second rep for dzvkayqgya-[id number]-[id number] contraction to encourage full muscle recruitment for strength & endurance. Initiated 10/14/2024   Verbalize & demonstrate good understanding of home stretch routine to improve AROM, help decrease pain & improve mobility. Initiated 10/14/2024   Demonstrate proper supine or seated diaphragmatic breathing with decreased chest excursion & emphasis on full abdominal excursion & increased expiration time to promote muscle relaxation & increased parasympathetic activity to improve patient tolerance to activities. Initiated 10/14/2024   Verbalize good understanding of importance of proper posture in resisted exercise, functional activities & ADL's in  "order to help reduce postural strain, promote proper breathing. Initiated 10/14/2024   Demonstrate good understanding of full body resisted exercises w/ use of pictures &/or verbal cues to promote independence w/ exercise at the end of the program. Initiated 10/14/2024   Verbalize plan for continuing resisted exercises w/ specific location (i.e. commercial gym, home, community fitness center)  to incorporate all major muscle groups to maintain & progress therapeutic functional improvements. Initiated 10/14/2024   Verbalize difference between  "hurt vs harm"  to demonstrate understanding of fear avoidance in pain cycle.  Initiated 10/14/2024         Midterm: In 8 weeks, pt will:     Verbalize good understanding of activities planning/scheduling (stretching, mindfulness, resisted training, & cardio) prescribed by PT/OT following the end of the program to sustain & progress functional improvements. Initiated 10/14/2024   Perform selected resisted training w/ minimal to no cuing in therapy session to be independent w/ resisted strengthening. Initiated 10/14/2024   Demonstrate improved symptom self-management w/ posture/positioning and mechanical movements and/or implementation of appropriate modalities throughout a typical day.  Initiated 10/14/2024   Demonstrate independence w/ home stretch routine to improve AROM, help decrease pain & improve mobility. Initiated 10/14/2024         Long Term: In 12 weeks, pt will:     Perform selected cardio & resisted training independently to continue safe regular performance of daily exercise. Initiated 10/14/2024   Improve 6 MWT to 300 meters or greater to improve gait speed and mobility. Initiated 10/14/2024   Demonstrate Timed Up & Go (with appropriate assistive device, if necessary) of 8 seconds or less to decrease fall risk and improve functional mobility. Initiated 10/14/2024   Score 51 % or greater on Lumbar FOTO to indicate significant functional improvements in impaired " functions secondary to pain. Initiated 10/14/2024   Pt will perform yoga practice >/= 3 times per week for exercise and recreation of enjoyment (patient goal) Initiated 10/14/2024   Pt will sit >/= 1 hour and stand >/= 30 mins to demonstrate capacity for return to work part-time. Initiated 10/14/2024        PLAN     Continue PT per POC     Shana Hester, PTA

## 2024-11-07 NOTE — PROGRESS NOTES
"[facility]  Occupational Therapy Treatment Note      Name: Scarlett Knox  MRN:[id number]  Encounter Date: 2024    Diagnosis:   Encounter Diagnosis   Name Primary?    Pain aggravated by activities of daily living Yes     Referring provider: No ref. provider found    Physician Orders: eval and treat; FRP  Medical Diagnosis: Z98.1 (ICD-10-CM) - S/P lumbar fusion  Evaluation Date: 10/28/2024  Insurance Authorization Period Expiration: 24  Plan of Care Certification Period: 2025  Visit # / Visits authorized: 3/20 (plus eval)  FOTO completed: 1/3     Precautions:  Standard and Fall    Time In: 11:05  Time Out: 12:00  Total Billable Time: 55 minutes    SUBJECTIVE     She reports: "I'm mad with my parents that they let me choose the surgery when I was a kid. Of course I picked the easier surgery, even when they knew it wasn't going to last".     Scarlett was compliant with parts of home exercise program given previously.     Response to previous treatment: Ms. Knox noted: no concern reported    Functional change: "I've been singing out loud a lot".    Pain:       Currently rating pain as 3/10.  Location: Low back, right leg  Description: Aching and Burning. Numbness in left leg.     Patient Specific Activities based on Personal goals:  Activity  2024    Performance (P) Satisfaction (S)   Sitting tolerance (for studies)  5 4   2.  Chores (house cleaning) 5 3   3.  Carrying groceries up the stairs into the house 3 3   4.  Intimacy 4 4   5.  Driving long distance (>4 hrs) 4 3           Total Score 4.2 3.4   Ratin-10; unable/unsatisfied - Able/Satisfied    Objective     Objective Measures updated at progress report unless specified.    COGNITIVE Exam  Behaviors: normal, sharing frustration.  Memory: not tested; able to follow 2 step commands during session  Safety awareness/insight to disability: impaired judgment and impaired insight; admitting to " tendency to boom/bust.  Coping skills/emotional control: passive coping strategies engaging in hobbies: tracy; during session Appropriate to situation, with danger in me messaging expressed.     Dominant hand: right     Active Range of Motion  Upper Extremity 10/28/2024    RUE LUE   Hands WFL WFL   Wrist WFL WFL   Elbows WFL WFL   Shoulders WFL WFL      Upper Extremity Strength - Manual Muscle Testing  Motion Tested 10/28/2024 10/30/2024    Right Left  Right  Left comment   Shoulder Flexion NT NT 5/5 5/5    Shoulder Extension NT NT 5/5 5/5    Shoulder Abduction NT NT 5/5 5/5 Slight shoulder elevation (B)ly   Shoulder IR NT NT 5/5 5/5    Shoulder ER NT NT 5/5 5/5    Elbow Flexion NT NT 5/5 5/5    Elbow Extension NT NT 5/5 5/5        Strength (in pounds, rung II, average of three trials)    10/28/2024   Right hand  60.67 lbs   Left hand  56 lbs   [norms for women aged 30-34: R=78.7 +/-19.2; L=68.0 +/-17.7 (Jaxsontz et al, 1985)]      Functional Strength  Pile Testing: Lifting    10/28/2024 (lbs/HR/DAYTON) 11/6/2024  (lbs/HR/DAYTON)    Repetitive Floor to Waist      8/107/4 NT   Repetitive Waist to Shoulder    NT 13/129/5   1- Time Maximum   NT NT   Carry-2 Handed (40ft)   NT NT   Work demand Level   Sedentary (#10 max, 1.5 METS)     Reason for stop point Increased time required for completing repetitions, Inability to maintain proper body mechanics. Inability to maintain proper body mechanics.   comment 1 rep only. Due to time PILE resting not completed. Tends to rush. Rushing   The work demand level listed above is based on the physical performance screening test performed. More comprehensive physical testing integrated with clinical findings would be required to derived an accurate work capacity.      Balance  5 times sit-stand (adults 18-63 y/o) 10/28/2024   >12 sec= fall risk for general elderly  >16 sec= fall risk for Parkinson's disease  >10 sec= balance/vestibular dysfunction (<59 y/o)  >14.2 sec=  "balance/vestibular dysfunction (>59 y/o)  >12 sec= fall risk for CVA 15.71 seconds     (without UE used to push up from chair)      Endurance Testing: Modified Ramp Treadmill Test    10/28/2024   Minutes Completed  3 minutes   % Grades  10 %   Speed (mph)  1.7 mph   METS 4    bpm   Lu Rating Perceived Exertion Scale   4   Reason for stop point Inability to maintain safe speed, Increased discomfort, Fear of increased pain   Comments Gait patterns with use of spinal rotations with circumduction      During physical testing, participation level was consistent.      Limitation/Restriction for FOTO NOC Survey     Therapist reviewed FOTO scores for Scarlett Knox on 10/28/2024.   FOTO documents entered into EPIC - see Media section.     Limitation Score: 60%                    Treatment     Scarlett received the treatments listed below:     Therapeutic activities and functional lifting activities in order to increase participation in, endurance for, and performance with vocational, selfcare ADLs, and leisure activities in order to improve quality of life, for 55 minutes, including:    Seated mindfulness activity x 15 minutes, while seated. Emotional release occurring. After task, spoke about having done EMDR, and needing to continue with therapy to deal with past traumas.    Review of oculular movements; in seated, review of palming technique for calming purposes, and relaxation of eyes, noting specks/images, improving when looking at dark surface before initiation of palming. Educated on benefits of palming, sunning.     Education benefits left hand mousing.    While seated, review of video "chronic pain explained in 5 minutes", with emotional release occurring.    Sit to stand transfer, with education provided regarding base of support/center point of gravity and similatiry of sit to stand technieue with sequence of lifting technique floor to waist. Rated as sort of hard exertion (4/10). Tendency to engage " quads and hip flexors to bounce of the seat, vs pushing through heels.    No environmental, cultural, spiritual, developmental or education needs expressed or noted.    Patient Education and Home Exercises   Education provided:   - Progress towards goals   - importance of completion of exercises and activities outside of session to attain goals.   - proper posture and body mechanics.  - anticipated muscular soreness following lift testing and strategies for management including stretching, using ice, scheduled rest.  - Functional pain scale  - DAYTON rate of perceived exertion scale.   - pain cycle  - pursed lip and diaphragmatic breathing techniques  - smoking cessation.  - acknowledging negative self talk or criticism, then shift focus (breath, task, HR for instance)  - daily stretch routine  - Tips to manage pain  - importance to acknowledge when noting self to rush, being judgmental, for instance.  - On/off the floor.  - Educational video: Understanding Pain in less than 5 minutes, and what to do about it!  - being a work place athlete  - 20/20/20 rule, use mirror if needed to create the distance  - palming, sunning for eye relaxation  - left hand mousing.     Written Home Exercises Provided: Patient instructed to cont prior HEP. Energy banking tracking sheet provided.   Exercises were reviewed and Scarlett was able to demonstrate them prior to the end of the session. Scarlett demonstrated good  understanding of the education provided.     See EMR under Patient Instructions for exercises provided during therapy sessions. Patient education also located in FRP binder provided on day 1 of the cohort.     Assessment     Ms. Knox engaged during session, open to education. Becoming emotional with mindfulness activity, and education chronic pain and brain/body connection. Speech normal in volume, high in rate; is judgmental about own performance, but aware. Overall, tolerated session well.     Scarlett is progressing  towards her goals and there are no updates to goals at this time.. Scarlett's prognosis is Good due to motivation.    Scarlett would continue to benefit from skilled outpatient occupational therapy to address the deficits listed in the problem list on initial evaluation, provide patient education, and to maximize patient's level of independence in the home and community environment.     Anticipated barriers to occupational therapy: fear    Goals:     SHORT Term goals: In 3 weeks, patient will:  Status of goal:   Implements correct use of breathing techniques during functional lifting tasks, with minimal cues needed. Initiated   Utilizes DAYTON rate of exertion scale to pace performance with functional lifting tasks, and implements self-care strategies during activity participation to manage pain. Initiated   Verbalize understanding of energy conservation and pacing strategies during daily habits, roles, and routines in order to improve tolerance for work and home demands.  Initiated   Completes 5x sit to stand test in 12 seconds or less to improve ability to participate in community mobility.   Initiated   Demonstrate increased positional tolerance to the level needed for work, home, and community activities (standing in cue at social event, managing supplies for outing, streneous IADL), as evidenced by having a METS level of 5. Initiated   Demonstrate proper body mechanics with functional lifting tasks (floor to waist, waist to shoulder, maximum lift, and box carry), via teach back method with minimal cues needed. Initiated   Demonstrate increased functional strength by lifting 13 lbs waist to shoulder for management of (I)ADL, including dressing and grooming, placing items in kitchen cabinets, folding towels, and/or managing suitcase when traveling.   Initiated   Demonstrate increased functional strength by lifting 18 lbs floor to waist to meet demand of work/home routine, including lifting groceries, managing  laundry, and/or managing suitcase when traveling.   Initiated   Tolerate Home Activity Plan (HAP)/ Home Exercise Program (HEP) to promote safety and independence with ADL, with report of completion of at least 2 out of 4 days outside of program participation.  Initiated   Show improved perception of own abilities as evidenced by increase of FOTO scores to established MDC value and perception of performance ratings regarding personal long term goals.  Initiated         Long Term goals: In 9 weeks, patient will: Status of goal:   Report implementation of application of mindfulness, stretching, and other coping strategies for improved participation in daily routine. Initiated   Verbalize functional application of lifting techniques in daily life (golfers lift, floor to waist, waist to shoulder). Initiated   Completes 5x sit to stand test in 10 seconds or less to improve ability to participate in community mobility.  Initiated   Applies functional application of lifting techniques (golfer's lift, floor to waist, waist to shoulder) in activities of daily life, per patient report.  Initiated   Demonstrate physical capacities consistent with a Light-Medium (#35 max, frequent lifting/carrying #20, 3 METS)  demand level, as needed to perform activities to be successful in roles of life, regarding Household Management and community mobility and possible return to work setting. Initiated   Have an improvement of 3 points in 2/5 personal goals in rating of  performance.  Initiated   Show improved perception of own abilities as evidenced by increase of FOTO scores to established MC II value and perception of performance ratings regarding personal long term goals.  Initiated      Patient Specific Goals; to be met after 2 month of (I) application of tools/techniques learned.  Vocational: attending academic program in person   Recreational: standing in line at events   Daily Living Activities: chores   Measured by patient's  self-reported performance and satisfaction of personal FRP goals, listed above in subjective section.   Total Score = sum of the activity performance scores / number of activities  Minimum detectable change (90%CI) for average score = 2 points  Minimum detectable change (90%CI) for single activity score  = 3 points     Plan     Continue per plan of care.     Updates/Grading for next session: discuss bike fit, progress with lifting sequence, RAHEL (work set up), energy banking tracking sheet.    FRANDY Henriquez, OTR/L, CEAS-I  11/8/2024

## 2024-11-08 ENCOUNTER — CLINICAL SUPPORT (OUTPATIENT)
Dept: REHABILITATION | Facility: OTHER | Age: 34
End: 2024-11-08
Payer: MEDICAID

## 2024-11-08 DIAGNOSIS — M53.86 DECREASED RANGE OF MOTION OF LUMBAR SPINE: Primary | ICD-10-CM

## 2024-11-08 DIAGNOSIS — R52 PAIN AGGRAVATED BY ACTIVITIES OF DAILY LIVING: Primary | ICD-10-CM

## 2024-11-08 DIAGNOSIS — R26.89 IMPAIRED GAIT AND MOBILITY: ICD-10-CM

## 2024-11-08 PROCEDURE — 97110 THERAPEUTIC EXERCISES: CPT | Mod: CQ

## 2024-11-08 PROCEDURE — 97530 THERAPEUTIC ACTIVITIES: CPT

## 2024-11-08 NOTE — PROGRESS NOTES
"[facility]  [facility]  Occupational Therapy Treatment Note      Name: Scarlett Knox  MRN:[id number]  Encounter Date: 2024    Diagnosis:   Encounter Diagnosis   Name Primary?    Pain aggravated by activities of daily living Yes     Referring provider: No ref. provider found    Physician Orders: eval and treat; FRP  Medical Diagnosis: Z98.1 (ICD-10-CM) - S/P lumbar fusion  Evaluation Date: 10/28/2024  Insurance Authorization Period Expiration: 24  Plan of Care Certification Period: 2025  Visit # / Visits authorized:  (plus eval)  FOTO completed: 1/3     Precautions:  Standard and Fall    Time In: 13:35  Time Out: 14:30  Total Billable Time: 55 minutes    SUBJECTIVE     She reports: "I am seeing benefit of coming here".    Scarlett was compliant with parts of home exercise program given previously.     Response to previous treatment: Ms. Knox noted: no concern reported    Functional change: "I've been singing out loud a lot".    Pain:       Currently rating pain as 3/10.  Location: Low back, right leg  Description: Aching and Burning. Numbness in left leg.     Patient Specific Activities based on Personal goals:  Activity  2024    Performance (P) Satisfaction (S)   Sitting tolerance (for studies)  5 4   2.  Chores (house cleaning) 5 3   3.  Carrying groceries up the stairs into the house 3 3   4.  Intimacy 4 4   5.  Driving long distance (>4 hrs) 4 3           Total Score 4.2 3.4   Ratin-10; unable/unsatisfied - Able/Satisfied    Objective     Objective Measures updated at progress report unless specified.    COGNITIVE Exam  Behaviors: normal, sharing frustration.  Memory: not tested; able to follow 2 step commands during session  Safety awareness/insight to disability: impaired judgment and impaired insight; admitting to tendency to boom/bust.  Coping skills/emotional control: passive coping strategies engaging in hobbies: tracy; during session " Appropriate to situation, with danger in me messaging expressed.     Dominant hand: right     Active Range of Motion  Upper Extremity 10/28/2024    RUE LUE   Hands WFL WFL   Wrist WFL WFL   Elbows WFL WFL   Shoulders WFL WFL      Upper Extremity Strength - Manual Muscle Testing  Motion Tested 10/28/2024 10/30/2024    Right Left  Right  Left comment   Shoulder Flexion NT NT 5/5 5/5    Shoulder Extension NT NT 5/5 5/5    Shoulder Abduction NT NT 5/5 5/5 Slight shoulder elevation (B)ly   Shoulder IR NT NT 5/5 5/5    Shoulder ER NT NT 5/5 5/5    Elbow Flexion NT NT 5/5 5/5    Elbow Extension NT NT 5/5 5/5        Strength (in pounds, rung II, average of three trials)    10/28/2024   Right hand  60.67 lbs   Left hand  56 lbs   [norms for women aged 30-34: R=78.7 +/-19.2; L=68.0 +/-17.7 (Jennifer et al, 1985)]      Functional Strength  Pile Testing: Lifting    10/28/2024 (lbs/HR/DAYTON) 11/6/2024  (lbs/HR/DAYTON)    Repetitive Floor to Waist      8/107/4 NT   Repetitive Waist to Shoulder    NT 13/129/5   1- Time Maximum   NT NT   Carry-2 Handed (40ft)   NT NT   Work demand Level   Sedentary (#10 max, 1.5 METS)     Reason for stop point Increased time required for completing repetitions, Inability to maintain proper body mechanics. Inability to maintain proper body mechanics.   comment 1 rep only. Due to time PILE resting not completed. Tends to rush. Rushing   The work demand level listed above is based on the physical performance screening test performed. More comprehensive physical testing integrated with clinical findings would be required to derived an accurate work capacity.      Balance  5 times sit-stand (adults 18-63 y/o) 10/28/2024   >12 sec= fall risk for general elderly  >16 sec= fall risk for Parkinson's disease  >10 sec= balance/vestibular dysfunction (<59 y/o)  >14.2 sec= balance/vestibular dysfunction (>59 y/o)  >12 sec= fall risk for CVA 15.71 seconds     (without UE used to push up from chair)     "  Endurance Testing: Modified Ramp Treadmill Test    10/28/2024   Minutes Completed  3 minutes   % Grades  10 %   Speed (mph)  1.7 mph   METS 4    bpm   Lu Rating Perceived Exertion Scale   4   Reason for stop point Inability to maintain safe speed, Increased discomfort, Fear of increased pain   Comments Gait patterns with use of spinal rotations with circumduction      During physical testing, participation level was consistent.      Limitation/Restriction for FOTO NOC Survey     Therapist reviewed FOTO scores for Scarlett Knox on 10/28/2024.   FOTO documents entered into [employer] - see Media section.     Limitation Score: 60%                    Treatment     Scarlett received the treatments listed below:     Therapeutic activities and functional lifting activities in order to increase participation in, endurance for, and performance with vocational, selfcare ADLs, and leisure activities in order to improve quality of life, for 55 minutes, including:    Full body stretch w/ 3-10 sec hold technique &/or 3-5 repetition technique on all of the following:  Cervical flx/ext  Cervical sidebending  Cervical rotation  Cervical protraction/retraction  Shld rolls  Shld depression/elevation  Scapular retraction/protraction/scaption  Posterior shld stretch (cross arm)  Shld ER stretch (chicken wing)  Elbow flx/ext  Forearm supination/pronation  Wrist flx/ext  Open/close hands/fingers  Seated trunk rotations  Seated trunk/thoracic ext/flx  Seated marches & knee to chest  Seated knee flx/ext  Seated hip ER/piriformis stretch  Seated hamstring stretch  Seated toe raise to heel raise   Seated ankle circles each way  Standing lumbar extensions  Standing lateral trunk stretch  Standing quad stretch    Review of pictures brought in regarding work spaces. Discussion on monitor placement, with identification of having primary vs using dual monitors equally. Requested pictures taken by someone else, or her "working", for review " of posture when seated.    Review of sit to stand, with education regarding physics involved. Improved performance at end of activity. Rated as sort of hard (4/10) exertion.       Scarlett completed functional lifting, floor to waist, 5 reps x 5 lbs with exertion rated Sort of hard (4/10); focused education on sequence.     Discussion on intimacy, and positioning for positive engagement, as with extreme height difference between herself and her partner. Education provided regarding brain/body connection as array of emotions possible affecting pain levels. Open to education provided.     No environmental, cultural, spiritual, developmental or education needs expressed or noted.    Patient Education and Home Exercises   Education provided:   - Progress towards goals   - importance of completion of exercises and activities outside of session to attain goals.   - proper posture and body mechanics.  - anticipated muscular soreness following lift testing and strategies for management including stretching, using ice, scheduled rest.  - Functional pain scale  - DAYTON rate of perceived exertion scale.   - pain cycle  - pursed lip and diaphragmatic breathing techniques  - smoking cessation.  - acknowledging negative self talk or criticism, then shift focus (breath, task, HR for instance)  - daily stretch routine  - Tips to manage pain  - importance to acknowledge when noting self to rush, being judgmental, for instance.  - On/off the floor.  - Educational video: Understanding Pain in less than 5 minutes, and what to do about it!  - being a work place athlete  - 20/20/20 rule, use mirror if needed to create the distance  - palming, sunning for eye relaxation  - left hand mousing.     Written Home Exercises Provided: Patient instructed to cont prior HEP. Energy banking tracking sheet provided.   Exercises were reviewed and Scarlett was able to demonstrate them prior to the end of the session. Scarlett demonstrated good   understanding of the education provided.     See EMR under Patient Instructions for exercises provided during therapy sessions. Patient education also located in P binder provided on day 1 of the cohort.     Assessment     Ms. Knox pleasant during session, vulnerable into sharing difficulties and pain regarding intimacy with partner. Open to education provided. Improved sit to stand with review of physics involved. Not as judgmental during session towards self. Overall, tolerated session well.     Scarlett is progressing towards her goals and there are no updates to goals at this time.. Scarlett's prognosis is Good due to motivation.    Scarlett would continue to benefit from skilled outpatient occupational therapy to address the deficits listed in the problem list on initial evaluation, provide patient education, and to maximize patient's level of independence in the home and community environment.     Anticipated barriers to occupational therapy: fear    Goals:     SHORT Term goals: In 3 weeks, patient will:  Status of goal:   Implements correct use of breathing techniques during functional lifting tasks, with minimal cues needed. Initiated   Utilizes DAYTON rate of exertion scale to pace performance with functional lifting tasks, and implements self-care strategies during activity participation to manage pain. Initiated   Verbalize understanding of energy conservation and pacing strategies during daily habits, roles, and routines in order to improve tolerance for work and home demands.  Initiated   Completes 5x sit to stand test in 12 seconds or less to improve ability to participate in community mobility.   Initiated   Demonstrate increased positional tolerance to the level needed for work, home, and community activities (standing in cue at social event, managing supplies for outing, streneous IADL), as evidenced by having a METS level of 5. Initiated   Demonstrate proper body mechanics with functional lifting  tasks (floor to waist, waist to shoulder, maximum lift, and box carry), via teach back method with minimal cues needed. Initiated   Demonstrate increased functional strength by lifting 13 lbs waist to shoulder for management of (I)ADL, including dressing and grooming, placing items in kitchen cabinets, folding towels, and/or managing suitcase when traveling.   Initiated   Demonstrate increased functional strength by lifting 18 lbs floor to waist to meet demand of work/home routine, including lifting groceries, managing laundry, and/or managing suitcase when traveling.   Initiated   Tolerate Home Activity Plan (HAP)/ Home Exercise Program (HEP) to promote safety and independence with ADL, with report of completion of at least 2 out of 4 days outside of program participation.  Initiated   Show improved perception of own abilities as evidenced by increase of FOTO scores to established MDC value and perception of performance ratings regarding personal long term goals.  Initiated         Long Term goals: In 9 weeks, patient will: Status of goal:   Report implementation of application of mindfulness, stretching, and other coping strategies for improved participation in daily routine. Initiated   Verbalize functional application of lifting techniques in daily life (golfers lift, floor to waist, waist to shoulder). Initiated   Completes 5x sit to stand test in 10 seconds or less to improve ability to participate in community mobility.  Initiated   Applies functional application of lifting techniques (golfer's lift, floor to waist, waist to shoulder) in activities of daily life, per patient report.  Initiated   Demonstrate physical capacities consistent with a Light-Medium (#35 max, frequent lifting/carrying #20, 3 METS)  demand level, as needed to perform activities to be successful in roles of life, regarding Household Management and community mobility and possible return to work setting. Initiated   Have an improvement of  3 points in 2/5 personal goals in rating of  performance.  Initiated   Show improved perception of own abilities as evidenced by increase of FOTO scores to established MC II value and perception of performance ratings regarding personal long term goals.  Initiated      Patient Specific Goals; to be met after 2 month of (I) application of tools/techniques learned.  Vocational: attending academic program in person   Recreational: standing in line at events   Daily Living Activities: chores   Measured by patient's self-reported performance and satisfaction of personal FRP goals, listed above in subjective section.   Total Score = sum of the activity performance scores / number of activities  Minimum detectable change (90%CI) for average score = 2 points  Minimum detectable change (90%CI) for single activity score  = 3 points     Plan     Continue per plan of care.     Updates/Grading for next session: discuss bike fit, progress with lifting sequence, RAHEL (work set up), energy banking tracking sheet, intimacy/positions.     FRANDY Henriquez, OTR/L, CEAS-I  11/11/2024

## 2024-11-11 ENCOUNTER — CLINICAL SUPPORT (OUTPATIENT)
Dept: REHABILITATION | Facility: OTHER | Age: 34
End: 2024-11-11
Payer: MEDICAID

## 2024-11-11 DIAGNOSIS — R26.89 IMPAIRED GAIT AND MOBILITY: ICD-10-CM

## 2024-11-11 DIAGNOSIS — M53.86 DECREASED RANGE OF MOTION OF LUMBAR SPINE: Primary | ICD-10-CM

## 2024-11-11 DIAGNOSIS — R52 PAIN AGGRAVATED BY ACTIVITIES OF DAILY LIVING: Primary | ICD-10-CM

## 2024-11-11 PROCEDURE — 97110 THERAPEUTIC EXERCISES: CPT | Mod: CQ

## 2024-11-11 PROCEDURE — 97530 THERAPEUTIC ACTIVITIES: CPT

## 2024-11-11 NOTE — PROGRESS NOTES
[facility]    Functional Restoration Program  Physical Therapy Treatment Note  FRP Non-Cohort    Name: Scarlett Knox  MRN:[id number]      Therapy Diagnosis:   Encounter Diagnoses   Name Primary?    Decreased range of motion of lumbar spine Yes    Impaired gait and mobility      Physician: Lou Block P*       Date: 11/11/2024    Physician Orders: PT Eval and Treat   Medical Diagnosis from Referral:   Diagnosis   Z98.1 (ICD-10-CM) - S/P lumbar fusion      Evaluation Date: 10/14/2024  Authorization Period Expiration: 9/23/25  Plan of Care Expiration: 1/14/2025  Visit # / Visits authorized: 7/20  FOTO: 1/ 3      Precautions: Standard    Time In: 2:30 am   Time Out: 3:30 am   Total Billable Time: 60      SUBJECTIVE     Scarlett reports emotional, physical and mental wreck from talking about her past during OT. Pt reports min posterior hip pain walking up here. Pt remarks knowing it hurt not harm and decreases if she stretches.   Pt reports having missed a couple of doses Gabapentin and not feeling increased pain. She wants to ask her Dr to lower the dose.     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       Limitation/Restriction for FOTO Lumbar Survey     Therapist reviewed FOTO scores for Scarlett Knox on 10/14/2024.   FOTO documents entered into [employer] - see Media section.     INTAKE Score 10/14/2024: 43%     Predicted Score: 51%           Treatment       Scarlett received the Individualized Treatments listed below:     Scarlett participated in dynamic functional therapeutic exercises to improve strength, range of motion, and functional performance for 60 minutes,  including:    Full body functional mobility w/ 3-10 sec hold technique &/or 3-5 repetition NP  Cervical flx/ext  Cervical side bending  Cervical rotation  Cervical protraction/retraction  Shld rolls  Shld depression/elevation  Scapular retraction/protraction/scaption  Posterior shld stretch (cross arm)  Shld ER stretch (chicken wing)  Elbow flx/ext  Forearm supination/pronation  Wrist flx/ext  Open/close hands/fingers  Seated trunk rotations  Seated trunk/thoracic ext/flx  Seated marches & knee to chest  Seated knee flx/ext  Seated hip ER/piriformis stretch  Seated hamstring stretch  Seated toe raise to heel raise   Seated ankle circles each way  Standing lumbar extensions  Standing lateral leaning stretch  Standing quad stretch (UE support for balance)    Mindfulness Moment:    Mindfulness-based activity involving deep breathing, mindful awareness of the body   Used to activate parasympathetic nervous system to decrease autonomic threat perception and thus reduce central sensitivity to pain  Patient demonstrate safe performance    PT education:  Physical & psychological viscous pain cycles   Role of consistent activity (graded exposure) to break the physical cycle  Importance of education & behavioral changes that promote intrinsic patient motivation to help break the psychological cycle  Impact on pt's functional goals when breaking each one of these cycles.    Impact central sensitization has on the sensory system in the chronic pain population      FUNCTIONAL ENDURANCE   DATE EQUIPMENT VARIABILITIES QUALITY PRE   10/30/24 TM 1 speed  5 min   4/10   11/6 TM 2.0->1.5 5 min 4/10                   Fitter board - mid setting, 20 tilt bouts   Fwd/Bwd    Trial 1-  2 hands fade to 1 hand     Trial 2 - 1 hand fade to attempts at no hand    Lat    Trial 1 - 1 hand     Trial 2 - 1 hand fade to no hand       PT education:  Physical & psychological viscous pain cycles (see patient instructions 10/30/2024)  Role of consistent  activity (graded exposure) to break the physical cycle  Importance of education & behavioral changes that promote intrinsic patient motivation to help break the psychological cycle  Impact on pt's functional goals when breaking each one of these cycles.    Impact central sensitization has on the sensory system in the chronic pain population    Peripheral muscle strengthening which included 1-2 sets of 10-20 repetitions at a slow, controlled 5-7 second per rep pace focused on strengthening supporting musculature for improved body mechanics & functional mobility.  Patient & therapist focused on proper form & speed during treatment to ensure optimal strengthening of each targeted muscle group & neuromuscular re-education. Patients are instructed to keep sets/reps with proper load to stay at 3-5 out of 10 on the provided modified Lu exertion scale.    BATCA Machine Exercises Weight (lbs) Sets Repetitions Lu Exertion Scale Rating   Leg Extension  7.5  15 5   Hamstring Curls 17.5  20 2   Chest Press 17.5  20 3   Pec Fly 17.5  20 4   Lat Pull Down 15  20 3   Mid Row 25  20 4   Bicep Bar Curls 5  20 3   Standing Tricep Extension       Single Leg Press   R/L 25/27.5  20/20 2/3     Floor transfer    Pain Neuroscience Education  Explained sensitization of the nervous system using the Why You Hurt education system by Brittany Bridges PT, PhD  Discussed alarm metaphor, safety vs. Danger, and graded exposure within context of sensitized nervous system  Encouraged patient questions and elicited patient's ideas and beliefs around their pain history    Spoon theory to assist with managing energy with chronic pain    Diaphragmatic breathing:   Verbal cues for unlabored, long & relaxed breathing w/ increased time for exhalation  Awareness of pulling breath down away from mouth to hips  Cues for for proper abdominal excursion & decreased use of accessory muscles of breathing (hand on stomach & on chest; increased stomach motion,  "decreased chest motion)  Education on inhalation activating sympathetic nervous system   Education on exhalation activating parasympathetic nervous system  Performed supine     Restorative Yoga poses:  Child's pose c/ pillow  Legs up the wall      Patient Education and Home Exercises     Home Exercises Provided and Patient Education Provided     Education provided:   - FRP Educational Topics: Modified Lu Exertion Scale, Physical & Psychological Pain Cycles, Activity Pacing for Pain & Debility , Central Sensitization , Mindfulness Resources, and Posture & Body Mechanics    Written Home Exercises Provided: yes. Exercises were reviewed and Scarlett was able to demonstrate them prior to the end of the session.  Scarlett demonstrated good  understanding of the education provided. See EMR under Patient Instructions for information provided during therapy sessions    HEP from [employer]:  - Child's pose  - Cat/cow  - Abdominal stretch/push up  - Wig wags  - Bridge  - Alt toe taps  - Belly breathing    ASSESSMENT     Scarlett /carole good participation in tx today.  Active ex with specific breath counts to calm the NS. BATCA resistance machines also used with the breath to calm the NS. Discussion and assignment about "mind full" as apposed to "mindful" with daily activities. Pt needs redirecting at times.     Scarlett Is progressing well towards her goals.   Pt prognosis is Good.     Pt will continue to benefit from skilled outpatient physical therapy to address the deficits listed in the problem list box on initial evaluation, provide pt/family education and to maximize pt's level of independence in the home and community environment.     Pt's spiritual, cultural and educational needs considered and pt agreeable to plan of care and goals.     Anticipated Barriers for therapy: chronic nature of issues, psychosocial factors, central sensitization    GOALS: Pt is in agreement with the following goals:  Goal Progress Towards Goal " "  SHORT Term: In 3 weeks, pt will:     Verbalize & demonstrate good understanding of resisted training with 3-1-3 second rep for [id number]-[id number]-[id number] contraction to encourage full muscle recruitment for strength & endurance. Initiated 10/14/2024   Verbalize & demonstrate good understanding of home stretch routine to improve AROM, help decrease pain & improve mobility. Initiated 10/14/2024   Demonstrate proper supine or seated diaphragmatic breathing with decreased chest excursion & emphasis on full abdominal excursion & increased expiration time to promote muscle relaxation & increased parasympathetic activity to improve patient tolerance to activities. Initiated 10/14/2024   Verbalize good understanding of importance of proper posture in resisted exercise, functional activities & ADL's in order to help reduce postural strain, promote proper breathing. Initiated 10/14/2024   Demonstrate good understanding of full body resisted exercises w/ use of pictures &/or verbal cues to promote independence w/ exercise at the end of the program. Initiated 10/14/2024   Verbalize plan for continuing resisted exercises w/ specific location (i.e. commercial gym, home, community fitness center)  to incorporate all major muscle groups to maintain & progress therapeutic functional improvements. Initiated 10/14/2024   Verbalize difference between  "hurt vs harm"  to demonstrate understanding of fear avoidance in pain cycle.  Initiated 10/14/2024         Midterm: In 8 weeks, pt will:     Verbalize good understanding of activities planning/scheduling (stretching, mindfulness, resisted training, & cardio) prescribed by PT/OT following the end of the program to sustain & progress functional improvements. Initiated 10/14/2024   Perform selected resisted training w/ minimal to no cuing in therapy session to be independent w/ resisted strengthening. Initiated 10/14/2024   Demonstrate improved symptom self-management w/ " posture/positioning and mechanical movements and/or implementation of appropriate modalities throughout a typical day.  Initiated 10/14/2024   Demonstrate independence w/ home stretch routine to improve AROM, help decrease pain & improve mobility. Initiated 10/14/2024         Long Term: In 12 weeks, pt will:     Perform selected cardio & resisted training independently to continue safe regular performance of daily exercise. Initiated 10/14/2024   Improve 6 MWT to 300 meters or greater to improve gait speed and mobility. Initiated 10/14/2024   Demonstrate Timed Up & Go (with appropriate assistive device, if necessary) of 8 seconds or less to decrease fall risk and improve functional mobility. Initiated 10/14/2024   Score 51 % or greater on Lumbar FOTO to indicate significant functional improvements in impaired functions secondary to pain. Initiated 10/14/2024   Pt will perform yoga practice >/= 3 times per week for exercise and recreation of enjoyment (patient goal) Initiated 10/14/2024   Pt will sit >/= 1 hour and stand >/= 30 mins to demonstrate capacity for return to work part-time. Initiated 10/14/2024        PLAN     Continue PT per POC     Shana Hester, PTA

## 2024-11-14 ENCOUNTER — OFFICE VISIT (OUTPATIENT)
Dept: INTERNAL MEDICINE | Facility: CLINIC | Age: 34
End: 2024-11-14
Payer: MEDICAID

## 2024-11-14 ENCOUNTER — NURSE TRIAGE (OUTPATIENT)
Dept: ADMINISTRATIVE | Facility: CLINIC | Age: 34
End: 2024-11-14
Payer: MEDICAID

## 2024-11-14 VITALS
HEIGHT: 61 IN | SYSTOLIC BLOOD PRESSURE: 99 MMHG | HEART RATE: 92 BPM | BODY MASS INDEX: 33.25 KG/M2 | DIASTOLIC BLOOD PRESSURE: 64 MMHG | WEIGHT: 176.13 LBS

## 2024-11-14 DIAGNOSIS — L25.9 CONTACT DERMATITIS, UNSPECIFIED CONTACT DERMATITIS TYPE, UNSPECIFIED TRIGGER: Primary | ICD-10-CM

## 2024-11-14 PROCEDURE — 99214 OFFICE O/P EST MOD 30 MIN: CPT | Mod: PBBFAC

## 2024-11-14 PROCEDURE — 99999 PR PBB SHADOW E&M-EST. PATIENT-LVL IV: CPT | Mod: PBBFAC,,,

## 2024-11-14 RX ORDER — FAMOTIDINE 20 MG/1
20 TABLET, FILM COATED ORAL DAILY
Qty: 5 TABLET | Refills: 0 | Status: SHIPPED | OUTPATIENT
Start: 2024-11-14 | End: 2025-11-14

## 2024-11-14 RX ORDER — METHYLPREDNISOLONE 4 MG/1
TABLET ORAL
Qty: 21 EACH | Refills: 0 | Status: CANCELLED | OUTPATIENT
Start: 2024-11-14 | End: 2024-12-05

## 2024-11-14 RX ORDER — PREDNISONE 20 MG/1
20 TABLET ORAL DAILY
Qty: 5 TABLET | Refills: 0 | Status: SHIPPED | OUTPATIENT
Start: 2024-11-14

## 2024-11-14 RX ORDER — TRIAMCINOLONE ACETONIDE 1 MG/G
CREAM TOPICAL 2 TIMES DAILY
Qty: 80 G | Refills: 0 | Status: SHIPPED | OUTPATIENT
Start: 2024-11-14 | End: 2024-12-14

## 2024-11-14 NOTE — PATIENT INSTRUCTIONS
Scheduling number: [phone]      Take:   Prednisone 20 mg for 5 days  Pepcid 20 mg for 5 days  Benadryl for 5 days    Referral to allergy has been made.

## 2024-11-14 NOTE — PROGRESS NOTES
[facility]    Functional Restoration Program  Physical Therapy Treatment Note  FRP Non-Cohort    Name: Scarlett Knox  MRN:[id number]      Therapy Diagnosis:   Encounter Diagnoses   Name Primary?    Decreased range of motion of lumbar spine Yes    Impaired gait and mobility        Physician: Lou Block P*       Date: 11/15/2024    Physician Orders: PT Eval and Treat   Medical Diagnosis from Referral:   Diagnosis   Z98.1 (ICD-10-CM) - S/P lumbar fusion      Evaluation Date: 10/14/2024  Authorization Period Expiration: 9/23/25  Plan of Care Expiration: 1/14/2025  Visit # / Visits authorized: 8/20  FOTO: 1/ 3      Precautions: Standard    Time In: 9:05am  Time Out: 10:00am  Total Billable Time: 55 mins      SUBJECTIVE     Scarlett reports needing gabapentin after doing her healthy back routine, despite having been okay not taking it on an 'off' day. Still taking far less than before and still wants to wean off.     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       Limitation/Restriction for FOTO Lumbar Survey     Therapist reviewed FOTO scores for Scarlett Knox on 10/14/2024.   FOTO documents entered into [employer] - see Media section.     INTAKE Score 10/14/2024: 43%     Predicted Score: 51%         Treatment       Scarlett received the Individualized Treatments listed below:     Scarlett participated in dynamic functional therapeutic exercises to improve strength, range of motion, and functional performance for 55 minutes, including:    FUNCTIONAL ENDURANCE   DATE EQUIPMENT VARIABILITIES QUALITY PRE   10/30/24 TM 1 speed  5 min   4/10   11/6 TM 2.0->1.5 5 min 4/10   11/15 TM 2.0 ->  1.8 6 min 3/10            Somatic Tracking:  Mindfulness-based, guided imagery to reduce fear-avoidance and catastrophizing around physical symptoms in body  Guided patient through attending to negative (painful), neutral, and positive sensations in the body  Used imagery and metaphors to reduce fear and hyperfixation on symptoms  Gave patient instructions for performing at home in the Patient Instructions section of After Visit Summary    Pain Neuroscience Education  Explained sensitization of the nervous system using the Why You Hurt education system by Brittany Bridges PT, PhD  Discussed alarm metaphor, safety vs. Danger, and graded exposure within context of sensitized nervous system  Encouraged patient questions and elicited patient's ideas and beliefs around their pain history    Fitter board - mid setting, 20 tilt bouts   Fwd/Bwd    Trial 1-  2 hands fade to 1 hand     Trial 2 - 1 hand fade to attempts at no hand    Lat    Trial 1 - 1 hand     Trial 2 - 1 hand fade to no hand     PT education:  Physical & psychological viscous pain cycles (see patient instructions 10/30/2024)  Role of consistent activity (graded exposure) to break the physical cycle  Importance of education & behavioral changes that promote intrinsic patient motivation to help break the psychological cycle  Impact on pt's functional goals when breaking each one of these cycles.    Impact central sensitization has on the sensory system in the chronic pain population    NP:  Peripheral muscle strengthening which included 1-2 sets of 10-20 repetitions at a slow, controlled 5-7 second per rep pace focused on strengthening supporting musculature for improved body mechanics & functional mobility.  Patient & therapist focused on proper form & speed during treatment to ensure optimal strengthening of each targeted muscle group & neuromuscular re-education. Patients are instructed to keep sets/reps with proper load to stay at 3-5 out of 10 on the provided  modified Lu exertion scale.    Eye-Q Machine Exercises Weight (lbs) Sets Repetitions Lu Exertion Scale Rating   Leg Extension  7.5  15 5   Hamstring Curls 17.5  20 2   Chest Press 17.5  20 3   Pec Fly 17.5  20 4   Lat Pull Down 15  20 3   Mid Row 25  20 4   Bicep Bar Curls 5  20 3   Standing Tricep Extension       Single Leg Press   R/L 25/27.5  20/20 2/3     NP:  Floor transfer    Restorative Yoga poses:  Child's pose c/ pillow  Legs up the wall      Patient Education and Home Exercises     Home Exercises Provided and Patient Education Provided     Education provided:   - FRP Educational Topics: Modified Lu Exertion Scale, Physical & Psychological Pain Cycles, Activity Pacing for Pain & Debility , Central Sensitization , Mindfulness Resources, and Posture & Body Mechanics    Written Home Exercises Provided: yes. Exercises were reviewed and Scarlett was able to demonstrate them prior to the end of the session.  Scarlett demonstrated good  understanding of the education provided. See EMR under Patient Instructions for information provided during therapy sessions    HEP from Healthy Back:  - Child's pose  - Cat/cow  - Abdominal stretch/push up  - Wig wags  - Bridge  - Alt toe taps  - Belly breathing    ASSESSMENT     Scarlett participated very well in treatment today, walking for an additional minute compared to last functional endurance training without breaking pacing parameters. Went through Pain Curiosity lecture today to help decrease fear and threat around pain and improve mindset around recovery and the possibilities of returned function, to very good effect. Initiated somatic tracking to decrease fear of pain.    Scarlett Is progressing well towards her goals.   Pt prognosis is Good.     Pt will continue to benefit from skilled outpatient physical therapy to address the deficits listed in the problem list box on initial evaluation, provide pt/family education and to maximize pt's level of independence in the  "home and community environment.     Pt's spiritual, cultural and educational needs considered and pt agreeable to plan of care and goals.     Anticipated Barriers for therapy: chronic nature of issues, psychosocial factors, central sensitization    GOALS: Pt is in agreement with the following goals:  Goal Progress Towards Goal   SHORT Term: In 3 weeks, pt will:     Verbalize & demonstrate good understanding of resisted training with 3-1-3 second rep for cfofraezix-iyptvbtgd-sapojqzox contraction to encourage full muscle recruitment for strength & endurance. Initiated 10/14/2024   Verbalize & demonstrate good understanding of home stretch routine to improve AROM, help decrease pain & improve mobility. Initiated 10/14/2024   Demonstrate proper supine or seated diaphragmatic breathing with decreased chest excursion & emphasis on full abdominal excursion & increased expiration time to promote muscle relaxation & increased parasympathetic activity to improve patient tolerance to activities. Initiated 10/14/2024   Verbalize good understanding of importance of proper posture in resisted exercise, functional activities & ADL's in order to help reduce postural strain, promote proper breathing. Initiated 10/14/2024   Demonstrate good understanding of full body resisted exercises w/ use of pictures &/or verbal cues to promote independence w/ exercise at the end of the program. Initiated 10/14/2024   Verbalize plan for continuing resisted exercises w/ specific location (i.e. commercial gym, home, community fitness center)  to incorporate all major muscle groups to maintain & progress therapeutic functional improvements. Initiated 10/14/2024   Verbalize difference between  "hurt vs harm"  to demonstrate understanding of fear avoidance in pain cycle.  Initiated 10/14/2024         Midterm: In 8 weeks, pt will:     Verbalize good understanding of activities planning/scheduling (stretching, mindfulness, resisted training, & cardio) " prescribed by PT/OT following the end of the program to sustain & progress functional improvements. Initiated 10/14/2024   Perform selected resisted training w/ minimal to no cuing in therapy session to be independent w/ resisted strengthening. Initiated 10/14/2024   Demonstrate improved symptom self-management w/ posture/positioning and mechanical movements and/or implementation of appropriate modalities throughout a typical day.  Initiated 10/14/2024   Demonstrate independence w/ home stretch routine to improve AROM, help decrease pain & improve mobility. Initiated 10/14/2024         Long Term: In 12 weeks, pt will:     Perform selected cardio & resisted training independently to continue safe regular performance of daily exercise. Initiated 10/14/2024   Improve 6 MWT to 300 meters or greater to improve gait speed and mobility. Initiated 10/14/2024   Demonstrate Timed Up & Go (with appropriate assistive device, if necessary) of 8 seconds or less to decrease fall risk and improve functional mobility. Initiated 10/14/2024   Score 51 % or greater on Lumbar FOTO to indicate significant functional improvements in impaired functions secondary to pain. Initiated 10/14/2024   Pt will perform yoga practice >/= 3 times per week for exercise and recreation of enjoyment (patient goal) Initiated 10/14/2024   Pt will sit >/= 1 hour and stand >/= 30 mins to demonstrate capacity for return to work part-time. Initiated 10/14/2024        PLAN     Continue PT per POC     Justin Tiwari, PT

## 2024-11-14 NOTE — PROGRESS NOTES
"[facility]  Functional Restoration CLINIC  Occupational Therapy Treatment Note      Name: Scarlett Knox  MRN:[id number]  Encounter Date: 11/15/2024    Diagnosis:   Encounter Diagnosis   Name Primary?    Pain aggravated by activities of daily living Yes     Referring provider: Lou Block P*    Physician Orders: eval and treat; FRP  Medical Diagnosis: Z98.1 (ICD-10-CM) - S/P lumbar fusion  Evaluation Date: 10/28/2024  Insurance Authorization Period Expiration: 24  Plan of Care Certification Period: 2025  Visit # / Visits authorized:  (plus eval)  FOTO completed: 1/3     Precautions:  Standard and Fall    Time In: 07:55  Time Out: 09:03  Total Billable Time: 68 minutes    SUBJECTIVE     She reports: "I have a break out of my face on one side only; it's because of my allergies to the nail gel. I had nails on because of a wedding". "Things are happening though. I had breakfast for the first time in a long time". "I feel so easily ashamed of myself".    Scarlett was compliant with parts of home exercise program given previously.     Response to previous treatment: Ms. Knox noted: no concern reported    Functional change: "I've been singing out loud a lot". More awareness, improved routine. Easier to do PT HEP.    Pain:       Currently rating pain as 3/10.  Location: Low back, right leg  Description: Aching and Burning. Numbness in left leg.     Patient Specific Activities based on Personal goals:  Activity  2024    Performance (P) Satisfaction (S)   Sitting tolerance (for studies)  5 4   2.  Chores (house cleaning) 5 3   3.  Carrying groceries up the stairs into the house 3 3   4.  Intimacy 4 4   5.  Driving long distance (>4 hrs) 4 3           Total Score 4.2 3.4   Ratin-10; unable/unsatisfied - Able/Satisfied    Objective     Objective Measures updated at progress report unless specified.    COGNITIVE Exam  Behaviors: normal, sharing frustration.  Memory: not " tested; able to follow 2 step commands during session  Safety awareness/insight to disability: impaired judgment and impaired insight; admitting to tendency to boom/bust.  Coping skills/emotional control: passive coping strategies engaging in hobbies: tracy; during session Appropriate to situation, with danger in me messaging expressed.     Dominant hand: right     Active Range of Motion  Upper Extremity 10/28/2024    RUE LUE   Hands WFL WFL   Wrist WFL WFL   Elbows WFL WFL   Shoulders WFL WFL      Upper Extremity Strength - Manual Muscle Testing  Motion Tested 10/28/2024 10/30/2024    Right Left  Right  Left comment   Shoulder Flexion NT NT 5/5 5/5    Shoulder Extension NT NT 5/5 5/5    Shoulder Abduction NT NT 5/5 5/5 Slight shoulder elevation (B)ly   Shoulder IR NT NT 5/5 5/5    Shoulder ER NT NT 5/5 5/5    Elbow Flexion NT NT 5/5 5/5    Elbow Extension NT NT 5/5 5/5        Strength (in pounds, rung II, average of three trials)    10/28/2024   Right hand  60.67 lbs   Left hand  56 lbs   [norms for women aged 30-34: R=78.7 +/-19.2; L=68.0 +/-17.7 (Jaxsontz et al, 1985)]      Functional Strength  Pile Testing: Lifting    10/28/2024 (lbs/HR/DAYTON) 11/6/2024  (lbs/HR/DAYTON)    Repetitive Floor to Waist      8/107/4 NT   Repetitive Waist to Shoulder    NT 13/129/5   1- Time Maximum   NT NT   Carry-2 Handed (40ft)   NT NT   Work demand Level   Sedentary (#10 max, 1.5 METS)     Reason for stop point Increased time required for completing repetitions, Inability to maintain proper body mechanics. Inability to maintain proper body mechanics.   comment 1 rep only. Due to time PILE resting not completed. Tends to rush. Rushing   The work demand level listed above is based on the physical performance screening test performed. More comprehensive physical testing integrated with clinical findings would be required to derived an accurate work capacity.      Balance  5 times sit-stand (adults 18-65 y/o) 10/28/2024   >12 sec=  fall risk for general elderly  >16 sec= fall risk for Parkinson's disease  >10 sec= balance/vestibular dysfunction (<61 y/o)  >14.2 sec= balance/vestibular dysfunction (>61 y/o)  >12 sec= fall risk for CVA 15.71 seconds     (without UE used to push up from chair)      Endurance Testing: Modified Ramp Treadmill Test    10/28/2024   Minutes Completed  3 minutes   % Grades  10 %   Speed (mph)  1.7 mph   METS 4    bpm   Lu Rating Perceived Exertion Scale   4   Reason for stop point Inability to maintain safe speed, Increased discomfort, Fear of increased pain   Comments Gait patterns with use of spinal rotations with circumduction      During physical testing, participation level was consistent.      Limitation/Restriction for FOTO NOC Survey     Therapist reviewed FOTO scores for Scarlett Knox on 10/28/2024.   FOTO documents entered into [employer] - see Media section.     Limitation Score: 60%                    Treatment     Scarlett received the treatments listed below:     Therapeutic activities and functional lifting activities in order to increase participation in, endurance for, and performance with vocational, selfcare ADLs, and leisure activities in order to improve quality of life, for 30 minutes, including:    Full body stretch w/ 3-10 sec hold technique &/or 3-5 repetition technique on all of the following:  Cervical flx/ext  Cervical sidebending  Cervical rotation  Cervical protraction/retraction  Shld rolls  Shld depression/elevation  Scapular retraction/protraction/scaption  Posterior shld stretch (cross arm)  Shld ER stretch (chicken wing)  Elbow flx/ext  Forearm supination/pronation  Wrist flx/ext  Open/close hands/fingers  Seated trunk rotations  Seated trunk/thoracic ext/flx  Seated marches & knee to chest  Seated knee flx/ext  Seated hip ER/piriformis stretch  Seated hamstring stretch  Seated toe raise to heel raise   Seated ankle circles each way  Standing lumbar extensions  Standing lateral  trunk stretch  Standing quad stretch    While stretching, education brain/body connection reviewed and reiterated.   Discussion on intimacy/performance; open to reviewing what effect of THC use is on vaginal lubrication, and if engaging in intimacy prior to taking THC, or in the AM. Open to education on danger in me vs safety in me messaging.    Scarlett completed functional lifting, floor to waist, 2x6 reps x 5 lbs with exertion rated Sort of hard (4/10); focused education on sequence and weight shifting on outside edges of heels, to promote hip abduction.     Scarlett completed dynamic reaching in various functional ranges, with continued focus on hip rotation/weight shifting, 5 reps x no added lbs, rated as Sort of hard (4/10) exertion.     Ball roll outs and seated physio ball bouncing between tasks and as needed for PNS activation.    No environmental, cultural, spiritual, developmental or education needs expressed or noted.    Patient Education and Home Exercises   Education provided:   - Progress towards goals   - importance of completion of exercises and activities outside of session to attain goals.   - proper posture and body mechanics.  - anticipated muscular soreness following lift testing and strategies for management including stretching, using ice, scheduled rest.  - Functional pain scale  - DAYTON rate of perceived exertion scale.   - pain cycle  - pursed lip and diaphragmatic breathing techniques  - smoking cessation.  - acknowledging negative self talk or criticism, then shift focus (breath, task, HR for instance)  - daily stretch routine  - Tips to manage pain  - importance to acknowledge when noting self to rush, being judgmental, for instance.  - On/off the floor.  - Educational video: Understanding Pain in less than 5 minutes, and what to do about it!  - being a work place athlete  - 20/20/20 rule, use mirror if needed to create the distance  - palming, sunning for eye relaxation  - left hand  mousing.  - danger in me messaging vs safety in messaging  - energy banking tracking sheet to help with pacing,      Written Home Exercises Provided: Patient instructed to cont prior HEP. Energy banking tracking sheet provided.   Exercises were reviewed and Scarlett was able to demonstrate them prior to the end of the session. Scarlett demonstrated good  understanding of the education provided.     See EMR under Patient Instructions for exercises provided during therapy sessions. Patient education also located in FRP binder provided on day 1 of the cohort.     Assessment     Ms. Knox tolerated session well;  is very engaged and excited about changes occurring. Open to education provided. Overall, tolerated session well.     Scarlett is progressing towards her goals and there are no updates to goals at this time. Scarlett's prognosis is Good due to motivation.    Scarlett would continue to benefit from skilled outpatient occupational therapy to address the deficits listed in the problem list on initial evaluation, provide patient education, and to maximize patient's level of independence in the home and community environment.     Anticipated barriers to occupational therapy: fear    Goals:     SHORT Term goals: In 3 weeks, patient will:  Status of goal:   Implements correct use of breathing techniques during functional lifting tasks, with minimal cues needed. Initiated   Utilizes DAYTON rate of exertion scale to pace performance with functional lifting tasks, and implements self-care strategies during activity participation to manage pain. Initiated   Verbalize understanding of energy conservation and pacing strategies during daily habits, roles, and routines in order to improve tolerance for work and home demands.  Initiated   Completes 5x sit to stand test in 12 seconds or less to improve ability to participate in community mobility.   Initiated   Demonstrate increased positional tolerance to the level needed for  work, home, and community activities (standing in cue at social event, managing supplies for outing, streneous IADL), as evidenced by having a METS level of 5. Initiated   Demonstrate proper body mechanics with functional lifting tasks (floor to waist, waist to shoulder, maximum lift, and box carry), via teach back method with minimal cues needed. Initiated   Demonstrate increased functional strength by lifting 13 lbs waist to shoulder for management of (I)ADL, including dressing and grooming, placing items in kitchen cabinets, folding towels, and/or managing suitcase when traveling.   Initiated   Demonstrate increased functional strength by lifting 18 lbs floor to waist to meet demand of work/home routine, including lifting groceries, managing laundry, and/or managing suitcase when traveling.   Initiated   Tolerate Home Activity Plan (HAP)/ Home Exercise Program (HEP) to promote safety and independence with ADL, with report of completion of at least 2 out of 4 days outside of program participation.  Initiated   Show improved perception of own abilities as evidenced by increase of FOTO scores to established MDC value and perception of performance ratings regarding personal long term goals.  Initiated         Long Term goals: In 9 weeks, patient will: Status of goal:   Report implementation of application of mindfulness, stretching, and other coping strategies for improved participation in daily routine. Initiated   Verbalize functional application of lifting techniques in daily life (golfers lift, floor to waist, waist to shoulder). Initiated   Completes 5x sit to stand test in 10 seconds or less to improve ability to participate in community mobility.  Initiated   Applies functional application of lifting techniques (golfer's lift, floor to waist, waist to shoulder) in activities of daily life, per patient report.  Initiated   Demonstrate physical capacities consistent with a Light-Medium (#35 max, frequent  lifting/carrying #20, 3 METS)  demand level, as needed to perform activities to be successful in roles of life, regarding Household Management and community mobility and possible return to work setting. Initiated   Have an improvement of 3 points in 2/5 personal goals in rating of  performance.  Initiated   Show improved perception of own abilities as evidenced by increase of FOTO scores to established MC II value and perception of performance ratings regarding personal long term goals.  Initiated      Patient Specific Goals; to be met after 2 month of (I) application of tools/techniques learned.  Vocational: attending academic program in person   Recreational: standing in line at events   Daily Living Activities: chores   Measured by patient's self-reported performance and satisfaction of personal FRP goals, listed above in subjective section.   Total Score = sum of the activity performance scores / number of activities  Minimum detectable change (90%CI) for average score = 2 points  Minimum detectable change (90%CI) for single activity score  = 3 points     Plan     Continue per plan of care.     Updates/Grading for next session: discuss bike fit, progress with lifting sequence, RAHEL (work set up), energy banking tracking sheet, intimacy/positions.     FRANDY Henriquez, OTR/L, CEAS-I  11/15/2024                normal rate and rhythm, no chest pain and no edema.

## 2024-11-14 NOTE — TELEPHONE ENCOUNTER
Pt reports she used gel nails on 11/9 which she is allergic to. Pt reports since Sunday she has been having a rash to fingertips and face. Pt describes as hives and reports rash is itchy. Pt also reports edema to left eyelid. Pt denies cough, facial swelling, SOB, throat tightness. Care advice to be seen today in office. Appointment scheduled for pt. Pt verbalizes understanding.   Reason for Disposition   Patient wants to be seen    Additional Information   Negative: Difficulty breathing or wheezing now   Negative: Rapid onset of swollen tongue   Negative: Rapid onset of hoarseness or cough   Negative: Very weak (can't stand)   Negative: Difficult to awaken or acting confused (e.g., disoriented, slurred speech)   Negative: Life-threatening reaction (anaphylaxis) in the past to similar substance (e.g., food, insect bite/sting, chemical, etc.) and < 2 hours since exposure   Negative: Sounds like a life-threatening emergency to the triager   Negative: Swollen tongue   Negative: Widespread hives, itching, or facial swelling and onset < 2 hours of exposure to high-risk allergen (e.g., 1st dose of antibiotic, nuts, sting)   Negative: Patient sounds very sick or weak to the triager   Negative: MODERATE-SEVERE hives persist (i.e., hives interfere with normal activities or work) and taking antihistamine (e.g., cetirizine, fexofenadine, or loratadine) > 24 hours   Negative: Sudden onset of rash (within last 2 hours) and difficulty with breathing or swallowing   Negative: Difficult to awaken or acting confused (e.g., disoriented, slurred speech)   Negative: Fever and purple or blood-colored spots or dots   Negative: Too weak or sick to stand   Negative: Life-threatening reaction (anaphylaxis) in the past to similar substance (e.g., food, insect bite/sting, chemical, etc.) and < 2 hours since exposure   Negative: Sounds like a life-threatening emergency to the triager   Hives suspected   Negative: Hives have become worse and  taking oral steroids (e.g., prednisone) > 24 hours   Negative: Abdominal pain   Negative: Joint swelling   Negative: Fever    Protocols used: Rash or Redness - Widespread-A-OH, Hives-A-OH

## 2024-11-14 NOTE — PROGRESS NOTES
[facility]  CLINIC NOTE    Name: Scarlett Knox  : 1990  Date of Service: 2024   PCP: Liberty Ngo MD    PRESENTING HISTORY       History of Present Illness:  Ms. Scarlett Knox is a 34 y.o. female with a medical history of:     hx of hip dysplasia, chronic right leg weakness neck pain, major depressive disorder, generalized anxiety disorder with panic attack     Meds: lexapro, gabapentin, lithium tramadol, zofran  No new medications.     Here for rash that started on 11/10. She is allergic to acrylic nails. She was told that the nail polish she had was natural. After applying the nail polish she noticed a rash that developed around her fingertips/cuticles. Describes it as hives/bubbles associated w/ itching. Tried 1% cortisone, benadryl w/ minimal relief. Came in today because her rash spread to her face. She did scratch her left side of her face w/ the polish on her nails and thinks it is related.       Allergic surgical dressing/adhsevie       Review of Systems:   Constitution: Negative for fever, chills  HENT: Negative for sore throat, rhinorrhea, or headache.  Eyes: Negative for blurred or double vision.   Pulmonary: Negative for SOB, cough   Skin: pos for rash to her fingertips/cuticles and left side of face,   Allergy: pos for itching    Labs:     Lab Results   Component Value Date     2023     2023    K 3.7 2023    K 3.8 2023     2023    CO2 22 (L) 2023    CO2 22 (L) 2023    BUN 11.0 2023    BUN 13 2023    CREATININE 0.53 2023    CREATININE 0.8 2023    GLUCOSE 89 2023    ANIONGAP 8 2023     Lab Results   Component Value Date    BILITOT 0.4 2023    BILITOT 0.5 2023    AST 13 2023    AST 11 2023    ALT 9 2023    ALT 14 2023    ALKPHOS 71 2023     Lab Results   Component Value Date    HGBA1C 5.0 2023                  "                               Lab Results   Component Value Date    WBC 16.54 (H) 08/18/2023    HGB 10.3 (L) 08/18/2023    HCT 31.6 (L) 08/18/2023    HCT 39 12/16/2022     08/18/2023    GRAN 13.1 (H) 08/18/2023    GRAN 78.9 (H) 08/18/2023     Lab Results   Component Value Date    CHOL 231 (H) 04/12/2023    HDL 52 04/12/2023    LDLCALC 146.6 04/12/2023    TRIG 162 (H) 04/12/2023      Lab Results   Component Value Date    TSH 2.73 05/29/2023    TSH 2.322 04/12/2023     No results found for: "PSA"         PAST HISTORY:     Past Medical History:   Diagnosis Date    ADHD     Anxiety     Back pain     Depression        Past Surgical History:   Procedure Laterality Date    FUSION, SPINE, LUMBAR, TLIF, POSTERIOR APPROACH, USING PEDICLE SCREW N/A 8/17/2023    Procedure: FUSION, SPINE, LUMBAR, TLIF, POSTERIOR APPROACH, USING PEDICLE SCREW L4/5 ;  Surgeon: Dominic Mike MD;  Location: [facility] OR [address] [place] [address];  Service: Neurosurgery;  Laterality: N/A;    KNEE CARTILAGE SURGERY Left     LUMBAR LAMINECTOMY WITH DISCECTOMY N/A 2/27/2020    Procedure: LAMINECTOMY, SPINE, LUMBAR, WITH DISCECTOMY L4/5 ;  Surgeon: Dominic Mike MD;  Location: [facility] OR [address] [place] [address];  Service: Neurosurgery;  Laterality: N/A;       Family History   Problem Relation Name Age of Onset    Breast cancer Maternal Grandmother         Social History     Socioeconomic History    Marital status:    Tobacco Use    Smoking status: Every Day     Types: Vaping with nicotine, Cigarettes     Passive exposure: Current    Smokeless tobacco: Never   Substance and Sexual Activity    Alcohol use: Yes     Comment: social    Drug use: Yes     Types: Marijuana    Sexual activity: Not Currently     Birth control/protection: I.U.D.     Comment: [employer]- 2015 Francine     Social Drivers of Health     Financial Resource Strain: High Risk (9/16/2024)    Overall Financial Resource Strain (CARDIA)     Difficulty of Paying Living Expenses: Very hard   Food Insecurity: Patient " "Declined (9/16/2024)    Hunger Vital Sign     Worried About Running Out of Food in the Last Year: Patient declined     Ran Out of Food in the Last Year: Patient declined   Transportation Needs: Patient Declined (6/19/2024)    Received from [facility] - Transportation     Lack of Transportation (Medical): Patient declined     Lack of Transportation (Non-Medical): Patient declined   Physical Activity: Insufficiently Active (9/16/2024)    Exercise Vital Sign     Days of Exercise per Week: 5 days     Minutes of Exercise per Session: 10 min   Stress: Stress Concern Present (9/16/2024)    [employer] - Occupational Stress Questionnaire     Feeling of Stress : To some extent   Housing Stability: High Risk (9/16/2024)    Housing Stability Vital Sign     Unable to Pay for Housing in the Last Year: Yes       MEDICATIONS & ALLERGIES:     Current Outpatient Medications on File Prior to Visit   Medication Sig    escitalopram oxalate (LEXAPRO) 20 MG tablet Take 20 mg by mouth once daily.    gabapentin (NEURONTIN) 100 MG capsule Take 300 mg by mouth 3 (three) times daily.    lithium (LITHOBID) 300 MG CR tablet Take 300 mg by mouth once daily.    ondansetron (ZOFRAN-ODT) 4 MG TbDL Take 1 tablet (4 mg total) by mouth every 8 (eight) hours as needed.    traMADoL (ULTRAM-ER) 100 MG Tb24 Take 100 mg by mouth once daily.     No current facility-administered medications on file prior to visit.       Review of patient's allergies indicates:  No Known Allergies    OBJECTIVE:   Vital Signs:  Vitals:    11/14/24 1438   BP: 99/64   Pulse: 92   Weight: 79.9 kg (176 lb 2.4 oz)   Height: 5' 1" (1.549 m)       No results found for this or any previous visit (from the past 24 hours).     Gen/Constitutional: No acute distress  Head: Normocephalic, Atraumatic  Neck: supple, no masses or LAD, no JVD  Eyes:conjunctiva clear  Ears, Nose and Throat: No rhinorrhea  Cardiac:  Regular rate, Reg Rhythm, No " murmur  Pulmonary: CTA Bilat, no wheezes, rhonchi, rales.  No increased work of breathing.  GI: Abdomen soft, non-tender, non-distended; no rebound or guarding  : No CVA tenderness.  Musculoskeletal: Extremities warm, well perfused, no erythema, no edema  Skin: No cyanosis or jaundice. There is an erythematous, papular, non vesicular rash along her finger nails and right side of face. See media  Neuro: Alert and Oriented x 3; No focal motor or sensory deficits.    Psych: Normal affect      ASSESSMENT & PLAN:     1. Contact dermatitis, unspecified contact dermatitis type, unspecified trigger  -     triamcinolone acetonide 0.1% (KENALOG) 0.1 % cream; Apply topically 2 (two) times daily.  Dispense: 80 g; Refill: 0  -     predniSONE (DELTASONE) 20 MG tablet; Take 1 tablet (20 mg total) by mouth once daily.  Dispense: 5 tablet; Refill: 0  -     famotidine (PEPCID) 20 MG tablet; Take 1 tablet (20 mg total) by mouth once daily.  Dispense: 5 tablet; Refill: 0      35 y/o F here w/ rash after putting on nail polish w/ signs and symptoms consistent w/ contact dermatitis to her cuticles and right side of her face. Will prescribe kenalog cream. Pred 20 mg x 5 days, pepcid 20 mg x 5 days and benadryl  25 mg for 5 days. Pt requested a referral to allergy for testing.    Discussed with Dr. Cedeno      RTC if symptoms are worsening/not improving      Efra Wong, DO   Internal Medicine PGY-2  [facility]

## 2024-11-14 NOTE — PROGRESS NOTES
I have reviewed the notes, assessments, and/or procedures performed by Dr. Wong, I concur with her/his documentation of Scarlett Knox.

## 2024-11-15 ENCOUNTER — PATIENT MESSAGE (OUTPATIENT)
Dept: REHABILITATION | Facility: OTHER | Age: 34
End: 2024-11-15

## 2024-11-15 ENCOUNTER — CLINICAL SUPPORT (OUTPATIENT)
Dept: REHABILITATION | Facility: OTHER | Age: 34
End: 2024-11-15
Payer: MEDICAID

## 2024-11-15 DIAGNOSIS — M53.86 DECREASED RANGE OF MOTION OF LUMBAR SPINE: Primary | ICD-10-CM

## 2024-11-15 DIAGNOSIS — R26.89 IMPAIRED GAIT AND MOBILITY: ICD-10-CM

## 2024-11-15 DIAGNOSIS — R52 PAIN AGGRAVATED BY ACTIVITIES OF DAILY LIVING: Primary | ICD-10-CM

## 2024-11-15 PROCEDURE — 97530 THERAPEUTIC ACTIVITIES: CPT

## 2024-11-15 PROCEDURE — 97110 THERAPEUTIC EXERCISES: CPT

## 2024-11-18 NOTE — PROGRESS NOTES
"[facility]  Occupational Therapy progress note, Treatment Note      Name: Scarlett Knox  MRN:[id number]  Encounter Date: 11/20/2024    Diagnosis:   Encounter Diagnosis   Name Primary?    Pain aggravated by activities of daily living Yes     Referring provider: Lou Block P*    Physician Orders: eval and treat; FRP  Medical Diagnosis: Z98.1 (ICD-10-CM) - S/P lumbar fusion  Evaluation Date: 10/28/2024  Insurance Authorization Period Expiration: 12/31/24  Plan of Care Certification Period: 1/17/2025  Visit # / Visits authorized: 6/20 (plus eval)  FOTO completed: 2/3     Precautions:  Standard and Fall    Time In: 10:10  Time Out: 11:04  Total Billable Time: 64 minutes    SUBJECTIVE     She reports: "I worked really hard with PT, you aren't going to get much out of me. But, I had to drive to an appointment and I was initially thinking it was going bad, and I caught myself and changed my tune, and actually, it wasn't bad at all". "I feel I have more control".    Scarlett was compliant with parts of home exercise program given previously.     Response to previous treatment: Ms. Knox noted: no concern reported    Functional change: "I've been singing out loud a lot". More awareness, improved routine. Easier to do PT HEP. Using DAYTON score. Attempting to change danger in me to safety in me messaging".    Pain:       Currently rating pain as 4/10.  Functional Pain Scale Rating 0-10: within the last 24 hours  Date 10/28/2024 11/20/2024   Average 6/10 3/10   Worst 8/10 4/10   Best 4/10 2/10   Composite score 6/10 3/10   [TESS  is 1 point or 15-20% change in interference composite score (Scarlettin et al. 2008)]     Location: Low back, right leg  Description: Aching and Burning. Numbness in left leg.  Aggravating Factors: Sitting, Standing, Walking, and Lifting  Easing Factors: Gabapentin     Patient Specific Activities based on Personal goals:  Activity  11/6/2024    " Performance (P) Satisfaction (S)   Sitting tolerance (for studies)  5 4   2.  Chores (house cleaning) 5 3   3.  Carrying groceries up the stairs into the house 3 3   4.  Intimacy 4 4   5.  Driving long distance (>4 hrs) 4 3           Total Score 4.2 3.4   Ratin-10; unable/unsatisfied - Able/Satisfied    Objective     Objective Measures updated at progress report unless specified.    COGNITIVE Exam  Behaviors: normal, sharing frustration.  Memory: not tested; able to follow 2 step commands during session  Safety awareness/insight to disability: impaired judgment and impaired insight; admitting to tendency to boom/bust.  Coping skills/emotional control: passive coping strategies engaging in hobbies: tracy; during session Appropriate to situation, with danger in me messaging expressed.     Dominant hand: right     Active Range of Motion  Upper Extremity 10/28/2024    RUE LUE   Hands WFL WFL   Wrist WFL WFL   Elbows WFL WFL   Shoulders WFL WFL      Upper Extremity Strength - Manual Muscle Testing  Motion Tested 10/28/2024 10/30/2024    Right Left  Right  Left comment   Shoulder Flexion NT NT 5/5 5/5    Shoulder Extension NT NT 5/5 5/5    Shoulder Abduction NT NT 5/5 5/5 Slight shoulder elevation (B)ly   Shoulder IR NT NT 5/5 5/5    Shoulder ER NT NT 5/5 5/5    Elbow Flexion NT NT 5/5 5/5    Elbow Extension NT NT 5/5 5/5        Strength (in pounds, rung II, average of three trials)    10/28/2024 2024   Right hand  60.67 lbs 61.67 lbs   Left hand  56 lbs 58.67 lbs   [norms for women aged 30-34: R=78.7 +/-19.2; L=68.0 +/-17.7 (Jennifer et al, 1985)]      Functional Strength  Pile Testing: Lifting    10/28/2024 (lbs/HR/DAYTON) 2024  (lbs/HR/DAYTON) 2024  (lbs/HR/DAYTON)    Repetitive Floor to Waist      8/107/4 NT 8133/3   Repetitive Waist to Shoulder    NT 13/129/5 18/118/4   1- Time Maximum   NT /4   Carry-2 Handed (40ft)   NT NT //4   Work demand Level   Sedentary (#10 max, 1.5 METS)    Light (#20 max, frequent lifting/carrying #10, 2.5 METS    Reason for stop point Increased time required for completing repetitions, Inability to maintain proper body mechanics. Inability to maintain proper body mechanics. Increased time required for completing repetitions   comment 1 rep only. Due to time PILE resting not completed. Tends to rush. Rushing    The work demand level listed above is based on the physical performance screening test performed. More comprehensive physical testing integrated with clinical findings would be required to derived an accurate work capacity.      Balance  5 times sit-stand (adults 18-65 y/o) 10/28/2024 11/20/2024   >12 sec= fall risk for general elderly  >16 sec= fall risk for Parkinson's disease  >10 sec= balance/vestibular dysfunction (<59 y/o)  >14.2 sec= balance/vestibular dysfunction (>59 y/o)  >12 sec= fall risk for CVA 15.71 seconds     (without UE used to push up from chair) 12.46    (Without UE used)      Endurance Testing: Modified Ramp Treadmill Test    10/28/2024 11/20/2024   Minutes Completed  3 minutes 3 minutes   % Grades  10 % 10%   Speed (mph)  1.7 mph 1.7 mph   METS 4 4    bpm 133 bpm   Lu Rating Perceived Exertion Scale   4 4   Reason for stop point Inability to maintain safe speed, Increased discomfort, Fear of increased pain Increased discomfort   Comments Gait patterns with use of spinal rotations with circumduction LU scale used      During physical testing, participation level was consistent.      Limitation/Restriction for FOTO NOC Survey     Therapist reviewed FOTO scores for Scarlett Knox on 11/20/2024  FOTO documents entered into [employer] - see Media section.     Limitation Score: 55*%                        Treatment     Scarlett received the treatments listed below:     Therapeutic activities and functional lifting activities in order to increase participation in, endurance for, and performance with vocational, selfcare ADLs, and leisure  activities in order to improve quality of life, for 64 minutes, including:    PNS activation through diaphragmatic breathing x 10 minutes. Beach ball held on abdomen for biofeedback. Education on inhalation activating sympathetic nervous system, and exhalation activating parasympathetic nervous system. Benefits for activation of PNS reviewed. Discussed physical and mental sensations experienced to cue her that PNS activate ie: shoulders drop away from ears, breath can lengthen, jaw unclenches.     While seated and bouncing on physio ball, completion of FOTO survey.    Taking of measures for review of plan of care (see objective section of note).    Discussion energy banking; completed tracking of a few days.     No environmental, cultural, spiritual, developmental or education needs expressed or noted.    Patient Education and Home Exercises   Education provided:   - Progress towards goals   - importance of completion of exercises and activities outside of session to attain goals.   - proper posture and body mechanics.  - anticipated muscular soreness following lift testing and strategies for management including stretching, using ice, scheduled rest.  - Functional pain scale  - DAYTON rate of perceived exertion scale.   - pain cycle  - pursed lip and diaphragmatic breathing techniques  - smoking cessation.  - acknowledging negative self talk or criticism, then shift focus (breath, task, HR for instance)  - daily stretch routine  - Tips to manage pain  - importance to acknowledge when noting self to rush, being judgmental, for instance.  - On/off the floor.  - Educational video: Understanding Pain in less than 5 minutes, and what to do about it!  - being a work place athlete  - 20/20/20 rule, use mirror if needed to create the distance  - palming, sunning for eye relaxation  - left hand mousing.  - danger in me messaging vs safety in messaging  - energy banking tracking sheet to help with pacing,      Written Home  Exercises Provided: Patient instructed to cont prior HEP. Energy banking tracking sheet provided.   Exercises were reviewed and Scarlett was able to demonstrate them prior to the end of the session. Scarlett demonstrated good  understanding of the education provided.     See EMR under Patient Instructions for exercises provided during therapy sessions. Patient education also located in FRP binder provided on day 1 of the cohort.     Assessment     Ms. Knox tolerated session well;  is very engaged and excited about changes occurring. Open to education provided. Measures taken today, which is showing improvement, including 5% increase per FOTO, and 3 point change in pain composite score; activities related to personal goals to be rated next session.    Measures taken show Scarlett still fall risk, with 5TST >12 seconds, but improved from 15+ seconds. Scarlett has slightly improved gross hand , and meeting norms for age group. Notably, Scarlett used DAYTON scale to pace self with endurance testing; due to incline difficulties tolerating, at same level of METS as compared to eval. With functional lifting, good application of body mechanics; DAYTON scale used to guide performance; increased from being at sedentary work demand level to light work demand level.    Scarlett is progressing towards her goals and status of goals can be seen below. Scarlett's prognosis is Good due to motivation.    Scarlett would continue to benefit from skilled outpatient occupational therapy to address the deficits listed in the problem list on initial evaluation, provide patient education, and to maximize patient's level of independence in the home and community environment.     Anticipated barriers to occupational therapy: fear    Goals:     SHORT Term goals: In 3 weeks, patient will:  Status of goal; reviewed 11/20/2024   Implements correct use of breathing techniques during functional lifting tasks, with minimal cues needed. PROGRESSING    Utilizes DAYTON rate of exertion scale to pace performance with functional lifting tasks, and implements self-care strategies during activity participation to manage pain. MET 11/20/2024   Verbalize understanding of energy conservation and pacing strategies during daily habits, roles, and routines in order to improve tolerance for work and home demands.  PROGRESSING   Completes 5x sit to stand test in 12 seconds or less to improve ability to participate in community mobility.   PROGRESSING   Demonstrate increased positional tolerance to the level needed for work, home, and community activities (standing in cue at social event, managing supplies for outing, streneous IADL), as evidenced by having a METS level of 5. PROGRESSING   Demonstrate proper body mechanics with functional lifting tasks (floor to waist, waist to shoulder, maximum lift, and box carry), via teach back method with minimal cues needed. MET 11/20/2024   Demonstrate increased functional strength by lifting 13 lbs waist to shoulder for management of (I)ADL, including dressing and grooming, placing items in kitchen cabinets, folding towels, and/or managing suitcase when traveling.   MET 11/20/2024   Demonstrate increased functional strength by lifting 18 lbs floor to waist to meet demand of work/home routine, including lifting groceries, managing laundry, and/or managing suitcase when traveling.   MET 11/20/2024   Tolerate Home Activity Plan (HAP)/ Home Exercise Program (HEP) to promote safety and independence with ADL, with report of completion of at least 2 out of 4 days outside of program participation.  MET 11/20/2024   Show improved perception of own abilities as evidenced by increase of FOTO scores to established MDC value and perception of performance ratings regarding personal long term goals.  PROGRESSING         Long Term goals: In 9 weeks, patient will: Status of goal; reviewed 11/20/2024   Report implementation of application of mindfulness,  stretching, and other coping strategies for improved participation in daily routine. PROGRESSING   Verbalize functional application of lifting techniques in daily life (golfers lift, floor to waist, waist to shoulder). PROGRESSING   Completes 5x sit to stand test in 10 seconds or less to improve ability to participate in community mobility.  PROGRESSING   Applies functional application of lifting techniques (golfer's lift, floor to waist, waist to shoulder) in activities of daily life, per patient report.  PROGRESSING   Demonstrate physical capacities consistent with a Light-Medium (#35 max, frequent lifting/carrying #20, 3 METS)  demand level, as needed to perform activities to be successful in roles of life, regarding Household Management and community mobility and possible return to work setting. PROGRESSING   Have an improvement of 3 points in 2/5 personal goals in rating of  performance.  Initiated   Show improved perception of own abilities as evidenced by increase of FOTO scores to established MC II value and perception of performance ratings regarding personal long term goals.  PROGRESSING      Patient Specific Goals; to be met after 2 month of (I) application of tools/techniques learned.  Vocational: attending academic program in person   Recreational: standing in line at events   Daily Living Activities: chores   Measured by patient's self-reported performance and satisfaction of personal FRP goals, listed above in subjective section.   Total Score = sum of the activity performance scores / number of activities  Minimum detectable change (90%CI) for average score = 2 points  Minimum detectable change (90%CI) for single activity score  = 3 points     Plan     Continue per plan of care.     Updates/Grading for next session: discuss bike fit, progress with lifting sequence, RAHEL (work set up), energy banking tracking sheet, intimacy/positions, rate personal goals.     FRANDY Henriquez, OTR/L,  CEAS-I  11/20/2024     QR code for subsequent FOTO surveys:

## 2024-11-20 ENCOUNTER — CLINICAL SUPPORT (OUTPATIENT)
Dept: REHABILITATION | Facility: OTHER | Age: 34
End: 2024-11-20
Payer: MEDICAID

## 2024-11-20 DIAGNOSIS — R26.89 IMPAIRED GAIT AND MOBILITY: ICD-10-CM

## 2024-11-20 DIAGNOSIS — R52 PAIN AGGRAVATED BY ACTIVITIES OF DAILY LIVING: Primary | ICD-10-CM

## 2024-11-20 DIAGNOSIS — M53.86 DECREASED RANGE OF MOTION OF LUMBAR SPINE: Primary | ICD-10-CM

## 2024-11-20 PROCEDURE — 97530 THERAPEUTIC ACTIVITIES: CPT

## 2024-11-20 PROCEDURE — 97110 THERAPEUTIC EXERCISES: CPT | Mod: CQ

## 2024-11-20 NOTE — PROGRESS NOTES
[facility]    Functional Restoration Program  Physical Therapy Treatment Note  FRP Non-Cohort    Name: Scarlett Knox  MRN:[id number]      Therapy Diagnosis:   Encounter Diagnoses   Name Primary?    Decreased range of motion of lumbar spine Yes    Impaired gait and mobility        Physician: Lou Block P*       Date: 11/20/2024    Physician Orders: PT Eval and Treat   Medical Diagnosis from Referral:   Diagnosis   Z98.1 (ICD-10-CM) - S/P lumbar fusion      Evaluation Date: 10/14/2024  Authorization Period Expiration: 9/23/25  Plan of Care Expiration: 1/14/2025  Visit # / Visits authorized: 8/20  FOTO: 1/ 3      Precautions: Standard    Time In: 9:05am  Time Out: 10:00am  Total Billable Time: 55 mins      SUBJECTIVE     Scarlett reports reports taking less gabapentin but her pain cont to be high at times, especially at night. Pt states she is not performing breathing techniques or meditation at this times in place of the meds. Pt has pertinent questions about progression of her HEP. Pt also, wants to know when she can go shopping because now it is too painful.     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       Limitation/Restriction for FOTO Lumbar Survey     Therapist reviewed FOTO scores for Scarlett Knox on 10/14/2024.   FOTO documents entered into [employer] - see Media section.     INTAKE Score 10/14/2024: 43%     Predicted Score: 51%         Treatment       Scarlett received the Individualized Treatments listed below:     Scarlett participated in dynamic functional therapeutic exercises to improve strength, range of motion, and functional performance for 55 minutes,  including:    Full body functional mobility w/ 3-10 sec hold technique &/or 3-5 repetition   Cervical flx/ext  Cervical side bending  Cervical rotation  Cervical protraction/retraction  Shld rolls  Shld depression/elevation  Scapular retraction/protraction/scaption  Posterior shld stretch (cross arm)  Shld ER stretch (chicken wing)  Elbow flx/ext  Forearm supination/pronation  Wrist flx/ext  Open/close hands/fingers  Seated trunk rotations  Seated trunk/thoracic ext/flx  Seated marches & knee to chest  Seated knee flx/ext  Seated hip ER/piriformis stretch  Seated hamstring stretch  Seated toe raise to heel raise   Seated ankle circles each way  Standing lumbar extensions  Standing lateral leaning stretch  Standing quad stretch (UE support for balance)    FUNCTIONAL ENDURANCE   DATE EQUIPMENT VARIABILITIES QUALITY PRE   10/30/24 TM 1 speed  5 min   4/10   11/6 TM 2.0->1.5 5 min 4/10   11/15 TM 2.0 -> 1.8 6 min 3/10   11/20/24 TM 2.0->1.9 5 min 4/10     Somatic Tracking: NP  Mindfulness-based, guided imagery to reduce fear-avoidance and catastrophizing around physical symptoms in body  Guided patient through attending to negative (painful), neutral, and positive sensations in the body  Used imagery and metaphors to reduce fear and hyperfixation on symptoms  Gave patient instructions for performing at home in the Patient Instructions section of After Visit Summary    Pain Neuroscience Education  Explained sensitization of the nervous system using the Why You Hurt education system by Brittany Bridges PT, PhD  Discussed alarm metaphor, safety vs. Danger, and graded exposure within context of sensitized nervous system  Encouraged patient questions and elicited patient's ideas and beliefs around their pain history    Fitter board - mid setting, 20 tilt bouts   Fwd/Bwd    Trial 1-  2 hands fade to 1 hand     Trial 2 - 1 hand fade to attempts at no hand    Lat    Trial 1 - 1 hand     Trial 2 - 1 hand fade to no hand     PT  education:  Physical & psychological viscous pain cycles (see patient instructions 10/30/2024)  Role of consistent activity (graded exposure) to break the physical cycle  Importance of education & behavioral changes that promote intrinsic patient motivation to help break the psychological cycle  Impact on pt's functional goals when breaking each one of these cycles.    Impact central sensitization has on the sensory system in the chronic pain population      Peripheral muscle strengthening which included 1-2 sets of 10-20 repetitions at a slow, controlled 5-7 second per rep pace focused on strengthening supporting musculature for improved body mechanics & functional mobility.  Patient & therapist focused on proper form & speed during treatment to ensure optimal strengthening of each targeted muscle group & neuromuscular re-education. Patients are instructed to keep sets/reps with proper load to stay at 3-5 out of 10 on the provided modified Lu exertion scale.    [employer] Machine Exercises Weight (lbs) Sets Repetitions Lu Exertion Scale Rating   Leg Extension  7.5  15 4   Hamstring Curls 17.5  20 3   Chest Press 20  15 4   Pec Fly 20  15 4   Lat Pull Down 15  20 3   Mid Row 27.5  15 4   Bicep Bar Curls       Standing Tricep Extension       Single Leg Press   R/L 27.5/30  15 4/3   S/L hip abd 0  15 4     NP:  Floor transfer    Restorative Yoga poses:  Child's pose c/ pillow  Legs up the wall      Patient Education and Home Exercises     Home Exercises Provided and Patient Education Provided     Education provided:   - FRP Educational Topics: Modified Lu Exertion Scale, Physical & Psychological Pain Cycles, Activity Pacing for Pain & Debility , Central Sensitization , Mindfulness Resources, and Posture & Body Mechanics    Written Home Exercises Provided: yes. Exercises were reviewed and Scarlett was able to demonstrate them prior to the end of the session.  Scarlett demonstrated good  understanding of the education  provided. See EMR under Patient Instructions for information provided during therapy sessions    HEP from [employer]:  - Child's pose  - Cat/cow  - Abdominal stretch/push up  - Wig wags  - Bridge  - Alt toe taps  - Belly breathing    ASSESSMENT     Scarlett participated well in treatment today. Discussed different ways to progress her HEP from HB and FRP. Pt asked about returning to grocery shopping without pain. Discussed options to stay in the green such as sitting and taking breaks, stretching with cart or making shorter more often runs to the grocery. Pt cont to need ed for pacing and slowing down.    Scarlett Is progressing well towards her goals.   Pt prognosis is Good.     Pt will continue to benefit from skilled outpatient physical therapy to address the deficits listed in the problem list box on initial evaluation, provide pt/family education and to maximize pt's level of independence in the home and community environment.     Pt's spiritual, cultural and educational needs considered and pt agreeable to plan of care and goals.     Anticipated Barriers for therapy: chronic nature of issues, psychosocial factors, central sensitization    GOALS: Pt is in agreement with the following goals:  Goal Progress Towards Goal   SHORT Term: In 3 weeks, pt will:     Verbalize & demonstrate good understanding of resisted training with 3-1-3 second rep for [id number]-ecthatwec contraction to encourage full muscle recruitment for strength & endurance. Initiated 10/14/2024   Verbalize & demonstrate good understanding of home stretch routine to improve AROM, help decrease pain & improve mobility. Initiated 10/14/2024   Demonstrate proper supine or seated diaphragmatic breathing with decreased chest excursion & emphasis on full abdominal excursion & increased expiration time to promote muscle relaxation & increased parasympathetic activity to improve patient tolerance to activities. Initiated 10/14/2024   Verbalize  "good understanding of importance of proper posture in resisted exercise, functional activities & ADL's in order to help reduce postural strain, promote proper breathing. Initiated 10/14/2024   Demonstrate good understanding of full body resisted exercises w/ use of pictures &/or verbal cues to promote independence w/ exercise at the end of the program. Initiated 10/14/2024   Verbalize plan for continuing resisted exercises w/ specific location (i.e. commercial gym, home, community fitness center)  to incorporate all major muscle groups to maintain & progress therapeutic functional improvements. Initiated 10/14/2024   Verbalize difference between  "hurt vs harm"  to demonstrate understanding of fear avoidance in pain cycle.  Initiated 10/14/2024         Midterm: In 8 weeks, pt will:     Verbalize good understanding of activities planning/scheduling (stretching, mindfulness, resisted training, & cardio) prescribed by PT/OT following the end of the program to sustain & progress functional improvements. Initiated 10/14/2024   Perform selected resisted training w/ minimal to no cuing in therapy session to be independent w/ resisted strengthening. Initiated 10/14/2024   Demonstrate improved symptom self-management w/ posture/positioning and mechanical movements and/or implementation of appropriate modalities throughout a typical day.  Initiated 10/14/2024   Demonstrate independence w/ home stretch routine to improve AROM, help decrease pain & improve mobility. Initiated 10/14/2024         Long Term: In 12 weeks, pt will:     Perform selected cardio & resisted training independently to continue safe regular performance of daily exercise. Initiated 10/14/2024   Improve 6 MWT to 300 meters or greater to improve gait speed and mobility. Initiated 10/14/2024   Demonstrate Timed Up & Go (with appropriate assistive device, if necessary) of 8 seconds or less to decrease fall risk and improve functional mobility. Initiated " 10/14/2024   Score 51 % or greater on Lumbar FOTO to indicate significant functional improvements in impaired functions secondary to pain. Initiated 10/14/2024   Pt will perform yoga practice >/= 3 times per week for exercise and recreation of enjoyment (patient goal) Initiated 10/14/2024   Pt will sit >/= 1 hour and stand >/= 30 mins to demonstrate capacity for return to work part-time. Initiated 10/14/2024        PLAN     Continue PT per POC     Shana Hester, PTA

## 2024-11-22 ENCOUNTER — CLINICAL SUPPORT (OUTPATIENT)
Dept: REHABILITATION | Facility: OTHER | Age: 34
End: 2024-11-22
Payer: MEDICAID

## 2024-11-22 DIAGNOSIS — R26.89 IMPAIRED GAIT AND MOBILITY: ICD-10-CM

## 2024-11-22 DIAGNOSIS — M53.86 DECREASED RANGE OF MOTION OF LUMBAR SPINE: Primary | ICD-10-CM

## 2024-11-22 DIAGNOSIS — R52 PAIN AGGRAVATED BY ACTIVITIES OF DAILY LIVING: Primary | ICD-10-CM

## 2024-11-22 PROCEDURE — 97110 THERAPEUTIC EXERCISES: CPT | Mod: CQ

## 2024-11-22 PROCEDURE — 97530 THERAPEUTIC ACTIVITIES: CPT | Mod: CO

## 2024-11-22 NOTE — PROGRESS NOTES
[facility]    Functional Restoration Program  Physical Therapy Treatment Note  FRP Non-Cohort    Name: Scarlett Knox  MRN:[id number]      Therapy Diagnosis:   Encounter Diagnoses   Name Primary?    Decreased range of motion of lumbar spine Yes    Impaired gait and mobility        Physician: Lou Block P*       Date: 11/22/2024    Physician Orders: PT Eval and Treat   Medical Diagnosis from Referral:   Diagnosis   Z98.1 (ICD-10-CM) - S/P lumbar fusion      Evaluation Date: 10/14/2024  Authorization Period Expiration: 9/23/25  Plan of Care Expiration: 1/14/2025  Visit # / Visits authorized: 8/20  FOTO: 1/ 3      Precautions: Standard    Time In: 9:05am  Time Out: 10:00am  Total Billable Time: 55 mins      SUBJECTIVE     Scarlett reports being out of some anxiety meds and scrip not being filled for few days. She feels head is in a fog, got off on wrong floor and felt lost. Pt states she realized increased stress makes her feel more pain.      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       Limitation/Restriction for FOTO Lumbar Survey     Therapist reviewed FOTO scores for Scarlett Knox on 10/14/2024.   FOTO documents entered into [employer] - see Media section.     INTAKE Score 10/14/2024: 43%     Predicted Score: 51%         Treatment       Scarlett received the Individualized Treatments listed below:     Scarlett participated in dynamic functional therapeutic exercises to improve strength, range of motion, and functional performance for 55 minutes, including:    Full body functional mobility w/ 3-10 sec hold technique &/or 3-5 repetition   Cervical flx/ext  Cervical side  bending  Cervical rotation  Cervical protraction/retraction  Shld rolls  Shld depression/elevation  Scapular retraction/protraction/scaption  Posterior shld stretch (cross arm)  Shld ER stretch (chicken wing)  Elbow flx/ext  Forearm supination/pronation  Wrist flx/ext  Open/close hands/fingers  Seated trunk rotations  Seated trunk/thoracic ext/flx  Seated marches & knee to chest  Seated knee flx/ext  Seated hip ER/piriformis stretch  Seated hamstring stretch  Seated toe raise to heel raise   Seated ankle circles each way  Standing lumbar extensions  Standing lateral leaning stretch  Standing quad stretch (UE support for balance)    FUNCTIONAL ENDURANCE   DATE EQUIPMENT VARIABILITIES QUALITY PRE   10/30/24 TM 1 speed  5 min   4/10   11/6 TM 2.0->1.5 5 min 4/10   11/15 TM 2.0 -> 1.8 6 min 3/10   11/20/24 TM 2.0->1.9 5 min 4/10   11/22 TM 2.0->1.9 5 min 3/10     Diaphragmatic breathing: Using the 4 count inhale - hold for 4 counts - exhale for 4 counts  Verbal cues for unlabored, long & relaxed breathing   Awareness of pulling breath down away from mouth to hips  Cues for for proper abdominal excursion & decreased use of accessory muscles of breathing (hand on stomach & on chest; increased stomach motion, decreased chest motion)  Education on inhalation activating sympathetic nervous system   Education on exhalation activating parasympathetic nervous system  Performed supine in Z lying    Somatic Tracking: NP  Mindfulness-based, guided imagery to reduce fear-avoidance and catastrophizing around physical symptoms in body  Guided patient through attending to negative (painful), neutral, and positive sensations in the body  Used imagery and metaphors to reduce fear and hyperfixation on symptoms  Gave patient instructions for performing at home in the Patient Instructions section of After Visit Summary    Pain Neuroscience Education  Explained sensitization of the nervous system using the Why You Hurt education system by  Brittany Bridges PT, PhD  Discussed alarm metaphor, safety vs. Danger, and graded exposure within context of sensitized nervous system  Encouraged patient questions and elicited patient's ideas and beliefs around their pain history    Fitter board - mid setting, 20 tilt bouts NP   Fwd/Bwd    Trial 1-  2 hands fade to 1 hand     Trial 2 - 1 hand fade to attempts at no hand    Lat    Trial 1 - 1 hand     Trial 2 - 1 hand fade to no hand     PT education:  Physical & psychological viscous pain cycles (see patient instructions 10/30/2024)  Role of consistent activity (graded exposure) to break the physical cycle  Importance of education & behavioral changes that promote intrinsic patient motivation to help break the psychological cycle  Impact on pt's functional goals when breaking each one of these cycles.    Impact central sensitization has on the sensory system in the chronic pain population      Peripheral muscle strengthening which included 1-2 sets of 10-20 repetitions at a slow, controlled 5-7 second per rep pace focused on strengthening supporting musculature for improved body mechanics & functional mobility.  Patient & therapist focused on proper form & speed during treatment to ensure optimal strengthening of each targeted muscle group & neuromuscular re-education. Patients are instructed to keep sets/reps with proper load to stay at 3-5 out of 10 on the provided modified Lu exertion scale.    BAT[employer] Machine Exercises Weight (lbs) Sets Repetitions Lu Exertion Scale Rating   Leg Extension  7.5  20 4   Hamstring Curls 20  20 2.5   Chest Press 20  15 4   Pec Fly 20  15 4   Lat Pull Down       Mid Row       Bicep Bar Curls       Standing Tricep Extension       Single Leg Press   R/L 30  20 4   S/L hip abd         NP:  Floor transfer    Restorative Yoga poses:  Child's pose c/ pillow  Legs up the wall      Patient Education and Home Exercises     Home Exercises Provided and Patient Education Provided     Education  "provided:   - FRP Educational Topics: Modified Lu Exertion Scale, Physical & Psychological Pain Cycles, Activity Pacing for Pain & Debility , Central Sensitization , Mindfulness Resources, and Posture & Body Mechanics    Written Home Exercises Provided: yes. Exercises were reviewed and Scarlett was able to demonstrate them prior to the end of the session.  Scarlett demonstrated good  understanding of the education provided. See EMR under Patient Instructions for information provided during therapy sessions    HEP from [employer]:  - Child's pose  - Cat/cow  - Abdominal stretch/push up  - Wig wags  - Bridge  - Alt toe taps  - Belly breathing    ASSESSMENT     Scarlett participated well in treatment today. Performing stretch routine and breathing activity activated the PNS and she expressed reduced pain and "fog" symptoms. Pt is demonstrating good tolerance to resisted exercises and using the Lu scale appropriately to stay within the "green".     Scarlett Is progressing well towards her goals.   Pt prognosis is Good.     Pt will continue to benefit from skilled outpatient physical therapy to address the deficits listed in the problem list box on initial evaluation, provide pt/family education and to maximize pt's level of independence in the home and community environment.     Pt's spiritual, cultural and educational needs considered and pt agreeable to plan of care and goals.     Anticipated Barriers for therapy: chronic nature of issues, psychosocial factors, central sensitization    GOALS: Pt is in agreement with the following goals:  Goal Progress Towards Goal   SHORT Term: In 3 weeks, pt will:     Verbalize & demonstrate good understanding of resisted training with 3-1-3 second rep for [id number]-[id number]-[id number] contraction to encourage full muscle recruitment for strength & endurance. Initiated 10/14/2024   Verbalize & demonstrate good understanding of home stretch routine to improve AROM, help decrease " "pain & improve mobility. Initiated 10/14/2024   Demonstrate proper supine or seated diaphragmatic breathing with decreased chest excursion & emphasis on full abdominal excursion & increased expiration time to promote muscle relaxation & increased parasympathetic activity to improve patient tolerance to activities. Initiated 10/14/2024   Verbalize good understanding of importance of proper posture in resisted exercise, functional activities & ADL's in order to help reduce postural strain, promote proper breathing. Initiated 10/14/2024   Demonstrate good understanding of full body resisted exercises w/ use of pictures &/or verbal cues to promote independence w/ exercise at the end of the program. Initiated 10/14/2024   Verbalize plan for continuing resisted exercises w/ specific location (i.e. commercial gym, home, community fitness center)  to incorporate all major muscle groups to maintain & progress therapeutic functional improvements. Initiated 10/14/2024   Verbalize difference between  "hurt vs harm"  to demonstrate understanding of fear avoidance in pain cycle.  Initiated 10/14/2024         Midterm: In 8 weeks, pt will:     Verbalize good understanding of activities planning/scheduling (stretching, mindfulness, resisted training, & cardio) prescribed by PT/OT following the end of the program to sustain & progress functional improvements. Initiated 10/14/2024   Perform selected resisted training w/ minimal to no cuing in therapy session to be independent w/ resisted strengthening. Initiated 10/14/2024   Demonstrate improved symptom self-management w/ posture/positioning and mechanical movements and/or implementation of appropriate modalities throughout a typical day.  Initiated 10/14/2024   Demonstrate independence w/ home stretch routine to improve AROM, help decrease pain & improve mobility. Initiated 10/14/2024         Long Term: In 12 weeks, pt will:     Perform selected cardio & resisted training " independently to continue safe regular performance of daily exercise. Initiated 10/14/2024   Improve 6 MWT to 300 meters or greater to improve gait speed and mobility. Initiated 10/14/2024   Demonstrate Timed Up & Go (with appropriate assistive device, if necessary) of 8 seconds or less to decrease fall risk and improve functional mobility. Initiated 10/14/2024   Score 51 % or greater on Lumbar FOTO to indicate significant functional improvements in impaired functions secondary to pain. Initiated 10/14/2024   Pt will perform yoga practice >/= 3 times per week for exercise and recreation of enjoyment (patient goal) Initiated 10/14/2024   Pt will sit >/= 1 hour and stand >/= 30 mins to demonstrate capacity for return to work part-time. Initiated 10/14/2024        PLAN     Continue PT per POC     Shana Hester, PTA

## 2024-11-22 NOTE — PROGRESS NOTES
"[facility]  Occupational Therapy Treatment Note      Name: Scarlett Knox  MRN:[id number]  Encounter Date: 11/22/2024    Diagnosis:   Encounter Diagnosis   Name Primary?    Pain aggravated by activities of daily living Yes       Referring provider: Lou Block P*    Physician Orders: eval and treat; FRP  Medical Diagnosis: Z98.1 (ICD-10-CM) - S/P lumbar fusion  Evaluation Date: 10/28/2024  Insurance Authorization Period Expiration: 12/31/24  Plan of Care Certification Period: 1/17/2025  Visit # / Visits authorized: 7/20 (plus eval)  FOTO completed: 2/3     Precautions:  Standard and Fall    Time In: 2:34  Time Out: 3:32  Total Billable Time: 58 minutes    SUBJECTIVE     She reports: "I'm in pain today I ran out of my wellbutrin and lithium" "I always thought playing games with my friends was relaxing. Now I find my HB exercises more relaxing and playing games with friends more work."     Scarlett was compliant with parts of home exercise program given previously.     Response to previous treatment: Ms. Knox noted: no concern reported    Functional change: "I've been singing out loud a lot". More awareness, improved routine. Easier to do PT HEP. Using DAYTON score. Attempting to change danger in me to safety in me messaging".    Pain:       Currently rating pain as 4/10.  Functional Pain Scale Rating 0-10: within the last 24 hours  Date 10/28/2024 11/20/2024   Average 6/10 3/10   Worst 8/10 4/10   Best 4/10 2/10   Composite score 6/10 3/10   [TESS  is 1 point or 15-20% change in interference composite score (Gabo et al. 2008)]     Location: Low back, right leg  Description: Aching and Burning. Numbness in left leg.  Aggravating Factors: Sitting, Standing, Walking, and Lifting  Easing Factors: Gabapentin     Patient Specific Activities based on Personal goals:  Activity  11/6/2024    Performance (P) Satisfaction (S)   Sitting tolerance (for studies)  5 4 "   2.  Chores (house cleaning) 5 3   3.  Carrying groceries up the stairs into the house 3 3   4.  Intimacy 4 4   5.  Driving long distance (>4 hrs) 4 3           Total Score 4.2 3.4   Ratin-10; unable/unsatisfied - Able/Satisfied    Objective     Objective Measures updated at progress report unless specified.    COGNITIVE Exam  Behaviors: normal, sharing frustration.  Memory: not tested; able to follow 2 step commands during session  Safety awareness/insight to disability: impaired judgment and impaired insight; admitting to tendency to boom/bust.  Coping skills/emotional control: passive coping strategies engaging in hobbies: tracy; during session Appropriate to situation, with danger in me messaging expressed.     Dominant hand: right     Active Range of Motion  Upper Extremity 10/28/2024    RUE LUE   Hands WFL WFL   Wrist WFL WFL   Elbows WFL WFL   Shoulders WFL WFL      Upper Extremity Strength - Manual Muscle Testing  Motion Tested 10/28/2024 10/30/2024    Right Left  Right  Left comment   Shoulder Flexion NT NT 5/5 5/5    Shoulder Extension NT NT 5/5 5/5    Shoulder Abduction NT NT 5/5 5/5 Slight shoulder elevation (B)ly   Shoulder IR NT NT 5/5 5/5    Shoulder ER NT NT 5/5 5/5    Elbow Flexion NT NT 5/5 5/5    Elbow Extension NT NT 5/5 5/5        Strength (in pounds, rung II, average of three trials)    10/28/2024 2024   Right hand  60.67 lbs 61.67 lbs   Left hand  56 lbs 58.67 lbs   [norms for women aged 30-34: R=78.7 +/-19.2; L=68.0 +/-17.7 (Jennifer et al, 1985)]      Functional Strength  Pile Testing: Lifting    10/28/2024 (lbs/HR/DAYTON) 2024  (lbs/HR/DAYTON) 2024  (lbs/HR/DAYTON)    Repetitive Floor to Waist      107/4 /3   Repetitive Waist to Shoulder    NT 13/129/5 18/118/4   1- Time Maximum   NT /4   Carry-2 Handed (40ft)   NT /4   Work demand Level   Sedentary (#10 max, 1.5 METS)   Light (#20 max, frequent lifting/carrying #10, 2.5 METS    Reason for  stop point Increased time required for completing repetitions, Inability to maintain proper body mechanics. Inability to maintain proper body mechanics. Increased time required for completing repetitions   comment 1 rep only. Due to time PILE resting not completed. Tends to rush. Rushing    The work demand level listed above is based on the physical performance screening test performed. More comprehensive physical testing integrated with clinical findings would be required to derived an accurate work capacity.      Balance  5 times sit-stand (adults 18-63 y/o) 10/28/2024 11/20/2024   >12 sec= fall risk for general elderly  >16 sec= fall risk for Parkinson's disease  >10 sec= balance/vestibular dysfunction (<61 y/o)  >14.2 sec= balance/vestibular dysfunction (>61 y/o)  >12 sec= fall risk for CVA 15.71 seconds     (without UE used to push up from chair) 12.46    (Without UE used)      Endurance Testing: Modified Ramp Treadmill Test    10/28/2024 11/20/2024   Minutes Completed  3 minutes 3 minutes   % Grades  10 % 10%   Speed (mph)  1.7 mph 1.7 mph   METS 4 4    bpm 133 bpm   Lu Rating Perceived Exertion Scale   4 4   Reason for stop point Inability to maintain safe speed, Increased discomfort, Fear of increased pain Increased discomfort   Comments Gait patterns with use of spinal rotations with circumduction LU scale used      During physical testing, participation level was consistent.      Limitation/Restriction for FOTO NOC Survey     Therapist reviewed FOTO scores for Scarlett Knox on 11/20/2024  FOTO documents entered into [employer] - see Media section.     Limitation Score: 55*%                        Treatment     Scarlett received the treatments listed below:     Therapeutic activities and functional lifting activities in order to increase participation in, endurance for, and performance with vocational, selfcare ADLs, and leisure activities in order to improve quality of life, for 64 minutes,  including:    Pt completed weight carry to simulate groceries with  2 x 3lbs weights, x 5 laps around gym. Education and performance of rest breaks, core engagement and mind-body awareness. Rated as Moderate, 3/10.  Required cues for check ins and rest breaks.     Pt completed dynamic reaching in various functional ranches, with continued focus on hip rotation/weight shifting, x 1 lbs, 8 reps, rated as easy  (2/10) exertion. Focus on core engagement.     PNS activation through gary garden meditation performed for 5 minutes. Discussion on focusing energy away from frustration and towards meditation and breath work.    While supine, performed diaphragmatic breathing x 10 minutes. 2lbs on abdomen for biofeedback. Benefits for activation of PNS reviewed and mindfulness included. Discussed physical and mental sensations experienced to cue her that PNS activate ie: shoulders drop away from ears, breath can lengthen, jaw unclenches.       No environmental, cultural, spiritual, developmental or education needs expressed or noted.    Patient Education and Home Exercises   Education provided:   - Progress towards goals   - importance of completion of exercises and activities outside of session to attain goals.   - proper posture and body mechanics.  - anticipated muscular soreness following lift testing and strategies for management including stretching, using ice, scheduled rest.  - Functional pain scale  - DAYTON rate of perceived exertion scale.   - pain cycle  - pursed lip and diaphragmatic breathing techniques  - smoking cessation.  - acknowledging negative self talk or criticism, then shift focus (breath, task, HR for instance)  - daily stretch routine  - Tips to manage pain  - importance to acknowledge when noting self to rush, being judgmental, for instance.  - On/off the floor.  - Educational video: Understanding Pain in less than 5 minutes, and what to do about it!  - being a work place athlete  - 20/20/20 rule, use  mirror if needed to create the distance  - palming, sunning for eye relaxation  - left hand mousing.  - danger in me messaging vs safety in messaging  - energy banking tracking sheet to help with pacing,      Written Home Exercises Provided: Patient instructed to cont prior HEP. Energy banking tracking sheet provided.   Exercises were reviewed and Scarlett was able to demonstrate them prior to the end of the session. Scarlett demonstrated good  understanding of the education provided.     See EMR under Patient Instructions for exercises provided during therapy sessions. Patient education also located in FRP binder provided on day 1 of the cohort.     Assessment     Ms. Knox with good engagement despite high level of pain. Scarlett remains open to feedback and is receptive to education provided. Verbalizes carryover of tools/techniques learned in FRP. Emotional during bouts of pain. Today's focus on PNS activation, breath work, and gentle movement. Difficulty with pacing, although has increased awareness of rushing through activities. Encouraged practicing self check ins and body scans.  Activities related to personal goals to be rated next session.     Scarlett is progressing towards her goals and status of goals can be seen below. Scarlett's prognosis is Good due to motivation.    Scarlett would continue to benefit from skilled outpatient occupational therapy to address the deficits listed in the problem list on initial evaluation, provide patient education, and to maximize patient's level of independence in the home and community environment.     Anticipated barriers to occupational therapy: fear    Goals:     SHORT Term goals: In 3 weeks, patient will:  Status of goal; reviewed 11/20/2024   Implements correct use of breathing techniques during functional lifting tasks, with minimal cues needed. PROGRESSING   Utilizes DAYTON rate of exertion scale to pace performance with functional lifting tasks, and implements  self-care strategies during activity participation to manage pain. MET 11/20/2024   Verbalize understanding of energy conservation and pacing strategies during daily habits, roles, and routines in order to improve tolerance for work and home demands.  PROGRESSING   Completes 5x sit to stand test in 12 seconds or less to improve ability to participate in community mobility.   PROGRESSING   Demonstrate increased positional tolerance to the level needed for work, home, and community activities (standing in cue at social event, managing supplies for outing, streneous IADL), as evidenced by having a METS level of 5. PROGRESSING   Demonstrate proper body mechanics with functional lifting tasks (floor to waist, waist to shoulder, maximum lift, and box carry), via teach back method with minimal cues needed. MET 11/20/2024   Demonstrate increased functional strength by lifting 13 lbs waist to shoulder for management of (I)ADL, including dressing and grooming, placing items in kitchen cabinets, folding towels, and/or managing suitcase when traveling.   MET 11/20/2024   Demonstrate increased functional strength by lifting 18 lbs floor to waist to meet demand of work/home routine, including lifting groceries, managing laundry, and/or managing suitcase when traveling.   MET 11/20/2024   Tolerate Home Activity Plan (HAP)/ Home Exercise Program (HEP) to promote safety and independence with ADL, with report of completion of at least 2 out of 4 days outside of program participation.  MET 11/20/2024   Show improved perception of own abilities as evidenced by increase of FOTO scores to established MDC value and perception of performance ratings regarding personal long term goals.  PROGRESSING         Long Term goals: In 9 weeks, patient will: Status of goal; reviewed 11/20/2024   Report implementation of application of mindfulness, stretching, and other coping strategies for improved participation in daily routine. PROGRESSING    Verbalize functional application of lifting techniques in daily life (golfers lift, floor to waist, waist to shoulder). PROGRESSING   Completes 5x sit to stand test in 10 seconds or less to improve ability to participate in community mobility.  PROGRESSING   Applies functional application of lifting techniques (golfer's lift, floor to waist, waist to shoulder) in activities of daily life, per patient report.  PROGRESSING   Demonstrate physical capacities consistent with a Light-Medium (#35 max, frequent lifting/carrying #20, 3 METS)  demand level, as needed to perform activities to be successful in roles of life, regarding Household Management and community mobility and possible return to work setting. PROGRESSING   Have an improvement of 3 points in 2/5 personal goals in rating of  performance.  Initiated   Show improved perception of own abilities as evidenced by increase of FOTO scores to established MC II value and perception of performance ratings regarding personal long term goals.  PROGRESSING      Patient Specific Goals; to be met after 2 month of (I) application of tools/techniques learned.  Vocational: attending academic program in person   Recreational: standing in line at events   Daily Living Activities: chores   Measured by patient's self-reported performance and satisfaction of personal FRP goals, listed above in subjective section.   Total Score = sum of the activity performance scores / number of activities  Minimum detectable change (90%CI) for average score = 2 points  Minimum detectable change (90%CI) for single activity score  = 3 points     Plan     Continue per plan of care.     Updates/Grading for next session: discuss bike fit, progress with lifting sequence, RAHEL (work set up), energy banking tracking sheet, intimacy/positions, rate personal goals.     MAGGI Caicedo   11/22/2024     QR code for subsequent FOTO surveys:

## 2024-11-25 ENCOUNTER — DOCUMENTATION ONLY (OUTPATIENT)
Dept: REHABILITATION | Facility: HOSPITAL | Age: 34
End: 2024-11-25
Payer: MEDICAID

## 2024-11-25 NOTE — PROGRESS NOTES
Occupational Therapy      Patient Name:  Scarlett Knox   MRN:  [id number]    Client Care conference completed with evaluating therapist in regards to this patients POC as evidenced by co signature of supervising therapist.      MAGGI Caicedo  11/25/2024

## 2024-11-25 NOTE — PROGRESS NOTES
"[facility]  [facility]  Occupational Therapy Treatment Note      Name: Scarlett Knox  MRN:[id number]  Encounter Date: 11/26/2024    Diagnosis:   Encounter Diagnosis   Name Primary?    Pain aggravated by activities of daily living Yes     Referring provider: Lou Block P*    Physician Orders: eval and treat; FRP  Medical Diagnosis: Z98.1 (ICD-10-CM) - S/P lumbar fusion  Evaluation Date: 10/28/2024  Insurance Authorization Period Expiration: 12/31/24  Plan of Care Certification Period: 1/17/2025  Visit # / Visits authorized: 8/20 (plus eval)  FOTO completed: 2/3      Precautions:  Standard and Fall    Time In: 08:11  Time Out: 09:04  Total Billable Time: 53 minutes    SUBJECTIVE     She reports: "I was naughty, I didn't do anything for 3 days and I felt fine". On Friday, I woke up, didn't have meds, and that night I have had back pain, and I didn't get up for 2 days". In discussion, did admit to doing stretches, having moved around to prep meals, and "I didn't lay in bed per se, I did sat and did knitting."    Scarlett was compliant with parts of home exercise program given previously.     Response to previous treatment: Ms. Knox noted: increased pain Friday night (out of meds).    Functional change: "I've been singing out loud a lot". More awareness, improved routine. Easier to do PT HEP. Using DAYTON score. Attempting to change danger in me to safety in me messaging".    Pain:       Currently rating pain as 5/10, improved to 3/10 after stretch routine  Location: Low back, right leg  Description: Aching and Burning. Numbness in left leg.     Patient Specific Activities based on Personal goals:  Activity  11/6/2024 11/26/2024    Performance (P) Satisfaction (S) P S   Sitting tolerance (for studies)  5 4 7 8   2.  Chores (house cleaning) 5 3 5 3   3.  Carrying groceries up the stairs into the house 3 3 7 8   4.  Intimacy 4 4 4 2   5.  Driving long distance (>4 hrs) 4 3 " "5 6             Total Score 4.2 3.4 5.6 5.4   Ratin-10; unable/unsatisfied - Able/Satisfied    Objective     Objective Measures updated at progress report unless specified.    See progress note dated 2024.      Treatment     Scarlett received the treatments listed below:     Therapeutic activities and functional lifting activities in order to increase participation in, endurance for, and performance with vocational, selfcare ADLs, and leisure activities in order to improve quality of life, for 53 minutes, including:    While holding beach ball to stomach, for biofeedback; cues for increasing exhale given, to deescalate as coming in stressed and frustrated. Education provided on gate theory.     Full body stretch w/ 3-10 sec hold technique &/or 3-5 repetition technique on all of the following:  Cervical flx/ext  Cervical sidebending  Cervical rotation  Cervical protraction/retraction  Shld rolls  Shld depression/elevation  Scapular retraction/protraction/scaption  Posterior shld stretch (cross arm)  Shld ER stretch (chicken wing)  Elbow flx/ext  Forearm supination/pronation  Wrist flx/ext  Open/close hands/fingers  Seated trunk rotations  Seated trunk/thoracic ext/flx (ball used)  Seated marches & knee to chest  Seated knee flx/ext  Seated hip ER/piriformis stretch  Seated hamstring stretch  Seated toe raise to heel raise   Seated ankle circles each way  Standing lumbar extensions  Standing lateral trunk stretch  Standing quad stretch    Review of core engagement, increasing hip flexion and avoiding over extension of knees when walking. Leg elevation for standing tolerance    Scarlett completed dynamic reaching in various functional ranges, with continued focus on hip rotation/weight shifting, 10 reps x 1 lbs, rated as Sort of hard (4/10) exertion. Cues for pelvic posterior tilt. Ball roll outs and bouncing after task.     Cues to not having rushing, running like gate, as part of danger in messaging. "People " "think it's funny when I walk like this".    Scarlett participated in backwards walking activity using treadmill for 4 minutes, at .6 - .8 mph to improve gait, as tendency to over extend knees and keep hips guarded, functional endurance and progress towards increased MET level for improved community mobility and participation, as well as performance with intimacy, rated as Sort of hard (4/10) exertion; cues given to breath.    No environmental, cultural, spiritual, developmental or education needs expressed or noted.    Patient Education and Home Exercises   Education provided to date:  - Progress towards goals   - importance of completion of exercises and activities outside of session to attain goals.   - proper posture and body mechanics.  - anticipated muscular soreness following lift testing and strategies for management including stretching, using ice, scheduled rest.  - Functional pain scale  - DAYTON rate of perceived exertion scale.   - pain cycle  - pursed lip and diaphragmatic breathing techniques  - smoking cessation.  - acknowledging negative self talk or criticism, then shift focus (breath, task, HR for instance)  - daily stretch routine  - Tips to manage pain  - importance to acknowledge when noting self to rush, being judgmental, for instance.  - On/off the floor.  - Educational video: Understanding Pain in less than 5 minutes, and what to do about it!  - being a work place athlete  - 20/20/20 rule, use mirror if needed to create the distance  - palming, sunning for eye relaxation  - left hand mousing.  - danger in me messaging vs safety in messaging  - energy banking tracking sheet to help with pacing  - gate theory  - importance to avoid rushing     Written Home Exercises Provided: Patient instructed to cont prior HEP.   Exercises were reviewed and Scarlett was able to demonstrate them prior to the end of the session. Scarlett demonstrated good  understanding of the education provided.     See EMR under " Patient Instructions for exercises provided during therapy sessions. Patient education also located in [facility] binder provided on day 1 of the cohort.     Assessment     Ms. Knox presented to clinic with heightened emotions, with improved pain rating with expression of situation, combined with education given, and completion of daily stretch routine. Scarlett remains open to feedback and is receptive to education provided. Focus on use of core engagement with dynamic reaching and encouraging opening of hips, avoiding knee extension. Continues with tendency to rush, and be critical of self.     Activities related to personal goals rated today; although performance not yet improved to the level that is significant, satisfaction with performance has, as evidenced by 2 point change.    Scarlett is progressing towards her goals and status of goals can be seen below. Scarlett's prognosis is Good due to motivation.    Scarlett would continue to benefit from skilled outpatient occupational therapy to address the deficits listed in the problem list on initial evaluation, provide patient education, and to maximize patient's level of independence in the home and community environment.     Anticipated barriers to occupational therapy: fear, self criticism, guarding, rushing.    Goals:     SHORT Term goals: In 3 weeks, patient will:  Status of goal; reviewed 11/20/2024   Implements correct use of breathing techniques during functional lifting tasks, with minimal cues needed. PROGRESSING   Utilizes DAYTON rate of exertion scale to pace performance with functional lifting tasks, and implements self-care strategies during activity participation to manage pain. MET 11/20/2024   Verbalize understanding of energy conservation and pacing strategies during daily habits, roles, and routines in order to improve tolerance for work and home demands.  PROGRESSING   Completes 5x sit to stand test in 12 seconds or less to improve ability to  participate in community mobility.   PROGRESSING   Demonstrate increased positional tolerance to the level needed for work, home, and community activities (standing in cue at social event, managing supplies for outing, streneous IADL), as evidenced by having a METS level of 5. PROGRESSING   Demonstrate proper body mechanics with functional lifting tasks (floor to waist, waist to shoulder, maximum lift, and box carry), via teach back method with minimal cues needed. MET 11/20/2024   Demonstrate increased functional strength by lifting 13 lbs waist to shoulder for management of (I)ADL, including dressing and grooming, placing items in kitchen cabinets, folding towels, and/or managing suitcase when traveling.   MET 11/20/2024   Demonstrate increased functional strength by lifting 18 lbs floor to waist to meet demand of work/home routine, including lifting groceries, managing laundry, and/or managing suitcase when traveling.   MET 11/20/2024   Tolerate Home Activity Plan (HAP)/ Home Exercise Program (HEP) to promote safety and independence with ADL, with report of completion of at least 2 out of 4 days outside of program participation.  MET 11/20/2024   Show improved perception of own abilities as evidenced by increase of FOTO scores to established MDC value and perception of performance ratings regarding personal long term goals.  PROGRESSING         Long Term goals: In 9 weeks, patient will: Status of goal; reviewed 11/20/2024   Report implementation of application of mindfulness, stretching, and other coping strategies for improved participation in daily routine. PROGRESSING   Verbalize functional application of lifting techniques in daily life (golfers lift, floor to waist, waist to shoulder). PROGRESSING   Completes 5x sit to stand test in 10 seconds or less to improve ability to participate in community mobility.  PROGRESSING   Applies functional application of lifting techniques (golfer's lift, floor to waist,  waist to shoulder) in activities of daily life, per patient report.  PROGRESSING   Demonstrate physical capacities consistent with a Light-Medium (#35 max, frequent lifting/carrying #20, 3 METS)  demand level, as needed to perform activities to be successful in roles of life, regarding Household Management and community mobility and possible return to work setting. PROGRESSING   Have an improvement of 3 points in 2/5 personal goals in rating of  performance.  Initiated   Show improved perception of own abilities as evidenced by increase of FOTO scores to established MC II value and perception of performance ratings regarding personal long term goals.  PROGRESSING      Patient Specific Goals; to be met after 2 month of (I) application of tools/techniques learned.  Vocational: attending academic program in person   Recreational: standing in line at events   Daily Living Activities: chores   Measured by patient's self-reported performance and satisfaction of personal FRP goals, listed above in subjective section.   Total Score = sum of the activity performance scores / number of activities  Minimum detectable change (90%CI) for average score = 2 points  Minimum detectable change (90%CI) for single activity score  = 3 points     Plan     Continue per plan of care.     Updates/Grading for next session: discuss bike fit, progress with lifting sequence, RAHEL (work set up), energy banking tracking sheet, intimacy/positions.     FRANDY Henriquez, OTR/L, CEAS-I  11/26/2024       QR code for subsequent FOTO surveys:

## 2024-11-26 ENCOUNTER — CLINICAL SUPPORT (OUTPATIENT)
Dept: REHABILITATION | Facility: OTHER | Age: 34
End: 2024-11-26
Payer: MEDICAID

## 2024-11-26 DIAGNOSIS — G89.29 CHRONIC BILATERAL THORACIC BACK PAIN: ICD-10-CM

## 2024-11-26 DIAGNOSIS — R52 PAIN AGGRAVATED BY ACTIVITIES OF DAILY LIVING: Primary | ICD-10-CM

## 2024-11-26 DIAGNOSIS — M54.6 CHRONIC BILATERAL THORACIC BACK PAIN: ICD-10-CM

## 2024-11-26 DIAGNOSIS — M46.1 SACROILIITIS: Primary | ICD-10-CM

## 2024-11-26 DIAGNOSIS — M54.16 LUMBAR RADICULOPATHY: ICD-10-CM

## 2024-11-26 PROCEDURE — 97530 THERAPEUTIC ACTIVITIES: CPT

## 2024-11-26 PROCEDURE — 97110 THERAPEUTIC EXERCISES: CPT | Mod: CQ

## 2024-11-26 NOTE — PATIENT INSTRUCTIONS
[place] Theory:    The painful sensation that is perceived and experienced isn't only driven by the sensory event, but also the pain signals sent from the brain due to thoughts, attitudes, emotions, and feelings that surround the painful experience.    If someone has worrisome or anxious thoughts, negative emotions or memories, poor past experiences, or receives negative social feedback, pain signals will be sent down from the brain passing through an 'open gate', and the pain perceived will be greater. However, positive thoughts, emotions, and memories about the painful experience, relaxation, or positive social feedback, will cause the gate to close, and the person will essentially experience less pain.

## 2024-11-26 NOTE — PROGRESS NOTES
[facility]    Functional Restoration Program  Physical Therapy Treatment Note  FRP Non-Cohort    Name: Scarlett Knox  MRN:[id number]      Therapy Diagnosis:   Encounter Diagnoses   Name Primary?    Sacroiliitis Yes    Chronic bilateral thoracic back pain     Lumbar radiculopathy        Physician: Lou Block P*       Date: 11/26/2024    Physician Orders: PT Eval and Treat   Medical Diagnosis from Referral:   Diagnosis   Z98.1 (ICD-10-CM) - S/P lumbar fusion      Evaluation Date: 10/14/2024  Authorization Period Expiration: 9/23/25  Plan of Care Expiration: 1/14/2025  Visit # / Visits authorized: 8/20  FOTO: 1/ 3      Precautions: Standard    Time In: 9:05am  Time Out: 10:00am  Total Billable Time: 55 mins      SUBJECTIVE     Scarlett reports being out of some anxiety meds and scrip not being filled for few days. She feels head is in a fog, got off on wrong floor and felt lost. Pt states she realized increased stress makes her feel more pain.      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       Limitation/Restriction for FOTO Lumbar Survey     Therapist reviewed FOTO scores for Scarlett Knox on 10/14/2024.   FOTO documents entered into [employer] - see Media section.     INTAKE Score 10/14/2024: 43%     Predicted Score: 51%         Treatment       Scarlett received the Individualized Treatments listed below:     Scarlett participated in dynamic functional therapeutic exercises to improve strength, range of motion, and functional performance for 55 minutes, including:    Full body functional mobility w/ 3-10 sec hold technique &/or 3-5 repetition NP  Cervical flx/ext  Cervical side  bending  Cervical rotation  Cervical protraction/retraction  Shld rolls  Shld depression/elevation  Scapular retraction/protraction/scaption  Posterior shld stretch (cross arm)  Shld ER stretch (chicken wing)  Elbow flx/ext  Forearm supination/pronation  Wrist flx/ext  Open/close hands/fingers  Seated trunk rotations  Seated trunk/thoracic ext/flx  Seated marches & knee to chest  Seated knee flx/ext  Seated hip ER/piriformis stretch  Seated hamstring stretch  Seated toe raise to heel raise   Seated ankle circles each way  Standing lumbar extensions  Standing lateral leaning stretch  Standing quad stretch (UE support for balance)    FUNCTIONAL ENDURANCE   DATE EQUIPMENT VARIABILITIES QUALITY PRE   10/30/24 TM 1 speed  5 min   4/10   11/6 TM 2.0->1.5 5 min 4/10   11/15 TM 2.0 -> 1.8 6 min 3/10   11/20/24 TM 2.0->1.9 5 min 4/10   11/22 TM 2.0->1.9 5 min 3/10     Diaphragmatic breathing: Using the 4 count inhale - hold for 4 counts - exhale for 4 counts  Verbal cues for unlabored, long & relaxed breathing   Awareness of pulling breath down away from mouth to hips  Cues for for proper abdominal excursion & decreased use of accessory muscles of breathing (hand on stomach & on chest; increased stomach motion, decreased chest motion)  Education on inhalation activating sympathetic nervous system   Education on exhalation activating parasympathetic nervous system  Performed supine in Z lying    Somatic Tracking: NP  Mindfulness-based, guided imagery to reduce fear-avoidance and catastrophizing around physical symptoms in body  Guided patient through attending to negative (painful), neutral, and positive sensations in the body  Used imagery and metaphors to reduce fear and hyperfixation on symptoms  Gave patient instructions for performing at home in the Patient Instructions section of After Visit Summary    Pain Neuroscience Education  Explained sensitization of the nervous system using the Why You Hurt education system by  Brittany Bridges PT, PhD  Discussed alarm metaphor, safety vs. Danger, and graded exposure within context of sensitized nervous system  Encouraged patient questions and elicited patient's ideas and beliefs around their pain history    Fitter board - mid setting, 20 tilt bouts   Fwd/Bwd    Trial 1-  2 hands fade to 1 hand     Trial 2 - 1 hand fade to attempts at no hand    Lat    Trial 1 - 1 hand     Trial 2 - 1 hand fade to no hand     PT education:  Physical & psychological viscous pain cycles (see patient instructions 10/30/2024)  Role of consistent activity (graded exposure) to break the physical cycle  Importance of education & behavioral changes that promote intrinsic patient motivation to help break the psychological cycle  Impact on pt's functional goals when breaking each one of these cycles.    Impact central sensitization has on the sensory system in the chronic pain population      Peripheral muscle strengthening which included 1-2 sets of 10-20 repetitions at a slow, controlled 5-7 second per rep pace focused on strengthening supporting musculature for improved body mechanics & functional mobility.  Patient & therapist focused on proper form & speed during treatment to ensure optimal strengthening of each targeted muscle group & neuromuscular re-education. Patients are instructed to keep sets/reps with proper load to stay at 3-5 out of 10 on the provided modified Lu exertion scale.    BATCA Machine Exercises Weight (lbs) Sets Repetitions Lu Exertion Scale Rating   Leg Extension  10 1 15 4   Hamstring Curls 25 1 20 3   Chest Press       Pec Fly       Lat Pull Down 15 1 20 3   Mid Row 27.5 1 20 3   Bicep Bar Curls 7.5  15 4   Standing Tricep Extension       Single Leg Press   R/L 32.5  20 3              S/L hip abd x15  S/L clams RTB x15  Resisted side step RTB around knees 10 stpes x4  Monster walks RTB forward and back 10 steps x4    Restorative Yoga poses:  Child's pose c/ pillow  Legs up the  "wall      Patient Education and Home Exercises     Home Exercises Provided and Patient Education Provided     Education provided:   - FRP Educational Topics: Modified Lu Exertion Scale, Physical & Psychological Pain Cycles, Activity Pacing for Pain & Debility , Central Sensitization , Mindfulness Resources, and Posture & Body Mechanics    Written Home Exercises Provided: yes. Exercises were reviewed and Scarlett was able to demonstrate them prior to the end of the session.  Scarlett demonstrated good  understanding of the education provided. See EMR under Patient Instructions for information provided during therapy sessions    HEP from [employer]:  - Child's pose  - Cat/cow  - Abdominal stretch/push up  - Wig wags  - Bridge  - Alt toe taps  - Belly breathing    ASSESSMENT     Scarlett participated well in treatment today. Performing stretch routine and breathing activity activated the PNS and she expressed reduced pain and "fog" symptoms. Pt is demonstrating good tolerance to resisted exercises and using the Lu scale appropriately to stay within the "green".     Scarlett Is progressing well towards her goals.   Pt prognosis is Good.     Pt will continue to benefit from skilled outpatient physical therapy to address the deficits listed in the problem list box on initial evaluation, provide pt/family education and to maximize pt's level of independence in the home and community environment.     Pt's spiritual, cultural and educational needs considered and pt agreeable to plan of care and goals.     Anticipated Barriers for therapy: chronic nature of issues, psychosocial factors, central sensitization    GOALS: Pt is in agreement with the following goals:  Goal Progress Towards Goal   SHORT Term: In 3 weeks, pt will:     Verbalize & demonstrate good understanding of resisted training with 3-1-3 second rep for mqqrtlnrpi-[id number]-lnccntace contraction to encourage full muscle recruitment for strength & endurance. " "Initiated 10/14/2024   Verbalize & demonstrate good understanding of home stretch routine to improve AROM, help decrease pain & improve mobility. Initiated 10/14/2024   Demonstrate proper supine or seated diaphragmatic breathing with decreased chest excursion & emphasis on full abdominal excursion & increased expiration time to promote muscle relaxation & increased parasympathetic activity to improve patient tolerance to activities. Initiated 10/14/2024   Verbalize good understanding of importance of proper posture in resisted exercise, functional activities & ADL's in order to help reduce postural strain, promote proper breathing. Initiated 10/14/2024   Demonstrate good understanding of full body resisted exercises w/ use of pictures &/or verbal cues to promote independence w/ exercise at the end of the program. Initiated 10/14/2024   Verbalize plan for continuing resisted exercises w/ specific location (i.e. commercial gym, home, community fitness center)  to incorporate all major muscle groups to maintain & progress therapeutic functional improvements. Initiated 10/14/2024   Verbalize difference between  "hurt vs harm"  to demonstrate understanding of fear avoidance in pain cycle.  Initiated 10/14/2024         Midterm: In 8 weeks, pt will:     Verbalize good understanding of activities planning/scheduling (stretching, mindfulness, resisted training, & cardio) prescribed by PT/OT following the end of the program to sustain & progress functional improvements. Initiated 10/14/2024   Perform selected resisted training w/ minimal to no cuing in therapy session to be independent w/ resisted strengthening. Initiated 10/14/2024   Demonstrate improved symptom self-management w/ posture/positioning and mechanical movements and/or implementation of appropriate modalities throughout a typical day.  Initiated 10/14/2024   Demonstrate independence w/ home stretch routine to improve AROM, help decrease pain & improve mobility. " Initiated 10/14/2024         Long Term: In 12 weeks, pt will:     Perform selected cardio & resisted training independently to continue safe regular performance of daily exercise. Initiated 10/14/2024   Improve 6 MWT to 300 meters or greater to improve gait speed and mobility. Initiated 10/14/2024   Demonstrate Timed Up & Go (with appropriate assistive device, if necessary) of 8 seconds or less to decrease fall risk and improve functional mobility. Initiated 10/14/2024   Score 51 % or greater on Lumbar FOTO to indicate significant functional improvements in impaired functions secondary to pain. Initiated 10/14/2024   Pt will perform yoga practice >/= 3 times per week for exercise and recreation of enjoyment (patient goal) Initiated 10/14/2024   Pt will sit >/= 1 hour and stand >/= 30 mins to demonstrate capacity for return to work part-time. Initiated 10/14/2024        PLAN     Continue PT per POC     Shana Hester, PTA

## 2024-11-27 NOTE — PROGRESS NOTES
"[facility]  [facility]  Occupational Therapy Treatment Note      Name: Scarlett Knox  MRN:[id number]  Encounter Date: 12/2/2024    Diagnosis:   Encounter Diagnosis   Name Primary?    Pain aggravated by activities of daily living Yes     Referring provider: Lou Block P*    Physician Orders: eval and treat; FRP  Medical Diagnosis: Z98.1 (ICD-10-CM) - S/P lumbar fusion  Evaluation Date: 10/28/2024  Insurance Authorization Period Expiration: 12/31/24  Plan of Care Certification Period: 1/17/2025  Visit # / Visits authorized: 9/20 (plus eval)  FOTO completed: 2/3      Precautions:  Standard and Fall    Time In: 08:03  Time Out: 09:01  Total Billable Time: 56 minutes    SUBJECTIVE     She reports: "I wonder if I started stimming because I was looking for attention?" "The holidays used to be my favorite time, until the divorce. We asked them to divorce when we were 10, but they stayed together till my little brother was 18". "I think my parents felled ashamed when they didn't take me back to the PT when I said "Ouch", and because of that didn't take me to the doctor". I wonder what would have happened if I was diagnosed as a kid". "I recognize that I am stimming when I am by myself".    Scarlett was compliant with home exercise program given previously.     Response to previous treatment: Ms. Knox noted: no concerns reported.    Functional change: "I've been singing out loud a lot". More awareness, improved routine. Easier to do PT HEP. Using DAYTON score. Attempting to change danger in me to safety in me messaging".    Pain:       Currently rating pain as 3/10.   Location: Low back, right leg  Description: Aching and Burning. Numbness in left leg.     Patient Specific Activities based on Personal goals:  Activity  11/6/2024 11/26/2024    Performance (P) Satisfaction (S) P S   Sitting tolerance (for studies)  5 4 7 8   2.  Chores (house cleaning) 5 3 5 3   3.  Carrying " groceries up the stairs into the house 3 3 7 8   4.  Intimacy 4 4 4 2   5.  Driving long distance (>4 hrs) 4 3 5 6             Total Score 4.2 3.4 5.6 5.4   Ratin-10; unable/unsatisfied - Able/Satisfied    Objective     Objective Measures updated at progress report unless specified.    See progress note dated 2024.      Treatment     Scarlett received the treatments listed below:     Therapeutic activities and functional lifting activities in order to increase participation in, endurance for, and performance with vocational, selfcare ADLs, and leisure activities in order to improve quality of life, for 56 minutes, including:    Full body stretch w/ 3-10 sec hold technique &/or 3-5 repetition technique on all of the following:  Cervical flx/ext  Cervical sidebending  Cervical rotation  Cervical protraction/retraction  Shld rolls  Shld depression/elevation  Scapular retraction/protraction/scaption  Posterior shld stretch (cross arm)  Shld ER stretch (chicken wing)  Elbow flx/ext  Forearm supination/pronation  Wrist flx/ext  Open/close hands/fingers  Seated trunk rotations  Seated trunk/thoracic ext/flx (ball used)  Seated marches & knee to chest  Seated knee flx/ext  Seated hip ER/piriformis stretch  Seated hamstring stretch  Seated toe raise to heel raise   Seated ankle circles each way  Standing lumbar extensions  Standing lateral trunk stretch  Standing quad stretch    Discussion regarding brain/body connection.  Review of signs indicating that PNS has been activated.     While seated on large physio ball, completed occupational performance inventory of sexuality and intimacy (OPISI). Rated it to be insightful and sort of hard exertion (4/10).    Scarlett participated in backwards walking activity using treadmill for 2 minutes, at .8 mph to improve gait, as tendency to over extend knees and keep hips guarded, functional endurance and progress towards increased MET level for improved community mobility and  participation, as well as performance with intimacy, rated as Sort of hard (4/10) exertion; cues given to breath.    No environmental, cultural, spiritual, developmental or education needs expressed or noted.    Patient Education and Home Exercises   Education provided to date:  - Progress towards goals   - importance of completion of exercises and activities outside of session to attain goals.   - proper posture and body mechanics.  - anticipated muscular soreness following lift testing and strategies for management including stretching, using ice, scheduled rest.  - Functional pain scale  - DAYTON rate of perceived exertion scale.   - pain cycle  - pursed lip and diaphragmatic breathing techniques  - smoking cessation.  - acknowledging negative self talk or criticism, then shift focus (breath, task, HR for instance)  - daily stretch routine  - Tips to manage pain  - importance to acknowledge when noting self to rush, being judgmental, for instance.  - On/off the floor.  - Educational video: Understanding Pain in less than 5 minutes, and what to do about it!  - being a work place athlete  - 20/20/20 rule, use mirror if needed to create the distance  - palming, sunning for eye relaxation  - left hand mousing.  - danger in me messaging vs safety in messaging  - energy banking tracking sheet to help with pacing  - gate theory  - importance to avoid rushing     Written Home Exercises Provided: Patient instructed to cont prior HEP.   Exercises were reviewed and Scarlett was able to demonstrate them prior to the end of the session. Scarlett demonstrated good  understanding of the education provided.     See EMR under Patient Instructions for exercises provided during therapy sessions. Patient education also located in FRP binder provided on day 1 of the cohort.     Assessment     Ms. Knox engaged in session, and forthcoming with struggles, concerns, but open to education and feedback provided. Focus on session OPISI,  and danger in me messaging affecting tension affecting performance.     Scarlett is progressing towards her goals and status of goals can be seen below. Scarlett's prognosis is Good due to motivation.    Scarlett would continue to benefit from skilled outpatient occupational therapy to address the deficits listed in the problem list on initial evaluation, provide patient education, and to maximize patient's level of independence in the home and community environment.     Anticipated barriers to occupational therapy: fear, self criticism, guarding, rushing.    Goals:     SHORT Term goals: In 3 weeks, patient will:  Status of goal; reviewed 11/20/2024   Implements correct use of breathing techniques during functional lifting tasks, with minimal cues needed. PROGRESSING   Utilizes DAYTON rate of exertion scale to pace performance with functional lifting tasks, and implements self-care strategies during activity participation to manage pain. MET 11/20/2024   Verbalize understanding of energy conservation and pacing strategies during daily habits, roles, and routines in order to improve tolerance for work and home demands.  PROGRESSING   Completes 5x sit to stand test in 12 seconds or less to improve ability to participate in community mobility.   PROGRESSING   Demonstrate increased positional tolerance to the level needed for work, home, and community activities (standing in cue at social event, managing supplies for outing, streneous IADL), as evidenced by having a METS level of 5. PROGRESSING   Demonstrate proper body mechanics with functional lifting tasks (floor to waist, waist to shoulder, maximum lift, and box carry), via teach back method with minimal cues needed. MET 11/20/2024   Demonstrate increased functional strength by lifting 13 lbs waist to shoulder for management of (I)ADL, including dressing and grooming, placing items in kitchen cabinets, folding towels, and/or managing suitcase when traveling.   MET  11/20/2024   Demonstrate increased functional strength by lifting 18 lbs floor to waist to meet demand of work/home routine, including lifting groceries, managing laundry, and/or managing suitcase when traveling.   MET 11/20/2024   Tolerate Home Activity Plan (HAP)/ Home Exercise Program (HEP) to promote safety and independence with ADL, with report of completion of at least 2 out of 4 days outside of program participation.  MET 11/20/2024   Show improved perception of own abilities as evidenced by increase of FOTO scores to established MDC value and perception of performance ratings regarding personal long term goals.  PROGRESSING         Long Term goals: In 9 weeks, patient will: Status of goal; reviewed 11/20/2024   Report implementation of application of mindfulness, stretching, and other coping strategies for improved participation in daily routine. PROGRESSING   Verbalize functional application of lifting techniques in daily life (golfers lift, floor to waist, waist to shoulder). PROGRESSING   Completes 5x sit to stand test in 10 seconds or less to improve ability to participate in community mobility.  PROGRESSING   Applies functional application of lifting techniques (golfer's lift, floor to waist, waist to shoulder) in activities of daily life, per patient report.  PROGRESSING   Demonstrate physical capacities consistent with a Light-Medium (#35 max, frequent lifting/carrying #20, 3 METS)  demand level, as needed to perform activities to be successful in roles of life, regarding Household Management and community mobility and possible return to work setting. PROGRESSING   Have an improvement of 3 points in 2/5 personal goals in rating of  performance.  Initiated   Show improved perception of own abilities as evidenced by increase of FOTO scores to established MC II value and perception of performance ratings regarding personal long term goals.  PROGRESSING      Patient Specific Goals; to be met after 2 month  of (I) application of tools/techniques learned.  Vocational: attending academic program in person   Recreational: standing in line at events   Daily Living Activities: chores   Measured by patient's self-reported performance and satisfaction of personal FRP goals, listed above in subjective section.   Total Score = sum of the activity performance scores / number of activities  Minimum detectable change (90%CI) for average score = 2 points  Minimum detectable change (90%CI) for single activity score  = 3 points     Plan     Continue per plan of care.     Updates/Grading for next session: discuss bike fit, progress with lifting sequence, RAHEL (work set up), energy banking tracking sheet, BRITTA Henriquez, OTR/L, CEAS-I  12/2/2024       QR code for subsequent FOTO surveys:

## 2024-12-02 ENCOUNTER — CLINICAL SUPPORT (OUTPATIENT)
Dept: REHABILITATION | Facility: OTHER | Age: 34
End: 2024-12-02
Payer: MEDICAID

## 2024-12-02 DIAGNOSIS — R52 PAIN AGGRAVATED BY ACTIVITIES OF DAILY LIVING: Primary | ICD-10-CM

## 2024-12-02 DIAGNOSIS — R26.89 IMPAIRED GAIT AND MOBILITY: ICD-10-CM

## 2024-12-02 DIAGNOSIS — M53.86 DECREASED RANGE OF MOTION OF LUMBAR SPINE: Primary | ICD-10-CM

## 2024-12-02 PROCEDURE — 97110 THERAPEUTIC EXERCISES: CPT | Mod: CQ

## 2024-12-02 PROCEDURE — 97530 THERAPEUTIC ACTIVITIES: CPT

## 2024-12-02 NOTE — PROGRESS NOTES
[facility]    Functional [facility] Program  Physical Therapy Treatment Note  FRP Non-Cohort    Name: Scarlett Knox  MRN:[id number]      Therapy Diagnosis:   Encounter Diagnoses   Name Primary?    Decreased range of motion of lumbar spine Yes    Impaired gait and mobility        Physician: Lou Block P*       Date: 12/2/2024    Physician Orders: PT Eval and Treat   Medical Diagnosis from Referral:   Diagnosis   Z98.1 (ICD-10-CM) - S/P lumbar fusion      Evaluation Date: 10/14/2024  Authorization Period Expiration: 9/23/25  Plan of Care Expiration: 1/14/2025  Visit # / Visits authorized: 11/20  FOTO: 1/ 3      Precautions: Standard    Time In: 9:05am  Time Out: 10:00am  Total Billable Time: 55 mins      SUBJECTIVE     Scarlett reports feeling good this morning. Pt states she tried to change her HB workout days to accommodate FRP but she can't. She likes doing them on MWF. Pt is performing stretch routine daily. She does not plan on joining gym but perform resisted ex at home with free weights and resisted bands.      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       Limitation/Restriction for FOTO Lumbar Survey     Therapist reviewed FOTO scores for Scarlett Knox on 10/14/2024.   FOTO documents entered into [employer] - see Media section.     INTAKE Score 10/14/2024: 43%     Predicted Score: 51%         Treatment       Scarlett received the Individualized Treatments listed below:     Scarlett participated in dynamic functional therapeutic exercises to improve strength, range of motion, and functional performance for 55 minutes, including:    Full body functional mobility w/ 3-10 sec  hold technique &/or 3-5 repetition NP  Cervical flx/ext  Cervical side bending  Cervical rotation  Cervical protraction/retraction  Shld rolls  Shld depression/elevation  Scapular retraction/protraction/scaption  Posterior shld stretch (cross arm)  Shld ER stretch (chicken wing)  Elbow flx/ext  Forearm supination/pronation  Wrist flx/ext  Open/close hands/fingers  Seated trunk rotations  Seated trunk/thoracic ext/flx  Seated marches & knee to chest  Seated knee flx/ext  Seated hip ER/piriformis stretch  Seated hamstring stretch  Seated toe raise to heel raise   Seated ankle circles each way  Standing lumbar extensions  Standing lateral leaning stretch  Standing quad stretch (UE support for balance)    FUNCTIONAL ENDURANCE   DATE EQUIPMENT VARIABILITIES QUALITY PRE   10/30/24 TM 1 speed  5 min   4/10   11/6 TM 2.0->1.5 5 min 4/10   11/15 TM 2.0 -> 1.8 6 min 3/10   11/20/24 TM 2.0->1.9 5 min 4/10   11/22 TM 2.0->1.9 5 min 3/10   12/02/24 TM 2.0, grade 2 6 min 4/10     Diaphragmatic breathing: Using the 4 count inhale - hold for 4 counts - exhale for 4 counts  Verbal cues for unlabored, long & relaxed breathing   Awareness of pulling breath down away from mouth to hips  Cues for for proper abdominal excursion & decreased use of accessory muscles of breathing (hand on stomach & on chest; increased stomach motion, decreased chest motion)  Education on inhalation activating sympathetic nervous system   Education on exhalation activating parasympathetic nervous system  Performed supine in Z lying    Somatic Tracking: NP  Mindfulness-based, guided imagery to reduce fear-avoidance and catastrophizing around physical symptoms in body  Guided patient through attending to negative (painful), neutral, and positive sensations in the body  Used imagery and metaphors to reduce fear and hyperfixation on symptoms  Gave patient instructions for performing at home in the Patient Instructions section of After Visit Summary    Pain  Neuroscience Education  Explained sensitization of the nervous system using the Why You Hurt education system by Brittany Bridges PT, PhD  Discussed alarm metaphor, safety vs. Danger, and graded exposure within context of sensitized nervous system  Encouraged patient questions and elicited patient's ideas and beliefs around their pain history    Fitter board - mid setting, 20 tilt bouts NP   Fwd/Bwd    Trial 1-  2 hands fade to 1 hand     Trial 2 - 1 hand fade to attempts at no hand    Lat    Trial 1 - 1 hand     Trial 2 - 1 hand fade to no hand     PT education:  Physical & psychological viscous pain cycles   Role of consistent activity (graded exposure) to break the physical cycle  Importance of education & behavioral changes that promote intrinsic patient motivation to help break the psychological cycle  Impact on pt's functional goals when breaking each one of these cycles.    Impact central sensitization has on the sensory system in the chronic pain population    Peripheral muscle strengthening which included 1-2 sets of 10-20 repetitions at a slow, controlled 5-7 second per rep pace focused on strengthening supporting musculature for improved body mechanics & functional mobility.  Patient & therapist focused on proper form during treatment to ensure optimal strengthening of each targeted muscle group. Patients are instructed to keep sets/reps with proper load to stay at 3-5 out of 10 on the provided modified Lu exertion scale.       Free Weight Exercises Weight (lbs) Sets Repetitions Lu Exertion Scale Rating   Bicep Dumbbell Curls 7 1 15 4   Bent Over Tricep Kickback       Bent Over Row       Supine Pec Fly 5 1 15    Supine Chest Press 5 1 15 3   Overhead Shoulder Press 5 1 15 4   Reverse Back Fly       Sit to Stand Unsupported       Wall Squats 0 2 12/8 5        S/L hip abd x15  S/L clams RTB x15    Gait training:  Resisted side step RTB around knees 10 stpes x4  Monster walks RTB forward and back 10 steps  "x4    Restorative Yoga poses:  Child's pose c/ pillow  Legs up the wall      Patient Education and Home Exercises     Home Exercises Provided and Patient Education Provided     Education provided:   - FRP Educational Topics: Modified Lu Exertion Scale, Physical & Psychological Pain Cycles, Activity Pacing for Pain & Debility , Central Sensitization , Mindfulness Resources, and Posture & Body Mechanics    Written Home Exercises Provided: yes. Exercises were reviewed and Scarlett was able to demonstrate them prior to the end of the session.  Scarlett demonstrated good  understanding of the education provided. See EMR under Patient Instructions for information provided during therapy sessions    HEP from [employer]:  - Child's pose  - Cat/cow  - Abdominal stretch/push up  - Wig wags  - Bridge  - Alt toe taps  - Belly breathing    ASSESSMENT     Scarlett participated well in treatment today. Performing breathing activity activated the PNS and she expressed reduced pain and "fog" symptoms. Pt is demonstrating good tolerance to resisted exercises and using the Lu scale appropriately to stay within the "green". Pt also, demonstrating improved giat with less genu valgus and increased step length.    Scarlett Is progressing well towards her goals.   Pt prognosis is Good.     Pt will continue to benefit from skilled outpatient physical therapy to address the deficits listed in the problem list box on initial evaluation, provide pt/family education and to maximize pt's level of independence in the home and community environment.     Pt's spiritual, cultural and educational needs considered and pt agreeable to plan of care and goals.     Anticipated Barriers for therapy: chronic nature of issues, psychosocial factors, central sensitization    GOALS: Pt is in agreement with the following goals:  Goal Progress Towards Goal   SHORT Term: In 3 weeks, pt will:     Verbalize & demonstrate good understanding of resisted training " "with 3-1-3 second rep for qygshgiyjb-[id number]-[id number] contraction to encourage full muscle recruitment for strength & endurance. Initiated 10/14/2024   Verbalize & demonstrate good understanding of home stretch routine to improve AROM, help decrease pain & improve mobility. Initiated 10/14/2024   Demonstrate proper supine or seated diaphragmatic breathing with decreased chest excursion & emphasis on full abdominal excursion & increased expiration time to promote muscle relaxation & increased parasympathetic activity to improve patient tolerance to activities. Initiated 10/14/2024   Verbalize good understanding of importance of proper posture in resisted exercise, functional activities & ADL's in order to help reduce postural strain, promote proper breathing. Initiated 10/14/2024   Demonstrate good understanding of full body resisted exercises w/ use of pictures &/or verbal cues to promote independence w/ exercise at the end of the program. Initiated 10/14/2024   Verbalize plan for continuing resisted exercises w/ specific location (i.e. commercial gym, home, community fitness center)  to incorporate all major muscle groups to maintain & progress therapeutic functional improvements. Initiated 10/14/2024   Verbalize difference between  "hurt vs harm"  to demonstrate understanding of fear avoidance in pain cycle.  Initiated 10/14/2024         Midterm: In 8 weeks, pt will:     Verbalize good understanding of activities planning/scheduling (stretching, mindfulness, resisted training, & cardio) prescribed by PT/OT following the end of the program to sustain & progress functional improvements. Initiated 10/14/2024   Perform selected resisted training w/ minimal to no cuing in therapy session to be independent w/ resisted strengthening. Initiated 10/14/2024   Demonstrate improved symptom self-management w/ posture/positioning and mechanical movements and/or implementation of appropriate modalities throughout a typical " day.  Initiated 10/14/2024   Demonstrate independence w/ home stretch routine to improve AROM, help decrease pain & improve mobility. Initiated 10/14/2024         Long Term: In 12 weeks, pt will:     Perform selected cardio & resisted training independently to continue safe regular performance of daily exercise. Initiated 10/14/2024   Improve 6 MWT to 300 meters or greater to improve gait speed and mobility. Initiated 10/14/2024   Demonstrate Timed Up & Go (with appropriate assistive device, if necessary) of 8 seconds or less to decrease fall risk and improve functional mobility. Initiated 10/14/2024   Score 51 % or greater on Lumbar FOTO to indicate significant functional improvements in impaired functions secondary to pain. Initiated 10/14/2024   Pt will perform yoga practice >/= 3 times per week for exercise and recreation of enjoyment (patient goal) Initiated 10/14/2024   Pt will sit >/= 1 hour and stand >/= 30 mins to demonstrate capacity for return to work part-time. Initiated 10/14/2024        PLAN     Continue PT per POC     Shana Hester, PTA

## 2024-12-04 ENCOUNTER — CLINICAL SUPPORT (OUTPATIENT)
Dept: REHABILITATION | Facility: OTHER | Age: 34
End: 2024-12-04
Payer: MEDICAID

## 2024-12-04 DIAGNOSIS — R52 PAIN AGGRAVATED BY ACTIVITIES OF DAILY LIVING: Primary | ICD-10-CM

## 2024-12-04 DIAGNOSIS — R26.89 IMPAIRED GAIT AND MOBILITY: ICD-10-CM

## 2024-12-04 DIAGNOSIS — M53.86 DECREASED RANGE OF MOTION OF LUMBAR SPINE: Primary | ICD-10-CM

## 2024-12-04 PROCEDURE — 97530 THERAPEUTIC ACTIVITIES: CPT | Mod: KX

## 2024-12-04 PROCEDURE — 97110 THERAPEUTIC EXERCISES: CPT | Mod: CQ

## 2024-12-04 NOTE — PROGRESS NOTES
"[facility]  [facility]  Occupational Therapy Treatment Note      Name: Scarlett Knox  MRN:[id number]  Encounter Date: 2024    Diagnosis:   Encounter Diagnosis   Name Primary?    Pain aggravated by activities of daily living Yes       Referring provider: Lou Block P*    Physician Orders: eval and treat; FRP  Medical Diagnosis: Z98.1 (ICD-10-CM) - S/P lumbar fusion  Evaluation Date: 10/28/2024  Insurance Authorization Period Expiration: 24  Plan of Care Certification Period: 2025  Visit # / Visits authorized: 10/20 (plus eval)  FOTO completed: 2/3      Precautions:  Standard and Fall    Time In: 1000  Time Out: 1100  Total Billable Time: 60 minutes    SUBJECTIVE     She reports: "I just feel like normal people dont' have to do a lot of the things I do"  "having the diagnosis helps me to know how to do things better to calm myself, but then on the flip side, it can feel like a big label that's put on me"     Scarlett was compliant with home exercise program given previously.     Response to previous treatment: Ms. Knox noted: no concerns reported.    Functional change: "I've been singing out loud a lot". More awareness, improved routine. Easier to do PT HEP. Using DAYTON score. Attempting to change danger in me to safety in me messaging".    Pain:       Currently rating pain as 3/10.   Location: Low back, right leg  Description: Aching and Burning. Numbness in left leg.     Patient Specific Activities based on Personal goals:  Activity  2024    Performance (P) Satisfaction (S) P S   Sitting tolerance (for studies)  5 4 7 8   2.  Chores (house cleaning) 5 3 5 3   3.  Carrying groceries up the stairs into the house 3 3 7 8   4.  Intimacy 4 4 4 2   5.  Driving long distance (>4 hrs) 4 3 5 6             Total Score 4.2 3.4 5.6 5.4   Ratin-10; unable/unsatisfied - Able/Satisfied    Objective     Objective Measures updated at progress " "report unless specified.    See progress note dated 11/20/2024.      Treatment     Scarlett received the treatments listed below:     Therapeutic activities and functional lifting activities in order to increase participation in, endurance for, and performance with vocational, selfcare ADLs, and leisure activities in order to improve quality of life, for 60 minutes, including:    Seated education, movement on physioball for PNS activation while discussing impact of over-stressed nervous system on pain/stress perception. Dorsed fear of additional movement on treadmill 2* "already completing" so instead guided in movement on physioball. Rated as moderate 3/10.    Pt completed seated writing task with verbal/tactile cues on support upright posture. Modifications suggested for height and distance from task. List of PNS activating "tools and tells" for improved awareness and efficacy in self-management of symptoms. Rated as moderate 3/10.  List typed up by therapist and attached to today's note.     No environmental, cultural, spiritual, developmental or education needs expressed or noted.    Patient Education and Home Exercises   Education provided to date:  - Progress towards goals   - importance of completion of exercises and activities outside of session to attain goals.   - proper posture and body mechanics.  - anticipated muscular soreness following lift testing and strategies for management including stretching, using ice, scheduled rest.  - Functional pain scale  - DAYTON rate of perceived exertion scale.   - pain cycle  - pursed lip and diaphragmatic breathing techniques  - smoking cessation.  - acknowledging negative self talk or criticism, then shift focus (breath, task, HR for instance)  - daily stretch routine  - Tips to manage pain  - importance to acknowledge when noting self to rush, being judgmental, for instance.  - On/off the floor.  - Educational video: Understanding Pain in less than 5 minutes, and " "what to do about it!  - being a work place athlete  - 20/20/20 rule, use mirror if needed to create the distance  - palming, sunning for eye relaxation  - left hand mousing.  - danger in me messaging vs safety in messaging  - energy banking tracking sheet to help with pacing  - gate theory  - importance to avoid rushing     Written Home Exercises Provided: Patient instructed to cont prior HEP.   Exercises were reviewed and Scarlett was able to demonstrate them prior to the end of the session. Scarlett demonstrated good  understanding of the education provided.     See EMR under Patient Instructions for exercises provided during therapy sessions. Patient education also located in FRP binder provided on day 1 of the cohort.     Assessment     Ms. Knox engaged well in session, open to education. Noted to be focused on feedback from therapist to validate "normalcy" - though responsive to encouragement to shift focus internally and note own response to treatment (calming down, experiencing less pain) as validation of "good vs bad". Good engagement in list of "tells" and "tips" for treating and interacting with PNS vs SNS with practical function-based opportunities identified for application.     Scarlett is progressing towards her goals and status of goals can be seen below. Scarlett's prognosis is Good due to motivation.    Scarlett would continue to benefit from skilled outpatient occupational therapy to address the deficits listed in the problem list on initial evaluation, provide patient education, and to maximize patient's level of independence in the home and community environment.     Anticipated barriers to occupational therapy: fear, self criticism, guarding, rushing.    Goals:     SHORT Term goals: In 3 weeks, patient will:  Status of goal; reviewed 11/20/2024   Implements correct use of breathing techniques during functional lifting tasks, with minimal cues needed. PROGRESSING   Utilizes DAYTON rate of exertion " scale to pace performance with functional lifting tasks, and implements self-care strategies during activity participation to manage pain. MET 11/20/2024   Verbalize understanding of energy conservation and pacing strategies during daily habits, roles, and routines in order to improve tolerance for work and home demands.  PROGRESSING   Completes 5x sit to stand test in 12 seconds or less to improve ability to participate in community mobility.   PROGRESSING   Demonstrate increased positional tolerance to the level needed for work, home, and community activities (standing in cue at social event, managing supplies for outing, streneous IADL), as evidenced by having a METS level of 5. PROGRESSING   Demonstrate proper body mechanics with functional lifting tasks (floor to waist, waist to shoulder, maximum lift, and box carry), via teach back method with minimal cues needed. MET 11/20/2024   Demonstrate increased functional strength by lifting 13 lbs waist to shoulder for management of (I)ADL, including dressing and grooming, placing items in kitchen cabinets, folding towels, and/or managing suitcase when traveling.   MET 11/20/2024   Demonstrate increased functional strength by lifting 18 lbs floor to waist to meet demand of work/home routine, including lifting groceries, managing laundry, and/or managing suitcase when traveling.   MET 11/20/2024   Tolerate Home Activity Plan (HAP)/ Home Exercise Program (HEP) to promote safety and independence with ADL, with report of completion of at least 2 out of 4 days outside of program participation.  MET 11/20/2024   Show improved perception of own abilities as evidenced by increase of FOTO scores to established MDC value and perception of performance ratings regarding personal long term goals.  PROGRESSING         Long Term goals: In 9 weeks, patient will: Status of goal; reviewed 11/20/2024   Report implementation of application of mindfulness, stretching, and other coping  strategies for improved participation in daily routine. PROGRESSING   Verbalize functional application of lifting techniques in daily life (golfers lift, floor to waist, waist to shoulder). PROGRESSING   Completes 5x sit to stand test in 10 seconds or less to improve ability to participate in community mobility.  PROGRESSING   Applies functional application of lifting techniques (golfer's lift, floor to waist, waist to shoulder) in activities of daily life, per patient report.  PROGRESSING   Demonstrate physical capacities consistent with a Light-Medium (#35 max, frequent lifting/carrying #20, 3 METS)  demand level, as needed to perform activities to be successful in roles of life, regarding Household Management and community mobility and possible return to work setting. PROGRESSING   Have an improvement of 3 points in 2/5 personal goals in rating of  performance.  Initiated   Show improved perception of own abilities as evidenced by increase of FOTO scores to established MC II value and perception of performance ratings regarding personal long term goals.  PROGRESSING      Patient Specific Goals; to be met after 2 month of (I) application of tools/techniques learned.  Vocational: attending academic program in person   Recreational: standing in line at events   Daily Living Activities: chores   Measured by patient's self-reported performance and satisfaction of personal FRP goals, listed above in subjective section.   Total Score = sum of the activity performance scores / number of activities  Minimum detectable change (90%CI) for average score = 2 points  Minimum detectable change (90%CI) for single activity score  = 3 points     Plan     Continue per plan of care.     Updates/Grading for next session: discuss bike fit, progress with lifting sequence, RAHEL (work set up), energy banking tracking sheet, OPISI, check on tip sheet    ERWIN Sheffield   12/4/2024       QR code for subsequent FOTO  surveys:

## 2024-12-04 NOTE — PROGRESS NOTES
"[facility]    [facility]  Physical Therapy Treatment Note  FRP Non-Cohort    Name: Scarlett Knox  MRN:[id number]      Therapy Diagnosis:   Encounter Diagnoses   Name Primary?    Decreased range of motion of lumbar spine Yes    Impaired gait and mobility        Physician: Lou Block P*       Date: 12/4/2024    Physician Orders: PT Eval and Treat   Medical Diagnosis from Referral:   Diagnosis   Z98.1 (ICD-10-CM) - S/P lumbar fusion      Evaluation Date: 10/14/2024  Authorization Period Expiration: 9/23/25  Plan of Care Expiration: 1/14/2025  Visit # / Visits authorized: 12/20  FOTO: 1/ 3      Precautions: Standard    Time In: 9:05am  Time Out: 10:00am  Total Billable Time: 55 mins      SUBJECTIVE     Scarlett reports late this morning due to difficulty finding parking. Pt obviously upset and stressed upon entry. Pt stating increased pain and her morning "was a lot". Pt emotional and crying.     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       Limitation/Restriction for FOTO Lumbar Survey     Therapist reviewed FOTO scores for Scarlett Knox on 10/14/2024.   FOTO documents entered into [employer] - see Media section.     INTAKE Score 10/14/2024: 43%     Predicted Score: 51%         Treatment       Scarlett received the Individualized Treatments listed below:     Scarlett participated in dynamic functional therapeutic exercises to improve strength, range of motion, and functional performance for 55 minutes, including:    Full body functional mobility w/ 3-10 sec hold technique &/or 3-5 repetition   Cervical flx/ext  Cervical side bending  Cervical rotation  Cervical " protraction/retraction  Shld rolls  Shld depression/elevation  Scapular retraction/protraction/scaption  Posterior shld stretch (cross arm)  Shld ER stretch (chicken wing)  Elbow flx/ext  Forearm supination/pronation  Wrist flx/ext  Open/close hands/fingers  Seated trunk rotations  Seated trunk/thoracic ext/flx  Seated marches & knee to chest  Seated knee flx/ext  Seated hip ER/piriformis stretch  Seated hamstring stretch  Seated toe raise to heel raise   Seated ankle circles each way  Standing lumbar extensions  Standing lateral leaning stretch  Standing quad stretch (UE support for balance)    FUNCTIONAL ENDURANCE   DATE EQUIPMENT VARIABILITIES QUALITY PRE   10/30/24 TM 1 speed  5 min   4/10   11/6 TM 2.0->1.5 5 min 4/10   11/15 TM 2.0 -> 1.8 6 min 3/10   11/20/24 TM 2.0->1.9 5 min 4/10   11/22 TM 2.0->1.9 5 min 3/10   12/02/24 TM 2.0, grade 2 6 min 4/10                   Mindfulness activity: self guided c/ assistance from PTA using the breathe to calm the NS, activate the PNS      Breathing: Using the 4 count inhale - hold for 4 counts - exhale for 4 counts  Verbal cues for unlabored, long & relaxed breathing   Awareness of pulling breath down away from mouth to hips  Cues for for proper abdominal excursion & decreased use of accessory muscles of breathing (hand on stomach & on chest; increased stomach motion, decreased chest motion)  Education on inhalation activating sympathetic nervous system   Education on exhalation activating parasympathetic nervous system  Performed supine in Z lying    Somatic Tracking: NP  Mindfulness-based, guided imagery to reduce fear-avoidance and catastrophizing around physical symptoms in body  Guided patient through attending to negative (painful), neutral, and positive sensations in the body  Used imagery and metaphors to reduce fear and hyperfixation on symptoms  Gave patient instructions for performing at home in the Patient Instructions section of After Visit Summary    Pain  Neuroscience Education  Explained sensitization of the nervous system using the Why You Hurt education system by Brittany Bridges PT, PhD  Discussed alarm metaphor, safety vs. Danger, and graded exposure within context of sensitized nervous system  Encouraged patient questions and elicited patient's ideas and beliefs around their pain history    Fitter board - mid setting, 20 tilt bouts    Fwd/Bwd    Trial 1-  2 hands fade to 1 hand     Trial 2 - 1 hand fade to attempts at no hand    Lat    Trial 1 - 1 hand     Trial 2 - 1 hand fade to no hand     PT education:  Physical & psychological viscous pain cycles   Role of consistent activity (graded exposure) to break the physical cycle  Importance of education & behavioral changes that promote intrinsic patient motivation to help break the psychological cycle  Impact on pt's functional goals when breaking each one of these cycles.    Impact central sensitization has on the sensory system in the chronic pain population    Peripheral muscle strengthening which included 1-2 sets of 10-20 repetitions at a slow, controlled 5-7 second per rep pace focused on strengthening supporting musculature for improved body mechanics & functional mobility.  Patient & therapist focused on proper form during treatment to ensure optimal strengthening of each targeted muscle group. Patients are instructed to keep sets/reps with proper load to stay at 3-5 out of 10 on the provided modified Lu exertion scale.       Free Weight Exercises Weight (lbs) Sets Repetitions Lu Exertion Scale Rating   Bicep Dumbbell Curls 5/3  During ambulation 4   Bent Over Tricep Kickback       Bent Over Row       Supine Pec Fly       Supine Chest Press       Overhead Shoulder Press       Reverse Back Fly       Sit to Stand Unsupported       Wall Squats            S/L hip abd x15  S/L clams RTB x15    Gait training:  Resisted side step RTB around knees 10 stpes x4  Monster walks RTB forward and back 10 steps  x4    Restorative Yoga poses:  Child's pose c/ pillow  Legs up the wall      Patient Education and Home Exercises     Home Exercises Provided and Patient Education Provided     Education provided:   - FRP Educational Topics: Modified Lu Exertion Scale, Physical & Psychological Pain Cycles, Activity Pacing for Pain & Debility , Central Sensitization , Mindfulness Resources, and Posture & Body Mechanics    Written Home Exercises Provided: yes. Exercises were reviewed and Scarlett was able to demonstrate them prior to the end of the session.  Scarlett demonstrated good  understanding of the education provided. See EMR under Patient Instructions for information provided during therapy sessions    HEP from [employer]:  - Child's pose  - Cat/cow  - Abdominal stretch/push up  - Wig wags  - Bridge  - Alt toe taps  - Belly breathing    ASSESSMENT     Scarlett presents to P emotionally stressed but was able to be guided into breathing and relaxation techniques to clam the NS. PT session began with activating the PNS with the breath while physically stretching the LB during trunk flex with T ball. Cont to educate the importance of practicing the calming of the NS tactics such as mindfulness and ex with the intention of re-training the brain so when stressful encounters happen, you can use it to stay calm. Review the importance of pacing and the use of the Lu scale to avoid rushing and Boom and Bust. Info was well received and pt cont with a productive session.    Scarlett Is progressing well towards her goals.   Pt prognosis is Good.     Pt will continue to benefit from skilled outpatient physical therapy to address the deficits listed in the problem list box on initial evaluation, provide pt/family education and to maximize pt's level of independence in the home and community environment.     Pt's spiritual, cultural and educational needs considered and pt agreeable to plan of care and goals.     Anticipated Barriers for  "therapy: chronic nature of issues, psychosocial factors, central sensitization    GOALS: Pt is in agreement with the following goals:  Goal Progress Towards Goal   SHORT Term: In 3 weeks, pt will:     Verbalize & demonstrate good understanding of resisted training with 3-1-3 second rep for [id number] contraction to encourage full muscle recruitment for strength & endurance. Initiated 10/14/2024   Verbalize & demonstrate good understanding of home stretch routine to improve AROM, help decrease pain & improve mobility. Initiated 10/14/2024   Demonstrate proper supine or seated diaphragmatic breathing with decreased chest excursion & emphasis on full abdominal excursion & increased expiration time to promote muscle relaxation & increased parasympathetic activity to improve patient tolerance to activities. Initiated 10/14/2024   Verbalize good understanding of importance of proper posture in resisted exercise, functional activities & ADL's in order to help reduce postural strain, promote proper breathing. Initiated 10/14/2024   Demonstrate good understanding of full body resisted exercises w/ use of pictures &/or verbal cues to promote independence w/ exercise at the end of the program. Initiated 10/14/2024   Verbalize plan for continuing resisted exercises w/ specific location (i.e. commercial gym, home, community fitness center)  to incorporate all major muscle groups to maintain & progress therapeutic functional improvements. Initiated 10/14/2024   Verbalize difference between  "hurt vs harm"  to demonstrate understanding of fear avoidance in pain cycle.  Initiated 10/14/2024         Midterm: In 8 weeks, pt will:     Verbalize good understanding of activities planning/scheduling (stretching, mindfulness, resisted training, & cardio) prescribed by PT/OT following the end of the program to sustain & progress functional improvements. Initiated 10/14/2024   Perform selected resisted training w/ " minimal to no cuing in therapy session to be independent w/ resisted strengthening. Initiated 10/14/2024   Demonstrate improved symptom self-management w/ posture/positioning and mechanical movements and/or implementation of appropriate modalities throughout a typical day.  Initiated 10/14/2024   Demonstrate independence w/ home stretch routine to improve AROM, help decrease pain & improve mobility. Initiated 10/14/2024         Long Term: In 12 weeks, pt will:     Perform selected cardio & resisted training independently to continue safe regular performance of daily exercise. Initiated 10/14/2024   Improve 6 MWT to 300 meters or greater to improve gait speed and mobility. Initiated 10/14/2024   Demonstrate Timed Up & Go (with appropriate assistive device, if necessary) of 8 seconds or less to decrease fall risk and improve functional mobility. Initiated 10/14/2024   Score 51 % or greater on Lumbar FOTO to indicate significant functional improvements in impaired functions secondary to pain. Initiated 10/14/2024   Pt will perform yoga practice >/= 3 times per week for exercise and recreation of enjoyment (patient goal) Initiated 10/14/2024   Pt will sit >/= 1 hour and stand >/= 30 mins to demonstrate capacity for return to work part-time. Initiated 10/14/2024        PLAN     Continue PT per POC     Shana Hester, PTA

## 2024-12-06 NOTE — PROGRESS NOTES
[facility]    Functional Restoration Program  Physical Therapy Treatment Note  FRP Non-Cohort    Name: Scarlett Knox  MRN:[id number]      Therapy Diagnosis:   No diagnosis found.      Physician: Lou Block P*       Date: 12/6/2024    Physician Orders: PT Eval and Treat   Medical Diagnosis from Referral:   Diagnosis   Z98.1 (ICD-10-CM) - S/P lumbar fusion      Evaluation Date: 10/14/2024  Authorization Period Expiration: 9/23/25  Plan of Care Expiration: 1/14/2025  Visit # / Visits authorized: 12/20  FOTO: 1/ 3      Precautions: Standard    Time In: ***  Time Out: ***  Total Billable Time: ***      SUBJECTIVE     Scarlett reports ***     Patient's FRP goals:   Be able to attend academic program in person  Get back to 5 days per week of yoga  Get 30 mins of cardio exercise for health per day  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       Limitation/Restriction for FOTO Lumbar Survey     Therapist reviewed FOTO scores for Scarlett Knox on 10/14/2024.   FOTO documents entered into EPIC - see Media section.     INTAKE Score 10/14/2024: 43%     Predicted Score: 51%         Treatment       Scarlett received the Individualized Treatments listed below:     Scarlett participated in dynamic functional therapeutic exercises to improve strength, range of motion, and functional performance for *** minutes, including:    Full body functional mobility w/ 3-10 sec hold technique &/or 3-5 repetition   Cervical flx/ext  Cervical side bending  Cervical rotation  Cervical protraction/retraction  Shld rolls  Shld depression/elevation  Scapular retraction/protraction/scaption  Posterior shld stretch (cross arm)  Shld ER stretch (chicken wing)  Elbow flx/ext  Forearm supination/pronation  Wrist flx/ext  Open/close hands/fingers  Seated trunk  rotations  Seated trunk/thoracic ext/flx  Seated marches & knee to chest  Seated knee flx/ext  Seated hip ER/piriformis stretch  Seated hamstring stretch  Seated toe raise to heel raise   Seated ankle circles each way  Standing lumbar extensions  Standing lateral leaning stretch  Standing quad stretch (UE support for balance)    FUNCTIONAL ENDURANCE   DATE EQUIPMENT VARIABILITIES QUALITY PRE   10/30/24 TM 1 speed  5 min   4/10   11/6 TM 2.0->1.5 5 min 4/10   11/15 TM 2.0 -> 1.8 6 min 3/10   11/20/24 TM 2.0->1.9 5 min 4/10   11/22 TM 2.0->1.9 5 min 3/10   12/02/24 TM 2.0, grade 2 6 min 4/10                   Mindfulness activity: self guided c/ assistance from PTA using the breathe to calm the NS, activate the PNS      Breathing: Using the 4 count inhale - hold for 4 counts - exhale for 4 counts  Verbal cues for unlabored, long & relaxed breathing   Awareness of pulling breath down away from mouth to hips  Cues for for proper abdominal excursion & decreased use of accessory muscles of breathing (hand on stomach & on chest; increased stomach motion, decreased chest motion)  Education on inhalation activating sympathetic nervous system   Education on exhalation activating parasympathetic nervous system  Performed supine in Z lying    Somatic Tracking: NP  Mindfulness-based, guided imagery to reduce fear-avoidance and catastrophizing around physical symptoms in body  Guided patient through attending to negative (painful), neutral, and positive sensations in the body  Used imagery and metaphors to reduce fear and hyperfixation on symptoms  Gave patient instructions for performing at home in the Patient Instructions section of After Visit Summary    Pain Neuroscience Education  Explained sensitization of the nervous system using the Why You Hurt education system by Brittany Bridges PT, PhD  Discussed alarm metaphor, safety vs. Danger, and graded exposure within context of sensitized nervous system  Encouraged patient  questions and elicited patient's ideas and beliefs around their pain history    Fitter board - mid setting, 20 tilt bouts    Fwd/Bwd    Trial 1-  2 hands fade to 1 hand     Trial 2 - 1 hand fade to attempts at no hand    Lat    Trial 1 - 1 hand     Trial 2 - 1 hand fade to no hand     PT education:  Physical & psychological viscous pain cycles   Role of consistent activity (graded exposure) to break the physical cycle  Importance of education & behavioral changes that promote intrinsic patient motivation to help break the psychological cycle  Impact on pt's functional goals when breaking each one of these cycles.    Impact central sensitization has on the sensory system in the chronic pain population    Peripheral muscle strengthening which included 1-2 sets of 10-20 repetitions at a slow, controlled 5-7 second per rep pace focused on strengthening supporting musculature for improved body mechanics & functional mobility.  Patient & therapist focused on proper form during treatment to ensure optimal strengthening of each targeted muscle group. Patients are instructed to keep sets/reps with proper load to stay at 3-5 out of 10 on the provided modified Lu exertion scale.       Free Weight Exercises Weight (lbs) Sets Repetitions Lu Exertion Scale Rating   Bicep Dumbbell Curls 5/3  During ambulation 4   Bent Over Tricep Kickback       Bent Over Row       Supine Pec Fly       Supine Chest Press       Overhead Shoulder Press       Reverse Back Fly       Sit to Stand Unsupported       Wall Squats            S/L hip abd x15  S/L clams RTB x15    Gait training:  Resisted side step RTB around knees 10 stpes x4  Monster walks RTB forward and back 10 steps x4    Restorative Yoga poses:  Child's pose c/ pillow  Legs up the wall      Patient Education and Home Exercises     Home Exercises Provided and Patient Education Provided     Education provided:   - FRP Educational Topics: Modified Lu Exertion Scale, Physical &  Psychological Pain Cycles, Activity Pacing for Pain & Debility , Central Sensitization , Mindfulness Resources, and Posture & Body Mechanics    Written Home Exercises Provided: yes. Exercises were reviewed and Scarlett was able to demonstrate them prior to the end of the session.  Scarlett demonstrated good  understanding of the education provided. See EMR under Patient Instructions for information provided during therapy sessions    HEP from Healthy [facility]:  - Child's pose  - Cat/cow  - Abdominal stretch/push up  - Wig wags  - Bridge  - Alt toe taps  - Belly breathing    ASSESSMENT     Scarlett ***    Scarlett Is progressing well towards her goals.   Pt prognosis is Good.     Pt will continue to benefit from skilled outpatient physical therapy to address the deficits listed in the problem list box on initial evaluation, provide pt/family education and to maximize pt's level of independence in the home and community environment.     Pt's spiritual, cultural and educational needs considered and pt agreeable to plan of care and goals.     Anticipated Barriers for therapy: chronic nature of issues, psychosocial factors, central sensitization    GOALS: Pt is in agreement with the following goals:  Goal Progress Towards Goal   SHORT Term: In 3 weeks, pt will:     Verbalize & demonstrate good understanding of resisted training with 3-1-3 second rep for rligntgxup-[id number]-[id number] contraction to encourage full muscle recruitment for strength & endurance. Initiated 10/14/2024   Verbalize & demonstrate good understanding of home stretch routine to improve AROM, help decrease pain & improve mobility. Initiated 10/14/2024   Demonstrate proper supine or seated diaphragmatic breathing with decreased chest excursion & emphasis on full abdominal excursion & increased expiration time to promote muscle relaxation & increased parasympathetic activity to improve patient tolerance to activities. Initiated 10/14/2024   Verbalize good  "understanding of importance of proper posture in resisted exercise, functional activities & ADL's in order to help reduce postural strain, promote proper breathing. Initiated 10/14/2024   Demonstrate good understanding of full body resisted exercises w/ use of pictures &/or verbal cues to promote independence w/ exercise at the end of the program. Initiated 10/14/2024   Verbalize plan for continuing resisted exercises w/ specific location (i.e. commercial gym, home, community fitness center)  to incorporate all major muscle groups to maintain & progress therapeutic functional improvements. Initiated 10/14/2024   Verbalize difference between  "hurt vs harm"  to demonstrate understanding of fear avoidance in pain cycle.  Initiated 10/14/2024         Midterm: In 8 weeks, pt will:     Verbalize good understanding of activities planning/scheduling (stretching, mindfulness, resisted training, & cardio) prescribed by PT/OT following the end of the program to sustain & progress functional improvements. Initiated 10/14/2024   Perform selected resisted training w/ minimal to no cuing in therapy session to be independent w/ resisted strengthening. Initiated 10/14/2024   Demonstrate improved symptom self-management w/ posture/positioning and mechanical movements and/or implementation of appropriate modalities throughout a typical day.  Initiated 10/14/2024   Demonstrate independence w/ home stretch routine to improve AROM, help decrease pain & improve mobility. Initiated 10/14/2024         Long Term: In 12 weeks, pt will:     Perform selected cardio & resisted training independently to continue safe regular performance of daily exercise. Initiated 10/14/2024   Improve 6 MWT to 300 meters or greater to improve gait speed and mobility. Initiated 10/14/2024   Demonstrate Timed Up & Go (with appropriate assistive device, if necessary) of 8 seconds or less to decrease fall risk and improve functional mobility. Initiated 10/14/2024 "   Score 51 % or greater on Lumbar FOTO to indicate significant functional improvements in impaired functions secondary to pain. Initiated 10/14/2024   Pt will perform yoga practice >/= 3 times per week for exercise and recreation of enjoyment (patient goal) Initiated 10/14/2024   Pt will sit >/= 1 hour and stand >/= 30 mins to demonstrate capacity for return to work part-time. Initiated 10/14/2024        PLAN     Continue PT per POC     Justin Tiwari, PT

## 2024-12-06 NOTE — PROGRESS NOTES
"[facility]  [facility]  Occupational Therapy Treatment Note      Name: Scarlett Knox  MRN:[id number]  Encounter Date: 12/9/2024    Diagnosis:   Encounter Diagnosis   Name Primary?    Pain aggravated by activities of daily living Yes     Referring provider: Lou Block P*    Physician Orders: eval and treat; FRP  Medical Diagnosis: Z98.1 (ICD-10-CM) - S/P lumbar fusion  Evaluation Date: 10/28/2024  Insurance Authorization Period Expiration: 12/31/24  Plan of Care Certification Period: 1/17/2025  Visit # / Visits authorized: 11/20 (plus eval)  FOTO completed: 2/3      Precautions:  Standard and Fall    Time In: 09:02  Time Out: 10:00  Total Billable Time: 58 minutes    SUBJECTIVE     She reports: "I cleaned out the closet. It came over me to do it. I haven't had done deep cleaning like that in I don't know how long". "I have walking sticks".     Scarlett was somewhat compliant with home exercise program given previously. Reports difficulty with walking; has been doing indoor bike for cardio instead. Reports this is due to the weather (too cold, too wet). "I think is lazy".    Response to previous treatment: Ms. Knox noted: no concerns reported.    Functional change: "I've been singing out loud a lot". More awareness, improved routine. Easier to do PT HEP. Using DAYTON score. Attempting to change danger in me to safety in me messaging", briging in groceries, dishes don't hurt anymore".    Pain:       Currently rating pain as 3/10.   Location: Low back, right leg  Description: Aching and Burning. Numbness in left leg.     Patient Specific Activities based on Personal goals:  Activity  11/6/2024 11/26/2024    Performance (P) Satisfaction (S) P S   Sitting tolerance (for studies)  5 4 7 8   2.  Chores (house cleaning) 5 3 5 3   3.  Carrying groceries up the stairs into the house 3 3 7 8   4.  Intimacy 4 4 4 2   5.  Driving long distance (>4 hrs) 4 3 5 6             Total " Score 4.2 3.4 5.6 5.4   Ratin-10; unable/unsatisfied - Able/Satisfied    Objective     Objective Measures updated at progress report unless specified.    See progress note dated 2024.    Gross Motor UE Coordination; Box/Block Test   2024   Right hand  79   Left hand  71   comment Leaning to left with (R)UE use; keeping trunk in neutral with use of (L). Calibration issues with use of (L).    [norms for women aged 30-34: R=85.2 +/-7.4; L=80.2 +/-5.6 (Jennifer et al, 1985)]    Treatment     Scarlett received the treatments listed below:     Therapeutic activities and functional lifting activities in order to increase participation in, endurance for, and performance with vocational, selfcare ADLs, and leisure activities in order to improve quality of life, for 58 minutes, including:    Scarlett completed dynamic reaching in various functional ranges, with continued focus on hip rotation/weight shifting, 10 reps x 1 lbs, rated as Moderate (3/10) exertion. Ball walk outs during and after task completion.    Box/block test.     Scarlett completed functional lifting, floor to waist, 2x5 reps x 5 lbs with exertion rated Moderate (3/10); focused education on sequence of technique. Use of devendra to view posture.     Discussion danger in me messaging, changing messaging from being lazy, to pacing to keep productivity.     Discussion on walking with cane and without cane, use of walking poles.    Review of carrying bag, and effect on trunk. Recommended not walking with cane if not needed, vs starting with cane expecting to need it.    No environmental, cultural, spiritual, developmental or education needs expressed or noted.    Patient Education and Home Exercises   Education provided to date:  - Progress towards goals   - importance of completion of exercises and activities outside of session to attain goals.   - proper posture and body mechanics.  - anticipated muscular soreness following lift testing and strategies  for management including stretching, using ice, scheduled rest.  - Functional pain scale  - DAYTON rate of perceived exertion scale.   - pain cycle  - pursed lip and diaphragmatic breathing techniques  - smoking cessation.  - acknowledging negative self talk or criticism, then shift focus (breath, task, HR for instance)  - daily stretch routine  - Tips to manage pain  - importance to acknowledge when noting self to rush, being judgmental, for instance.  - On/off the floor.  - Educational video: Understanding Pain in less than 5 minutes, and what to do about it!  - being a work place athlete  - 20/20/20 rule, use mirror if needed to create the distance  - palming, sunning for eye relaxation  - left hand mousing.  - danger in me messaging vs safety in messaging  - energy banking tracking sheet to help with pacing  - gate theory  - importance to avoid rushing     Written Home Exercises Provided: Patient instructed to cont prior HEP.   Exercises were reviewed and Scarlett was able to demonstrate them prior to the end of the session. Scarlett demonstrated good  understanding of the education provided.     See EMR under Patient Instructions for exercises provided during therapy sessions. Patient education also located in FRP binder provided on day 1 of the cohort.     Assessment     Ms. Knox tolerated session well. Open to education and feedback provided. Open to using walking poles for walking, vs using cane. During session, still with danger in me messaging and highly judgmental about own performance. Reports applying tools and techniques learned.    Scarlett is progressing towards her goals and status of goals can be seen below. Scarlett's prognosis is Good due to motivation.    Scarlett would continue to benefit from skilled outpatient occupational therapy to address the deficits listed in the problem list on initial evaluation, provide patient education, and to maximize patient's level of independence in the home and  community environment.     Anticipated barriers to occupational therapy: fear, self criticism, guarding, rushing.    Goals:     SHORT Term goals: In 3 weeks, patient will:  Status of goal; reviewed 11/20/2024   Implements correct use of breathing techniques during functional lifting tasks, with minimal cues needed. PROGRESSING   Utilizes DAYTON rate of exertion scale to pace performance with functional lifting tasks, and implements self-care strategies during activity participation to manage pain. MET 11/20/2024   Verbalize understanding of energy conservation and pacing strategies during daily habits, roles, and routines in order to improve tolerance for work and home demands.  PROGRESSING   Completes 5x sit to stand test in 12 seconds or less to improve ability to participate in community mobility.   PROGRESSING   Demonstrate increased positional tolerance to the level needed for work, home, and community activities (standing in cue at social event, managing supplies for outing, streneous IADL), as evidenced by having a METS level of 5. PROGRESSING   Demonstrate proper body mechanics with functional lifting tasks (floor to waist, waist to shoulder, maximum lift, and box carry), via teach back method with minimal cues needed. MET 11/20/2024   Demonstrate increased functional strength by lifting 13 lbs waist to shoulder for management of (I)ADL, including dressing and grooming, placing items in kitchen cabinets, folding towels, and/or managing suitcase when traveling.   MET 11/20/2024   Demonstrate increased functional strength by lifting 18 lbs floor to waist to meet demand of work/home routine, including lifting groceries, managing laundry, and/or managing suitcase when traveling.   MET 11/20/2024   Tolerate Home Activity Plan (HAP)/ Home Exercise Program (HEP) to promote safety and independence with ADL, with report of completion of at least 2 out of 4 days outside of program participation.  MET 11/20/2024   Show  improved perception of own abilities as evidenced by increase of FOTO scores to established MDC value and perception of performance ratings regarding personal long term goals.  PROGRESSING         Long Term goals: In 9 weeks, patient will: Status of goal; reviewed 11/20/2024   Report implementation of application of mindfulness, stretching, and other coping strategies for improved participation in daily routine. PROGRESSING   Verbalize functional application of lifting techniques in daily life (golfers lift, floor to waist, waist to shoulder). PROGRESSING   Completes 5x sit to stand test in 10 seconds or less to improve ability to participate in community mobility.  PROGRESSING   Applies functional application of lifting techniques (golfer's lift, floor to waist, waist to shoulder) in activities of daily life, per patient report.  PROGRESSING   Demonstrate physical capacities consistent with a Light-Medium (#35 max, frequent lifting/carrying #20, 3 METS)  demand level, as needed to perform activities to be successful in roles of life, regarding Household Management and community mobility and possible return to work setting. PROGRESSING   Have an improvement of 3 points in 2/5 personal goals in rating of  performance.  Initiated   Show improved perception of own abilities as evidenced by increase of FOTO scores to established MC II value and perception of performance ratings regarding personal long term goals.  PROGRESSING      Patient Specific Goals; to be met after 2 month of (I) application of tools/techniques learned.  Vocational: attending academic program in person   Recreational: standing in line at events   Daily Living Activities: chores   Measured by patient's self-reported performance and satisfaction of personal FR goals, listed above in subjective section.   Total Score = sum of the activity performance scores / number of activities  Minimum detectable change (90%CI) for average score = 2  points  Minimum detectable change (90%CI) for single activity score  = 3 points     Plan     Continue per plan of care.     Updates/Grading for next session: discuss bike fit, progress with lifting sequence, RAHEL (work set up), energy banking tracking sheet, OPISI, check on tip sheet    FRANDY Henriquez, OTR/L, CEAS-I  12/9/2024       QR code for subsequent FOTO surveys:

## 2024-12-09 ENCOUNTER — CLINICAL SUPPORT (OUTPATIENT)
Dept: REHABILITATION | Facility: OTHER | Age: 34
End: 2024-12-09
Payer: MEDICAID

## 2024-12-09 DIAGNOSIS — M53.86 DECREASED RANGE OF MOTION OF LUMBAR SPINE: Primary | ICD-10-CM

## 2024-12-09 DIAGNOSIS — R26.89 IMPAIRED GAIT AND MOBILITY: ICD-10-CM

## 2024-12-09 DIAGNOSIS — R52 PAIN AGGRAVATED BY ACTIVITIES OF DAILY LIVING: Primary | ICD-10-CM

## 2024-12-09 PROCEDURE — 97530 THERAPEUTIC ACTIVITIES: CPT

## 2024-12-09 PROCEDURE — 97110 THERAPEUTIC EXERCISES: CPT | Mod: CQ

## 2024-12-09 NOTE — PROGRESS NOTES
[facility]    Functional Restoration Program  Physical Therapy Treatment Note  FRP Non-Cohort    Name: Scarlett Knox  MRN:[id number]      Therapy Diagnosis:   Encounter Diagnoses   Name Primary?    Decreased range of motion of lumbar spine Yes    Impaired gait and mobility        Physician: Lou Block P*       Date: 12/9/2024    Physician Orders: PT Eval and Treat   Medical Diagnosis from Referral:   Diagnosis   Z98.1 (ICD-10-CM) - S/P lumbar fusion      Evaluation Date: 10/14/2024  Authorization Period Expiration: 9/23/25  Plan of Care Expiration: 1/14/2025  Visit # / Visits authorized: 13/20  FOTO: 1/ 3      Precautions: Standard    Time In: 9:05am  Time Out: 10:00am  Total Billable Time: 55 mins      SUBJECTIVE     Scarlett reports less pain symptoms this morning and handling tasks at home better  with improved pacing and body awareness. Pt states she is more observant of her posture and using her breathe more to clam the NS.       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       Limitation/Restriction for FOTO Lumbar Survey     Therapist reviewed FOTO scores for Scarlett Knox on 10/14/2024.   FOTO documents entered into [employer] - see Media section.     INTAKE Score 10/14/2024: 43%     Predicted Score: 51%         Treatment       Scarlett received the Individualized Treatments listed below:     Scarlett participated in dynamic functional therapeutic exercises to improve strength, range of motion, and functional performance for 55 minutes, including:    Full body functional mobility w/ 3-10 sec hold technique &/or 3-5 repetition   Cervical flx/ext  Cervical side  bending  Cervical rotation  Cervical protraction/retraction  Shld rolls  Shld depression/elevation  Scapular retraction/protraction/scaption  Posterior shld stretch (cross arm)  Shld ER stretch (chicken wing)  Elbow flx/ext  Forearm supination/pronation  Wrist flx/ext  Open/close hands/fingers  Seated trunk rotations  Seated trunk/thoracic ext/flx  Seated marches & knee to chest  Seated knee flx/ext  Seated hip ER/piriformis stretch  Seated hamstring stretch  Seated toe raise to heel raise   Seated ankle circles each way  Standing lumbar extensions  Standing lateral leaning stretch  Standing quad stretch (UE support for balance)    FUNCTIONAL ENDURANCE   DATE EQUIPMENT VARIABILITIES QUALITY PRE   10/30/24 TM 1 speed  5 min   4/10   11/6 TM 2.0->1.5 5 min 4/10   11/15 TM 2.0 -> 1.8 6 min 3/10   11/20/24 TM 2.0->1.9 5 min 4/10   11/22 TM 2.0->1.9 5 min 3/10   12/02/24 TM 2.0, grade 2 6 min 4/10   12/09/24 TM 2.0, grade 2 8 min 3/10            Mindfulness activity: self guided c/ assistance from PTA using the breathe to calm the NS, activate the PNS NP      Breathing:  Verbal cues for unlabored, long & relaxed breathing   Awareness of pulling breath down away from mouth to hips  Cues for for proper abdominal excursion & decreased use of accessory muscles of breathing (hand on stomach & on chest; increased stomach motion, decreased chest motion)  Education on inhalation activating sympathetic nervous system   Education on exhalation activating parasympathetic nervous system  Performed seated    Somatic Tracking: NP  Mindfulness-based, guided imagery to reduce fear-avoidance and catastrophizing around physical symptoms in body  Guided patient through attending to negative (painful), neutral, and positive sensations in the body  Used imagery and metaphors to reduce fear and hyperfixation on symptoms  Gave patient instructions for performing at home in the Patient Instructions section of After Visit Summary    Pain  Neuroscience Education  Explained sensitization of the nervous system using the Why You Hurt education system by Brittany Bridges PT, PhD  Discussed alarm metaphor, safety vs. Danger, and graded exposure within context of sensitized nervous system  Encouraged patient questions and elicited patient's ideas and beliefs around their pain history    Fitter board - mid setting, 20 tilt bouts    Fwd/Bwd    Trial 1-  2 hands fade to 1 hand     Trial 2 - 1 hand fade to attempts at no hand    Lat    Trial 1 - 1 hand     Trial 2 - 1 hand fade to no hand     PT education:  Physical & psychological viscous pain cycles   Role of consistent activity (graded exposure) to break the physical cycle  Importance of education & behavioral changes that promote intrinsic patient motivation to help break the psychological cycle  Impact on pt's functional goals when breaking each one of these cycles.    Impact central sensitization has on the sensory system in the chronic pain population    Peripheral muscle strengthening which included 1-2 sets of 10-20 repetitions at a slow, controlled 5-7 second per rep pace focused on strengthening supporting musculature for improved body mechanics & functional mobility.  Patient & therapist focused on proper form during treatment to ensure optimal strengthening of each targeted muscle group. Patients are instructed to keep sets/reps with proper load to stay at 3-5 out of 10 on the provided modified Lu exertion scale.       Free Weight Exercises Weight (lbs) Sets Repetitions Lu Exertion Scale Rating   Bicep Dumbbell Curls       Bent Over Tricep Kickback       Bent Over Row       Supine Pec Fly       Supine Chest Press       Overhead Shoulder Press       Reverse Back Fly       Sit to Stand Unsupported       Wall Squats       LAQ 3  20 3        S/L hip abd x15  S/L clams RTB x15    Gait training:  Resisted side step RTB around knees 10 stpes x4  Monster walks RTB forward and back 10 steps  x4    Restorative Yoga poses:  Child's pose c/ pillow  Legs up the wall      Patient Education and Home Exercises     Home Exercises Provided and Patient Education Provided     Education provided:   - FRP Educational Topics: Modified Lu Exertion Scale, Physical & Psychological Pain Cycles, Activity Pacing for Pain & Debility , Central Sensitization , Mindfulness Resources, and Posture & Body Mechanics    Written Home Exercises Provided: yes. Exercises were reviewed and Scarlett was able to demonstrate them prior to the end of the session.  Scarlett demonstrated good  understanding of the education provided. See EMR under Patient Instructions for information provided during therapy sessions    HEP from [employer]:  - Child's pose  - Cat/cow  - Abdominal stretch/push up  - Wig wags  - Bridge  - Alt toe taps  - Belly breathing    ASSESSMENT     Scarlett participated well in PT session today. Pt making improvements to her pacing with activities at home, less boom and bust and less judgmental if taking it slow. Pt was able to complete 8 min on TM while staying in the 3/10 exertion rating which is increase her previous times. Pt given cues for slow 3-1-3 rate for all resistance ex. Slow and controlled to activate all muscle fibers and with slow pace also, activating the PNS.    Scarlett Is progressing well towards her goals.   Pt prognosis is Good.     Pt will continue to benefit from skilled outpatient physical therapy to address the deficits listed in the problem list box on initial evaluation, provide pt/family education and to maximize pt's level of independence in the home and community environment.     Pt's spiritual, cultural and educational needs considered and pt agreeable to plan of care and goals.     Anticipated Barriers for therapy: chronic nature of issues, psychosocial factors, central sensitization    GOALS: Pt is in agreement with the following goals:  Goal Progress Towards Goal   SHORT Term: In 3 weeks,  "pt will:     Verbalize & demonstrate good understanding of resisted training with 3-1-3 second rep for [id number]-[id number]-[id number] contraction to encourage full muscle recruitment for strength & endurance. Initiated 10/14/2024   Verbalize & demonstrate good understanding of home stretch routine to improve AROM, help decrease pain & improve mobility. Initiated 10/14/2024   Demonstrate proper supine or seated diaphragmatic breathing with decreased chest excursion & emphasis on full abdominal excursion & increased expiration time to promote muscle relaxation & increased parasympathetic activity to improve patient tolerance to activities. Initiated 10/14/2024   Verbalize good understanding of importance of proper posture in resisted exercise, functional activities & ADL's in order to help reduce postural strain, promote proper breathing. Initiated 10/14/2024   Demonstrate good understanding of full body resisted exercises w/ use of pictures &/or verbal cues to promote independence w/ exercise at the end of the program. Initiated 10/14/2024   Verbalize plan for continuing resisted exercises w/ specific location (i.e. commercial gym, home, community fitness center)  to incorporate all major muscle groups to maintain & progress therapeutic functional improvements. Initiated 10/14/2024   Verbalize difference between  "hurt vs harm"  to demonstrate understanding of fear avoidance in pain cycle.  Initiated 10/14/2024         Midterm: In 8 weeks, pt will:     Verbalize good understanding of activities planning/scheduling (stretching, mindfulness, resisted training, & cardio) prescribed by PT/OT following the end of the program to sustain & progress functional improvements. Initiated 10/14/2024   Perform selected resisted training w/ minimal to no cuing in therapy session to be independent w/ resisted strengthening. Initiated 10/14/2024   Demonstrate improved symptom self-management w/ posture/positioning and mechanical " movements and/or implementation of appropriate modalities throughout a typical day.  Initiated 10/14/2024   Demonstrate independence w/ home stretch routine to improve AROM, help decrease pain & improve mobility. Initiated 10/14/2024         Long Term: In 12 weeks, pt will:     Perform selected cardio & resisted training independently to continue safe regular performance of daily exercise. Initiated 10/14/2024   Improve 6 MWT to 300 meters or greater to improve gait speed and mobility. Initiated 10/14/2024   Demonstrate Timed Up & Go (with appropriate assistive device, if necessary) of 8 seconds or less to decrease fall risk and improve functional mobility. Initiated 10/14/2024   Score 51 % or greater on Lumbar FOTO to indicate significant functional improvements in impaired functions secondary to pain. Initiated 10/14/2024   Pt will perform yoga practice >/= 3 times per week for exercise and recreation of enjoyment (patient goal) Initiated 10/14/2024   Pt will sit >/= 1 hour and stand >/= 30 mins to demonstrate capacity for return to work part-time. Initiated 10/14/2024        PLAN     Continue PT per POC     Shana Hester, PTA

## 2024-12-13 ENCOUNTER — PATIENT MESSAGE (OUTPATIENT)
Dept: REHABILITATION | Facility: OTHER | Age: 34
End: 2024-12-13

## 2024-12-13 ENCOUNTER — CLINICAL SUPPORT (OUTPATIENT)
Dept: REHABILITATION | Facility: OTHER | Age: 34
End: 2024-12-13
Payer: MEDICAID

## 2024-12-13 DIAGNOSIS — R26.89 IMPAIRED GAIT AND MOBILITY: ICD-10-CM

## 2024-12-13 DIAGNOSIS — R52 PAIN AGGRAVATED BY ACTIVITIES OF DAILY LIVING: Primary | ICD-10-CM

## 2024-12-13 DIAGNOSIS — M53.86 DECREASED RANGE OF MOTION OF LUMBAR SPINE: Primary | ICD-10-CM

## 2024-12-13 PROCEDURE — 97110 THERAPEUTIC EXERCISES: CPT

## 2024-12-13 PROCEDURE — 97530 THERAPEUTIC ACTIVITIES: CPT

## 2024-12-13 NOTE — PROGRESS NOTES
"[facility]  Occupational Therapy PROGRESS REPORT and Treatment Note      Name: Scarlett Knox  MRN:[id number]  Encounter Date: 12/13/2024    Diagnosis:   Encounter Diagnosis   Name Primary?    Pain aggravated by activities of daily living Yes     Referring provider: Lou Block P*    Physician Orders: eval and treat; FRP  Medical Diagnosis: Z98.1 (ICD-10-CM) - S/P lumbar fusion  Evaluation Date: 10/28/2024  Insurance Authorization Period Expiration: 12/31/24  Plan of Care Certification Period: 1/17/2025  Visit # / Visits authorized: 12/20 (plus eval)  FOTO completed: 2/3      Precautions:  Standard    Time In: 08:02  Time Out: 09:01  Total Billable Time: 59 minutes    SUBJECTIVE     She reports: "MC showed me different stims and I haven't been picking my nails, because I did those instead". "I also cooked and cleaned for over 2 hours. My nerve pain did flair late at night, but I just took my meds and I went to bed and felt better the next day. As long as I stretch and take care of myself".    Scarlett was compliant with home exercise program given previously.    Response to previous treatment: Ms. Knox noted: no concerns reported.    Functional change: "I've been singing out loud a lot". More awareness, improved routine. Easier to do PT HEP. Using DAYTON score. Attempting to change danger in me to safety in me messaging, bringing in groceries, "dishes don't hurt anymore", becoming a 'pain nerd' as sharing all the education with friends.    Pain:       Functional Pain Scale Rating 0-10: within the last 24 hours  Date 10/28/2024 11/20/2024 12/13/2024   Average 6/10 3/10 3/10   Worst 8/10 4/10 3/10   Best 4/10 2/10 3/10   Composite score 6/10 3/10 3/10   [TESS  is 1 point or 15-20% change in interference composite score (Gabo et al. 2008)]     Location: Low back, right leg, left leg  Description: sharp when flairing up, almost Burning. Numbness in left " toes  Aggravating Factors: Walking.  Easing Factors: stretches, medication, exercise routine, not being frustrated or angry.     Patient Specific Activities based on Personal goals:  Activity  2024    Performance (P) Satisfaction (S) P S P S   Sitting tolerance (for studies)  5 4 7 8 9 9   2.  Chores (house cleaning) 5 3 5 3 7 7   3.  Carrying groceries up the stairs into the house 3 3 7 8 8 9   4.  Intimacy 4 4 4 2 6 5   5.  Driving long distance (>4 hrs) 4 3 5 6 8 9               Total Score 4.2 3.4 5.6 5.4 7.6 7.8   Ratin-10; unable/unsatisfied - Able/Satisfied    Objective     Objective Measures updated at progress report unless specified.    COGNITIVE Exam  Behaviors: normal, pleasant. At times needing redirection for heightened energy/emotions. Some danger in me messaging.  Memory: able to follow 2 step commands during session  Safety awareness/insight to disability: impaired judgment and impaired insight; admitting to tendency to boom/bust.  Coping skills/emotional control: passive coping strategies engaging in hobbies: tracy; during session Appropriate to situation, with danger in me messaging expressed.     Dominant hand: right     Active Range of Motion  Upper Extremity 10/28/2024    RUE LUE   Hands WFL WFL   Wrist WFL WFL   Elbows WFL WFL   Shoulders WFL WFL      Upper Extremity Strength - Manual Muscle Testing  Motion Tested 10/28/2024 10/30/2024    Right Left  Right  Left comment   Shoulder Flexion NT NT 5/5 5/5    Shoulder Extension NT NT 5/5 5/5    Shoulder Abduction NT NT 5/5 5/5 Slight shoulder elevation (B)ly   Shoulder IR NT NT 5/5 5/5    Shoulder ER NT NT 5/5 5/5    Elbow Flexion NT NT 5/5 5/5    Elbow Extension NT NT 5/5 5/5        Strength (in pounds, rung II, average of three trials)    10/28/2024 2024   Right hand  60.67 lbs 61.67 lbs   Left hand  56 lbs 58.67 lbs   [norms for women aged 30-34: R=78.7 +/-19.2; L=68.0 +/-17.7 (Armida, 1985)]       Functional Strength  Pile Testing: Lifting    10/28/2024 (lbs/HR/DAYTON) 11/6/2024  (lbs/HR/DAYTON) 11/20/2024  (lbs/HR/DAYTON) 12/13/2024  (lbs/HR/DAYTON)    Repetitive Floor to Waist      8/107/4 NT 8/133/3 8/135/4   Repetitive Waist to Shoulder    NT 13/129/5 18/118/4 18/143/5   1- Time Maximum   NT NT 20/122/4 24/142/4   Carry-2 Handed (40ft)   NT NT 20/119/4 NT   Work demand Level   Sedentary (#10 max, 1.5 METS)   Light (#20 max, frequent lifting/carrying #10, 2.5 METS  Light (#20 max, frequent lifting/carrying #10, 2.5 METS     Reason for stop point Increased time required for completing repetitions, Inability to maintain proper body mechanics. Inability to maintain proper body mechanics. Increased time required for completing repetitions Increased time required for completing repetitions   comment 1 rep only. Due to time PILE resting not completed. Tends to rush. Rushing - -   The work demand level listed above is based on the physical performance screening test performed. More comprehensive physical testing integrated with clinical findings would be required to derived an accurate work capacity.      Gross Motor UE Coordination; Box/Block Test   12/9/2024   Right hand  79   Left hand  71   comment Leaning to left with (R)UE use; keeping trunk in neutral with use of (L). Calibration issues with use of (L).    [norms for women aged 30-34: R=85.2 +/-7.4; L=80.2 +/-5.6 (Jennifer et al, 1985)]    Balance  5 times sit-stand (adults 18-65 y/o) 10/28/2024 11/20/2024 12/13/2024   >12 sec= fall risk for general elderly  >16 sec= fall risk for Parkinson's disease  >10 sec= balance/vestibular dysfunction (<59 y/o)  >14.2 sec= balance/vestibular dysfunction (>59 y/o)  >12 sec= fall risk for CVA 15.71 seconds     (without UE used to push up from chair) 12.46    (Without UE used) 8.88 seconds    (Without UE used)      Endurance Testing: Modified Ramp Treadmill Test    10/28/2024 11/20/2024 12/13/2024   Minutes Completed  3  minutes 3 minutes 6 minutes   % Grades  10 % 10% 12%   Speed (mph)  1.7 mph 1.7 mph 2.5 mph   METS 4 4 7    bpm 133 bpm 145   Lu Rating Perceived Exertion Scale   4 4 5   Reason for stop point Inability to maintain safe speed, Increased discomfort, Fear of increased pain Increased discomfort Pacing using LU scale.   Comments Gait patterns with use of spinal rotations with circumduction LU scale used  -          Limitation/Restriction for FOTO NOC Survey     Therapist reviewed FOTO scores for Scarlett Knox on 11/20/2024  FOTO documents entered into [employer] - see Media section.     Limitation Score: 55*%         Treatment     Scarlett received the treatments listed below:     Therapeutic activities and functional lifting activities in order to increase participation in, endurance for, and performance with vocational, selfcare ADLs, and leisure activities in order to improve quality of life, for 59 minutes, including:    Full body stretch w/ 3-10 sec hold technique &/or 3-5 repetition technique on all of the following:  Cervical flx/ext  Cervical sidebending  Cervical rotation  Cervical protraction/retraction  Shld rolls  Shld depression/elevation  Scapular retraction/protraction/scaption  Posterior shld stretch (cross arm)  Shld ER stretch (chicken wing)  Elbow flx/ext  Forearm supination/pronation  Wrist flx/ext  Open/close hands/fingers  Seated trunk rotations  Seated trunk/thoracic ext/flx  Seated marches & knee to chest  Seated knee flx/ext  Seated hip ER/piriformis stretch  Seated hamstring stretch  Seated toe raise to heel raise   Seated ankle circles each way  Standing lumbar extensions  Standing lateral trunk stretch  Standing quad stretch    Taking of measures, for review of plan of care.     Review of activities related to personal goals; discussion of alinker bike.    No environmental, cultural, spiritual, developmental or education needs expressed or noted.    Patient Education and Home  Exercises   Education provided to date:  - Progress towards goals   - importance of completion of exercises and activities outside of session to attain goals.   - proper posture and body mechanics.  - anticipated muscular soreness following lift testing and strategies for management including stretching, using ice, scheduled rest.  - Functional pain scale  - DAYTON rate of perceived exertion scale.   - pain cycle  - pursed lip and diaphragmatic breathing techniques  - smoking cessation.  - acknowledging negative self talk or criticism, then shift focus (breath, task, HR for instance)  - daily stretch routine  - Tips to manage pain  - importance to acknowledge when noting self to rush, being judgmental, for instance.  - On/off the floor.  - Educational video: Understanding Pain in less than 5 minutes, and what to do about it!  - being a work place athlete  - 20/20/20 rule, use mirror if needed to create the distance  - palming, sunning for eye relaxation  - left hand mousing.  - danger in me messaging vs safety in messaging  - energy banking tracking sheet to help with pacing  - gate theory  - importance to avoid rushing  - alinker walking bike  - belief vs facts     Written Home Exercises Provided: Patient instructed to cont prior HEP.   Exercises were reviewed and Scarlett was able to demonstrate them prior to the end of the session. Scarlett demonstrated good  understanding of the education provided.     See EMR under Patient Instructions for exercises provided during therapy sessions. Patient education also located in FRP binder provided on day 1 of the cohort.     Assessment     Ms. Knox tolerated session well; time spend on taking of measures, as today with positive report, and Scarlett rather spending time on that today, vs early Monday morning after the weekend. Activities related to personal goals reviewed; improved scoring showing improved perception of function as evidenced by 3.4 and 4.4 average point  increase in performance and satisfaction of activities related to personal goals, and 14% decrease per FOTO regarding functional limitations, and 19.7 point increase in self efficacy for physical functions. Her pain composite score is still 3/10 as compared to previous eval, with decreased pain fluctuations noted.    Measures taken show Scarlett now with decreased fall risk, with 5TST <9 seconds. Notably, Scarlett used DAYTON scale to pace self with endurance testing, and has an increase in endurance, now at 7 METS. With functional lifting, performance is affected by time needed to completed sequencing of technique.     Scarlett is progressing towards her goals and status of goals can be seen below. Scarlett's prognosis is excellent due to motivation and follow through with HEP and education received. Despite having met all but one goals, would continue with Occupational therapy to follow up on OPISI initiated recently, in addition to addressing work demand level and positional tolerated needed for work, before working towards discharge from our program.     Scarlett would continue to benefit from skilled outpatient occupational therapy to address the deficits listed in the problem list on initial evaluation, provide patient education, and to maximize patient's level of independence in the home and community environment.     Anticipated barriers to occupational therapy: fear, self criticism, guarding, rushing.    Goals:     SHORT Term goals: In 3 weeks, patient will:  Status of goal; reviewed 12/13/2024   Implements correct use of breathing techniques during functional lifting tasks, with minimal cues needed. MET 12/13/2024   Utilizes DAYTON rate of exertion scale to pace performance with functional lifting tasks, and implements self-care strategies during activity participation to manage pain. MET 11/20/2024   Verbalize understanding of energy conservation and pacing strategies during daily habits, roles, and routines in  order to improve tolerance for work and home demands.  MET 12/13/2024   Completes 5x sit to stand test in 12 seconds or less to improve ability to participate in community mobility.   MET 12/13/2024   Demonstrate increased positional tolerance to the level needed for work, home, and community activities (standing in cue at social event, managing supplies for outing, streneous IADL), as evidenced by having a METS level of 5. MET 12/13/2024   Demonstrate proper body mechanics with functional lifting tasks (floor to waist, waist to shoulder, maximum lift, and box carry), via teach back method with minimal cues needed. MET 11/20/2024   Demonstrate increased functional strength by lifting 13 lbs waist to shoulder for management of (I)ADL, including dressing and grooming, placing items in kitchen cabinets, folding towels, and/or managing suitcase when traveling.   MET 11/20/2024   Demonstrate increased functional strength by lifting 18 lbs floor to waist to meet demand of work/home routine, including lifting groceries, managing laundry, and/or managing suitcase when traveling.   MET 11/20/2024   Tolerate Home Activity Plan (HAP)/ Home Exercise Program (HEP) to promote safety and independence with ADL, with report of completion of at least 2 out of 4 days outside of program participation.  MET 11/20/2024   Show improved perception of own abilities as evidenced by increase of FOTO scores to established MDC value and perception of performance ratings regarding personal long term goals.  MET 12/13/2024         Long Term goals: In 9 weeks, patient will: Status of goal; reviewed 12/13/2024   Report implementation of application of mindfulness, stretching, and other coping strategies for improved participation in daily routine. MET 12/13/2024   Verbalize functional application of lifting techniques in daily life (golfers lift, floor to waist, waist to shoulder). MET 12/13/2024   Completes 5x sit to stand test in 10 seconds or  less to improve ability to participate in community mobility.  MET 12/13/2024   Applies functional application of lifting techniques (golfer's lift, floor to waist, waist to shoulder) in activities of daily life, per patient report.  MET 12/13/2024     Demonstrate physical capacities consistent with a Light-Medium (#35 max, frequent lifting/carrying #20, 3 METS)  demand level, as needed to perform activities to be successful in roles of life, regarding Household Management and community mobility and possible return to work setting. PROGRESSING   Have an improvement of 3 points in 2/5 personal goals in rating of  performance.  MET 12/13/2024   Show improved perception of own abilities as evidenced by increase of FOTO scores to established MC II value and perception of performance ratings regarding personal long term goals.  MET 12/13/2024      Patient Specific Goals; to be met after 2 month of (I) application of tools/techniques learned.  Vocational: attending academic program in person   Recreational: standing in line at events   Daily Living Activities: chores   Measured by patient's self-reported performance and satisfaction of personal FRP goals, listed above in subjective section.   Total Score = sum of the activity performance scores / number of activities  Minimum detectable change (90%CI) for average score = 2 points  Minimum detectable change (90%CI) for single activity score  = 3 points     Plan     Continue per plan of care.     Updates/Grading for next session: discuss bike fit, RAHEL (work set up), energy banking tracking sheet, OPISI, check on tip sheet    FRANDY Henriquez, OTR/L, CEAS-I  12/13/2024       QR code for subsequent FOTO surveys:

## 2024-12-13 NOTE — PROGRESS NOTES
"[facility]  [facility]  Occupational Therapy Treatment Note      Name: Scarlett Knox  MRN:[id number]  Encounter Date: 12/16/2024    Diagnosis:   Encounter Diagnosis   Name Primary?    Pain aggravated by activities of daily living Yes     Referring provider: Lou Block P*    Physician Orders: eval and treat; FRP  Medical Diagnosis: Z98.1 (ICD-10-CM) - S/P lumbar fusion  Evaluation Date: 10/28/2024  Insurance Authorization Period Expiration: 12/31/24  Plan of Care Certification Period: 1/17/2025  Visit # / Visits authorized: 13/20 (plus eval)  FOTO completed: 2/3      Precautions:  Standard    Time In: 08:15  Time Out: 09:01  Total Billable Time: 46 minutes    SUBJECTIVE     She reports: "I do better with road rage when I am driving myself, but I crawl up the seat when Jd is driving, even with him driving defensively, and I trust him to be a good ". "I did yoga and I was sore, I didn't register it as pain".    Scarlett was compliant with home exercise program given previously.    Response to previous treatment: Ms. Knox noted: no concerns reported.    Functional change: "I've been singing out loud a lot". More awareness, improved routine. Easier to do PT HEP. Using DAYTON score. Attempting to change danger in me to safety in me messaging, bringing in groceries, "dishes don't hurt anymore", becoming a 'pain nerd' as sharing all the education with friends.    Pain:       Currently rating pain 4/10.   Location: Low back, right leg, left leg  Description: sharp when flairing up, almost Burning. Numbness in left toes     Patient Specific Activities based on Personal goals:  Activity  11/6/2024 11/26/2024 12/13/2024    Performance (P) Satisfaction (S) P S P S   Sitting tolerance (for studies)  5 4 7 8 9 9   2.  Chores (house cleaning) 5 3 5 3 7 7   3.  Carrying groceries up the stairs into the house 3 3 7 8 8 9   4.  Intimacy 4 4 4 2 6 5   5.  Driving long distance " (>4 hrs) 4 3 5 6 8 9               Total Score 4.2 3.4 5.6 5.4 7.6 7.8   Ratin-10; unable/unsatisfied - Able/Satisfied    Objective     Objective Measures updated at progress report unless specified.    See progress note 2024 for details.    Treatment     Scarlett received the treatments listed below:     Therapeutic activities and functional lifting activities in order to increase participation in, endurance for, and performance with vocational, selfcare ADLs, and leisure activities in order to improve quality of life, for 46 minutes, including:    Full body stretch w/ 3-10 sec hold technique &/or 3-5 repetition technique on all of the following:  Cervical flx/ext  Cervical sidebending  Cervical rotation  Cervical protraction/retraction  Shld rolls  Shld depression/elevation  Scapular retraction/protraction/scaption  Posterior shld stretch (cross arm)  Shld ER stretch (chicken wing)  Elbow flx/ext  Forearm supination/pronation  Wrist flx/ext  Open/close hands/fingers  Seated trunk rotations  Seated trunk/thoracic ext/flx  Seated marches & knee to chest  Seated knee flx/ext  Seated hip ER/piriformis stretch  Seated hamstring stretch  Seated toe raise to heel raise   Seated ankle circles each way  Standing lumbar extensions  Standing lateral trunk stretch  Standing quad stretch    Discussion bike fit.     Discussion on brain/body connection related to community mobility using the car, especially as a passenger; created plan of attack.    Scarlett participated in endurance activity using treadmill for 11 minutes walking backwards, starting at level .6 to .8 to improve functional endurance and progress towards increased MET level for improved community mobility and participation, rated as Sort of hard (4/10) exertion, Scarlett re-educated on how to add mindfulness component to activity and how to pace appropriately by completed self check ins and grading activity as needed, with goal to reach moderate to sort of  hard by end of activity.     No environmental, cultural, spiritual, developmental or education needs expressed or noted.    Patient Education and Home Exercises   Education provided to date:  - Progress towards goals   - importance of completion of exercises and activities outside of session to attain goals.   - proper posture and body mechanics.  - anticipated muscular soreness following lift testing and strategies for management including stretching, using ice, scheduled rest.  - Functional pain scale  - DAYTON rate of perceived exertion scale.   - pain cycle  - pursed lip and diaphragmatic breathing techniques  - smoking cessation.  - acknowledging negative self talk or criticism, then shift focus (breath, task, HR for instance)  - daily stretch routine  - Tips to manage pain  - importance to acknowledge when noting self to rush, being judgmental, for instance.  - On/off the floor.  - Educational video: Understanding Pain in less than 5 minutes, and what to do about it!  - being a work place athlete  - 20/20/20 rule, use mirror if needed to create the distance  - palming, sunning for eye relaxation  - left hand mousing.  - danger in me messaging vs safety in messaging  - energy banking tracking sheet to help with pacing  - gate theory  - importance to avoid rushing  - alinker walking bike  - belief vs facts     Written Home Exercises Provided: Patient instructed to cont prior HEP.   Exercises were reviewed and Scarlett was able to demonstrate them prior to the end of the session. Scarlett demonstrated good  understanding of the education provided.     See EMR under Patient Instructions for exercises provided during therapy sessions. Patient education also located in FRP binder provided on day 1 of the cohort.     Assessment     Ms. Knox late to session, but with positive report. Tolerated session without difficulties. Focus on changing beliefs around driving, understanding past experiences, emotions's role on  expectations. Plan developed to help reshift focus when noticing ANS activation.    Scarlett is progressing towards her goals and status of goals can be seen below. Scarlett's prognosis is excellent due to motivation and follow through with HEP and education received. Despite having met all but one goals, would continue with Occupational therapy to follow up on OPISI initiated recently, in addition to addressing work demand level and positional tolerated needed for work, before working towards discharge from our program.     Scarlett would continue to benefit from skilled outpatient occupational therapy to address the deficits listed in the problem list on initial evaluation, provide patient education, and to maximize patient's level of independence in the home and community environment.     Anticipated barriers to occupational therapy: fear, self criticism, guarding, rushing.    Goals:     SHORT Term goals: In 3 weeks, patient will:  Status of goal; reviewed 12/13/2024   Implements correct use of breathing techniques during functional lifting tasks, with minimal cues needed. MET 12/13/2024   Utilizes DAYTON rate of exertion scale to pace performance with functional lifting tasks, and implements self-care strategies during activity participation to manage pain. MET 11/20/2024   Verbalize understanding of energy conservation and pacing strategies during daily habits, roles, and routines in order to improve tolerance for work and home demands.  MET 12/13/2024   Completes 5x sit to stand test in 12 seconds or less to improve ability to participate in community mobility.   MET 12/13/2024   Demonstrate increased positional tolerance to the level needed for work, home, and community activities (standing in cue at social event, managing supplies for outing, streneous IADL), as evidenced by having a METS level of 5. MET 12/13/2024   Demonstrate proper body mechanics with functional lifting tasks (floor to waist, waist to  shoulder, maximum lift, and box carry), via teach back method with minimal cues needed. MET 11/20/2024   Demonstrate increased functional strength by lifting 13 lbs waist to shoulder for management of (I)ADL, including dressing and grooming, placing items in kitchen cabinets, folding towels, and/or managing suitcase when traveling.   MET 11/20/2024   Demonstrate increased functional strength by lifting 18 lbs floor to waist to meet demand of work/home routine, including lifting groceries, managing laundry, and/or managing suitcase when traveling.   MET 11/20/2024   Tolerate Home Activity Plan (HAP)/ Home Exercise Program (HEP) to promote safety and independence with ADL, with report of completion of at least 2 out of 4 days outside of program participation.  MET 11/20/2024   Show improved perception of own abilities as evidenced by increase of FOTO scores to established MDC value and perception of performance ratings regarding personal long term goals.  MET 12/13/2024         Long Term goals: In 9 weeks, patient will: Status of goal; reviewed 12/13/2024   Report implementation of application of mindfulness, stretching, and other coping strategies for improved participation in daily routine. MET 12/13/2024   Verbalize functional application of lifting techniques in daily life (golfers lift, floor to waist, waist to shoulder). MET 12/13/2024   Completes 5x sit to stand test in 10 seconds or less to improve ability to participate in community mobility.  MET 12/13/2024   Applies functional application of lifting techniques (golfer's lift, floor to waist, waist to shoulder) in activities of daily life, per patient report.  MET 12/13/2024     Demonstrate physical capacities consistent with a Light-Medium (#35 max, frequent lifting/carrying #20, 3 METS)  demand level, as needed to perform activities to be successful in roles of life, regarding Household Management and community mobility and possible return to work setting.  PROGRESSING   Have an improvement of 3 points in 2/5 personal goals in rating of  performance.  MET 12/13/2024   Show improved perception of own abilities as evidenced by increase of FOTO scores to established MC II value and perception of performance ratings regarding personal long term goals.  MET 12/13/2024      Patient Specific Goals; to be met after 2 month of (I) application of tools/techniques learned.  Vocational: attending academic program in person   Recreational: standing in line at events   Daily Living Activities: chores   Measured by patient's self-reported performance and satisfaction of personal FRP goals, listed above in subjective section.   Total Score = sum of the activity performance scores / number of activities  Minimum detectable change (90%CI) for average score = 2 points  Minimum detectable change (90%CI) for single activity score  = 3 points     Plan     Continue per plan of care.     Updates/Grading for next session: discuss bike fit, RAHEL (work set up), energy banking tracking sheet, OPISI, check on tip sheet, mopping sweeping.    FRANDY Henriquez, OTR/L, CEAS-I  12/16/2024       QR code for subsequent FOTO surveys:

## 2024-12-13 NOTE — PROGRESS NOTES
"[facility]    [facility]  Physical Therapy DISCHARGE Note  FRP Non-Cohort    Name: Scarlett Knox  MRN:[id number]      Therapy Diagnosis:   Encounter Diagnoses   Name Primary?    Decreased range of motion of lumbar spine Yes    Impaired gait and mobility        Physician: Lou Block P*       Date: 12/13/2024    Physician Orders: PT Eval and Treat   Medical Diagnosis from Referral:   Diagnosis   Z98.1 (ICD-10-CM) - S/P lumbar fusion      Evaluation Date: 10/14/2024  Authorization Period Expiration: 9/23/25  Plan of Care Expiration: 1/14/2025  Visit # / Visits authorized: 14/20  FOTO: 2/3       Precautions: Standard    Time In: 9:05 am   Time Out: 10:05 am  Total Billable Time: 60 mins      SUBJECTIVE     Scarlett reports "You taught me how to teach myself [how to deal with pain]"   Patient report inc tolerance to household chores including inc standing activity /c dishes and laundry.   Patient report tracy sessions > 2 hr and not experiencing inc pain. Patient report attention to her posture (pelvic position) throughout the day including when walking.  Patient report performing stretching in the evening and noticing a difference when she misses opportunity to stretch.  Patient report applying controlled breathing /c resistance training specifically "exhale with effort" resulting in improved exercise tolerance.    Patient report experiencing example of "hurt vs harm" recently /c LLE motion leading to inc discomfort but /c application of evening stretch and waking the next day /c no continued pain and realizing she did not harm herself /c the LLE motion.    Although patient report not having re-attempted Yoga exercises noting recent memories of having started a Yoga session and having resultant discomfort leading to frustration, patient follow up /c comment referencing this example as a fear based anticipatory response that she is now aware of.   Patient " "report plans to revisit Yoga /c Sue.  Patient plans to maintain an at home exercise routine as opposed to going to a gym.  Patient cite transportation and social anxiety as a basis.   Patient report some concern for returning to work noting plan to explore possibility of working from home.     When asked what was different in messaging between HB and FRP patient note a "change in perspective". Patient report keeping Pain Cycles on her bedside table.  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     Functional Testing       10/14/2024 Reassessment  12/13/24  Reassessment   Timed Up and Go 14.86 sec 6.86 sec sec   Single Limb Stance R LE >30 sec > 30 sec sec   Single Limb Stance L LE >30 sec > 30 sec sec   2 Minute Walk Test meters  feet   6 Minute Walk Test 243.9 meters w/o AD w/ 2 breaks 366 m feet   Self Selected Walking Speed 0.68 m/sec 1.0 m/sec m/sec            Limitation/Restriction for FOTO Lumbar Survey     Therapist reviewed FOTO scores for Scarlett Knox on 10/14/2024.   FOTO documents entered into [employer] - see Media section.     INTAKE Score 10/14/2024: 43%  12/13/24: 49%      Predicted Score: 51%               Treatment       Scarlett received the Individualized Treatments listed below:     Scarlett participated in dynamic functional therapeutic exercises to improve strength, range of motion, and functional performance for 60 minutes, including:    HEP questions:   PPT progressions  PPT x 5 supine   PPT x 10 standing  PPT /c LLE on step x 5  PPT during talking x 10   HS strengthening alternative positions   Seated HS curl /c Blue TB x 5 "    Standing curl x 5 cue to maintain neutral hip   Standing hip ext /c knee bent x 5      Patient demonstrate yoga poses - attention to breathing    Quad cat/cow x 4    Ann Pose 10 sec hold    Standing Sun salutation protocol - multi positioned         PT reassessment (see above)         NP  FUNCTIONAL ENDURANCE   DATE EQUIPMENT VARIABILITIES QUALITY PRE   10/30/24 TM 1 speed  5 min   4/10   11/6 TM 2.0->1.5 5 min 4/10   11/15 TM 2.0 -> 1.8 6 min 3/10   11/20/24 TM 2.0->1.9 5 min 4/10   11/22 TM 2.0->1.9 5 min 3/10   12/02/24 TM 2.0, grade 2 6 min 4/10   12/09/24 TM 2.0, grade 2 8 min 3/10   12/13/24                         Peripheral muscle strengthening which included 1-2 sets of 10-20 repetitions at a slow, controlled 5-7 second per rep pace focused on strengthening supporting musculature for improved body mechanics & functional mobility.  Patient & therapist focused on proper form during treatment to ensure optimal strengthening of each targeted muscle group. Patients are instructed to keep sets/reps with proper load to stay at 3-5 out of 10 on the provided modified Lu exertion scale.       Free Weight Exercises Weight (lbs) Sets Repetitions Lu Exertion Scale Rating   Bicep Dumbbell Curls       Bent Over Tricep Kickback       Bent Over Row       Supine Pec Fly       Supine Chest Press       Overhead Shoulder Press       Reverse Back Fly       Sit to Stand Unsupported       Wall Squats       LAQ 3  20 3          Mindfulness activity: self guided c/ assistance from PTA using the breathe to calm the NS, activate the PNS       Breathing:  Verbal cues for unlabored, long & relaxed breathing   Awareness of pulling breath down away from mouth to hips  Cues for for proper abdominal excursion & decreased use of accessory muscles of breathing (hand on stomach & on chest; increased stomach motion, decreased chest motion)  Education on inhalation activating sympathetic nervous system   Education on exhalation  activating parasympathetic nervous system  Performed seated    Somatic Tracking:  Mindfulness-based, guided imagery to reduce fear-avoidance and catastrophizing around physical symptoms in body  Guided patient through attending to negative (painful), neutral, and positive sensations in the body  Used imagery and metaphors to reduce fear and hyperfixation on symptoms  Gave patient instructions for performing at home in the Patient Instructions section of After Visit Summary    Pain Neuroscience Education  Explained sensitization of the nervous system using the Why You Hurt education system by Brittany Bridges PT, PhD  Discussed alarm metaphor, safety vs. Danger, and graded exposure within context of sensitized nervous system  Encouraged patient questions and elicited patient's ideas and beliefs around their pain history    Gait training:  Resisted side step RTB around knees 10 stpes x4  Monster walks RTB forward and back 10 steps x4      Patient Education and Home Exercises     Home Exercises Provided and Patient Education Provided     Education provided:   - FRP Educational Topics: Modified Lu Exertion Scale, Physical & Psychological Pain Cycles, Activity Pacing for Pain & Debility , Central Sensitization , Mindfulness Resources, and Posture & Body Mechanics    Written Home Exercises Provided: yes. Exercises were reviewed and Scarlett was able to demonstrate them prior to the end of the session.  Scarlett demonstrated good  understanding of the education provided. See EMR under Patient Instructions for information provided during therapy sessions    HEP from [employer]:  - Child's pose  - Cat/cow  - Abdominal stretch/push up  - Wig wags  - Bridge  - Alt toe taps  - Belly breathing    ASSESSMENT     Patient subjective report indicate inc insight into non mechanical components to pain presentation.  Patient /c good participation in FRP education /c assimilation of stretching , breathing and HEP daily and meeting  associated goals.  Patient request for progressions indicate improved self efficacy and dec fear avoidance.  Functional activity reassessment indicate sig improved TUG and 6MWT /c both eclipsing established goals indicating improved physical components like BLE functional strength, balance, mobility.  Patient verbalize plan to forward her personal goals /c exercise plans and thoughts of returning to work.  Of note, patient to continue /c OT visits to reinforce functionality and return to work. Although FOTO score not meet goal, patient improved score indicating improved functionality and dec pain and score change meets MDC indicating significance.    Patient demonstrate HEP /c accuracy and is appropriate for d/c to Ind workouts.          Scarlett Is progressing well towards her goals.   Pt prognosis is Good.     Pt will continue to benefit from skilled outpatient physical therapy to address the deficits listed in the problem list box on initial evaluation, provide pt/family education and to maximize pt's level of independence in the home and community environment.     Pt's spiritual, cultural and educational needs considered and pt agreeable to plan of care and goals.     Anticipated Barriers for therapy: chronic nature of issues, psychosocial factors, central sensitization    GOALS: Pt is in agreement with the following goals:  Goal Progress Towards Goal   SHORT Term: In 3 weeks, pt will:     Verbalize & demonstrate good understanding of resisted training with 3-1-3 second rep for krricckeys-kollljfwe-inwutmbnb contraction to encourage full muscle recruitment for strength & endurance. MET 12/13/24   Verbalize & demonstrate good understanding of home stretch routine to improve AROM, help decrease pain & improve mobility. MET 12/13/24   Demonstrate proper supine or seated diaphragmatic breathing with decreased chest excursion & emphasis on full abdominal excursion & increased expiration time to promote muscle  "relaxation & increased parasympathetic activity to improve patient tolerance to activities. MET 12/13/24   Verbalize good understanding of importance of proper posture in resisted exercise, functional activities & ADL's in order to help reduce postural strain, promote proper breathing. MET 12/13/24   Demonstrate good understanding of full body resisted exercises w/ use of pictures &/or verbal cues to promote independence w/ exercise at the end of the program. PROGRESSING 12/13/24   Verbalize plan for continuing resisted exercises w/ specific location (i.e. commercial gym, home, community fitness center)  to incorporate all major muscle groups to maintain & progress therapeutic functional improvements. MET 12/13/24   Verbalize difference between  "hurt vs harm"  to demonstrate understanding of fear avoidance in pain cycle.  MET 12/13/24         Midterm: In 8 weeks, pt will:     Verbalize good understanding of activities planning/scheduling (stretching, mindfulness, resisted training, & cardio) prescribed by PT/OT following the end of the program to sustain & progress functional improvements. MET 12/13/24   Perform selected resisted training w/ minimal to no cuing in therapy session to be independent w/ resisted strengthening. PROGRESSING 12/13/24   Demonstrate improved symptom self-management w/ posture/positioning and mechanical movements and/or implementation of appropriate modalities throughout a typical day.  MET 12/13/24   Demonstrate independence w/ home stretch routine to improve AROM, help decrease pain & improve mobility. MET 12/13/24         Long Term: In 12 weeks, pt will:     Perform selected cardio & resisted training independently to continue safe regular performance of daily exercise. PROGRESSING 12/13/24   Improve 6 MWT to 300 meters or greater to improve gait speed and mobility. MET 12/13/24   Demonstrate Timed Up & Go (with appropriate assistive device, if necessary) of 8 seconds or less to decrease " fall risk and improve functional mobility. MET 12/13/24   Score 51 % or greater on Lumbar FOTO to indicate significant functional improvements in impaired functions secondary to pain. NEARLY MET 12/13/24   Pt will perform yoga practice >/= 3 times per week for exercise and recreation of enjoyment (patient goal) PROGRESSING 12/13/24   Pt will sit >/= 1 hour and stand >/= 30 mins to demonstrate capacity for return to work part-time. MET 12/13/24        PLAN     Patient to be discharged to [facility] workouts     Mathieu Sorto PT

## 2024-12-13 NOTE — PATIENT INSTRUCTIONS
[employer] AL[employer]ER [employer]          The Alinker is for people with an active mindset. People who understand that looking for comfort is not a way to regain strength and mobility. The Alinker takes work, and athletes live by it, it takes effort and moving through discomfort, to grow and overcome challenges.   Based on our experience, we can generally say that you need to be at least able to do the following: Have some command over your legs. Be able to lift one leg while standing on the other. Be aware and cognizant of your surroundings. Know your limitations in order not to overdo it or be unsafe.  The Alinker is used by more than 5000 worldwide and these people live with a wide variety of challenges that affects their mobility, from Diabetes, Arthritis, Parkinson's to MS, and other muscular and neurological illnesses, CP, stroke, SCI, NADEEN, autism and Alzheimers.  [email or web address]

## 2024-12-16 ENCOUNTER — CLINICAL SUPPORT (OUTPATIENT)
Dept: REHABILITATION | Facility: OTHER | Age: 34
End: 2024-12-16
Payer: MEDICAID

## 2024-12-16 DIAGNOSIS — R52 PAIN AGGRAVATED BY ACTIVITIES OF DAILY LIVING: Primary | ICD-10-CM

## 2024-12-16 PROCEDURE — 97530 THERAPEUTIC ACTIVITIES: CPT

## 2024-12-16 NOTE — PATIENT INSTRUCTIONS
"Steps when in the car; managing driving when being a passenger    State belief before entering the car: "back seat  only giving comments when Jd apparently stopped paying attention".  Alternate nostril breathing; use hemispheres of the brain intermittently: alternate hand tapping.  Note when cursing, road raging, crawling up the seat: acknowledge this.  Return focus: [address]14 grounding technique, breathing, grabbing thumb/feeling heart beat  Be non judgmental; celebrate building resilience.       "

## 2024-12-30 ENCOUNTER — CLINICAL SUPPORT (OUTPATIENT)
Dept: REHABILITATION | Facility: OTHER | Age: 34
End: 2024-12-30
Payer: MEDICAID

## 2024-12-30 DIAGNOSIS — R52 PAIN AGGRAVATED BY ACTIVITIES OF DAILY LIVING: Primary | ICD-10-CM

## 2024-12-30 PROCEDURE — 97530 THERAPEUTIC ACTIVITIES: CPT | Mod: KX

## 2024-12-30 NOTE — PROGRESS NOTES
"[facility]  Occupational Therapy Treatment Note      Name: Scarlett Knox  MRN:[id number]  Encounter Date: 12/30/2024    Diagnosis:   Encounter Diagnosis   Name Primary?    Pain aggravated by activities of daily living Yes       Referring provider: Lou Block P*    Physician Orders: eval and treat; FRP  Medical Diagnosis: Z98.1 (ICD-10-CM) - S/P lumbar fusion  Evaluation Date: 10/28/2024  Insurance Authorization Period Expiration: 12/31/24  Plan of Care Certification Period: 1/17/2025  Visit # / Visits authorized: 16/20 (plus eval)  FOTO completed: 2/3      Precautions:  Standard    Time In: 0805am  Time Out: 0905  Total Billable Time: 60 minutes    SUBJECTIVE     She reports: "I really wanted a win, I graduated PT, I wanted to show that I was better, and instead it was like everything was starting all over again." "I really can't operate when I don't sleep, it makes me nervous that I can never travel again."  "I am in so much pain, if I didn't have my flight scheduled to come home, I really thought I was going to have to go to the hospital instead, it was so bad" re: travel and vacation over [place] holiday.     Scarlett was compliant with home exercise program given previously. Had difficulty implementing schedule and PNS activating tools, Pacing with DAYTON scale while on vacation.     Response to previous treatment: Ms. Knox noted: no concerns reported.    Functional change: "I've been singing out loud a lot". More awareness, improved routine. Easier to do PT HEP. Using DAYTON score. Attempting to change danger in me to safety in me messaging, bringing in groceries, "dishes don't hurt anymore", becoming a 'pain nerd' as sharing all the education with friends.    Pain:       Currently rating pain 6-7/10.   Location: Low back, right leg, left leg  Description: sharp when flairing up, almost Burning. Numbness in left toes     Patient Specific Activities " based on Personal goals:  Activity  2024    Performance (P) Satisfaction (S) P S P S   Sitting tolerance (for studies)  5 4 7 8 9 9   2.  Chores (house cleaning) 5 3 5 3 7 7   3.  Carrying groceries up the stairs into the house 3 3 7 8 8 9   4.  Intimacy 4 4 4 2 6 5   5.  Driving long distance (>4 hrs) 4 3 5 6 8 9               Total Score 4.2 3.4 5.6 5.4 7.6 7.8   Ratin-10; unable/unsatisfied - Able/Satisfied    Objective     Objective Measures updated at progress report unless specified.    See progress note 2024 for details.    Treatment     Scarlett received the treatments listed below:     Therapeutic activities and functional lifting activities in order to increase participation in, endurance for, and performance with vocational, selfcare ADLs, and leisure activities in order to improve quality of life, for 60 minutes, including:    Seated de-briefing and review of pain science education re: DAYTON scale, differentiating SNS vs PNS, reocnigiton of impacts of environment, context and social supports/stressors and sleep hygiene. Written list made and given to patient of activities/elements of impact for both PNS and SNS. Education on need for balance between two systems via DAYTON scale (pacing, planning, prioritizing) rather than criminalizing SNS vs PNS.     Therapist guided transition to seated on physioball (vs chair) for improved grounding and PNS activation. Rated as moderate 3/10.   Verbal cues required to note physical signs of tension and stress. With min cues, Scarlett able to note shift and identify awareness.     Written list of elements of impact for sleep hygiene reviewed via motivational interview, with cues and prompts for Scarlett to identify aspects which are manageable and within own autonomy. Rated list as comforting and moderate 3/10 to identify.    Discussion of safety in me vs danger in me messaging with prompts to verbalize safety messaging examples for use  when planning upcoming trip or debriefing past travel experiences. Able to name 5 safety messages. Rated as sort of hard 4/10.       No environmental, cultural, spiritual, developmental or education needs expressed or noted.    Patient Education and Home Exercises   Education provided to date:  - Progress towards goals   - importance of completion of exercises and activities outside of session to attain goals.   - proper posture and body mechanics.  - anticipated muscular soreness following lift testing and strategies for management including stretching, using ice, scheduled rest.  - Functional pain scale  - DAYTON rate of perceived exertion scale.   - pain cycle  - pursed lip and diaphragmatic breathing techniques  - smoking cessation.  - acknowledging negative self talk or criticism, then shift focus (breath, task, HR for instance)  - daily stretch routine  - Tips to manage pain  - importance to acknowledge when noting self to rush, being judgmental, for instance.  - On/off the floor.  - Educational video: Understanding Pain in less than 5 minutes, and what to do about it!  - being a work place athlete  - 20/20/20 rule, use mirror if needed to create the distance  - palming, sunning for eye relaxation  - left hand mousing.  - danger in me messaging vs safety in messaging  - energy banking tracking sheet to help with pacing  - gate theory  - importance to avoid rushing  - alinker walking bike  - belief vs facts     Written Home Exercises Provided: Patient instructed to cont prior HEP.   Exercises were reviewed and Scarlett was able to demonstrate them prior to the end of the session. Scarlett demonstrated good  understanding of the education provided.     See EMR under Patient Instructions for exercises provided during therapy sessions. Patient education also located in [facility] binder provided on day 1 of the cohort.     Assessment     Ms. Knox initially presented this date with elevated pain, stressors and endorsed  "significant impact 2* pain caused by travel. With further questions and prompts to apply education, able ot note exacerbation at pain presentation with poor sleep quality and stressors heightened impacting and increasing pain experience. Good engagement in education and written exercises this date, with homework of continuing to practice pacing, identify manageable aspects to environment and context and to name activities that help her "return to the green." Continues to be an excellent OT candidate.     Scarlett is progressing towards her goals and status of goals can be seen below. Scarlett's prognosis is excellent due to motivation and follow through with HEP and education received. Despite having met all but one goals, would continue with Occupational therapy to follow up on OPISI initiated recently, in addition to addressing work demand level and positional tolerated needed for work, before working towards discharge from our program.     Scarlett would continue to benefit from skilled outpatient occupational therapy to address the deficits listed in the problem list on initial evaluation, provide patient education, and to maximize patient's level of independence in the home and community environment.     Anticipated barriers to occupational therapy: fear, self criticism, guarding, rushing.    Goals:     SHORT Term goals: In 3 weeks, patient will:  Status of goal; reviewed 12/13/2024   Implements correct use of breathing techniques during functional lifting tasks, with minimal cues needed. MET 12/13/2024   Utilizes DAYTON rate of exertion scale to pace performance with functional lifting tasks, and implements self-care strategies during activity participation to manage pain. MET 11/20/2024   Verbalize understanding of energy conservation and pacing strategies during daily habits, roles, and routines in order to improve tolerance for work and home demands.  MET 12/13/2024   Completes 5x sit to stand test in 12 seconds " or less to improve ability to participate in community mobility.   MET 12/13/2024   Demonstrate increased positional tolerance to the level needed for work, home, and community activities (standing in cue at social event, managing supplies for outing, streneous IADL), as evidenced by having a METS level of 5. MET 12/13/2024   Demonstrate proper body mechanics with functional lifting tasks (floor to waist, waist to shoulder, maximum lift, and box carry), via teach back method with minimal cues needed. MET 11/20/2024   Demonstrate increased functional strength by lifting 13 lbs waist to shoulder for management of (I)ADL, including dressing and grooming, placing items in kitchen cabinets, folding towels, and/or managing suitcase when traveling.   MET 11/20/2024   Demonstrate increased functional strength by lifting 18 lbs floor to waist to meet demand of work/home routine, including lifting groceries, managing laundry, and/or managing suitcase when traveling.   MET 11/20/2024   Tolerate Home Activity Plan (HAP)/ Home Exercise Program (HEP) to promote safety and independence with ADL, with report of completion of at least 2 out of 4 days outside of program participation.  MET 11/20/2024   Show improved perception of own abilities as evidenced by increase of FOTO scores to established MDC value and perception of performance ratings regarding personal long term goals.  MET 12/13/2024         Long Term goals: In 9 weeks, patient will: Status of goal; reviewed 12/13/2024   Report implementation of application of mindfulness, stretching, and other coping strategies for improved participation in daily routine. MET 12/13/2024   Verbalize functional application of lifting techniques in daily life (golfers lift, floor to waist, waist to shoulder). MET 12/13/2024   Completes 5x sit to stand test in 10 seconds or less to improve ability to participate in community mobility.  MET 12/13/2024   Applies functional application of  "lifting techniques (golfer's lift, floor to waist, waist to shoulder) in activities of daily life, per patient report.  MET 12/13/2024     Demonstrate physical capacities consistent with a Light-Medium (#35 max, frequent lifting/carrying #20, 3 METS)  demand level, as needed to perform activities to be successful in roles of life, regarding Household Management and community mobility and possible return to work setting. PROGRESSING   Have an improvement of 3 points in 2/5 personal goals in rating of  performance.  MET 12/13/2024   Show improved perception of own abilities as evidenced by increase of FOTO scores to established MC II value and perception of performance ratings regarding personal long term goals.  MET 12/13/2024      Patient Specific Goals; to be met after 2 month of (I) application of tools/techniques learned.  Vocational: attending academic program in person   Recreational: standing in line at events   Daily Living Activities: chores   Measured by patient's self-reported performance and satisfaction of personal FRP goals, listed above in subjective section.   Total Score = sum of the activity performance scores / number of activities  Minimum detectable change (90%CI) for average score = 2 points  Minimum detectable change (90%CI) for single activity score  = 3 points     Plan     Continue per plan of care.     Updates/Grading for next session: discuss bike fit, RAHEL (work set up), energy banking tracking sheet, OPISI, check on tip sheet, mopping sweeping. Check on identifying barriers to travel and tasks that "get her into the green"     ERWIN Sheffield   12/30/2024       QR code for subsequent FOTO surveys:              "

## 2025-01-03 ENCOUNTER — CLINICAL SUPPORT (OUTPATIENT)
Dept: REHABILITATION | Facility: OTHER | Age: 35
End: 2025-01-03
Payer: MEDICAID

## 2025-01-03 DIAGNOSIS — R52 PAIN AGGRAVATED BY ACTIVITIES OF DAILY LIVING: Primary | ICD-10-CM

## 2025-01-03 PROCEDURE — 97530 THERAPEUTIC ACTIVITIES: CPT

## 2025-01-03 NOTE — PROGRESS NOTES
"  [facility]  [facility]  Occupational Therapy Treatment Note      Name: Scarlett Knox  MRN:[id number]  Encounter Date: 1/3/2025    Diagnosis:   Encounter Diagnosis   Name Primary?    Pain aggravated by activities of daily living Yes     Referring provider: Lou Block P*    Physician Orders: eval and treat; FRP  Medical Diagnosis: Z98.1 (ICD-10-CM) - S/P lumbar fusion  Evaluation Date: 10/28/2024  Insurance Authorization Period Expiration: 12/31/24  Plan of Care Certification Period: 1/17/2025  Visit # / Visits authorized: 1/10 (plus eval and 16 visits in 2024)  FOTO completed: 2/3      Precautions:  Standard    Time In: 0800 am  Time Out: 0900  Total Billable Time: 60 minutes    SUBJECTIVE     She reports: feeling better with applying education and using her HEP to manage recent pain flare. Also endorsed high levels of concern for feedback re: "normalcy" of actions and abilities, stating "I just want to know if what I'm doing is normal, like if other people do this too."     Scarlett was compliant with home exercise program given previously.      Response to previous treatment: Ms. Knox noted: no concerns reported.    Functional change: "I've been singing out loud a lot". More awareness, improved routine. Easier to do PT HEP. Using DAYTON score. Attempting to change danger in me to safety in me messaging, bringing in groceries, "dishes don't hurt anymore", becoming a 'pain nerd' as sharing all the education with friends.    Pain:       Currently rating pain 3/10.   Location: Low back, right leg, left leg  Description: sharp when flairing up, almost Burning. Numbness in left toes     Patient Specific Activities based on Personal goals:  Activity  11/6/2024 11/26/2024 12/13/2024    Performance (P) Satisfaction (S) P S P S   Sitting tolerance (for studies)  5 4 7 8 9 9   2.  Chores (house cleaning) 5 3 5 3 7 7   3.  Carrying groceries up the stairs into the house 3 3 7 " "8 8 9   4.  Intimacy 4 4 4 2 6 5   5.  Driving long distance (>4 hrs) 4 3 5 6 8 9               Total Score 4.2 3.4 5.6 5.4 7.6 7.8   Ratin-10; unable/unsatisfied - Able/Satisfied    Objective     Objective Measures updated at progress report unless specified.    See progress note 2024 for details.    Treatment     Scarlett received the treatments listed below:     Therapeutic activities and functional lifting activities in order to increase participation in, endurance for, and performance with vocational, selfcare ADLs, and leisure activities in order to improve quality of life, for 60 minutes, including:    Seated on physioball, review of tools for calming the nervous system, review of pain science education re: DAYTON scale, differentiating SNS vs PNS, reocnigiton of impacts of environment, context and social supports/stressors and sleep hygiene. Written list made and given to patient of activities/elements of impact for both PNS and SNS. Education on need for balance between two systems via DAYTON scale (pacing, planning, prioritizing) rather than criminalizing SNS vs PNS.  Rated education and review as 3/10.     Scarlett participated in endurance activity using treadmill for 15 minutes, starting at level 1.2 to 1.5 to improve functional endurance and progress towards increased MET level for improved community mobility and participation, rated as Moderate (3/10) exertion, Scarlett re-educated on how to add mindfulness component to activity and how to pace appropriately by completed self check ins and grading activity as needed, with goal to reach moderate to sort of hard by end of activity.     Education and implementation of tips to improve standing tolerance. Pt completed standing task x10 mins at table top with focused education on weight shifting and knees bent. While standing, reviewed and discussed "Chronic Pain Explained" video. Rated as applicable and easy 2/10.       No environmental, cultural, " spiritual, developmental or education needs expressed or noted.    Patient Education and Home Exercises   Education provided to date:  - Progress towards goals   - importance of completion of exercises and activities outside of session to attain goals.   - proper posture and body mechanics.  - anticipated muscular soreness following lift testing and strategies for management including stretching, using ice, scheduled rest.  - Functional pain scale  - DAYTON rate of perceived exertion scale.   - pain cycle  - pursed lip and diaphragmatic breathing techniques  - smoking cessation.  - acknowledging negative self talk or criticism, then shift focus (breath, task, HR for instance)  - daily stretch routine  - Tips to manage pain  - importance to acknowledge when noting self to rush, being judgmental, for instance.  - On/off the floor.  - Educational video: Understanding Pain in less than 5 minutes, and what to do about it!  - being a work place athlete  - 20/20/20 rule, use mirror if needed to create the distance  - palming, sunning for eye relaxation  - left hand mousing.  - danger in me messaging vs safety in messaging  - energy banking tracking sheet to help with pacing  - gate theory  - importance to avoid rushing  - alinker walking bike  - belief vs facts     Written Home Exercises Provided: Patient instructed to cont prior HEP.   Exercises were reviewed and Scarlett was able to demonstrate them prior to the end of the session. Scarlett demonstrated good  understanding of the education provided.     See EMR under Patient Instructions for exercises provided during therapy sessions. Patient education also located in FRP binder provided on day 1 of the cohort.     Assessment     Ms. Knox demonstrates good engagement in education and written exercises this date, with completion of homework of continuing to practice pacing, identify manageable aspects to environment and context and to name activities that help her  ""return to the green." Noted to be highly perseverative on external cues for feedback rather than internalizing awareness through pacing with DAYTON scale. Continues to be an excellent OT candidate.     Scarlett is progressing towards her goals and status of goals can be seen below. Scarlett's prognosis is excellent due to motivation and follow through with HEP and education received. Despite having met all but one goals, would continue with Occupational therapy to follow up on OPISI initiated recently, in addition to addressing work demand level and positional tolerated needed for work, before working towards discharge from our program.     Scarlett would continue to benefit from skilled outpatient occupational therapy to address the deficits listed in the problem list on initial evaluation, provide patient education, and to maximize patient's level of independence in the home and community environment.     Anticipated barriers to occupational therapy: fear, self criticism, guarding, rushing.    Goals:     SHORT Term goals: In 3 weeks, patient will:  Status of goal; reviewed 12/13/2024   Implements correct use of breathing techniques during functional lifting tasks, with minimal cues needed. MET 12/13/2024   Utilizes DAYTON rate of exertion scale to pace performance with functional lifting tasks, and implements self-care strategies during activity participation to manage pain. MET 11/20/2024   Verbalize understanding of energy conservation and pacing strategies during daily habits, roles, and routines in order to improve tolerance for work and home demands.  MET 12/13/2024   Completes 5x sit to stand test in 12 seconds or less to improve ability to participate in community mobility.   MET 12/13/2024   Demonstrate increased positional tolerance to the level needed for work, home, and community activities (standing in cue at social event, managing supplies for outing, streneous IADL), as evidenced by having a METS level of " 5. MET 12/13/2024   Demonstrate proper body mechanics with functional lifting tasks (floor to waist, waist to shoulder, maximum lift, and box carry), via teach back method with minimal cues needed. MET 11/20/2024   Demonstrate increased functional strength by lifting 13 lbs waist to shoulder for management of (I)ADL, including dressing and grooming, placing items in kitchen cabinets, folding towels, and/or managing suitcase when traveling.   MET 11/20/2024   Demonstrate increased functional strength by lifting 18 lbs floor to waist to meet demand of work/home routine, including lifting groceries, managing laundry, and/or managing suitcase when traveling.   MET 11/20/2024   Tolerate Home Activity Plan (HAP)/ Home Exercise Program (HEP) to promote safety and independence with ADL, with report of completion of at least 2 out of 4 days outside of program participation.  MET 11/20/2024   Show improved perception of own abilities as evidenced by increase of FOTO scores to established MDC value and perception of performance ratings regarding personal long term goals.  MET 12/13/2024         Long Term goals: In 9 weeks, patient will: Status of goal; reviewed 12/13/2024   Report implementation of application of mindfulness, stretching, and other coping strategies for improved participation in daily routine. MET 12/13/2024   Verbalize functional application of lifting techniques in daily life (golfers lift, floor to waist, waist to shoulder). MET 12/13/2024   Completes 5x sit to stand test in 10 seconds or less to improve ability to participate in community mobility.  MET 12/13/2024   Applies functional application of lifting techniques (golfer's lift, floor to waist, waist to shoulder) in activities of daily life, per patient report.  MET 12/13/2024     Demonstrate physical capacities consistent with a Light-Medium (#35 max, frequent lifting/carrying #20, 3 METS)  demand level, as needed to perform activities to be  "successful in roles of life, regarding Household Management and community mobility and possible return to work setting. PROGRESSING   Have an improvement of 3 points in 2/5 personal goals in rating of  performance.  MET 12/13/2024   Show improved perception of own abilities as evidenced by increase of FOTO scores to established MC II value and perception of performance ratings regarding personal long term goals.  MET 12/13/2024      Patient Specific Goals; to be met after 2 month of (I) application of tools/techniques learned.  Vocational: attending academic program in person   Recreational: standing in line at events   Daily Living Activities: chores   Measured by patient's self-reported performance and satisfaction of personal FRP goals, listed above in subjective section.   Total Score = sum of the activity performance scores / number of activities  Minimum detectable change (90%CI) for average score = 2 points  Minimum detectable change (90%CI) for single activity score  = 3 points     Plan     Continue per plan of care.     Updates/Grading for next session: discuss bike fit, RAHEL (work set up), energy banking tracking sheet, OPISI, check on tip sheet, mopping sweeping. Check on identifying barriers to travel and tasks that "get her into the green"     ERWIN Sheffield   1/3/2025       QR code for subsequent FOTO surveys:              "

## 2025-01-07 NOTE — PROGRESS NOTES
"  [facility]  [facility]  Occupational Therapy Treatment Note      Name: Scarlett Knox  MRN:[id number]  Encounter Date: 1/8/2025    Diagnosis:   Encounter Diagnosis   Name Primary?    Pain aggravated by activities of daily living Yes       Referring provider: Lou Block P*    Physician Orders: eval and treat; FRP  Medical Diagnosis: Z98.1 (ICD-10-CM) - S/P lumbar fusion  Evaluation Date: 10/28/2024  Insurance Authorization Period Expiration: 12/31/24  Plan of Care Certification Period: 1/17/2025  Visit # / Visits authorized: 1/10 (plus eval and 16 visits in 2024)  FOTO completed: 2/3      Precautions:  Standard    Time In: 1330   Time Out: 1430  Total Billable Time: 60 minutes    SUBJECTIVE     She reports: "I made two checklists and they help so much" re: applying tools and HEP routines. Also stated "I feel so much better. It makes such a difference"   "I'm changing out some of the low intensity for high intensity. I can do more"   Also states that she is starting yoga online.     Scarlett was compliant with home exercise program given previously.      Response to previous treatment: Ms. Knox noted: no concerns reported.     Functional change: "I've been singing out loud a lot". More awareness, improved routine. Easier to do PT HEP. Using DAYTON score. Attempting to change danger in me to safety in me messaging, bringing in groceries, "dishes don't hurt anymore", becoming a 'pain nerd' as sharing all the education with friends.     Pain:       Currently rating pain 3/10.   Location: Low back, right leg, left leg  Description: sharp when flairing up, almost Burning. Numbness in left toes     Patient Specific Activities based on Personal goals:  Activity  11/6/2024 11/26/2024 12/13/2024    Performance (P) Satisfaction (S) P S P S   Sitting tolerance (for studies)  5 4 7 8 9 9   2.  Chores (house cleaning) 5 3 5 3 7 7   3.  Carrying groceries up the stairs into the " "house 3 3 7 8 8 9   4.  Intimacy 4 4 4 2 6 5   5.  Driving long distance (>4 hrs) 4 3 5 6 8 9               Total Score 4.2 3.4 5.6 5.4 7.6 7.8   Ratin-10; unable/unsatisfied - Able/Satisfied    Objective     Objective Measures updated at progress report unless specified.    See progress note 2024 for details.    Treatment     Scarlett received the treatments listed below:     Therapeutic activities and functional lifting activities in order to increase participation in, endurance for, and performance with vocational, selfcare ADLs, and leisure activities in order to improve quality of life, for 60 minutes, including:    Scarlett participated in functional lift waist to shoulder, 5x3 reps x 5 lbs with a rating of Sort of hard (4/10) exertion. Scarlett educated on standing posture, core awareness, keeping shoulders depressed and retracted, stepping to place > reaching to place, keeping weight close to center of gravity and pacing as needed.      Pt educated on proper mechanics to get on and off floor using chair for support.  Pt demonstrated understanding x5reps. Pt with increased self-efficacy after completion of activity. Pt given handouts on proper mechanics. Pt rated as moderate (3/10) exertion.      Seated on bench, pt educated on technique for sit<>stand using education on proper body mechanics, weight shift/momentum as well as "noes over toes", with focus on using BUE and coordinating movement with breath. Pt completed 5x4 reps with mod-min cues needed.  Rated as Moderate (3/10) exertion.     Scarlett completed functional lifting, floor to waist, crate taps with exertion rated Sort of hard (4/10); focused education on hip hinge and maintaining weight in heels. Pt plans to use this lift related to laundry.     No environmental, cultural, spiritual, developmental or education needs expressed or noted.    Patient Education and Home Exercises   Education provided to date:  - Progress towards goals   - " importance of completion of exercises and activities outside of session to attain goals.   - proper posture and body mechanics.  - anticipated muscular soreness following lift testing and strategies for management including stretching, using ice, scheduled rest.  - Functional pain scale  - DAYTON rate of perceived exertion scale.   - pain cycle  - pursed lip and diaphragmatic breathing techniques  - smoking cessation.  - acknowledging negative self talk or criticism, then shift focus (breath, task, HR for instance)  - daily stretch routine  - Tips to manage pain  - importance to acknowledge when noting self to rush, being judgmental, for instance.  - On/off the floor.  - Educational video: Understanding Pain in less than 5 minutes, and what to do about it!  - being a work place athlete  - 20/20/20 rule, use mirror if needed to create the distance  - palming, sunning for eye relaxation  - left hand mousing.  - danger in me messaging vs safety in messaging  - energy banking tracking sheet to help with pacing  - gate theory  - importance to avoid rushing  - alinker walking bike  - belief vs facts     Written Home Exercises Provided: Patient instructed to cont prior HEP.   Exercises were reviewed and Scarlett was able to demonstrate them prior to the end of the session. Scarlett demonstrated good  understanding of the education provided.     See EMR under Patient Instructions for exercises provided during therapy sessions. Patient education also located in FRP binder provided on day 1 of the cohort.     Assessment     Ms. Knox tolerated today's session well, good response to changes in body mechanics. Overall improved in tension with cues, benefits from external cues for pacing.     Scarlett is progressing towards her goals and status of goals can be seen below. Scarlett's prognosis is excellent due to motivation and follow through with HEP and education received. Despite having met all but one goals, would continue  with Occupational therapy to follow up on OPISI initiated recently, in addition to addressing work demand level and positional tolerated needed for work, before working towards discharge from our program.     Scarlett would continue to benefit from skilled outpatient occupational therapy to address the deficits listed in the problem list on initial evaluation, provide patient education, and to maximize patient's level of independence in the home and community environment.     Anticipated barriers to occupational therapy: fear, self criticism, guarding, rushing.    Goals:     SHORT Term goals: In 3 weeks, patient will:  Status of goal; reviewed 12/13/2024   Implements correct use of breathing techniques during functional lifting tasks, with minimal cues needed. MET 12/13/2024   Utilizes DAYTON rate of exertion scale to pace performance with functional lifting tasks, and implements self-care strategies during activity participation to manage pain. MET 11/20/2024   Verbalize understanding of energy conservation and pacing strategies during daily habits, roles, and routines in order to improve tolerance for work and home demands.  MET 12/13/2024   Completes 5x sit to stand test in 12 seconds or less to improve ability to participate in community mobility.   MET 12/13/2024   Demonstrate increased positional tolerance to the level needed for work, home, and community activities (standing in cue at social event, managing supplies for outing, streneous IADL), as evidenced by having a METS level of 5. MET 12/13/2024   Demonstrate proper body mechanics with functional lifting tasks (floor to waist, waist to shoulder, maximum lift, and box carry), via teach back method with minimal cues needed. MET 11/20/2024   Demonstrate increased functional strength by lifting 13 lbs waist to shoulder for management of (I)ADL, including dressing and grooming, placing items in kitchen cabinets, folding towels, and/or managing suitcase when  traveling.   MET 11/20/2024   Demonstrate increased functional strength by lifting 18 lbs floor to waist to meet demand of work/home routine, including lifting groceries, managing laundry, and/or managing suitcase when traveling.   MET 11/20/2024   Tolerate Home Activity Plan (HAP)/ Home Exercise Program (HEP) to promote safety and independence with ADL, with report of completion of at least 2 out of 4 days outside of program participation.  MET 11/20/2024   Show improved perception of own abilities as evidenced by increase of FOTO scores to established MDC value and perception of performance ratings regarding personal long term goals.  MET 12/13/2024         Long Term goals: In 9 weeks, patient will: Status of goal; reviewed 12/13/2024   Report implementation of application of mindfulness, stretching, and other coping strategies for improved participation in daily routine. MET 12/13/2024   Verbalize functional application of lifting techniques in daily life (golfers lift, floor to waist, waist to shoulder). MET 12/13/2024   Completes 5x sit to stand test in 10 seconds or less to improve ability to participate in community mobility.  MET 12/13/2024   Applies functional application of lifting techniques (golfer's lift, floor to waist, waist to shoulder) in activities of daily life, per patient report.  MET 12/13/2024     Demonstrate physical capacities consistent with a Light-Medium (#35 max, frequent lifting/carrying #20, 3 METS)  demand level, as needed to perform activities to be successful in roles of life, regarding Household Management and community mobility and possible return to work setting. PROGRESSING   Have an improvement of 3 points in 2/5 personal goals in rating of  performance.  MET 12/13/2024   Show improved perception of own abilities as evidenced by increase of FOTO scores to established MC II value and perception of performance ratings regarding personal long term goals.  MET 12/13/2024     "  Patient Specific Goals; to be met after 2 month of (I) application of tools/techniques learned.  Vocational: attending academic program in person   Recreational: standing in line at events   Daily Living Activities: chores   Measured by patient's self-reported performance and satisfaction of personal FRP goals, listed above in subjective section.   Total Score = sum of the activity performance scores / number of activities  Minimum detectable change (90%CI) for average score = 2 points  Minimum detectable change (90%CI) for single activity score  = 3 points     Plan     Continue per plan of care.     Updates/Grading for next session: discuss bike fit, RAHEL (work set up), energy banking tracking sheet, OPISI, check on tip sheet, mopping sweeping. Check on identifying barriers to travel and tasks that "get her into the green"     ERWIN Sheffield   1/8/2025       QR code for subsequent FOTO surveys:                "

## 2025-01-08 ENCOUNTER — CLINICAL SUPPORT (OUTPATIENT)
Dept: REHABILITATION | Facility: OTHER | Age: 35
End: 2025-01-08
Payer: MEDICAID

## 2025-01-08 DIAGNOSIS — R52 PAIN AGGRAVATED BY ACTIVITIES OF DAILY LIVING: Primary | ICD-10-CM

## 2025-01-08 PROCEDURE — 97530 THERAPEUTIC ACTIVITIES: CPT
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
## 2025-01-10 ENCOUNTER — CLINICAL SUPPORT (OUTPATIENT)
Dept: REHABILITATION | Facility: OTHER | Age: 35
End: 2025-01-10
Payer: MEDICAID

## 2025-01-10 DIAGNOSIS — R52 PAIN AGGRAVATED BY ACTIVITIES OF DAILY LIVING: Primary | ICD-10-CM

## 2025-01-10 PROCEDURE — 97110 THERAPEUTIC EXERCISES: CPT

## 2025-01-10 PROCEDURE — 97530 THERAPEUTIC ACTIVITIES: CPT

## 2025-01-13 ENCOUNTER — CLINICAL SUPPORT (OUTPATIENT)
Dept: REHABILITATION | Facility: OTHER | Age: 35
End: 2025-01-13
Payer: MEDICAID

## 2025-01-13 DIAGNOSIS — R52 PAIN AGGRAVATED BY ACTIVITIES OF DAILY LIVING: Primary | ICD-10-CM

## 2025-01-13 PROCEDURE — 97530 THERAPEUTIC ACTIVITIES: CPT

## 2025-01-13 NOTE — PROGRESS NOTES
"NOTE: This is a duplicate copy utilized for reassessment purposes & continuity of care.    See pt's plan of care for co-signed copy.     [facility]  [facility]  Occupational Therapy Treatment Note and PROGRESS REPORT      Name: Scarlett Knox  MRN:[id number]  Encounter Date: 1/13/2025    Diagnosis:   Encounter Diagnosis   Name Primary?    Pain aggravated by activities of daily living Yes     Referring provider: Lou Block P*    Physician Orders: eval and treat; FRP  Medical Diagnosis: Z98.1 (ICD-10-CM) - S/P lumbar fusion  Evaluation Date: 10/28/2024  Insurance Authorization Period Expiration: 12/31/24  Plan of Care Certification Period: 2/13/2025    Visit # / Visits authorized: 3/10 (pus 16 visits in 2024, including)   FOTO completed: 2/3      Precautions:  Standard    Time In: 0800  Time Out: 0900  Total Billable Time: 60 minutes    SUBJECTIVE     She reports: "I was told I'm not saying I'm sorry anymore, I feel more comfortable taking up space. I'm worthy" "I'm interested to see how my trip to [place] goes"  "I'm afraid I'm disabled" sharing fears about work tolerance re: standing and walking  . "Everything else seems to be going swimmingly, except the walking. I haven't been trying to stand that long"  "I'm realizing its not about the strength, its about the autism. Why is that making me emotional?"   "I didn't think about posture- but I've only flown one hour at a time."       Scarlett was compliant with home exercise program given previously.      Response to previous treatment: Ms. Knox noted: no concerns reported.     Functional change: "I've been singing out loud a lot". More awareness, improved routine. Easier to do PT HEP. Using DAYTON score. Attempting to change danger in me to safety in me messaging, bringing in groceries, "dishes don't hurt anymore", becoming a 'pain nerd' as sharing all the education with friends.     Pain:     Currently rating pain " 2/10.  Functional Pain Scale Rating 0-10: within the last 24 hours  Date 10/28/2024 2024 2024 1/10/2025   Average 6/10 3/10 3/10 2/10   Worst 8/10 4/10 3/10 3/10   Best 4/10 2/10 3/10 210   Composite score 6/10 3/10 3/10 2.33/10   [TESS  is 1 point or 15-20% change in interference composite score (Gabo et al. 2008)]     Location: Low back, right leg, left leg  Description: sharp when flairing up, almost Burning. Numbness in left toes  Aggravating Factors: Walking.  Easing Factors: stretches, medication, exercise routine, not being frustrated or angry.     Patient Specific Activities based on Personal goals:  Activity  2024    Performance (P) Satisfaction (S) P S P S P S   Sitting tolerance (for studies)  5 4 7 8 9 9 8 8   2.  Chores (house cleaning) 5 3 5 3 7 7 4 3   3.  Carrying groceries up the stairs into the house 3 3 7 8 8 9 10 10   4.  Intimacy 4 4 4 2 6 5 5 3   5.  Driving long distance (>4 hrs) 4 3 5 6 8 9 6 6   6. Sweeping and mopping       5 3   7. Standing tolerance          5 4              Total Score 4.2 3.4 5.6 5.4 7.6 7.8 5.3 5.3   Ratin-10; unable/unsatisfied - Able/Satisfied      Objective     Objective Measures updated at progress report unless specified.    COGNITIVE Exam  Behaviors: normal, pleasant. At times needing redirection for heightened energy/emotions. Some danger in me messaging.  Memory: able to follow 2 step commands during session  Safety awareness/insight to disability: impaired judgment and impaired insight; admitting to tendency to boom/bust.  Coping skills/emotional control: passive coping strategies engaging in hobbies: tracy; during session Appropriate to situation, with danger in me messaging expressed.     Dominant hand: right     Active Range of Motion  Upper Extremity 10/28/2024    RUE LUE   Hands WFL WFL   Wrist WFL WFL   Elbows WFL WFL   Shoulders WFL WFL      Upper Extremity Strength - Manual Muscle Testing  Motion  Tested 10/28/2024 10/30/2024    Right Left  Right  Left comment   Shoulder Flexion NT NT 5/5 5/5    Shoulder Extension NT NT 5/5 5/5    Shoulder Abduction NT NT 5/5 5/5 Slight shoulder elevation (B)ly   Shoulder IR NT NT 5/5 5/5    Shoulder ER NT NT 5/5 5/5    Elbow Flexion NT NT 5/5 5/5    Elbow Extension NT NT 5/5 5/5        Strength (in pounds, rung II, average of three trials)    10/28/2024 11/20/2024 1/10/2025   Right hand  60.67 lbs 61.67 lbs 61.67 lbs   Left hand  56 lbs 58.67 lbs 58.67 lbs   [norms for women aged 30-34: R=78.7 +/-19.2; L=68.0 +/-17.7 (Jennifer et al, 1985)]      Functional Strength  Pile Testing: Lifting    10/28/2024 (lbs/HR/DAYTON) 11/6/2024  (lbs/HR/DAYTON) 11/20/2024  (lbs/HR/DAYTON) 12/13/2024  (lbs/HR/DAYTON) 1/13/2025     Repetitive Floor to Waist      8/107/4 NT 8/133/3 8/135/4 13/132/3-4   Repetitive Waist to Shoulder    NT 13/129/5 18/118/4 18/143/5 18/137/5   1- Time Maximum   NT NT 20/122/4 24/142/4 35/135/5   Carry-2 Handed (40ft)   NT NT 20/119/4 NT 22/143/4   Work demand Level   Sedentary (#10 max, 1.5 METS)   Light (#20 max, frequent lifting/carrying #10, 2.5 METS  Light (#20 max, frequent lifting/carrying #10, 2.5 METS   Light (#20 max, frequent lifting/carrying #10, 2.5 METS    Reason for stop point Increased time required for completing repetitions, Inability to maintain proper body mechanics. Inability to maintain proper body mechanics. Increased time required for completing repetitions Increased time required for completing repetitions Increased DAYTON rating   comment 1 rep only. Due to time PILE resting not completed. Tends to rush. Rushing - - -   The work demand level listed above is based on the physical performance screening test performed. More comprehensive physical testing integrated with clinical findings would be required to derived an accurate work capacity.      Gross Motor UE Coordination; Box/Block Test   12/9/2024   Right hand  79   Left hand  71   comment  Leaning to left with (R)UE use; keeping trunk in neutral with use of (L). Calibration issues with use of (L).    [norms for women aged 30-34: R=85.2 +/-7.4; L=80.2 +/-5.6 (Jennifer et al, 1985)]    Balance  5 times sit-stand (adults 18-63 y/o) 10/28/2024 11/20/2024 12/13/2024   >12 sec= fall risk for general elderly  >16 sec= fall risk for Parkinson's disease  >10 sec= balance/vestibular dysfunction (<59 y/o)  >14.2 sec= balance/vestibular dysfunction (>59 y/o)  >12 sec= fall risk for CVA 15.71 seconds     (without UE used to push up from chair) 12.46    (Without UE used) 8.88 seconds    (Without UE used)      Endurance Testing: Modified Ramp Treadmill Test    10/28/2024 11/20/2024 12/13/2024 1/13/2025    Minutes Completed  3 minutes 3 minutes 6 minutes 6 min    % Grades  10 % 10% 12% 12%   Speed (mph)  1.7 mph 1.7 mph 2.5 mph 2.5 mph   METS 4 4 7 7    bpm 133 bpm 145 161   Lu Rating Perceived Exertion Scale   4 4 5 5   Reason for stop point Inability to maintain safe speed, Increased discomfort, Fear of increased pain Increased discomfort Pacing using LU scale. Pacing using LU   Comments Gait patterns with use of spinal rotations with circumduction LU scale used  -           Limitation/Restriction for FOTO NOC Survey     Therapist reviewed FOTO scores for Scarlett Knox on 1/10/2025  FOTO documents entered into [employer] - see Media section.     Limitation Score: 48%             Treatment     Scarlett received the treatments listed below:     Therapeutic activities and functional lifting activities in order to increase participation in, endurance for, and performance with vocational, selfcare ADLs, and leisure activities in order to improve quality of life, for 60 minutes, including:    Taking of measures objective and subjective, see above.  While stretching, review of remaining goals.  Goals: work tolerance, managing hyper fixation and pain flare, intimacy goals, sweeping and mopping.     No  environmental, cultural, spiritual, developmental or education needs expressed or noted.    Patient Education and Home Exercises   Education provided to date:  - Progress towards goals   - importance of completion of exercises and activities outside of session to attain goals.   - proper posture and body mechanics.  - anticipated muscular soreness following lift testing and strategies for management including stretching, using ice, scheduled rest.  - Functional pain scale  - DAYTON rate of perceived exertion scale.   - pain cycle  - pursed lip and diaphragmatic breathing techniques  - smoking cessation.  - acknowledging negative self talk or criticism, then shift focus (breath, task, HR for instance)  - daily stretch routine  - Tips to manage pain  - importance to acknowledge when noting self to rush, being judgmental, for instance.  - On/off the floor.  - Educational video: Understanding Pain in less than 5 minutes, and what to do about it!  - being a work place athlete  - 20/20/20 rule, use mirror if needed to create the distance  - palming, sunning for eye relaxation  - left hand mousing.  - danger in me messaging vs safety in messaging  - energy banking tracking sheet to help with pacing  - gate theory  - importance to avoid rushing  - alinker walking bike  - belief vs facts     Written Home Exercises Provided: Patient instructed to cont prior HEP.   Exercises were reviewed and Scarlett was able to demonstrate them prior to the end of the session. Scarlett demonstrated good  understanding of the education provided.     See EMR under Patient Instructions for exercises provided during therapy sessions. Patient education also located in FRP binder provided on day 1 of the cohort.     Assessment     Ms. Knox tolerated today's session well. Applying tools and HEP, and has identified appropriate goals for extension of OT care in FRP, specifically work tolerance, managing hyper fixation and pain flare, intimacy  goals, sweeping and mopping. Requires cues throughout session to shift to self-awareness and self-efficacy, favoring therapist's feedback first. Noted recognition with motivational interviewing to identify larger barrier in return to work of concerns around stigmatism related to autism diagnosis rather than physical shortcomings. Good engagement and openness to education.     Scarlett is progressing towards her goals and status of goals can be seen below. Will take additional measures next week, and update goals if appropriate next session.     Scarlett's prognosis is excellent due to motivation and follow through with HEP and education received. Despite having met all but one goals, would continue with Occupational therapy to address areas identified in OPISI and improve personal goals, in addition to addressing work demand level and positional tolerated needed for work, before working towards discharge from our program.     Anticipated barriers to occupational therapy: fear, self criticism, guarding, rushing.    Goals:     SHORT Term goals: In 3 weeks, patient will:  Status of goal; reviewed 1/13/2025   Implements correct use of breathing techniques during functional lifting tasks, with minimal cues needed. MET 12/13/2024   Utilizes DAYTON rate of exertion scale to pace performance with functional lifting tasks, and implements self-care strategies during activity participation to manage pain. MET 11/20/2024   Verbalize understanding of energy conservation and pacing strategies during daily habits, roles, and routines in order to improve tolerance for work and home demands.  MET 12/13/2024   Completes 5x sit to stand test in 12 seconds or less to improve ability to participate in community mobility.   MET 12/13/2024   Demonstrate increased positional tolerance to the level needed for work, home, and community activities (standing in cue at social event, managing supplies for outing, streneous IADL), as evidenced by  having a METS level of 5. MET 12/13/2024   Demonstrate proper body mechanics with functional lifting tasks (floor to waist, waist to shoulder, maximum lift, and box carry), via teach back method with minimal cues needed. MET 11/20/2024   Demonstrate increased functional strength by lifting 13 lbs waist to shoulder for management of (I)ADL, including dressing and grooming, placing items in kitchen cabinets, folding towels, and/or managing suitcase when traveling.   MET 11/20/2024   Demonstrate increased functional strength by lifting 18 lbs floor to waist to meet demand of work/home routine, including lifting groceries, managing laundry, and/or managing suitcase when traveling.   MET 11/20/2024   Tolerate Home Activity Plan (HAP)/ Home Exercise Program (HEP) to promote safety and independence with ADL, with report of completion of at least 2 out of 4 days outside of program participation.  MET 11/20/2024   Show improved perception of own abilities as evidenced by increase of FOTO scores to established MDC value and perception of performance ratings regarding personal long term goals.  MET 12/13/2024         Long Term goals: In 9 weeks, patient will: Status of goal; reviewed 1/10/2025   Report implementation of application of mindfulness, stretching, and other coping strategies for improved participation in daily routine. MET 12/13/2024   Verbalize functional application of lifting techniques in daily life (golfers lift, floor to waist, waist to shoulder). MET 12/13/2024   Completes 5x sit to stand test in 10 seconds or less to improve ability to participate in community mobility.  MET 12/13/2024   Applies functional application of lifting techniques (golfer's lift, floor to waist, waist to shoulder) in activities of daily life, per patient report.  MET 12/13/2024     Demonstrate physical capacities consistent with a Light-Medium (#35 max, frequent lifting/carrying #20, 3 METS)  demand level, as needed to perform  "activities to be successful in roles of life, regarding Household Management and community mobility and possible return to work setting. In progress   Have an improvement of 3 points in 2/5 personal goals in rating of  performance.     Updated: in 4/7 goals.  In progress   Show improved perception of own abilities as evidenced by increase of FOTO scores to established MC II value and perception of performance ratings regarding personal long term goals.  MET 12/13/2024      Patient Specific Goals; to be met after 2 month of (I) application of tools/techniques learned.  Vocational: attending academic program in person   Recreational: standing in line at events   Daily Living Activities: chores   Measured by patient's self-reported performance and satisfaction of personal FRP goals, listed above in subjective section.   Total Score = sum of the activity performance scores / number of activities  Minimum detectable change (90%CI) for average score = 2 points  Minimum detectable change (90%CI) for single activity score  = 3 points     Plan     POC extended to 2/13/25 to address goals.     Updates/Grading for next session: discuss bike fit, RAHEL (work set up), energy banking tracking sheet, check on tip sheet. Check on identifying barriers to travel and tasks that "get her into the green", continue use of OPISI for goal setting and review of abilities, compensatory techniques to improve ability, expression. Discuss work place athlete- work tolerance, managing hyper fixation and pain flare, intimacy goals, sweeping and mopping.     ERWIN Sheffield   1/13/2025       QR code for subsequent FOTO surveys:                    "

## 2025-01-16 NOTE — PROGRESS NOTES
"[facility]  Occupational Therapy Treatment Note      Name: Scarlett Knox  MRN:[id number]  Encounter Date: 1/17/2025    Diagnosis:   No diagnosis found.    Referring provider: Lou Block P*    Physician Orders: eval and treat; FRP  Medical Diagnosis: Z98.1 (ICD-10-CM) - S/P lumbar fusion  Evaluation Date: 10/28/2024  Insurance Authorization Period Expiration: 12/31/24  Plan of Care Certification Period: 2/13/2025  ***  Visit # / Visits authorized: 4/10 (pus 16 visits in 2024, including)   FOTO completed: 2/3 ***     Precautions:  Standard    Time In: ***  Time Out: ***  Total Billable Time: *** minutes    SUBJECTIVE     She reports: "***    I was told I'm not saying I'm sorry anymore, I feel more comfortable taking up space. I'm worthy" "I'm interested to see how my trip to [place] goes"  "I'm afraid I'm disabled" sharing fears about work tolerance re: standing and walking  . "Everything else seems to be going swimmingly, except the walking. I haven't been trying to stand that long"  "I'm realizing its not about the strength, its about the autism. Why is that making me emotional?"   "I didn't think about posture- but I've only flown one hour at a time."       Scarlett was compliant with home exercise program given previously.      Response to previous treatment: Ms. Knox noted: no concerns reported.     Functional change: "I've been singing out loud a lot". More awareness, improved routine. Easier to do PT HEP. Using DAYTON score. Attempting to change danger in me to safety in me messaging, bringing in groceries, "dishes don't hurt anymore", becoming a 'pain nerd' as sharing all the education with friends.     Pain:     Currently rating pain 2/10.  Functional Pain Scale Rating 0-10: within the last 24 hours  Date 10/28/2024 11/20/2024 12/13/2024 1/10/2025   Average 6/10 3/10 3/10 2/10   Worst 8/10 4/10 3/10 3/10   Best 4/10 2/10 3/10 2/10   Composite " score 6/10 3/10 3/10 2.33/10   [TESS  is 1 point or 15-20% change in interference composite score (Gabo et al. 2008)]     Location: Low back, right leg, left leg  Description: sharp when flairing up, almost Burning. Numbness in left toes  Aggravating Factors: Walking.  Easing Factors: stretches, medication, exercise routine, not being frustrated or angry.     Patient Specific Activities based on Personal goals:  Activity  2024    Performance (P) Satisfaction (S) P S P S P S   Sitting tolerance (for studies)  5 4 7 8 9 9 8 8   2.  Chores (house cleaning) 5 3 5 3 7 7 4 3   3.  Carrying groceries up the stairs into the house 3 3 7 8 8 9 10 10   4.  Intimacy 4 4 4 2 6 5 5 3   5.  Driving long distance (>4 hrs) 4 3 5 6 8 9 6 6   6. Sweeping and mopping       5 3   7. Standing tolerance          5 4              Total Score 4.2 3.4 5.6 5.4 7.6 7.8 5.3 5.3   Ratin-10; unable/unsatisfied - Able/Satisfied      Objective     Objective Measures updated at progress report unless specified.    COGNITIVE Exam  Behaviors: normal, pleasant. At times needing redirection for heightened energy/emotions. Some danger in me messaging.  Memory: able to follow 2 step commands during session  Safety awareness/insight to disability: impaired judgment and impaired insight; admitting to tendency to boom/bust.  Coping skills/emotional control: passive coping strategies engaging in hobbies: tracy; during session Appropriate to situation, with danger in me messaging expressed.     Dominant hand: right     Active Range of Motion  Upper Extremity 10/28/2024    RUE LUE   Hands WFL WFL   Wrist WFL WFL   Elbows WFL WFL   Shoulders WFL WFL      Upper Extremity Strength - Manual Muscle Testing  Motion Tested 10/28/2024 10/30/2024    Right Left  Right  Left comment   Shoulder Flexion NT NT 5/5 5/5    Shoulder Extension NT NT 5/5 5/5    Shoulder Abduction NT NT 5/5 5/5 Slight shoulder elevation (B)ly   Shoulder  IR NT NT 5/5 5/5    Shoulder ER NT NT 5/5 5/5    Elbow Flexion NT NT 5/5 5/5    Elbow Extension NT NT 5/5 5/5        Strength (in pounds, rung II, average of three trials)    10/28/2024 11/20/2024 1/10/2025   Right hand  60.67 lbs 61.67 lbs 61.67 lbs   Left hand  56 lbs 58.67 lbs 58.67 lbs   [norms for women aged 30-34: R=78.7 +/-19.2; L=68.0 +/-17.7 (Jennifer et al, 1985)]      Functional Strength  Pile Testing: Lifting    10/28/2024 (lbs/HR/DAYTON) 11/6/2024  (lbs/HR/DAYTON) 11/20/2024  (lbs/HR/DAYTON) 12/13/2024  (lbs/HR/DAYTON) 1/13/2025     Repetitive Floor to Waist      8/107/4 NT 8/133/3 8/135/4 13/132/3-4   Repetitive Waist to Shoulder    NT 13/129/5 18/118/4 18/143/5 18/137/5   1- Time Maximum   NT NT 20/122/4 24/142/4 35/135/5   Carry-2 Handed (40ft)   NT NT 20/119/4 NT 22/143/4   Work demand Level   Sedentary (#10 max, 1.5 METS)   Light (#20 max, frequent lifting/carrying #10, 2.5 METS  Light (#20 max, frequent lifting/carrying #10, 2.5 METS   Light (#20 max, frequent lifting/carrying #10, 2.5 METS    Reason for stop point Increased time required for completing repetitions, Inability to maintain proper body mechanics. Inability to maintain proper body mechanics. Increased time required for completing repetitions Increased time required for completing repetitions Increased DAYTON rating   comment 1 rep only. Due to time PILE resting not completed. Tends to rush. Rushing - - -   The work demand level listed above is based on the physical performance screening test performed. More comprehensive physical testing integrated with clinical findings would be required to derived an accurate work capacity.      Gross Motor UE Coordination; Box/Block Test   12/9/2024 1/16/2025   Right hand  79    Left hand  71    comment Leaning to left with (R)UE use; keeping trunk in neutral with use of (L). Calibration issues with use of (L).     [norms for women aged 30-34: R=85.2 +/-7.4; L=80.2 +/-5.6 (Jennifer et al,  1985)]    Balance  5 times sit-stand (adults 18-63 y/o) 10/28/2024 11/20/2024 12/13/2024 1/16/2025   >12 sec= fall risk for general elderly  >16 sec= fall risk for Parkinson's disease  >10 sec= balance/vestibular dysfunction (<59 y/o)  >14.2 sec= balance/vestibular dysfunction (>59 y/o)  >12 sec= fall risk for CVA 15.71 seconds     (without UE used to push up from chair) 12.46    (Without UE used) 8.88 seconds    (Without UE used)       Endurance Testing: Modified Ramp Treadmill Test    10/28/2024 11/20/2024 12/13/2024 1/13/2025    Minutes Completed  3 minutes 3 minutes 6 minutes 6 min    % Grades  10 % 10% 12% 12%   Speed (mph)  1.7 mph 1.7 mph 2.5 mph 2.5 mph   METS 4 4 7 7    bpm 133 bpm 145 161   Lu Rating Perceived Exertion Scale   4 4 5 5   Reason for stop point Inability to maintain safe speed, Increased discomfort, Fear of increased pain Increased discomfort Pacing using LU scale. Pacing using LU   Comments Gait patterns with use of spinal rotations with circumduction LU scale used  -           Limitation/Restriction for FOTO NOC Survey     Therapist reviewed FOTO scores for Scarlett Knox on 1/10/2025  FOTO documents entered into [employer] - see Media section.     Limitation Score: 48%             Treatment     Scarlett received the treatments listed below:     Therapeutic activities and functional lifting activities in order to increase participation in, endurance for, and performance with vocational, selfcare ADLs, and leisure activities in order to improve quality of life, for *** minutes, including:    ***    Goals: work tolerance, managing hyper fixation and pain flare, intimacy goals, sweeping and mopping.     Neuromuscular re-education activities to improve {AMB PT PROGRESS NEURO RE-ED:74423} for *** minutes. The following activities were included:    ***      Therapeutic exercises to develop {AMB PT PROGRESS OBJECTIVE:46866} for *** minutes, including:    ***    No environmental,  cultural, spiritual, developmental or education needs expressed or noted.    Patient Education and Home Exercises   Education provided to date:  - Progress towards goals   - importance of completion of exercises and activities outside of session to attain goals.   - proper posture and body mechanics.  - anticipated muscular soreness following lift testing and strategies for management including stretching, using ice, scheduled rest.  - Functional pain scale  - DAYTON rate of perceived exertion scale.   - pain cycle  - pursed lip and diaphragmatic breathing techniques  - smoking cessation.  - acknowledging negative self talk or criticism, then shift focus (breath, task, HR for instance)  - daily stretch routine  - Tips to manage pain  - importance to acknowledge when noting self to rush, being judgmental, for instance.  - On/off the floor.  - Educational video: Understanding Pain in less than 5 minutes, and what to do about it!  - being a work place athlete  - 20/20/20 rule, use mirror if needed to create the distance  - palming, sunning for eye relaxation  - left hand mousing.  - danger in me messaging vs safety in messaging  - energy banking tracking sheet to help with pacing  - gate theory  - importance to avoid rushing  - alinker walking bike  - belief vs facts  - link to book for references     Written Home Exercises Provided: Patient instructed to cont prior HEP.   Exercises were reviewed and Scarlett was able to demonstrate them prior to the end of the session. Scarlett demonstrated good  understanding of the education provided.     See EMR under Patient Instructions for exercises provided during therapy sessions. Patient education also located in [facility] binder provided on day 1 of the cohort.     Assessment     Ms. Knox ***    tolerated today's session well. Applying tools and HEP, and has identified appropriate goals for extension of OT care in [facility], specifically work tolerance, managing hyper fixation and  pain flare, intimacy goals, sweeping and mopping. Requires cues throughout session to shift to self-awareness and self-efficacy, favoring therapist's feedback first. Noted recognition with motivational interviewing to identify larger barrier in return to work of concerns around stigmatism related to autism diagnosis rather than physical shortcomings. Good engagement and openness to education.     Scarlett is progressing towards her goals and status of goals can be seen below. Will take additional measures next week, and update goals if appropriate next session. ***    Scarlett's prognosis is excellent due to motivation and follow through with HEP and education received. Despite having met all but one goals, would continue with Occupational therapy to address areas identified in OPISI and improve personal goals, in addition to addressing work demand level and positional tolerated needed for work, before working towards discharge from our program.     Anticipated barriers to occupational therapy: fear, self criticism, guarding, rushing.    Goals:     SHORT Term goals: In 3 weeks, patient will:  Status of goal; reviewed 1/13/2025   Implements correct use of breathing techniques during functional lifting tasks, with minimal cues needed. MET 12/13/2024   Utilizes DAYTON rate of exertion scale to pace performance with functional lifting tasks, and implements self-care strategies during activity participation to manage pain. MET 11/20/2024   Verbalize understanding of energy conservation and pacing strategies during daily habits, roles, and routines in order to improve tolerance for work and home demands.  MET 12/13/2024   Completes 5x sit to stand test in 12 seconds or less to improve ability to participate in community mobility.   MET 12/13/2024   Demonstrate increased positional tolerance to the level needed for work, home, and community activities (standing in cue at social event, managing supplies for outing, streneous  IADL), as evidenced by having a METS level of 5. MET 12/13/2024   Demonstrate proper body mechanics with functional lifting tasks (floor to waist, waist to shoulder, maximum lift, and box carry), via teach back method with minimal cues needed. MET 11/20/2024   Demonstrate increased functional strength by lifting 13 lbs waist to shoulder for management of (I)ADL, including dressing and grooming, placing items in kitchen cabinets, folding towels, and/or managing suitcase when traveling.   MET 11/20/2024   Demonstrate increased functional strength by lifting 18 lbs floor to waist to meet demand of work/home routine, including lifting groceries, managing laundry, and/or managing suitcase when traveling.   MET 11/20/2024   Tolerate Home Activity Plan (HAP)/ Home Exercise Program (HEP) to promote safety and independence with ADL, with report of completion of at least 2 out of 4 days outside of program participation.  MET 11/20/2024   Show improved perception of own abilities as evidenced by increase of FOTO scores to established MDC value and perception of performance ratings regarding personal long term goals.  MET 12/13/2024         Long Term goals: In 9 weeks, patient will: Status of goal; reviewed 1/10/2025   Report implementation of application of mindfulness, stretching, and other coping strategies for improved participation in daily routine. MET 12/13/2024   Verbalize functional application of lifting techniques in daily life (golfers lift, floor to waist, waist to shoulder). MET 12/13/2024   Completes 5x sit to stand test in 10 seconds or less to improve ability to participate in community mobility.  MET 12/13/2024   Applies functional application of lifting techniques (golfer's lift, floor to waist, waist to shoulder) in activities of daily life, per patient report.  MET 12/13/2024      Demonstrate physical capacities consistent with a Light-Medium (#35 max, frequent lifting/carrying #20, 3 METS)  demand level,  "as needed to perform activities to be successful in roles of life, regarding Household Management and community mobility and possible return to work setting. In progress   Have an improvement of 3 points in 2/5 personal goals in rating of  performance.      Updated: in 4/7 goals.  In progress   Show improved perception of own abilities as evidenced by increase of FOTO scores to established MC II value and perception of performance ratings regarding personal long term goals.  MET 12/13/2024      Patient Specific Goals; to be met after 2 month of (I) application of tools/techniques learned.  Vocational: attending academic program in person   Recreational: standing in line at events   Daily Living Activities: chores   Measured by patient's self-reported performance and satisfaction of personal FRP goals, listed above in subjective section.   Total Score = sum of the activity performance scores / number of activities  Minimum detectable change (90%CI) for average score = 2 points  Minimum detectable change (90%CI) for single activity score  = 3 points     Plan     Continue per plan of care.     Updates/Grading for next session: discuss bike fit, RAHEL (work set up), energy banking tracking sheet, check on tip sheet. Check on identifying barriers to travel and tasks that "get her into the green", continue use of OPISI for goal setting and review of abilities, compensatory techniques to improve ability, expression. Discuss work place athlete- work tolerance, managing hyper fixation and pain flare, intimacy goals, sweeping and mopping.     FRANDY Henriquez, OTR/L, CEAS-I  1/16/2025     QR code for subsequent FOTO surveys:                      "

## 2025-01-16 NOTE — PLAN OF CARE
"[facility]  Occupational Therapy Treatment Note and PROGRESS REPORT      Name: Scarlett Knox  MRN:[id number]  Encounter Date: 1/13/2025    Diagnosis:   Encounter Diagnosis   Name Primary?    Pain aggravated by activities of daily living Yes     Referring provider: Lou Block P*    Physician Orders: eval and treat; FRP  Medical Diagnosis: Z98.1 (ICD-10-CM) - S/P lumbar fusion  Evaluation Date: 10/28/2024  Insurance Authorization Period Expiration: 12/31/24  Plan of Care Certification Period: 2/13/2025    Visit # / Visits authorized: 3/10 (pus 16 visits in 2024, including)   FOTO completed: 2/3      Precautions:  Standard    Time In: 0800  Time Out: 0900  Total Billable Time: 60 minutes    SUBJECTIVE     She reports: "I was told I'm not saying I'm sorry anymore, I feel more comfortable taking up space. I'm worthy" "I'm interested to see how my trip to [place] goes"  "I'm afraid I'm disabled" sharing fears about work tolerance re: standing and walking  . "Everything else seems to be going swimmingly, except the walking. I haven't been trying to stand that long"  "I'm realizing its not about the strength, its about the autism. Why is that making me emotional?"   "I didn't think about posture- but I've only flown one hour at a time."       Scarlett was compliant with home exercise program given previously.      Response to previous treatment: Ms. Knox noted: no concerns reported.     Functional change: "I've been singing out loud a lot". More awareness, improved routine. Easier to do PT HEP. Using DAYTON score. Attempting to change danger in me to safety in me messaging, bringing in groceries, "dishes don't hurt anymore", becoming a 'pain nerd' as sharing all the education with friends.     Pain:     Currently rating pain 2/10.  Functional Pain Scale Rating 0-10: within the last 24 hours  Date 10/28/2024 11/20/2024 12/13/2024 1/10/2025   Average 6/10 3/10 " 3/10 2/10   Worst 8/10 4/10 3/10 3/10   Best 4/10 2/10 3/10 210   Composite score 6/10 3/10 310 2.10   [TESS  is 1 point or 15-20% change in interference composite score (Gabo et al. 2008)]     Location: Low back, right leg, left leg  Description: sharp when flairing up, almost Burning. Numbness in left toes  Aggravating Factors: Walking.  Easing Factors: stretches, medication, exercise routine, not being frustrated or angry.     Patient Specific Activities based on Personal goals:  Activity  2024    Performance (P) Satisfaction (S) P S P S P S   Sitting tolerance (for studies)  5 4 7 8 9 9 8 8   2.  Chores (house cleaning) 5 3 5 3 7 7 4 3   3.  Carrying groceries up the stairs into the house 3 3 7 8 8 9 10 10   4.  Intimacy 4 4 4 2 6 5 5 3   5.  Driving long distance (>4 hrs) 4 3 5 6 8 9 6 6   6. Sweeping and mopping       5 3   7. Standing tolerance          5 4              Total Score 4.2 3.4 5.6 5.4 7.6 7.8 5.3 5.3   Ratin-10; unable/unsatisfied - Able/Satisfied      Objective     Objective Measures updated at progress report unless specified.    COGNITIVE Exam  Behaviors: normal, pleasant. At times needing redirection for heightened energy/emotions. Some danger in me messaging.  Memory: able to follow 2 step commands during session  Safety awareness/insight to disability: impaired judgment and impaired insight; admitting to tendency to boom/bust.  Coping skills/emotional control: passive coping strategies engaging in hobbies: tracy; during session Appropriate to situation, with danger in me messaging expressed.     Dominant hand: right     Active Range of Motion  Upper Extremity 10/28/2024    RUE LUE   Hands WFL WFL   Wrist WFL WFL   Elbows WFL WFL   Shoulders WFL WFL      Upper Extremity Strength - Manual Muscle Testing  Motion Tested 10/28/2024 10/30/2024    Right Left  Right  Left comment   Shoulder Flexion NT NT 5/5 5/5    Shoulder Extension NT NT 5/5 5/5     Shoulder Abduction NT NT 5/5 5/5 Slight shoulder elevation (B)ly   Shoulder IR NT NT 5/5 5/5    Shoulder ER NT NT 5/5 5/5    Elbow Flexion NT NT 5/5 5/5    Elbow Extension NT NT 5/5 5/5        Strength (in pounds, rung II, average of three trials)    10/28/2024 11/20/2024 1/10/2025   Right hand  60.67 lbs 61.67 lbs 61.67 lbs   Left hand  56 lbs 58.67 lbs 58.67 lbs   [norms for women aged 30-34: R=78.7 +/-19.2; L=68.0 +/-17.7 (Jennifer et al, 1985)]      Functional Strength  Pile Testing: Lifting    10/28/2024 (lbs/HR/DAYTON) 11/6/2024  (lbs/HR/DAYTON) 11/20/2024  (lbs/HR/DAYTON) 12/13/2024  (lbs/HR/DAYTON) 1/13/2025     Repetitive Floor to Waist      8/107/4 NT 8/133/3 8/135/4 13/132/3-4   Repetitive Waist to Shoulder    NT 13/129/5 18/118/4 18/143/5 18/137/5   1- Time Maximum   NT NT 20/122/4 24/142/4 35/135/5   Carry-2 Handed (40ft)   NT NT 20/119/4 NT 22/143/4   Work demand Level   Sedentary (#10 max, 1.5 METS)   Light (#20 max, frequent lifting/carrying #10, 2.5 METS  Light (#20 max, frequent lifting/carrying #10, 2.5 METS   Light (#20 max, frequent lifting/carrying #10, 2.5 METS    Reason for stop point Increased time required for completing repetitions, Inability to maintain proper body mechanics. Inability to maintain proper body mechanics. Increased time required for completing repetitions Increased time required for completing repetitions Increased DAYTON rating   comment 1 rep only. Due to time PILE resting not completed. Tends to rush. Rushing - - -   The work demand level listed above is based on the physical performance screening test performed. More comprehensive physical testing integrated with clinical findings would be required to derived an accurate work capacity.      Gross Motor UE Coordination; Box/Block Test   12/9/2024   Right hand  79   Left hand  71   comment Leaning to left with (R)UE use; keeping trunk in neutral with use of (L). Calibration issues with use of (L).    [norms for women aged  30-34: R=85.2 +/-7.4; L=80.2 +/-5.6 (Jennifer et al, 1985)]    Balance  5 times sit-stand (adults 18-63 y/o) 10/28/2024 11/20/2024 12/13/2024   >12 sec= fall risk for general elderly  >16 sec= fall risk for Parkinson's disease  >10 sec= balance/vestibular dysfunction (<59 y/o)  >14.2 sec= balance/vestibular dysfunction (>59 y/o)  >12 sec= fall risk for CVA 15.71 seconds     (without UE used to push up from chair) 12.46    (Without UE used) 8.88 seconds    (Without UE used)      Endurance Testing: Modified Ramp Treadmill Test    10/28/2024 11/20/2024 12/13/2024 1/13/2025    Minutes Completed  3 minutes 3 minutes 6 minutes 6 min    % Grades  10 % 10% 12% 12%   Speed (mph)  1.7 mph 1.7 mph 2.5 mph 2.5 mph   METS 4 4 7 7    bpm 133 bpm 145 161   Lu Rating Perceived Exertion Scale   4 4 5 5   Reason for stop point Inability to maintain safe speed, Increased discomfort, Fear of increased pain Increased discomfort Pacing using LU scale. Pacing using LU   Comments Gait patterns with use of spinal rotations with circumduction LU scale used  -           Limitation/Restriction for FOTO NOC Survey     Therapist reviewed FOTO scores for Scarlett Knox on 1/10/2025  FOTO documents entered into [employer] - see Media section.     Limitation Score: 48%             Treatment     Scarlett received the treatments listed below:     Therapeutic activities and functional lifting activities in order to increase participation in, endurance for, and performance with vocational, selfcare ADLs, and leisure activities in order to improve quality of life, for 60 minutes, including:    Taking of measures objective and subjective, see above.  While stretching, review of remaining goals.  Goals: work tolerance, managing hyper fixation and pain flare, intimacy goals, sweeping and mopping.     No environmental, cultural, spiritual, developmental or education needs expressed or noted.    Patient Education and Home Exercises   Education  provided to date:  - Progress towards goals   - importance of completion of exercises and activities outside of session to attain goals.   - proper posture and body mechanics.  - anticipated muscular soreness following lift testing and strategies for management including stretching, using ice, scheduled rest.  - Functional pain scale  - DAYTON rate of perceived exertion scale.   - pain cycle  - pursed lip and diaphragmatic breathing techniques  - smoking cessation.  - acknowledging negative self talk or criticism, then shift focus (breath, task, HR for instance)  - daily stretch routine  - Tips to manage pain  - importance to acknowledge when noting self to rush, being judgmental, for instance.  - On/off the floor.  - Educational video: Understanding Pain in less than 5 minutes, and what to do about it!  - being a work place athlete  - 20/20/20 rule, use mirror if needed to create the distance  - palming, sunning for eye relaxation  - left hand mousing.  - danger in me messaging vs safety in messaging  - energy banking tracking sheet to help with pacing  - gate theory  - importance to avoid rushing  - alinker walking bike  - belief vs facts     Written Home Exercises Provided: Patient instructed to cont prior HEP.   Exercises were reviewed and Scarlett was able to demonstrate them prior to the end of the session. Scarlett demonstrated good  understanding of the education provided.     See EMR under Patient Instructions for exercises provided during therapy sessions. Patient education also located in FRP binder provided on day 1 of the cohort.     Assessment     Ms. Knox tolerated today's session well. Applying tools and HEP, and has identified appropriate goals for extension of OT care in FRP, specifically work tolerance, managing hyper fixation and pain flare, intimacy goals, sweeping and mopping. Requires cues throughout session to shift to self-awareness and self-efficacy, favoring therapist's feedback first.  Noted recognition with motivational interviewing to identify larger barrier in return to work of concerns around stigmatism related to autism diagnosis rather than physical shortcomings. Good engagement and openness to education.     Scarlett is progressing towards her goals and status of goals can be seen below. Will take additional measures next week, and update goals if appropriate next session.     Scarlett's prognosis is excellent due to motivation and follow through with HEP and education received. Despite having met all but one goals, would continue with Occupational therapy to address areas identified in OPISI and improve personal goals, in addition to addressing work demand level and positional tolerated needed for work, before working towards discharge from our program.     Anticipated barriers to occupational therapy: fear, self criticism, guarding, rushing.    Goals:     SHORT Term goals: In 3 weeks, patient will:  Status of goal; reviewed 1/13/2025   Implements correct use of breathing techniques during functional lifting tasks, with minimal cues needed. MET 12/13/2024   Utilizes DAYTON rate of exertion scale to pace performance with functional lifting tasks, and implements self-care strategies during activity participation to manage pain. MET 11/20/2024   Verbalize understanding of energy conservation and pacing strategies during daily habits, roles, and routines in order to improve tolerance for work and home demands.  MET 12/13/2024   Completes 5x sit to stand test in 12 seconds or less to improve ability to participate in community mobility.   MET 12/13/2024   Demonstrate increased positional tolerance to the level needed for work, home, and community activities (standing in cue at social event, managing supplies for outing, streneous IADL), as evidenced by having a METS level of 5. MET 12/13/2024   Demonstrate proper body mechanics with functional lifting tasks (floor to waist, waist to shoulder,  maximum lift, and box carry), via teach back method with minimal cues needed. MET 11/20/2024   Demonstrate increased functional strength by lifting 13 lbs waist to shoulder for management of (I)ADL, including dressing and grooming, placing items in kitchen cabinets, folding towels, and/or managing suitcase when traveling.   MET 11/20/2024   Demonstrate increased functional strength by lifting 18 lbs floor to waist to meet demand of work/home routine, including lifting groceries, managing laundry, and/or managing suitcase when traveling.   MET 11/20/2024   Tolerate Home Activity Plan (HAP)/ Home Exercise Program (HEP) to promote safety and independence with ADL, with report of completion of at least 2 out of 4 days outside of program participation.  MET 11/20/2024   Show improved perception of own abilities as evidenced by increase of FOTO scores to established MDC value and perception of performance ratings regarding personal long term goals.  MET 12/13/2024         Long Term goals: In 9 weeks, patient will: Status of goal; reviewed 1/10/2025   Report implementation of application of mindfulness, stretching, and other coping strategies for improved participation in daily routine. MET 12/13/2024   Verbalize functional application of lifting techniques in daily life (golfers lift, floor to waist, waist to shoulder). MET 12/13/2024   Completes 5x sit to stand test in 10 seconds or less to improve ability to participate in community mobility.  MET 12/13/2024   Applies functional application of lifting techniques (golfer's lift, floor to waist, waist to shoulder) in activities of daily life, per patient report.  MET 12/13/2024     Demonstrate physical capacities consistent with a Light-Medium (#35 max, frequent lifting/carrying #20, 3 METS)  demand level, as needed to perform activities to be successful in roles of life, regarding Household Management and community mobility and possible return to work setting. In  "progress   Have an improvement of 3 points in 2/5 personal goals in rating of  performance.     Updated: in 4/7 goals.  In progress   Show improved perception of own abilities as evidenced by increase of FOTO scores to established MC II value and perception of performance ratings regarding personal long term goals.  MET 12/13/2024      Patient Specific Goals; to be met after 2 month of (I) application of tools/techniques learned.  Vocational: attending academic program in person   Recreational: standing in line at events   Daily Living Activities: chores   Measured by patient's self-reported performance and satisfaction of personal FRP goals, listed above in subjective section.   Total Score = sum of the activity performance scores / number of activities  Minimum detectable change (90%CI) for average score = 2 points  Minimum detectable change (90%CI) for single activity score  = 3 points     Plan     POC extended to 2/13/25 to address goals.     Updates/Grading for next session: discuss bike fit, RAHEL (work set up), energy banking tracking sheet, check on tip sheet. Check on identifying barriers to travel and tasks that "get her into the green", continue use of OPISI for goal setting and review of abilities, compensatory techniques to improve ability, expression. Discuss work place athlete- work tolerance, managing hyper fixation and pain flare, intimacy goals, sweeping and mopping.     ERWIN Sheffield   1/13/2025     "

## 2025-01-17 ENCOUNTER — CLINICAL SUPPORT (OUTPATIENT)
Dept: REHABILITATION | Facility: OTHER | Age: 35
End: 2025-01-17
Payer: MEDICAID

## 2025-01-17 DIAGNOSIS — R52 PAIN AGGRAVATED BY ACTIVITIES OF DAILY LIVING: Primary | ICD-10-CM

## 2025-01-17 PROCEDURE — 97112 NEUROMUSCULAR REEDUCATION: CPT

## 2025-01-17 PROCEDURE — 97530 THERAPEUTIC ACTIVITIES: CPT

## 2025-01-17 NOTE — PROGRESS NOTES
"[facility]  Occupational Therapy Treatment Note      Name: Scarlett Knox  MRN:[id number]  Encounter Date: 1/17/2025    Diagnosis:   Encounter Diagnosis   Name Primary?    Pain aggravated by activities of daily living Yes       Referring provider: Lou Block P*    Physician Orders: eval and treat; FRP  Medical Diagnosis: Z98.1 (ICD-10-CM) - S/P lumbar fusion  Evaluation Date: 10/28/2024  Insurance Authorization Period Expiration: 12/31/24  Plan of Care Certification Period: 2/13/2025   Visit # / Visits authorized: 4/10 (pus 16 visits in 2024, including)   FOTO completed: 2/3      Precautions:  Standard    Time In: 1330  Time Out: 1430  Total Billable Time: 60 minutes    SUBJECTIVE     She reports: "how could you tell I needed that?" Re: grounding socorro chi rest break during crafting. When prompted able to identify, "my heart rate was going fast and now it feels better"     "I'm bringing my yoga mat, and I think I'll be ok" "actually I'm going to use their towels"     Scarlett was compliant with home exercise program given previously.      Response to previous treatment: Ms. Knox noted: no concerns reported.     Functional change: "I've been singing out loud a lot". More awareness, improved routine. Easier to do PT HEP. Using DAYTON score. Attempting to change danger in me to safety in me messaging, bringing in groceries, "dishes don't hurt anymore", becoming a 'pain nerd' as sharing all the education with friends.     Pain:     Currently rating pain 2/10.  Functional Pain Scale Rating 0-10: within the last 24 hours  Date 10/28/2024 11/20/2024 12/13/2024 1/10/2025   Average 6/10 3/10 3/10 2/10   Worst 8/10 4/10 3/10 3/10   Best 4/10 2/10 3/10 2/10   Composite score 6/10 3/10 3/10 2.33/10   [TESS  is 1 point or 15-20% change in interference composite score (Gabo et al. 2008)]     Location: Low back, right leg, left leg  Description: sharp when flairing up, " almost Burning. Numbness in left toes  Aggravating Factors: Walking.  Easing Factors: stretches, medication, exercise routine, not being frustrated or angry.     Patient Specific Activities based on Personal goals:  Activity  2024    Performance (P) Satisfaction (S) P S P S P S   Sitting tolerance (for studies)  5 4 7 8 9 9 8 8   2.  Chores (house cleaning) 5 3 5 3 7 7 4 3   3.  Carrying groceries up the stairs into the house 3 3 7 8 8 9 10 10   4.  Intimacy 4 4 4 2 6 5 5 3   5.  Driving long distance (>4 hrs) 4 3 5 6 8 9 6 6   6. Sweeping and mopping       5 3   7. Standing tolerance          5 4              Total Score 4.2 3.4 5.6 5.4 7.6 7.8 5.3 5.3   Ratin-10; unable/unsatisfied - Able/Satisfied      Objective     Objective Measures updated at progress report unless specified.    COGNITIVE Exam  Behaviors: normal, pleasant. At times needing redirection for heightened energy/emotions. Some danger in me messaging.  Memory: able to follow 2 step commands during session  Safety awareness/insight to disability: impaired judgment and impaired insight; admitting to tendency to boom/bust.  Coping skills/emotional control: passive coping strategies engaging in hobbies: tracy; during session Appropriate to situation, with danger in me messaging expressed.     Dominant hand: right     Active Range of Motion  Upper Extremity 10/28/2024    RUE LUE   Hands WFL WFL   Wrist WFL WFL   Elbows WFL WFL   Shoulders WFL WFL      Upper Extremity Strength - Manual Muscle Testing  Motion Tested 10/28/2024 10/30/2024    Right Left  Right  Left comment   Shoulder Flexion NT NT 5/5 5/5    Shoulder Extension NT NT 5/5 5/5    Shoulder Abduction NT NT 5/5 5/5 Slight shoulder elevation (B)ly   Shoulder IR NT NT 5/5 5/5    Shoulder ER NT NT 5/5 5/5    Elbow Flexion NT NT 5/5 5/5    Elbow Extension NT NT 5/5 5/5        Strength (in pounds, rung II, average of three trials)    10/28/2024 2024  1/10/2025   Right hand  60.67 lbs 61.67 lbs 61.67 lbs   Left hand  56 lbs 58.67 lbs 58.67 lbs   [norms for women aged 30-34: R=78.7 +/-19.2; L=68.0 +/-17.7 (Jennifer et al, 1985)]      Functional Strength  Pile Testing: Lifting    10/28/2024 (lbs/HR/DAYTON) 11/6/2024  (lbs/HR/DAYTON) 11/20/2024  (lbs/HR/DAYTON) 12/13/2024  (lbs/HR/DAYTON) 1/13/2025     Repetitive Floor to Waist      8/107/4 NT 8/133/3 8/135/4 13/132/3-4   Repetitive Waist to Shoulder    NT 13/129/5 18/118/4 18/143/5 18/137/5   1- Time Maximum   NT NT 20/122/4 24/142/4 35/135/5   Carry-2 Handed (40ft)   NT NT 20/119/4 NT 22/143/4   Work demand Level   Sedentary (#10 max, 1.5 METS)   Light (#20 max, frequent lifting/carrying #10, 2.5 METS  Light (#20 max, frequent lifting/carrying #10, 2.5 METS   Light (#20 max, frequent lifting/carrying #10, 2.5 METS    Reason for stop point Increased time required for completing repetitions, Inability to maintain proper body mechanics. Inability to maintain proper body mechanics. Increased time required for completing repetitions Increased time required for completing repetitions Increased DAYTON rating   comment 1 rep only. Due to time PILE resting not completed. Tends to rush. Rushing - - -   The work demand level listed above is based on the physical performance screening test performed. More comprehensive physical testing integrated with clinical findings would be required to derived an accurate work capacity.      Gross Motor UE Coordination; Box/Block Test   12/9/2024 1/17/2025   Right hand  79    Left hand  71    comment Leaning to left with (R)UE use; keeping trunk in neutral with use of (L). Calibration issues with use of (L).     [norms for women aged 30-34: R=85.2 +/-7.4; L=80.2 +/-5.6 (Jennifer et al, 1985)]    Balance  5 times sit-stand (adults 18-65 y/o) 10/28/2024 11/20/2024 12/13/2024 1/17/2025   >12 sec= fall risk for general elderly  >16 sec= fall risk for Parkinson's disease  >10 sec= balance/vestibular  dysfunction (<61 y/o)  >14.2 sec= balance/vestibular dysfunction (>61 y/o)  >12 sec= fall risk for CVA 15.71 seconds     (without UE used to push up from chair) 12.46    (Without UE used) 8.88 seconds    (Without UE used)       Endurance Testing: Modified Ramp Treadmill Test    10/28/2024 11/20/2024 12/13/2024 1/13/2025    Minutes Completed  3 minutes 3 minutes 6 minutes 6 min    % Grades  10 % 10% 12% 12%   Speed (mph)  1.7 mph 1.7 mph 2.5 mph 2.5 mph   METS 4 4 7 7    bpm 133 bpm 145 161   Lu Rating Perceived Exertion Scale   4 4 5 5   Reason for stop point Inability to maintain safe speed, Increased discomfort, Fear of increased pain Increased discomfort Pacing using LU scale. Pacing using UL   Comments Gait patterns with use of spinal rotations with circumduction LU scale used  -           Limitation/Restriction for FOTO NOC Survey     Therapist reviewed FOTO scores for Scarlett Knox on 1/10/2025  FOTO documents entered into [employer] - see Media section.     Limitation Score: 48%             Treatment     Scarlett received the treatments listed below:     Therapeutic activities and functional lifting activities in order to increase participation in, endurance for, and performance with vocational, selfcare ADLs, and leisure activities in order to improve quality of life, for 28 minutes, including:    Full body functional mobility w/ 3-10 sec hold technique &/or 3-5 repetition technique to improve functional performance. In order to:  Improve driving safety, visual & spatial awareness:  Cervical flx/ext  Cervical side bending  Cervical rotation  Cervical protraction/retraction  Improve lifting mechanics, showering/bathing, dressing, cooking, reaching, pushing & fine motor skills  Shld rolls  Shld depression/elevation  Scapular retraction/protraction/scaption  Posterior shld stretch (cross arm)  Shld ER stretch (chicken wing)  Elbow flx/ext  Forearm supination/pronation  Wrist flx/ext  Open/close  hands/fingers  Improve bed mobility & rolling, bending over, cooking & cleaning:  Seated trunk rotations  Seated trunk/thoracic ext/flx  Improve functional gait, squatting, sitting tolerance, functional balance:   Seated marches & knee to chest  Seated knee flx/ext  Seated hip ER/piriformis stretch  Seated hamstring stretch  Seated toe raise to heel raise   Seated ankle circles each way  Improve overhead reaching/activities, standing tolerance:  Standing lumbar extensions  Standing lateral leaning stretch  Standing quad stretch (UE support for balance)      Goals: work tolerance, managing hyper fixation and pain flare, intimacy goals, sweeping and mopping.     Neuromuscular re-education activities to improve Balance, Coordination, Kinesthetic, Sense, Proprioception, and Posture for 30 minutes. The following activities were included:    Standing crafting task, with reaching, placing, cutting and dynamic weight shift. Rated as enjoyable. Tolerated 32 minutes of standing.    Standing socorro chi breathing exercise.  Rated as easy 2/10.     Tennis ball throw and bounce. Rated as easy 2/10.     Seated alternate nostril breathing exercise. Rated as easy and enjoyable 2/10.       No environmental, cultural, spiritual, developmental or education needs expressed or noted.    Patient Education and Home Exercises   Education provided to date:  - Progress towards goals   - importance of completion of exercises and activities outside of session to attain goals.   - proper posture and body mechanics.  - anticipated muscular soreness following lift testing and strategies for management including stretching, using ice, scheduled rest.  - Functional pain scale  - DAYTON rate of perceived exertion scale.   - pain cycle  - pursed lip and diaphragmatic breathing techniques  - smoking cessation.  - acknowledging negative self talk or criticism, then shift focus (breath, task, HR for instance)  - daily stretch routine  - Tips to manage pain  -  importance to acknowledge when noting self to rush, being judgmental, for instance.  - On/off the floor.  - Educational video: Understanding Pain in less than 5 minutes, and what to do about it!  - being a work place athlete  - 20/20/20 rule, use mirror if needed to create the distance  - palming, sunning for eye relaxation  - left hand mousing.  - danger in me messaging vs safety in messaging  - energy banking tracking sheet to help with pacing  - gate theory  - importance to avoid rushing  - alinker walking bike  - belief vs facts  - link to book for references     Written Home Exercises Provided: Patient instructed to cont prior HEP.   Exercises were reviewed and Scarlett was able to demonstrate them prior to the end of the session. Scarlett demonstrated good  understanding of the education provided.     See EMR under Patient Instructions for exercises provided during therapy sessions. Patient education also located in FRP binder provided on day 1 of the cohort.     Assessment     Ms. Knox arrived fatigued and endorsed frustration with self at not attending Wednesday's session. Prompts and education on impact of binary thinking vs allowing herself to practice pacing to better reflect on self-expectations. Good engagement in crafting task, tolerating 30 minutes of standing, with x2 PNS activating rest breaks also in standing. Remains perseverative on external feedback, though open to education and reflection and prompt to shift attention interiorly and use pacing and apply education, even if not specific to scenario.     tolerated today's session well. Applying tools and HEP, and has identified appropriate goals for extension of OT care in FRP, specifically work tolerance, managing hyper fixation and pain flare, intimacy goals, sweeping and mopping. Requires cues throughout session to shift to self-awareness and self-efficacy, favoring therapist's feedback first. Noted recognition with motivational  interviewing to identify larger barrier in return to work of concerns around stigmatism related to autism diagnosis rather than physical shortcomings. Good engagement and openness to education.     Scarlett is progressing towards her goals and status of goals can be seen below. Will take additional measures next week, and update goals if appropriate next session.      Scarlett's prognosis is excellent due to motivation and follow through with HEP and education received. Despite having met all but one goals, would continue with Occupational therapy to address areas identified in OPISI and improve personal goals, in addition to addressing work demand level and positional tolerated needed for work, before working towards discharge from our program.     Anticipated barriers to occupational therapy: fear, self criticism, guarding, rushing.    Goals:     SHORT Term goals: In 3 weeks, patient will:  Status of goal; reviewed 1/13/2025   Implements correct use of breathing techniques during functional lifting tasks, with minimal cues needed. MET 12/13/2024   Utilizes DAYTON rate of exertion scale to pace performance with functional lifting tasks, and implements self-care strategies during activity participation to manage pain. MET 11/20/2024   Verbalize understanding of energy conservation and pacing strategies during daily habits, roles, and routines in order to improve tolerance for work and home demands.  MET 12/13/2024   Completes 5x sit to stand test in 12 seconds or less to improve ability to participate in community mobility.   MET 12/13/2024   Demonstrate increased positional tolerance to the level needed for work, home, and community activities (standing in cue at social event, managing supplies for outing, streneous IADL), as evidenced by having a METS level of 5. MET 12/13/2024   Demonstrate proper body mechanics with functional lifting tasks (floor to waist, waist to shoulder, maximum lift, and box carry), via teach  back method with minimal cues needed. MET 11/20/2024   Demonstrate increased functional strength by lifting 13 lbs waist to shoulder for management of (I)ADL, including dressing and grooming, placing items in kitchen cabinets, folding towels, and/or managing suitcase when traveling.   MET 11/20/2024   Demonstrate increased functional strength by lifting 18 lbs floor to waist to meet demand of work/home routine, including lifting groceries, managing laundry, and/or managing suitcase when traveling.   MET 11/20/2024   Tolerate Home Activity Plan (HAP)/ Home Exercise Program (HEP) to promote safety and independence with ADL, with report of completion of at least 2 out of 4 days outside of program participation.  MET 11/20/2024   Show improved perception of own abilities as evidenced by increase of FOTO scores to established MDC value and perception of performance ratings regarding personal long term goals.  MET 12/13/2024         Long Term goals: In 9 weeks, patient will: Status of goal; reviewed 1/10/2025   Report implementation of application of mindfulness, stretching, and other coping strategies for improved participation in daily routine. MET 12/13/2024   Verbalize functional application of lifting techniques in daily life (golfers lift, floor to waist, waist to shoulder). MET 12/13/2024   Completes 5x sit to stand test in 10 seconds or less to improve ability to participate in community mobility.  MET 12/13/2024   Applies functional application of lifting techniques (golfer's lift, floor to waist, waist to shoulder) in activities of daily life, per patient report.  MET 12/13/2024      Demonstrate physical capacities consistent with a Light-Medium (#35 max, frequent lifting/carrying #20, 3 METS)  demand level, as needed to perform activities to be successful in roles of life, regarding Household Management and community mobility and possible return to work setting. In progress   Have an improvement of 3 points in  "2/5 personal goals in rating of  performance.      Updated: in 4/7 goals.  In progress   Show improved perception of own abilities as evidenced by increase of FOTO scores to established MC II value and perception of performance ratings regarding personal long term goals.  MET 12/13/2024      Patient Specific Goals; to be met after 2 month of (I) application of tools/techniques learned.  Vocational: attending academic program in person   Recreational: standing in line at events   Daily Living Activities: chores   Measured by patient's self-reported performance and satisfaction of personal FRP goals, listed above in subjective section.   Total Score = sum of the activity performance scores / number of activities  Minimum detectable change (90%CI) for average score = 2 points  Minimum detectable change (90%CI) for single activity score  = 3 points     Plan     Continue per plan of care.     Updates/Grading for next session: discuss bike fit, RAHEL (work set up), energy banking tracking sheet, check on tip sheet. Check on identifying barriers to travel and tasks that "get her into the green", continue use of OPISI for goal setting and review of abilities, compensatory techniques to improve ability, expression. Discuss work place athlete- work tolerance, managing hyper fixation and pain flare, intimacy goals, sweeping and mopping.     ERWIN Sheffield   1/17/2025     QR code for subsequent FOTO surveys:                      "

## 2025-01-27 ENCOUNTER — CLINICAL SUPPORT (OUTPATIENT)
Dept: REHABILITATION | Facility: OTHER | Age: 35
End: 2025-01-27
Payer: MEDICAID

## 2025-01-27 DIAGNOSIS — R52 PAIN AGGRAVATED BY ACTIVITIES OF DAILY LIVING: Primary | ICD-10-CM

## 2025-01-27 PROCEDURE — 97530 THERAPEUTIC ACTIVITIES: CPT

## 2025-01-27 NOTE — PROGRESS NOTES
"[facility]  Occupational Therapy Treatment Note      Name: Scarlett Knox  MRN:[id number]  Encounter Date: 1/27/2025    Diagnosis:   Encounter Diagnosis   Name Primary?    Pain aggravated by activities of daily living Yes       Referring provider: Lou Block P*    Physician Orders: eval and treat; FRP  Medical Diagnosis: Z98.1 (ICD-10-CM) - S/P lumbar fusion  Evaluation Date: 10/28/2024  Insurance Authorization Period Expiration: 12/31/24  Plan of Care Certification Period: 2/13/2025   Visit # / Visits authorized: 5/10 (pus 16 visits in 2024, including)   FOTO completed: 2/3      Precautions:  Standard    Time In: 0807  Time Out: 0900  Total Billable Time: 53 minutes    SUBJECTIVE     She reports: "I guess I am recognizing that things are going to be ok" "the cold weather wasn't as bad as I thought it was going to be"  "I need to reach out to my psychiatrist, I want to go down on my medications"  "I needed OT, its not all PT"  "I'm recognizing that getting diagnosed as autistic is more important to me than  I thought"     Scarlett was compliant with home exercise program given previously.      Response to previous treatment: Ms. Knox noted: no concerns reported.     Functional change: "I've been singing out loud a lot". More awareness, improved routine. Easier to do PT HEP. Using DAYTON score. Attempting to change danger in me to safety in me messaging, bringing in groceries, "dishes don't hurt anymore", becoming a 'pain nerd' as sharing all the education with friends.     Pain:     Currently rating pain 2/10.  Functional Pain Scale Rating 0-10: within the last 24 hours  Date 10/28/2024 11/20/2024 12/13/2024 1/10/2025   Average 6/10 3/10 3/10 2/10   Worst 8/10 4/10 3/10 3/10   Best 4/10 2/10 3/10 2/10   Composite score 6/10 3/10 3/10 2.33/10   [TESS  is 1 point or 15-20% change in interference composite score (Gabo et al. 2008)]     Location: Low back, " right leg, left leg  Description: sharp when flairing up, almost Burning. Numbness in left toes  Aggravating Factors: Walking.  Easing Factors: stretches, medication, exercise routine, not being frustrated or angry.     Patient Specific Activities based on Personal goals:  Activity  2024    Performance (P) Satisfaction (S) P S P S P S   Sitting tolerance (for studies)  5 4 7 8 9 9 8 8   2.  Chores (house cleaning) 5 3 5 3 7 7 4 3   3.  Carrying groceries up the stairs into the house 3 3 7 8 8 9 10 10   4.  Intimacy 4 4 4 2 6 5 5 3   5.  Driving long distance (>4 hrs) 4 3 5 6 8 9 6 6   6. Sweeping and mopping       5 3   7. Standing tolerance          5 4              Total Score 4.2 3.4 5.6 5.4 7.6 7.8 5.3 5.3   Ratin-10; unable/unsatisfied - Able/Satisfied      Objective     Objective Measures updated at progress report unless specified.    COGNITIVE Exam  Behaviors: normal, pleasant. At times needing redirection for heightened energy/emotions. Some danger in me messaging.  Memory: able to follow 2 step commands during session  Safety awareness/insight to disability: impaired judgment and impaired insight; admitting to tendency to boom/bust.  Coping skills/emotional control: passive coping strategies engaging in hobbies: tracy; during session Appropriate to situation, with danger in me messaging expressed.     Dominant hand: right     Active Range of Motion  Upper Extremity 10/28/2024    RUE LUE   Hands WFL WFL   Wrist WFL WFL   Elbows WFL WFL   Shoulders WFL WFL      Upper Extremity Strength - Manual Muscle Testing  Motion Tested 10/28/2024 10/30/2024    Right Left  Right  Left comment   Shoulder Flexion NT NT 5/5 5/5    Shoulder Extension NT NT 5/5 5/5    Shoulder Abduction NT NT 5/5 5/5 Slight shoulder elevation (B)ly   Shoulder IR NT NT 5/5 5/5    Shoulder ER NT NT 5/5 5/5    Elbow Flexion NT NT 5/5 5/5    Elbow Extension NT NT 5/5 5/5        Strength (in pounds,  rung II, average of three trials)    10/28/2024 11/20/2024 1/10/2025   Right hand  60.67 lbs 61.67 lbs 61.67 lbs   Left hand  56 lbs 58.67 lbs 58.67 lbs   [norms for women aged 30-34: R=78.7 +/-19.2; L=68.0 +/-17.7 (Jennifer et al, 1985)]      Functional Strength  Pile Testing: Lifting    10/28/2024 (lbs/HR/DAYTON) 11/6/2024  (lbs/HR/DAYTON) 11/20/2024  (lbs/HR/DAYTON) 12/13/2024  (lbs/HR/DAYTON) 1/13/2025     Repetitive Floor to Waist      8/107/4 NT 8/133/3 8/135/4 13/132/3-4   Repetitive Waist to Shoulder    NT 13/129/5 18/118/4 18/143/5 18/137/5   1- Time Maximum   NT NT 20/122/4 24/142/4 35/135/5   Carry-2 Handed (40ft)   NT NT 20/119/4 NT 22/143/4   Work demand Level   Sedentary (#10 max, 1.5 METS)   Light (#20 max, frequent lifting/carrying #10, 2.5 METS  Light (#20 max, frequent lifting/carrying #10, 2.5 METS   Light (#20 max, frequent lifting/carrying #10, 2.5 METS    Reason for stop point Increased time required for completing repetitions, Inability to maintain proper body mechanics. Inability to maintain proper body mechanics. Increased time required for completing repetitions Increased time required for completing repetitions Increased DAYTON rating   comment 1 rep only. Due to time PILE resting not completed. Tends to rush. Rushing - - -   The work demand level listed above is based on the physical performance screening test performed. More comprehensive physical testing integrated with clinical findings would be required to derived an accurate work capacity.      Gross Motor UE Coordination; Box/Block Test   12/9/2024 1/27/2025   Right hand  79    Left hand  71    comment Leaning to left with (R)UE use; keeping trunk in neutral with use of (L). Calibration issues with use of (L).     [norms for women aged 30-34: R=85.2 +/-7.4; L=80.2 +/-5.6 (Jennifer et al, 1985)]    Balance  5 times sit-stand (adults 18-63 y/o) 10/28/2024 11/20/2024 12/13/2024 1/27/2025   >12 sec= fall risk for general elderly  >16 sec=  fall risk for Parkinson's disease  >10 sec= balance/vestibular dysfunction (<59 y/o)  >14.2 sec= balance/vestibular dysfunction (>59 y/o)  >12 sec= fall risk for CVA 15.71 seconds     (without UE used to push up from chair) 12.46    (Without UE used) 8.88 seconds    (Without UE used)       Endurance Testing: Modified Ramp Treadmill Test    10/28/2024 11/20/2024 12/13/2024 1/13/2025    Minutes Completed  3 minutes 3 minutes 6 minutes 6 min    % Grades  10 % 10% 12% 12%   Speed (mph)  1.7 mph 1.7 mph 2.5 mph 2.5 mph   METS 4 4 7 7    bpm 133 bpm 145 161   Lu Rating Perceived Exertion Scale   4 4 5 5   Reason for stop point Inability to maintain safe speed, Increased discomfort, Fear of increased pain Increased discomfort Pacing using LU scale. Pacing using LU   Comments Gait patterns with use of spinal rotations with circumduction LU scale used  -           Limitation/Restriction for FOTO NOC Survey     Therapist reviewed FOTO scores for Scarlett Knox on 1/10/2025  FOTO documents entered into [employer] - see Media section.     Limitation Score: 48%             Treatment     Scarlett received the treatments listed below:     Therapeutic activities and functional lifting activities in order to increase participation in, endurance for, and performance with vocational, selfcare ADLs, and leisure activities in order to improve quality of life, for 53 minutes, including:    Dynamic standing and seated activity 30 minutes, while discussing and writing list of tools/resources available for return to work and upcoming discharge from OT.  Rated conversation as sort of hard 4/10, but important. Able to take rest breaks with minimal cues from therapist.    Review of sit to stand, and functional lift: waist to shouldedr x5 reps (rated moderate 3/10) and waist to floor,3x5 reps rated 4/10 - cues to widen base of support and to send hips and shift weight into heels.      No environmental, cultural, spiritual,  developmental or education needs expressed or noted.    Patient Education and Home Exercises   Education provided to date:  - Progress towards goals   - importance of completion of exercises and activities outside of session to attain goals.   - proper posture and body mechanics.  - anticipated muscular soreness following lift testing and strategies for management including stretching, using ice, scheduled rest.  - Functional pain scale  - DAYTON rate of perceived exertion scale.   - pain cycle  - pursed lip and diaphragmatic breathing techniques  - smoking cessation.  - acknowledging negative self talk or criticism, then shift focus (breath, task, HR for instance)  - daily stretch routine  - Tips to manage pain  - importance to acknowledge when noting self to rush, being judgmental, for instance.  - On/off the floor.  - Educational video: Understanding Pain in less than 5 minutes, and what to do about it!  - being a work place athlete  - 20/20/20 rule, use mirror if needed to create the distance  - palming, sunning for eye relaxation  - left hand mousing.  - danger in me messaging vs safety in messaging  - energy banking tracking sheet to help with pacing  - gate theory  - importance to avoid rushing  - alinker walking bike  - belief vs facts  - link to book for references     Written Home Exercises Provided: Patient instructed to cont prior HEP.   Exercises were reviewed and Scarlett was able to demonstrate them prior to the end of the session. Scarlett demonstrated good  understanding of the education provided.     See EMR under Patient Instructions for exercises provided during therapy sessions. Patient education also located in FRP binder provided on day 1 of the cohort.     Assessment     Ms. Knox tolerated today's session well. Noted recognition with motivational interviewing to identify larger barrier in return to work of concerns around stigmatism related to autism diagnosis rather than physical  shortcomings. Good engagement and openness to education.  Improved self-regulation and pacing noted. Plan to discharge upon next session. Scarlett in agreement.     Scarlett is progressing towards her goals and status of goals can be seen below.      Scarlett's prognosis is excellent due to motivation and follow through with HEP and education received. Despite having met all but one goals, would continue with Occupational therapy to address areas identified in OPISI and improve personal goals, in addition to addressing work demand level and positional tolerated needed for work, before working towards discharge from our program.     Anticipated barriers to occupational therapy: fear, self criticism, guarding, rushing.    Goals:     SHORT Term goals: In 3 weeks, patient will:  Status of goal; reviewed 1/13/2025   Implements correct use of breathing techniques during functional lifting tasks, with minimal cues needed. MET 12/13/2024   Utilizes DAYTON rate of exertion scale to pace performance with functional lifting tasks, and implements self-care strategies during activity participation to manage pain. MET 11/20/2024   Verbalize understanding of energy conservation and pacing strategies during daily habits, roles, and routines in order to improve tolerance for work and home demands.  MET 12/13/2024   Completes 5x sit to stand test in 12 seconds or less to improve ability to participate in community mobility.   MET 12/13/2024   Demonstrate increased positional tolerance to the level needed for work, home, and community activities (standing in cue at social event, managing supplies for outing, streneous IADL), as evidenced by having a METS level of 5. MET 12/13/2024   Demonstrate proper body mechanics with functional lifting tasks (floor to waist, waist to shoulder, maximum lift, and box carry), via teach back method with minimal cues needed. MET 11/20/2024   Demonstrate increased functional strength by lifting 13 lbs waist  to shoulder for management of (I)ADL, including dressing and grooming, placing items in kitchen cabinets, folding towels, and/or managing suitcase when traveling.   MET 11/20/2024   Demonstrate increased functional strength by lifting 18 lbs floor to waist to meet demand of work/home routine, including lifting groceries, managing laundry, and/or managing suitcase when traveling.   MET 11/20/2024   Tolerate Home Activity Plan (HAP)/ Home Exercise Program (HEP) to promote safety and independence with ADL, with report of completion of at least 2 out of 4 days outside of program participation.  MET 11/20/2024   Show improved perception of own abilities as evidenced by increase of FOTO scores to established MDC value and perception of performance ratings regarding personal long term goals.  MET 12/13/2024         Long Term goals: In 9 weeks, patient will: Status of goal; reviewed 1/10/2025   Report implementation of application of mindfulness, stretching, and other coping strategies for improved participation in daily routine. MET 12/13/2024   Verbalize functional application of lifting techniques in daily life (golfers lift, floor to waist, waist to shoulder). MET 12/13/2024   Completes 5x sit to stand test in 10 seconds or less to improve ability to participate in community mobility.  MET 12/13/2024   Applies functional application of lifting techniques (golfer's lift, floor to waist, waist to shoulder) in activities of daily life, per patient report.  MET 12/13/2024      Demonstrate physical capacities consistent with a Light-Medium (#35 max, frequent lifting/carrying #20, 3 METS)  demand level, as needed to perform activities to be successful in roles of life, regarding Household Management and community mobility and possible return to work setting. In progress   Have an improvement of 3 points in 2/5 personal goals in rating of  performance.      Updated: in 4/7 goals.  In progress   Show improved perception of own  abilities as evidenced by increase of FOTO scores to established MC II value and perception of performance ratings regarding personal long term goals.  MET 12/13/2024      Patient Specific Goals; to be met after 2 month of (I) application of tools/techniques learned.  Vocational: attending academic program in person   Recreational: standing in line at events   Daily Living Activities: chores   Measured by patient's self-reported performance and satisfaction of personal FRP goals, listed above in subjective section.   Total Score = sum of the activity performance scores / number of activities  Minimum detectable change (90%CI) for average score = 2 points  Minimum detectable change (90%CI) for single activity score  = 3 points     Plan     Continue per plan of care.     Updates/Grading for next session:  Discuss work place athlete- work tolerance, managing hyper fixation and pain flare, intimacy goals, sweeping and mopping.     ERWIN Sheffield   1/27/2025     QR code for subsequent FOTO surveys:

## 2025-02-01 NOTE — PROGRESS NOTES
"[facility]  [facility]  Occupational Therapy Treatment Note and Discharge Summary      Name: Scarlett Knox  MRN:[id number]  Encounter Date: 2/3/2025    Diagnosis:   Encounter Diagnosis   Name Primary?    Pain aggravated by activities of daily living Yes     Referring provider: Marion Laguna NP    Physician Orders: eval and treat; FRP  Medical Diagnosis: Z98.1 (ICD-10-CM) - S/P lumbar fusion  Evaluation Date: 10/28/2024  Insurance Authorization Period Expiration: 12/31/24  Plan of Care Certification Period: 2/13/2025   Visit # / Visits authorized: 6/10 (pus 16 visits in 2024, including)   FOTO completed: 2/3      Precautions:  Standard    Time In: 08:04  Time Out: 09:02  Total Billable Time: 58 minutes    SUBJECTIVE     She reports: "I paced a lot and made cookies for y'all. I could not have paced it out more than that I did; overall, it took 3.5 hours".    Scarlett was compliant with home exercise program given previously.      Response to previous treatment: Ms. Knox noted: no concerns reported.     Functional change: "I've been singing out loud a lot". More awareness, improved routine. Easier to do PT HEP. Using DAYTON score. Attempting to change danger in me to safety in me messaging, bringing in groceries, "dishes don't hurt anymore", becoming a 'pain nerd' as sharing all the education with friends.     Pain:     Currently rating pain 2/10.  Functional Pain Scale Rating 0-10: within the 24 hours period listed:  Date 10/28/2024 11/20/2024 12/13/2024 1/10/2025 2/3/2025   Average 6/10 3/10 3/10 2/10 2/10   Worst 8/10 4/10 3/10 3/10 3/10   Best 4/10 2/10 3/10 2/10 1/10   Composite score 6/10 3/10 3/10 2.33/10 2/10   [TESS  is 1 point or 15-20% change in interference composite score (Gabo et al. 2008)]     Location: Low back  Description: achy, burning "muscular pain". Numbness in left toes  Aggravating Factors: Walking.   Easing Factors: stretches, medication, exercise " routine, not being frustrated or angry.     Patient Specific Activities based on Personal goals:  Activity  2024 2024 2024 2025 2/3/2025    Performance (P) Satisfaction (S) P S P S P S P S   Sitting tolerance (for studies)  5 4 7 8 9 9 8 8 9 9   2.  Chores (house cleaning) 5 3 5 3 7 7 4 3 8 9   3.  Carrying groceries up the stairs into the house 3 3 7 8 8 9 10 10 10 10   4.  Intimacy 4 4 4 2 6 5 5 3 8 9   5.  Driving long distance (>4 hrs) 4 3 5 6 8 9 6 6 9 9   6. Sweeping and mopping       5 3 8 8   7. Standing tolerance          5 4 7 9                Total Score 4.2 3.4 5.6 5.4 7.6 7.8 5.3 5.3 8.4 9   Ratin-10; unable/unsatisfied - Able/Satisfied      Objective     Objective Measures updated at progress report unless specified.    COGNITIVE Exam  Behaviors: normal, pleasant. At times needing redirection for heightened energy/emotions. Some danger in me messaging.  Memory: able to follow 2 step commands during session  Safety awareness/insight to disability: impaired judgment and impaired insight; admitting to tendency to boom/bust.  Coping skills/emotional control: passive coping strategies engaging in hobbies: tracy; during session Appropriate to situation, with danger in me messaging expressed.     Dominant hand: right     Active Range of Motion  Upper Extremity 10/28/2024    RUE LUE   Hands WFL WFL   Wrist WFL WFL   Elbows WFL WFL   Shoulders WFL WFL      Upper Extremity Strength - Manual Muscle Testing  Motion Tested 10/28/2024 10/30/2024    Right Left  Right  Left comment   Shoulder Flexion NT NT 5/5 5/5    Shoulder Extension NT NT 5/5 5/5    Shoulder Abduction NT NT 5/5 5/5 Slight shoulder elevation (B)ly   Shoulder IR NT NT 5/5 5/5    Shoulder ER NT NT 5/5 5/5    Elbow Flexion NT NT 5/5 5/5    Elbow Extension NT NT 5/5 5/5        Strength (in pounds, rung II, average of three trials)    10/28/2024 2024 1/10/2025 2/3/2025   Right hand  60.67 lbs 61.67 lbs 61.67 lbs 48.67  lbs   Left hand  56 lbs 58.67 lbs 58.67 lbs 50.33 lbs   comment Tool not calibrated S/p having made cookies yesterday, and a lot of crocheting.    [norms for women aged 30-34: R=78.7 +/-19.2; L=68.0 +/-17.7 (Jennifer et al, 1985)]      Functional Strength  Pile Testing: Lifting    10/28/2024 (lbs/HR/DAYTON) 11/6/2024  (lbs/HR/DAYTON) 11/20/2024  (lbs/HR/DAYTON) 12/13/2024  (lbs/HR/DAYTON) 1/13/2025     Repetitive Floor to Waist      8/107/4 NT 8/133/3 8/135/4 13/132/3-4   Repetitive Waist to Shoulder    NT 13/129/5 18/118/4 18/143/5 18/137/5   1- Time Maximum   NT NT 20/122/4 24/142/4 35/135/5   Carry-2 Handed (40ft)   NT NT 20/119/4 NT 22/143/4   Work demand Level   Sedentary (#10 max, 1.5 METS)   Light (#20 max, frequent lifting/carrying #10, 2.5 METS  Light (#20 max, frequent lifting/carrying #10, 2.5 METS   Light (#20 max, frequent lifting/carrying #10, 2.5 METS    Reason for stop point Increased time required for completing repetitions, Inability to maintain proper body mechanics. Inability to maintain proper body mechanics. Increased time required for completing repetitions Increased time required for completing repetitions Increased DAYTON rating   comment 1 rep only. Due to time PILE resting not completed. Tends to rush. Rushing - - -   The work demand level listed above is based on the physical performance screening test performed. More comprehensive physical testing integrated with clinical findings would be required to derived an accurate work capacity.      Gross Motor UE Coordination; Box/Block Test   12/9/2024   Right hand  79   Left hand  71   comment Leaning to left with (R)UE use; keeping trunk in neutral with use of (L). Calibration issues with use of (L).    [norms for women aged 30-34: R=85.2 +/-7.4; L=80.2 +/-5.6 (Jennifer et al, 1985)]    Balance  5 times sit-stand (adults 18-63 y/o) 10/28/2024 11/20/2024 12/13/2024 2/3/2025   >12 sec= fall risk for general elderly  >16 sec= fall risk for Parkinson's  disease  >10 sec= balance/vestibular dysfunction (<61 y/o)  >14.2 sec= balance/vestibular dysfunction (>61 y/o)  >12 sec= fall risk for CVA 15.71 seconds     (without UE used to push up from chair) 12.46    (Without UE used) 8.88 seconds    (Without UE used) 9.73 seconds    (Without UE used)      Endurance Testing: Modified Ramp Treadmill Test    10/28/2024 11/20/2024 12/13/2024 1/13/2025  2/3/2025   Minutes Completed  3 minutes 3 minutes 6 minutes 6 min  6 minutes   % Grades  10 % 10% 12% 12% 12 %   Speed (mph)  1.7 mph 1.7 mph 2.5 mph 2.5 mph 2.5 mph   METS 4 4 7 7 7    bpm 133 bpm 145 161 157   Lu Rating Perceived Exertion Scale   4 4 5 5 4   Reason for stop point Inability to maintain safe speed, Increased discomfort, Fear of increased pain Increased discomfort Pacing using LU scale. Pacing using LU Pacing using LU   Comments Gait patterns with use of spinal rotations with circumduction LU scale used  -            Limitation/Restriction for FOTO NOC Survey     Therapist reviewed FOTO scores for Scarlett Knox on 1/10/2025  FOTO documents entered into [employer] - see Media section.     Limitation Score: 48%             Treatment     Scarlett received the treatments listed below:     Therapeutic activities and functional lifting activities in order to increase participation in, endurance for, and performance with vocational, selfcare ADLs, and leisure activities in order to improve quality of life, for 38 minutes, including:    Taking of measures.     Education on pacing, energy banking; completed sheets from the past used for education on planning and prioritizing activities when scheduling.    While seated on Register[employer] ball completed FOTO survey.    Neuromuscular re-education activities to improve Coordination, Kinesthetic, Sense, and Proprioception for 5 minutes. The following activities were included:    Expressed interested in raster glasses; education on relation ocular movements vs neck ROM, and  trauma/memory access given. Saccades, smooth pursuit reviewed. Jumpy eye movements with smooth pursuit; HEP given.     Therapeutic exercises to develop strength, endurance, ROM, flexibility, posture, and core stabilization for 15 minutes, including:    Scarlett participated in endurance activity using treadmill for 10 minutes, starting at level .9 to .9 to improve functional endurance and progress towards increased MET level for improved community mobility and participation, rated as Sort of hard (4/10) exertion, Scarlett re-educated on how to add mindfulness component to activity and how to pace appropriately by completed self check ins and grading activity as needed, with goal to reach moderate to sort of hard by end of activity.     Pick and choose stretching from daily stretch routine during review of activities related to personal goals.     No environmental, cultural, spiritual, developmental or education needs expressed or noted.    Patient Education and Home Exercises   Education provided to date:  - Progress towards goals   - importance of completion of exercises and activities outside of session to attain goals.   - proper posture and body mechanics.  - anticipated muscular soreness following lift testing and strategies for management including stretching, using ice, scheduled rest.  - Functional pain scale  - DAYTON rate of perceived exertion scale.   - pain cycle  - pursed lip and diaphragmatic breathing techniques  - smoking cessation.  - acknowledging negative self talk or criticism, then shift focus (breath, task, HR for instance)  - daily stretch routine  - Tips to manage pain  - importance to acknowledge when noting self to rush, being judgmental, for instance.  - On/off the floor.  - Educational video: Understanding Pain in less than 5 minutes, and what to do about it!  - being a work place athlete  - 20/20/20 rule, use mirror if needed to create the distance  - masoud, sunning for eye relaxation  -  "left hand mousing.  - danger in me messaging vs safety in messaging  - energy banking tracking sheet to help with pacing  - gate theory  - importance to avoid rushing  - alinker walking bike  - belief vs facts  - link to book for references  - pacing: average of 3 times of an activity; divide by 5, x 4 to plan a 1/5 less. This will give leave way with "Oh crap" moments.     Written Home Exercises Provided: Patient instructed to cont prior HEP.   Exercises were reviewed and Scarlett was able to demonstrate them prior to the end of the session. Scarlett demonstrated good  understanding of the education provided.     See EMR under Patient Instructions for exercises provided during therapy sessions. Patient education also located in FRP binder provided on day 1 of the cohort.     Assessment     Ms. Knox with h/o s/p lumbar fusion, participated in [facility] at [facility] in non-cohort sessions, 21 times since evaluation, and has made tremendous progress, including 4 point improvement pain composite score,  with perception of function improved as evidenced by 4.2/6.6 point increase in performance and satisfaction of activities related to personal goals, and 13% increase per FOTO with self efficacy scores 86-90/100.    She is in agreement with discharge today, and expresses confidence to keep applying tools and techniques learned, to manage her symptoms and potential pain flare-ups.     Status of goals can be seen below; she has met     Anticipated barriers to progress: fear, self criticism, guarding, rushing.    Goals:     SHORT Term goals: In 3 weeks, patient will:  Status of goal; reviewed 2/4/2025   Implements correct use of breathing techniques during functional lifting tasks, with minimal cues needed. MET 12/13/2024   Utilizes DAYTON rate of exertion scale to pace performance with functional lifting tasks, and implements self-care strategies during activity participation to manage " pain. MET 11/20/2024   Verbalize understanding of energy conservation and pacing strategies during daily habits, roles, and routines in order to improve tolerance for work and home demands.  MET 12/13/2024   Completes 5x sit to stand test in 12 seconds or less to improve ability to participate in community mobility.   MET 12/13/2024   Demonstrate increased positional tolerance to the level needed for work, home, and community activities (standing in cue at social event, managing supplies for outing, streneous IADL), as evidenced by having a METS level of 5. MET 12/13/2024   Demonstrate proper body mechanics with functional lifting tasks (floor to waist, waist to shoulder, maximum lift, and box carry), via teach back method with minimal cues needed. MET 11/20/2024   Demonstrate increased functional strength by lifting 13 lbs waist to shoulder for management of (I)ADL, including dressing and grooming, placing items in kitchen cabinets, folding towels, and/or managing suitcase when traveling.   MET 11/20/2024   Demonstrate increased functional strength by lifting 18 lbs floor to waist to meet demand of work/home routine, including lifting groceries, managing laundry, and/or managing suitcase when traveling.   MET 11/20/2024   Tolerate Home Activity Plan (HAP)/ Home Exercise Program (HEP) to promote safety and independence with ADL, with report of completion of at least 2 out of 4 days outside of program participation.  MET 11/20/2024   Show improved perception of own abilities as evidenced by increase of FOTO scores to established MDC value and perception of performance ratings regarding personal long term goals.  MET 12/13/2024         Long Term goals: In 9 weeks, patient will: Status of goal; reviewed 2/4/2025   Report implementation of application of mindfulness, stretching, and other coping strategies for improved participation in daily routine. MET 12/13/2024   Verbalize functional application of lifting  techniques in daily life (golfers lift, floor to waist, waist to shoulder). MET 12/13/2024   Completes 5x sit to stand test in 10 seconds or less to improve ability to participate in community mobility.  MET 12/13/2024   Applies functional application of lifting techniques (golfer's lift, floor to waist, waist to shoulder) in activities of daily life, per patient report.  MET 12/13/2024      Demonstrate physical capacities consistent with a Light-Medium (#35 max, frequent lifting/carrying #20, 3 METS)  demand level, as needed to perform activities to be successful in roles of life, regarding Household Management and community mobility and possible return to work setting. MET 1/13/2025   Have an improvement of 3 points in 2/5 personal goals in rating of  performance.      Updated: in 4/7 goals.  MET 2/4/2025   Show improved perception of own abilities as evidenced by increase of FOTO scores to established MC II value and perception of performance ratings regarding personal long term goals.  MET 12/13/2024      Patient Specific Goals; to be met after 2 month of (I) application of tools/techniques learned.  Vocational: attending academic program in person   Recreational: standing in line at events   Daily Living Activities: chores   Measured by patient's self-reported performance and satisfaction of personal FRP goals, listed above in subjective section.   Total Score = sum of the activity performance scores / number of activities  Minimum detectable change (90%CI) for average score = 2 points  Minimum detectable change (90%CI) for single activity score  = 3 points     Plan     Discharge from Occupational Therapy to [facility].    FRANDY Henriquez, OTR/L, CEAS-I  2/4/2025

## 2025-02-03 ENCOUNTER — CLINICAL SUPPORT (OUTPATIENT)
Dept: REHABILITATION | Facility: OTHER | Age: 35
End: 2025-02-03
Payer: MEDICAID

## 2025-02-03 DIAGNOSIS — R52 PAIN AGGRAVATED BY ACTIVITIES OF DAILY LIVING: Primary | ICD-10-CM

## 2025-02-03 PROCEDURE — 97530 THERAPEUTIC ACTIVITIES: CPT

## 2025-02-03 PROCEDURE — 97110 THERAPEUTIC EXERCISES: CPT

## 2025-02-04 PROBLEM — R52 PAIN AGGRAVATED BY ACTIVITIES OF DAILY LIVING: Status: RESOLVED | Noted: 2024-10-29 | Resolved: 2025-02-04

## 2025-06-16 ENCOUNTER — PATIENT MESSAGE (OUTPATIENT)
Dept: ORTHOPEDICS | Facility: CLINIC | Age: 35
End: 2025-06-16
Payer: MEDICAID

## 2025-06-16 ENCOUNTER — TELEPHONE (OUTPATIENT)
Dept: ORTHOPEDICS | Facility: CLINIC | Age: 35
End: 2025-06-16
Payer: MEDICAID

## 2025-06-16 DIAGNOSIS — Z98.1 S/P LUMBAR FUSION: Primary | ICD-10-CM

## 2025-06-16 NOTE — TELEPHONE ENCOUNTER
Left msg on vm and msg sent in patient portal with appt date/time and location for xray as well as spine clinic.

## 2025-06-24 ENCOUNTER — OFFICE VISIT (OUTPATIENT)
Dept: ORTHOPEDICS | Facility: CLINIC | Age: 35
End: 2025-06-24
Payer: MEDICAID

## 2025-06-24 ENCOUNTER — HOSPITAL ENCOUNTER (OUTPATIENT)
Dept: RADIOLOGY | Facility: HOSPITAL | Age: 35
Discharge: HOME | End: 2025-06-24
Attending: PHYSICIAN ASSISTANT
Payer: MEDICAID

## 2025-06-24 VITALS — WEIGHT: 161.38 LBS | HEIGHT: 61 IN | BODY MASS INDEX: 30.47 KG/M2

## 2025-06-24 DIAGNOSIS — Z98.1 S/P LUMBAR FUSION: Primary | ICD-10-CM

## 2025-06-24 DIAGNOSIS — Z98.1 S/P LUMBAR FUSION: ICD-10-CM

## 2025-06-24 PROCEDURE — 3008F BODY MASS INDEX DOCD: CPT | Mod: CPTII,,, | Performed by: PHYSICIAN ASSISTANT

## 2025-06-24 PROCEDURE — 99999 PR PBB SHADOW E&M-EST. PATIENT-LVL III: CPT | Mod: PBBFAC,,, | Performed by: PHYSICIAN ASSISTANT

## 2025-06-24 PROCEDURE — 72100 X-RAY EXAM L-S SPINE 2/3 VWS: CPT | Mod: 26,,, | Performed by: RADIOLOGY

## 2025-06-24 PROCEDURE — 1159F MED LIST DOCD IN RCRD: CPT | Mod: CPTII,,, | Performed by: PHYSICIAN ASSISTANT

## 2025-06-24 PROCEDURE — 99213 OFFICE O/P EST LOW 20 MIN: CPT | Mod: PBBFAC,25 | Performed by: PHYSICIAN ASSISTANT

## 2025-06-24 PROCEDURE — 99212 OFFICE O/P EST SF 10 MIN: CPT | Mod: S$PBB,,, | Performed by: PHYSICIAN ASSISTANT

## 2025-06-24 PROCEDURE — 72100 X-RAY EXAM L-S SPINE 2/3 VWS: CPT | Mod: TC

## 2025-06-24 NOTE — PROGRESS NOTES
Date: 06/24/2025    Supervising Physician: Dominic Mike M.D.    Date of Surgery: 8/17/2023    Procedure: L4/5 TLIF    History: Scarlett Knox is seen today for follow-up following the above listed procedure. Overall the patient is doing well but today notes she continues to have right leg pain.  Overall she is improved compared to before surgery but the pain in her right leg is limiting her activities.  She completed the functional restoration program and was very impressed.  She continues the exercises daily.  This still affects her daily and she is unable to work because of the pain.       Exam:  Incision is healing well. There is no sign of infection. Neuro exam is stable. No signs of DVT.    Radiographs: imaging today shows hardware in place, no evidence of failure.  MRI and CT from October show solid bony fusion and good decompression.     Assessment/Plan: nearly 2 years post op.    I will see if Dr. Mallory can see her for pain management.  I will let her know once I hear back from him.       Thank you for the opportunity to participate in this patient's care. Please give me a call if there are any concerns or questions.

## 2025-06-30 ENCOUNTER — TELEPHONE (OUTPATIENT)
Dept: PAIN MEDICINE | Facility: CLINIC | Age: 35
End: 2025-06-30
Payer: MEDICAID

## 2025-06-30 NOTE — TELEPHONE ENCOUNTER
----- Message from Lou Block PA-C sent at 6/30/2025  7:15 AM CDT -----  Thank you.  Will you please schedule her in clinic?  ----- Message -----  From: Vibha Mallory MD  Sent: 6/30/2025   7:12 AM CDT  To: Lou Block PA-C; [facility] Staff    Yes, sorry for late answer, just came from vacation  ----- Message -----  From: Lou Block PA-C  Sent: 6/24/2025  10:28 AM CDT  To: Vibha Mallory MD    Hi Dr. Mallory,    Would you be able to see this patient for pain management?  She is looking for medical management not so much interventional pain.    Thanks,  Lou   Show Topical Anesthesia Variable?: Yes

## 2025-07-09 ENCOUNTER — TELEPHONE (OUTPATIENT)
Dept: PAIN MEDICINE | Facility: CLINIC | Age: 35
End: 2025-07-09
Payer: MEDICAID

## 2025-07-09 NOTE — TELEPHONE ENCOUNTER
----- Message from Hanna sent at 2025  2:17 PM CDT -----  Contact: PATIENT  Scarlett Knox  MRN: [id number]  : 1990  PCP: Liberty Ngo  Home Phone      [phone]  Work Phone      Not on file.  Mobile          [phone]      MESSAGE: Patient would like to make an appointment for the treatment of chronic back pain.  Next available is March but feels she needs to be seen sooner.        Phone: [phone]  
Appointment scheduled  
37.2

## 2025-09-03 ENCOUNTER — TELEPHONE (OUTPATIENT)
Dept: PAIN MEDICINE | Facility: CLINIC | Age: 35
End: 2025-09-03
Payer: COMMERCIAL

## 2025-09-03 ENCOUNTER — OFFICE VISIT (OUTPATIENT)
Dept: PAIN MEDICINE | Facility: CLINIC | Age: 35
End: 2025-09-03
Payer: MEDICAID

## 2025-09-03 VITALS
BODY MASS INDEX: 30.8 KG/M2 | SYSTOLIC BLOOD PRESSURE: 117 MMHG | WEIGHT: 163.13 LBS | HEART RATE: 100 BPM | OXYGEN SATURATION: 97 % | HEIGHT: 61 IN | DIASTOLIC BLOOD PRESSURE: 80 MMHG

## 2025-09-03 DIAGNOSIS — G89.4 CHRONIC PAIN SYNDROME: ICD-10-CM

## 2025-09-03 DIAGNOSIS — Z98.890 HISTORY OF LUMBAR LAMINECTOMY: Primary | ICD-10-CM

## 2025-09-03 DIAGNOSIS — M47.816 LUMBAR FACET ARTHROPATHY: ICD-10-CM

## 2025-09-03 DIAGNOSIS — G89.29 CHRONIC BILATERAL LOW BACK PAIN WITHOUT SCIATICA: ICD-10-CM

## 2025-09-03 DIAGNOSIS — M47.816 LUMBAR SPONDYLOSIS: Primary | ICD-10-CM

## 2025-09-03 DIAGNOSIS — M51.360 DEGENERATION OF INTERVERTEBRAL DISC OF LUMBAR REGION WITH DISCOGENIC BACK PAIN: ICD-10-CM

## 2025-09-03 DIAGNOSIS — Z74.09 IMPAIRED FUNCTIONAL MOBILITY, BALANCE, GAIT, AND ENDURANCE: ICD-10-CM

## 2025-09-03 DIAGNOSIS — M54.50 CHRONIC BILATERAL LOW BACK PAIN WITHOUT SCIATICA: ICD-10-CM

## 2025-09-03 DIAGNOSIS — M96.1 LUMBAR POSTLAMINECTOMY SYNDROME: ICD-10-CM

## 2025-09-03 DIAGNOSIS — Z98.1 S/P LUMBAR FUSION: ICD-10-CM

## 2025-09-03 PROCEDURE — 1159F MED LIST DOCD IN RCRD: CPT | Mod: CPTII,,, | Performed by: ANESTHESIOLOGY

## 2025-09-03 PROCEDURE — 3074F SYST BP LT 130 MM HG: CPT | Mod: CPTII,,, | Performed by: ANESTHESIOLOGY

## 2025-09-03 PROCEDURE — 1160F RVW MEDS BY RX/DR IN RCRD: CPT | Mod: CPTII,,, | Performed by: ANESTHESIOLOGY

## 2025-09-03 PROCEDURE — 99215 OFFICE O/P EST HI 40 MIN: CPT | Mod: PBBFAC,PN | Performed by: ANESTHESIOLOGY

## 2025-09-03 PROCEDURE — 99204 OFFICE O/P NEW MOD 45 MIN: CPT | Mod: S$PBB,,, | Performed by: ANESTHESIOLOGY

## 2025-09-03 PROCEDURE — 3079F DIAST BP 80-89 MM HG: CPT | Mod: CPTII,,, | Performed by: ANESTHESIOLOGY

## 2025-09-03 PROCEDURE — 3008F BODY MASS INDEX DOCD: CPT | Mod: CPTII,,, | Performed by: ANESTHESIOLOGY

## 2025-09-03 PROCEDURE — 99999 PR PBB SHADOW E&M-EST. PATIENT-LVL V: CPT | Mod: PBBFAC,,, | Performed by: ANESTHESIOLOGY

## 2025-09-03 RX ORDER — GABAPENTIN 300 MG/1
300 CAPSULE ORAL 3 TIMES DAILY
COMMUNITY
End: 2025-09-03

## 2025-09-03 RX ORDER — TIZANIDINE 4 MG/1
4 TABLET ORAL 2 TIMES DAILY PRN
COMMUNITY

## 2025-09-03 RX ORDER — DULOXETIN HYDROCHLORIDE 30 MG/1
30 CAPSULE, DELAYED RELEASE ORAL DAILY
COMMUNITY
Start: 2025-08-19 | End: 2025-09-18

## 2025-09-03 RX ORDER — BUPROPION HYDROCHLORIDE 150 MG/1
150 TABLET, EXTENDED RELEASE ORAL DAILY
COMMUNITY
Start: 2025-08-19 | End: 2025-09-18

## 2025-09-03 RX ORDER — GABAPENTIN 300 MG/1
600 CAPSULE ORAL 3 TIMES DAILY
Qty: 180 CAPSULE | Refills: 11 | Status: SHIPPED | OUTPATIENT
Start: 2025-09-03 | End: 2026-09-03

## 2025-09-03 RX ORDER — DEXTROAMPHETAMINE SACCHARATE, AMPHETAMINE ASPARTATE, DEXTROAMPHETAMINE SULFATE AND AMPHETAMINE SULFATE 1.25; 1.25; 1.25; 1.25 MG/1; MG/1; MG/1; MG/1
5 TABLET ORAL DAILY
COMMUNITY
Start: 2025-09-02 | End: 2025-09-16

## (undated) DEVICE — DRAPE STERI-DRAPE 1000 17X11IN

## (undated) DEVICE — SEE MEDLINE ITEM 157117

## (undated) DEVICE — TUBE FRAZIER 5MM 2FT SOFT TIP

## (undated) DEVICE — DRESSING TRANS 4X4 TEGADERM

## (undated) DEVICE — DRESSING AQUACEL FOAM 5 X 5

## (undated) DEVICE — MARKER SKIN STND TIP BLUE BARR

## (undated) DEVICE — SPONGE COTTON TRAY 4X4IN

## (undated) DEVICE — DRESSING AQUACEL SACRAL 9 X 9

## (undated) DEVICE — BUR BONE CUT MICRO TPS 3X3.8MM

## (undated) DEVICE — CLOSURE SKIN STERI STRIP 1/2X4

## (undated) DEVICE — KIT SPINAL PATIENT CARE JACK

## (undated) DEVICE — TRAY CATH FOL SIL URIMTR 16FR

## (undated) DEVICE — NDL SPINAL 18GX3.5 SPINOCAN

## (undated) DEVICE — SPONGE GAUZE 16PLY 4X4

## (undated) DEVICE — SUT STRATAFIX 3-0 30CM

## (undated) DEVICE — SUT VICRYL PLUS 2-0 CT1 18

## (undated) DEVICE — CLOSURE SKIN 1X5 STERI-STRIP

## (undated) DEVICE — DRESSING AQUACEL FOAM 3 X 3

## (undated) DEVICE — CORD BIPOLAR 12 FOOT

## (undated) DEVICE — DRAPE C-ARMOR EQUIPMENT COVER

## (undated) DEVICE — SPONGE LAP 4X18 PREWASHED

## (undated) DEVICE — Device

## (undated) DEVICE — DRAPE C-ARM ELAS CLIP 42X120IN

## (undated) DEVICE — TRAY NEURO OMC

## (undated) DEVICE — ELECTRODE BLADE INSULATED 1 IN

## (undated) DEVICE — SEE MEDLINE ITEM 146347

## (undated) DEVICE — ROUTER TAPERED 2.3MM

## (undated) DEVICE — DRESSING ABSRBNT ISLAND 3.6X8

## (undated) DEVICE — DRAPE ABDOMINAL TIBURON 14X11

## (undated) DEVICE — DIFFUSER

## (undated) DEVICE — SYS CLSR DERMABOND PRINEO 22CM

## (undated) DEVICE — KIT EVACUATOR 3-SPRING 1/8 DRN

## (undated) DEVICE — NDL HYPO 27G X 1 1/2

## (undated) DEVICE — DRESSING MEPILEX BORDER 4 X 4

## (undated) DEVICE — APPLICATOR CHLORAPREP ORN 26ML

## (undated) DEVICE — ELECTRODE REM PLYHSV RETURN 9

## (undated) DEVICE — NDL HYPO REG 25G X 1 1/2

## (undated) DEVICE — TIP YANKAUERS BULB NO VENT

## (undated) DEVICE — GOWN POLY REINF BRTH SLV LG

## (undated) DEVICE — SUT VICRYL+ 1 CT1 18IN

## (undated) DEVICE — KIT SURGIFLO HEMOSTATIC MATRIX

## (undated) DEVICE — DRAPE CORETEMP FLD WRM 56X62IN

## (undated) DEVICE — DRESSING MEPILEX FLEX 3X3IN

## (undated) DEVICE — DRAPE TOP 53X102IN

## (undated) DEVICE — STRIP MEDI WND CLSR 1X5IN

## (undated) DEVICE — DRESSING SURGICAL 1/2X1/2

## (undated) DEVICE — SEE MEDLINE ITEM 157131

## (undated) DEVICE — SEE MEDLINE ITEM 146417

## (undated) DEVICE — SEE MEDLINE ITEM 156905

## (undated) DEVICE — COVER BACK TBL HD 2-TIER 72IN

## (undated) DEVICE — CARTRIDGE OIL

## (undated) DEVICE — SYR IRRIGATION BULB STER 60ML

## (undated) DEVICE — SUT 2-0 SILK 30IN BLK BRAID

## (undated) DEVICE — DRAPE C ARM 42 X 120 10/BX

## (undated) DEVICE — SUTURE STRATAFIX PGA PCL 3-0

## (undated) DEVICE — SEE MEDLINE ITEM 157150

## (undated) DEVICE — SUT ETHILON 3/0 18IN PS-1